# Patient Record
Sex: FEMALE | Race: WHITE | NOT HISPANIC OR LATINO | Employment: OTHER | URBAN - METROPOLITAN AREA
[De-identification: names, ages, dates, MRNs, and addresses within clinical notes are randomized per-mention and may not be internally consistent; named-entity substitution may affect disease eponyms.]

---

## 2019-09-27 ENCOUNTER — RECORDS - HEALTHEAST (OUTPATIENT)
Dept: LAB | Facility: CLINIC | Age: 39
End: 2019-09-27

## 2019-10-02 LAB
GAMMA INTERFERON BACKGROUND BLD IA-ACNC: 0.01 IU/ML
M TB IFN-G BLD-IMP: NEGATIVE
MITOGEN IGNF BCKGRD COR BLD-ACNC: 0 IU/ML
MITOGEN IGNF BCKGRD COR BLD-ACNC: 0 IU/ML
QTF INTERPRETATION: NORMAL
QTF MITOGEN - NIL: >10 IU/ML

## 2020-07-16 ENCOUNTER — TRANSFERRED RECORDS (OUTPATIENT)
Dept: HEALTH INFORMATION MANAGEMENT | Facility: CLINIC | Age: 40
End: 2020-07-16

## 2020-07-19 ENCOUNTER — TRANSFERRED RECORDS (OUTPATIENT)
Dept: HEALTH INFORMATION MANAGEMENT | Facility: CLINIC | Age: 40
End: 2020-07-19

## 2020-07-19 LAB
CREAT SERPL-MCNC: 2.32 MG/DL (ref 0.55–1.02)
GFR SERPL CREATININE-BSD FRML MDRD: 26 ML/MIN/1.73M2
GLUCOSE SERPL-MCNC: 98 MG/DL (ref 70–100)
POTASSIUM SERPL-SCNC: 3.6 MMOL/L (ref 3.5–5.1)

## 2020-09-03 ENCOUNTER — TRANSFERRED RECORDS (OUTPATIENT)
Dept: HEALTH INFORMATION MANAGEMENT | Facility: CLINIC | Age: 40
End: 2020-09-03

## 2020-09-03 ENCOUNTER — MEDICAL CORRESPONDENCE (OUTPATIENT)
Dept: HEALTH INFORMATION MANAGEMENT | Facility: CLINIC | Age: 40
End: 2020-09-03

## 2020-09-10 ENCOUNTER — REFERRAL (OUTPATIENT)
Dept: TRANSPLANT | Facility: CLINIC | Age: 40
End: 2020-09-10

## 2020-09-10 DIAGNOSIS — N18.9 CHRONIC RENAL FAILURE: ICD-10-CM

## 2020-09-10 DIAGNOSIS — N18.6 ESRD (END STAGE RENAL DISEASE) (H): Primary | ICD-10-CM

## 2020-09-10 DIAGNOSIS — Q60.5 CONGENITAL RENAL AGENESIS AND DYSGENESIS: ICD-10-CM

## 2020-09-10 DIAGNOSIS — Z76.82 ORGAN TRANSPLANT CANDIDATE: ICD-10-CM

## 2020-09-10 DIAGNOSIS — I10 ESSENTIAL HYPERTENSION: ICD-10-CM

## 2020-09-10 DIAGNOSIS — Z01.818 PRE-TRANSPLANT EVALUATION FOR KIDNEY TRANSPLANT: ICD-10-CM

## 2020-09-10 DIAGNOSIS — E78.5 HYPERLIPIDEMIA: ICD-10-CM

## 2020-09-10 DIAGNOSIS — Q60.2 CONGENITAL RENAL AGENESIS AND DYSGENESIS: ICD-10-CM

## 2020-09-10 NOTE — LETTER
September 25, 2020    Ariane Flores  Lot 11  1971 [address] [place] [address]    Dear Ariane,    Thank you for your interest in the Transplant Center at [facility], [facility]. We look forward to being a part of your care team and assisting you through the transplant process.    As we discussed, your transplant coordinator is Ann Solomon (Kidney).  You may call your coordinator at any time with questions or concerns call [phone].    Please complete the following.    1. Fill out and return the enclosed forms    Authorization for Electronic Communication    Authorization to Discuss Protected Health Information    Authorization for Release of Protected Health Information    Authorization for Care Everywhere Release of Information    2. Sign up for:    Properati, access to your electronic medical record (see enclosed pamphlet)    Mobile [email or web address], a transplant education website    You can use these tools to learn more about your transplant, communicate with your care team, and track your medical details    Sincerely,  Solid Organ Transplant  [facility], [facility], [facility]    cc: Dwayne Pandya MD(PCP); Rachael Tenorio

## 2020-09-17 VITALS — HEIGHT: 59 IN | BODY MASS INDEX: 34.27 KG/M2 | WEIGHT: 170 LBS

## 2020-09-17 PROBLEM — N17.9 AKI (ACUTE KIDNEY INJURY) (H): Status: ACTIVE | Noted: 2020-07-17

## 2020-09-17 PROBLEM — N25.81 HYPERPARATHYROIDISM, SECONDARY RENAL (H): Status: ACTIVE | Noted: 2019-10-13

## 2020-09-17 PROBLEM — J02.0 STREP PHARYNGITIS: Status: ACTIVE | Noted: 2020-07-17

## 2020-09-17 ASSESSMENT — MIFFLIN-ST. JEOR: SCORE: 1346.74

## 2020-09-17 NOTE — TELEPHONE ENCOUNTER
PCP: Dr. Dwayne Pandya   Referring Provider: Dr. Rachael Tenorio   Referring Diagnosis: One Kidney/CKD    Is patient under the age of 65? Yes  Is patient diabetic? No  Is patient on insulin? No  Was patient offered a pancreas transplant referral? No    Is patient in a group home/assisted living? No  Does patient have a guardian? No    Referral intake process completed.  Patient is aware that after financial approval is received, medical records will be requested.   Patient confirmed for a callback from transplant coordinator on 9/29/2020. (within 2 weeks)  Tentative evaluation date 11/5/2020. (within 4 weeks)    Confirmed coordinator will discuss evaluation process in more detail at the time of their call.   Patient is aware of the need to arrange age appropriate cancer screening, vaccinations, and dental care.  Reminded patient to complete questionnaire, complete medical records release, and review packet prior to evaluation visit .  Assessed patient for special needs (ie--wheelchair, assistance, guardian, and ):  No  Patient instructed to call [phone] with questions.     YAJAIRA Unger, LPN   Solid Organ Transplant

## 2020-09-29 NOTE — TELEPHONE ENCOUNTER
Contacted patient and introduced myself as their Transplant Coordinator, also introduced the role of the Transplant Coordinator in the transplant process.  Explained the purpose of this call including reviewing next steps and answering questions.    Confirmed Referring Provider, Dialysis Center, and Primary Care Physician. Notified patient of the importance of continued communication with referring providers and primary care physicians.    Reviewed components of transplant evaluation process including necessary appointments, tests, and procedures.    Answered questions for patient regarding evaluation, provided my name and contact information and requested they call with any additional questions.    Determined that patient would like additional information regarding transplant by:     Drop Down choices: Mail, Email, MyChart, Phone Call   Encourage MyChart   Notified patients that they will hear from a Transplant  to schedule evaluation.     Reviewed medical records and interviewed the patient. CKD with GFR range of 19 to 26. 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 No kidney biopsy. She has a history of HTN, hyperlipidemia, UTI's, migraines and genital warts. Other surgeries include achilles tendon repair, appendectomy, hernia repair and ovarian cystectomy. She does not remember anything about an ovarian cystectomy and has no idea when this could have been done. Never received blood, no smoking, no etoh, and no recreational drugs. She lives with her SO and daughter. BMI 34.5. She is independent in her ADL's. She has possible living donors. She is due for mammogram, PAP and dental. Records acceptable to proceed with pre-kidney evaluation.    We talked about the evaluation day and she has a save the date for 11/5/2020 and would prefer an in person evaluation. She will arrive at 0700 so she can watch the MTP videos in clinic. Reviewed the list of providers she will see and their  roles. Reviewed the goals of an evaluation and the approval process. She is aware her next contact will be from scheduling. Provided her with my contact information.     Smart set orders placed in Epic and routed to scheduling.

## 2020-10-12 NOTE — TELEPHONE ENCOUNTER
Called patient to schedule P[facility] clinic.  Reviewed equipment needed for virtual visits. Patient states she does not have equipment for virtual visit and would like to schedule in person. Patient declined MyChart and requested we send info via mail re: appts.  Informed patient we will mail info for eval day and coordinator will call week prior to review education.

## 2020-10-15 ENCOUNTER — TRANSFERRED RECORDS (OUTPATIENT)
Dept: HEALTH INFORMATION MANAGEMENT | Facility: CLINIC | Age: 40
End: 2020-10-15

## 2020-10-15 ENCOUNTER — RECORDS - HEALTHEAST (OUTPATIENT)
Dept: LAB | Facility: CLINIC | Age: 40
End: 2020-10-15

## 2020-10-15 LAB — HIV 1+2 AB+HIV1 P24 AG SERPL QL IA: NEGATIVE

## 2020-10-16 LAB — HCV AB SERPL QL IA: NEGATIVE

## 2020-10-20 ENCOUNTER — TRANSFERRED RECORDS (OUTPATIENT)
Dept: HEALTH INFORMATION MANAGEMENT | Facility: CLINIC | Age: 40
End: 2020-10-20

## 2020-10-20 LAB
HPV ABSTRACT: NORMAL
PAP-ABSTRACT: NORMAL

## 2020-10-22 ENCOUNTER — TRANSFERRED RECORDS (OUTPATIENT)
Dept: HEALTH INFORMATION MANAGEMENT | Facility: CLINIC | Age: 40
End: 2020-10-22

## 2020-11-05 ENCOUNTER — OFFICE VISIT (OUTPATIENT)
Dept: TRANSPLANT | Facility: CLINIC | Age: 40
End: 2020-11-05
Attending: INTERNAL MEDICINE
Payer: MEDICARE

## 2020-11-05 ENCOUNTER — ANCILLARY PROCEDURE (OUTPATIENT)
Dept: GENERAL RADIOLOGY | Facility: CLINIC | Age: 40
End: 2020-11-05
Payer: MEDICARE

## 2020-11-05 ENCOUNTER — DOCUMENTATION ONLY (OUTPATIENT)
Dept: TRANSPLANT | Facility: CLINIC | Age: 40
End: 2020-11-05

## 2020-11-05 VITALS
HEART RATE: 72 BPM | OXYGEN SATURATION: 99 % | WEIGHT: 172.1 LBS | HEIGHT: 59 IN | DIASTOLIC BLOOD PRESSURE: 86 MMHG | SYSTOLIC BLOOD PRESSURE: 138 MMHG | BODY MASS INDEX: 34.69 KG/M2

## 2020-11-05 DIAGNOSIS — Q60.2 CONGENITAL RENAL AGENESIS AND DYSGENESIS: ICD-10-CM

## 2020-11-05 DIAGNOSIS — N18.6 ESRD (END STAGE RENAL DISEASE) (H): ICD-10-CM

## 2020-11-05 DIAGNOSIS — Z01.818 PRE-TRANSPLANT EVALUATION FOR KIDNEY TRANSPLANT: ICD-10-CM

## 2020-11-05 DIAGNOSIS — I10 ESSENTIAL HYPERTENSION: ICD-10-CM

## 2020-11-05 DIAGNOSIS — Z11.59 ENCOUNTER FOR SCREENING FOR OTHER VIRAL DISEASES: ICD-10-CM

## 2020-11-05 DIAGNOSIS — N18.4 CKD (CHRONIC KIDNEY DISEASE) STAGE 4, GFR 15-29 ML/MIN (H): ICD-10-CM

## 2020-11-05 DIAGNOSIS — E78.5 HYPERLIPIDEMIA: ICD-10-CM

## 2020-11-05 DIAGNOSIS — Q60.5 CONGENITAL RENAL AGENESIS AND DYSGENESIS: ICD-10-CM

## 2020-11-05 DIAGNOSIS — N18.9 CHRONIC RENAL FAILURE: ICD-10-CM

## 2020-11-05 DIAGNOSIS — Z76.82 ORGAN TRANSPLANT CANDIDATE: ICD-10-CM

## 2020-11-05 DIAGNOSIS — Z01.818 PRE-TRANSPLANT EVALUATION FOR KIDNEY TRANSPLANT: Primary | ICD-10-CM

## 2020-11-05 DIAGNOSIS — N18.32 CHRONIC RENAL FAILURE, STAGE 3B (H): ICD-10-CM

## 2020-11-05 DIAGNOSIS — Z76.82 ORGAN TRANSPLANT CANDIDATE: Primary | ICD-10-CM

## 2020-11-05 DIAGNOSIS — I10 ESSENTIAL (PRIMARY) HYPERTENSION: ICD-10-CM

## 2020-11-05 DIAGNOSIS — E78.00 PURE HYPERCHOLESTEROLEMIA: ICD-10-CM

## 2020-11-05 LAB
ABO + RH BLD: NORMAL
ALBUMIN SERPL-MCNC: 3.4 G/DL (ref 3.4–5)
ALBUMIN UR-MCNC: 100 MG/DL
ALP SERPL-CCNC: 84 U/L (ref 40–150)
ALT SERPL W P-5'-P-CCNC: 24 U/L (ref 0–50)
ANION GAP SERPL CALCULATED.3IONS-SCNC: 5 MMOL/L (ref 3–14)
APPEARANCE UR: CLEAR
APTT PPP: 32 SEC (ref 22–37)
AST SERPL W P-5'-P-CCNC: 16 U/L (ref 0–45)
BACTERIA #/AREA URNS HPF: ABNORMAL /HPF
BASOPHILS # BLD AUTO: 0 10E9/L (ref 0–0.2)
BASOPHILS NFR BLD AUTO: 0.5 %
BILIRUB SERPL-MCNC: 0.5 MG/DL (ref 0.2–1.3)
BILIRUB UR QL STRIP: NEGATIVE
BLD GP AB SCN SERPL QL: NORMAL
BLOOD BANK CMNT PATIENT-IMP: NORMAL
BLOOD BANK CMNT PATIENT-IMP: NORMAL
BUN SERPL-MCNC: 26 MG/DL (ref 7–30)
CALCIUM SERPL-MCNC: 8.7 MG/DL (ref 8.5–10.1)
CHLORIDE SERPL-SCNC: 109 MMOL/L (ref 94–109)
CO2 SERPL-SCNC: 26 MMOL/L (ref 20–32)
COLOR UR AUTO: ABNORMAL
CREAT SERPL-MCNC: 2.5 MG/DL (ref 0.52–1.04)
DIFFERENTIAL METHOD BLD: NORMAL
EOSINOPHIL # BLD AUTO: 0.2 10E9/L (ref 0–0.7)
EOSINOPHIL NFR BLD AUTO: 2.7 %
ERYTHROCYTE [DISTWIDTH] IN BLOOD BY AUTOMATED COUNT: 12.4 % (ref 10–15)
GFR SERPL CREATININE-BSD FRML MDRD: 23 ML/MIN/{1.73_M2}
GLUCOSE SERPL-MCNC: 89 MG/DL (ref 70–99)
GLUCOSE UR STRIP-MCNC: NEGATIVE MG/DL
HCG SERPL QL: NEGATIVE
HCT VFR BLD AUTO: 40.2 % (ref 35–47)
HGB BLD-MCNC: 12.9 G/DL (ref 11.7–15.7)
HGB UR QL STRIP: NEGATIVE
IMM GRANULOCYTES # BLD: 0 10E9/L (ref 0–0.4)
IMM GRANULOCYTES NFR BLD: 0.2 %
INR PPP: 0.96 (ref 0.86–1.14)
KETONES UR STRIP-MCNC: NEGATIVE MG/DL
LEUKOCYTE ESTERASE UR QL STRIP: NEGATIVE
LYMPHOCYTES # BLD AUTO: 2.3 10E9/L (ref 0.8–5.3)
LYMPHOCYTES NFR BLD AUTO: 27 %
MCH RBC QN AUTO: 29.6 PG (ref 26.5–33)
MCHC RBC AUTO-ENTMCNC: 32.1 G/DL (ref 31.5–36.5)
MCV RBC AUTO: 92 FL (ref 78–100)
MONOCYTES # BLD AUTO: 0.8 10E9/L (ref 0–1.3)
MONOCYTES NFR BLD AUTO: 9.2 %
NEUTROPHILS # BLD AUTO: 5.1 10E9/L (ref 1.6–8.3)
NEUTROPHILS NFR BLD AUTO: 60.4 %
NITRATE UR QL: NEGATIVE
NRBC # BLD AUTO: 0 10*3/UL
NRBC BLD AUTO-RTO: 0 /100
PH UR STRIP: 6 PH (ref 5–7)
PLATELET # BLD AUTO: 335 10E9/L (ref 150–450)
POTASSIUM SERPL-SCNC: 3.6 MMOL/L (ref 3.4–5.3)
PROT SERPL-MCNC: 7.4 G/DL (ref 6.8–8.8)
RBC # BLD AUTO: 4.36 10E12/L (ref 3.8–5.2)
RBC #/AREA URNS AUTO: 2 /HPF (ref 0–2)
SODIUM SERPL-SCNC: 140 MMOL/L (ref 133–144)
SOURCE: ABNORMAL
SP GR UR STRIP: 1.01 (ref 1–1.03)
SPECIMEN EXP DATE BLD: NORMAL
SPECIMEN EXP DATE BLD: NORMAL
SQUAMOUS #/AREA URNS AUTO: 10 /HPF (ref 0–1)
UROBILINOGEN UR STRIP-MCNC: 0 MG/DL (ref 0–2)
WBC # BLD AUTO: 8.4 10E9/L (ref 4–11)
WBC #/AREA URNS AUTO: 5 /HPF (ref 0–5)

## 2020-11-05 PROCEDURE — 99204 OFFICE O/P NEW MOD 45 MIN: CPT | Mod: 95 | Performed by: TRANSPLANT SURGERY

## 2020-11-05 PROCEDURE — 99205 OFFICE O/P NEW HI 60 MIN: CPT

## 2020-11-05 PROCEDURE — 85610 PROTHROMBIN TIME: CPT | Performed by: PATHOLOGY

## 2020-11-05 PROCEDURE — 36415 COLL VENOUS BLD VENIPUNCTURE: CPT | Performed by: PATHOLOGY

## 2020-11-05 PROCEDURE — 81240 F2 GENE: CPT | Mod: GZ | Performed by: PATHOLOGY

## 2020-11-05 PROCEDURE — 86644 CMV ANTIBODY: CPT | Performed by: PATHOLOGY

## 2020-11-05 PROCEDURE — 86592 SYPHILIS TEST NON-TREP QUAL: CPT | Performed by: PATHOLOGY

## 2020-11-05 PROCEDURE — 85390 FIBRINOLYSINS SCREEN I&R: CPT | Performed by: PATHOLOGY

## 2020-11-05 PROCEDURE — 85025 COMPLETE CBC W/AUTO DIFF WBC: CPT | Performed by: PATHOLOGY

## 2020-11-05 PROCEDURE — 81241 F5 GENE: CPT | Performed by: PATHOLOGY

## 2020-11-05 PROCEDURE — 86481 TB AG RESPONSE T-CELL SUSP: CPT | Performed by: PATHOLOGY

## 2020-11-05 PROCEDURE — 84703 CHORIONIC GONADOTROPIN ASSAY: CPT | Performed by: PATHOLOGY

## 2020-11-05 PROCEDURE — 86803 HEPATITIS C AB TEST: CPT | Performed by: PATHOLOGY

## 2020-11-05 PROCEDURE — 99000 SPECIMEN HANDLING OFFICE-LAB: CPT | Performed by: PATHOLOGY

## 2020-11-05 PROCEDURE — 85670 THROMBIN TIME PLASMA: CPT | Performed by: PATHOLOGY

## 2020-11-05 PROCEDURE — 80053 COMPREHEN METABOLIC PANEL: CPT | Performed by: PATHOLOGY

## 2020-11-05 PROCEDURE — 86850 RBC ANTIBODY SCREEN: CPT | Performed by: PATHOLOGY

## 2020-11-05 PROCEDURE — 36415 COLL VENOUS BLD VENIPUNCTURE: CPT

## 2020-11-05 PROCEDURE — 86665 EPSTEIN-BARR CAPSID VCA: CPT | Performed by: PATHOLOGY

## 2020-11-05 PROCEDURE — 86706 HEP B SURFACE ANTIBODY: CPT | Performed by: PATHOLOGY

## 2020-11-05 PROCEDURE — 86147 CARDIOLIPIN ANTIBODY EA IG: CPT | Performed by: PATHOLOGY

## 2020-11-05 PROCEDURE — 86900 BLOOD TYPING SEROLOGIC ABO: CPT | Performed by: PATHOLOGY

## 2020-11-05 PROCEDURE — 86780 TREPONEMA PALLIDUM: CPT | Performed by: PATHOLOGY

## 2020-11-05 PROCEDURE — 999N001110 HC STATISTIC HIV 1/2 ANTIGEN/ANTIBODY PRETRANSPLANT ONLY: Performed by: PATHOLOGY

## 2020-11-05 PROCEDURE — 85730 THROMBOPLASTIN TIME PARTIAL: CPT | Performed by: PATHOLOGY

## 2020-11-05 PROCEDURE — 85613 RUSSELL VIPER VENOM DILUTED: CPT | Performed by: PATHOLOGY

## 2020-11-05 PROCEDURE — 86886 COOMBS TEST INDIRECT TITER: CPT | Performed by: PATHOLOGY

## 2020-11-05 PROCEDURE — G0452 MOLECULAR PATHOLOGY INTERPR: HCPCS | Performed by: PATHOLOGY

## 2020-11-05 PROCEDURE — 81001 URINALYSIS AUTO W/SCOPE: CPT | Performed by: PATHOLOGY

## 2020-11-05 PROCEDURE — 86901 BLOOD TYPING SEROLOGIC RH(D): CPT | Performed by: PATHOLOGY

## 2020-11-05 PROCEDURE — 71046 X-RAY EXAM CHEST 2 VIEWS: CPT | Performed by: RADIOLOGY

## 2020-11-05 PROCEDURE — 85730 THROMBOPLASTIN TIME PARTIAL: CPT | Mod: 91 | Performed by: PATHOLOGY

## 2020-11-05 PROCEDURE — 87340 HEPATITIS B SURFACE AG IA: CPT | Performed by: PATHOLOGY

## 2020-11-05 PROCEDURE — 86787 VARICELLA-ZOSTER ANTIBODY: CPT | Performed by: PATHOLOGY

## 2020-11-05 PROCEDURE — 86704 HEP B CORE ANTIBODY TOTAL: CPT | Performed by: PATHOLOGY

## 2020-11-05 PROCEDURE — 86905 BLOOD TYPING RBC ANTIGENS: CPT | Performed by: PATHOLOGY

## 2020-11-05 RX ORDER — FERROUS SULFATE 325(65) MG
325 TABLET ORAL
COMMUNITY

## 2020-11-05 RX ORDER — ATORVASTATIN CALCIUM 10 MG/1
10 TABLET, FILM COATED ORAL DAILY
COMMUNITY

## 2020-11-05 ASSESSMENT — MIFFLIN-ST. JEOR: SCORE: 1363.39

## 2020-11-05 NOTE — LETTER
11/5/2020         RE: Ariane Flores  Lot [address]  1971 [address] [place] [address]        Dear Colleague,    Thank you for referring your patient, Ariane Flores, to the [facility] TRANSPL[facility]. Please see a copy of my visit note below.    Transplant Surgery Consult Note     Medical record number: [id number]  YOB: 1980,   Consult requested  for evaluation of kidney transplant candidacy.    Assessment and Recommendations:Ms. Flores appears to be a good candidate for kidney transplantation and has a good understanding of the risks and benefits of this approach to the management of renal failure. The following issues should be addressed prior to finalizing her transplant candidacy:     Transplant order: Ms. 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  Recommend he be considered as a candidate for kidney trasnplant.  Cardiology consult for cardiac risk stratification to be ordered: needs echo, if echo is nl, does not need cards consult  Would recommend non-con MRI to confirm presence of normal vascular anatomy given etiology of her disease, and combination of anomalies that could contribute to syndrome; also doppler iliac US to make sure nl flow  Transplant listing labs ordered to include HLA, ABOx2, Cr, etc.  Dietician consult ordered: Yes  Social work consult ordered: Yes  Imaging reports reviewed:  Need to be done  Radiology images reviewed: CXR - clear, official report pending  PVR on Mark  Recommend [facility] given surgical history (which included ureteral re-implantation per family and right nephrectomy)  Need [facility]      The majority of our visit was spent in  counselling, discussing the medical and surgical risks of kidney transplantation. We discussed approximate wait time and how that is influenced by issues such as blood type and sensitization (PRA) and access to a living donor. I contrasted potential waiting time for living vs  donor kidneys from  normal (0-85%) or higher (%) kidney donor profile index (KDPI) donors and their associated outcomes. I would not recommend this individual to consider kidneys from high KDPI donors. The reason for this decision is best summarized as: improved long term graft survival. Potential surgical complications of kidney transplantation include bleeding, superficial or deep wound complications (infection, hernia, lymphocele), ureteral anastomotic failure (leak or stenosis), graft thrombosis, need for reoperation and other issues such as cardiac complications, pneumonia, deep venous thrombosis, pulmonary embolism, post transplant diabetes and death. The potential for recurrent disease or need for retransplantation was also addressed. We discussed the possible need for ureteral stent (and subsequent removal), and the utility of protocol biopsy and laboratory studies to evaluate for rejection or recurrent disease. We discussed the risk of graft rejection, our center's average graft and patient survival rates, immunosuppression protocols, as well as the potential opportunity to participate in clinical trials.  We also discussed the average length of stay, recovery process, and posttransplant lab and monitoring protocol.  I emphasized the need for strict immunosuppression medication adherence and the potential for complications of immunosuppression such as skin cancer or lymphoma, as well as a very low but not zero risk of donor-derived disease transmission risks (infection, cancer). Ms. Flores asked good questions and her candidacy will be reviewed at our Multidisciplinary Selection Committee. Thank you for the opportunity  to participate in Ms. Flores's care.      Total time: 45 minutes  Counselling time: 30 minutes    Attestation:  Patient has been seen and evaluated by me.   Vital signs, labs, medications and orders were reviewed.   When obtained, diagnostic images were reviewed by me and interpreted as above.    The care plan was discussed with the multidisciplinary team and I agree with the findings and plan in this note, with any differences recorded in blue.      George Marroquin MD    ---------------------------------------------------------------------------------------------------    HPI: Ms. Flores has Chronic renal failure due to congenital renal agenesis (may have had posterior ureteral valves - per discussion with patient and prior surgical history) and right nephrectomy. The patient is non-diabetic.       The patient is not on dialysis.    Has potential kidney donors:  Yes .  Interested in participation in paired exchange if a donor is willing: Yes     The patient has the following pertinent history:       No    Yes  Dialysis:    [x]      [] via:       Blood Transfusion                  [x]      []  Number of units:   Most recently:  Pregnancy:    []      [x] Number:       Previous Transplant:  [x]      [] Details:    Cancer    [x]      [] Comment:   Kidney stones   [x]      [] Comment:      Recurrent infections  [x]      []  Type:                  Bladder dysfunction  []      [x] Cause:    Claudication   [x]      [] Distance:    Previous Amputation  [x]      [] Cause:     Chronic anticoagulation  [x]      [] Indication:   Congregational  [x]      []      Past Medical History:   Diagnosis Date     CKD (chronic kidney disease)      Depressive disorder      Hyperlipidemia      Hypertension      Migraines      Renal agenesis      Tuberculosis      UTI (urinary tract infection)      Past Surgical History:   Procedure Laterality Date     ABDOMEN SURGERY      Nephrectomy      APPENDECTOMY       HC REPAIR ACHILLES  TENDON,PRIMARY       HERNIA REPAIR       No family history on file.  Social History     Socioeconomic History     Marital status: Single     Spouse name: Not on file     Number of children: Not on file     Years of education: Not on file     Highest education level: Not on file   Occupational History     Not on file   Social Needs     Financial resource strain: Not on file     Food insecurity     Worry: Not on file     Inability: Not on file     Transportation needs     Medical: Not on file     Non-medical: Not on file   Tobacco Use     Smoking status: Never Smoker     Smokeless tobacco: Never Used   Substance and Sexual Activity     Alcohol use: Not Currently     Drug use: Never     Sexual activity: Not on file   Lifestyle     Physical activity     Days per week: Not on file     Minutes per session: Not on file     Stress: Not on file   Relationships     Social connections     Talks on phone: Not on file     Gets together: Not on file     Attends Congregation service: Not on file     Active member of club or organization: Not on file     Attends meetings of clubs or organizations: Not on file     Relationship status: Not on file     Intimate partner violence     Fear of current or ex partner: Not on file     Emotionally abused: Not on file     Physically abused: Not on file     Forced sexual activity: Not on file   Other Topics Concern     Parent/sibling w/ CABG, MI or angioplasty before 65F 55M? Not Asked   Social History Narrative     Not on file       ROS:   CONSTITUTIONAL:  No fevers or chills  EYES: negative for icterus  ENT:  negative for hearing loss, tinnitus and sore throat  RESPIRATORY:  negative for cough, sputum, dyspnea  CARDIOVASCULAR:  negative for chest pain None  GASTROINTESTINAL:  negative for nausea, vomiting, diarrhea or constipation  GENITOURINARY:  negative for incontinence, dysuria, bladder emptying problems  HEME:  No easy bruising  INTEGUMENT:  negative for rash and pruritus  NEURO:  Negative  for headache, seizure disorder  Allergies:   Allergies   Allergen Reactions     Cephalosporins Other (See Comments)     Codeine Other (See Comments)     Diagnostic X-Ray Materials Other (See Comments)     Only has 1 kidney.  Only has 1 kidney.  Only has 1 kidney.       Nsaids Other (See Comments)     Kidney Damage. Have Only has one kidney      Medications:  Prescription Medications as of 11/5/2020       Rx Number Disp Refills Start End Last Dispensed Date Next Fill Date Owning Pharmacy    atorvastatin (LIPITOR) 20 MG tablet            Sig: Take 40 mg by mouth daily    Class: Historical    Route: Oral    cholecalciferol 25 MCG (1000 UT) TABS            Sig: Take 1,000 Units by mouth    Class: Historical    Route: Oral    ferrous sulfate (FEROSUL) 325 (65 Fe) MG tablet            Sig: Take 325 mg by mouth    Class: Historical    Route: Oral        Exam:     There were no vitals taken for this visit.  Appearance: in no apparent distress.   Skin: normal  Eyes:  no redness or discharge.  Sclera anicteric  Head and Neck: Normal, no rashes or jaundice  Respiratory: easy respirations, no audible wheezing.  Abdomen: rounded, No distention   Extremeties: femoral pulses - unable to palpate  Neuro: without deficit   Psychiatric: Normal mood and affect    Diagnostics:   No results found for this or any previous visit (from the past 672 hour(s)).  No results found for: CPRA        Again, thank you for allowing me to participate in the care of your patient.        Sincerely,        YENNY

## 2020-11-05 NOTE — PROGRESS NOTES
TRANSPLANT NEPHROLOGY RECIPIENT EVALUATION NOTE    Assessment and Plan:  # Kidney Transplant Evaluation: Patient is a good candidate overall. Benefits of a living donor transplant were discussed.    # CKD from unclear etiology: history of possible reflux/obstruction and recurrent UTI as a child requiring multiple urologic procedures, last of which was right native nephrectomy at age 13. Will need to obtain [facility] [place] [facility] she had other urologic procedures. No recent UTI or need for self-catheterization in years. She has had a slow progression of her kidney disease over the years. She is not yet on dialysis, most recent eGFR around 23-26 ml/min, but was 17-19 ml/min earlier this summer, and when ready, she may benefit from a kidney transplant. Per surgery, will likely need further abdominal imaging, PVR, and VCUG.     # Cardiac Risk: she has no known history of cardiac disease or events and is asymptomatic with exertion. Will get EKG and ECHO. If normal, no need for cardiac risk assessment at this time.    # Positive Treponema Ab: reflex studies pending.    # Health Maintenance: PAP: Up to date and Dental: Up to date    Discussed the risks and benefits of a transplant, including the risk of surgery and immunosuppression medications.  Patient's overall evaluation will be discussed in the Transplant Program's regular meeting with a final recommendation on the patients suitability for transplant to be made at that time.  Patient was seen in conjunction with Dr. Jeovanny Alvarez as part of a shared visit.    Evaluation:  Ariane Flores was seen in consultation at the request of Dr. George Marroquin for evaluation as a potential kidney transplant recipient.    Reason for Visit:  Ariane Flores is a 40-year-old female with CKD from unclear etiology, who presents for kidney transplant evaluation.    History of Present Illness:         Kidney Disease Hx: Patient was born in [place] and mother  reports the following history as records from that time are not available- had seizures and what sounds like reflux/obstruction (wasn't urinating, large volume urine out with beach placement) within the first few months of life. Required urologic procedure at age 5 months (details unknown). Then was intermittently hospitalized over a 3 year timeframe due to recurrent UTI. Was requiring intermittent catheterization. They moved to [place] when patient was approximately 6-7 years old. She had a few more [facility] at [facility] time [facility] [place]. She ultimately underwent right native nephrectomy at age 13 years. She did relatively well for many years, but has had elevated creatinine and proteinuria since at least 2014, which has steadily worsened. Most recent eGFR low 20's, but did reach a qualifying eGFR earlier this summer. She has not had a UTI in 5+ years. No seizure in 35+ years. Of note, patient's daughter was born with seizures and urinary reflux, which was surgically corrected. Patient has not had genetic testing completed.        Kidney Disease Dx: as above       Biopsy Proven: No         On Dialysis: No       Primary Nephrologist: Dr. Tenorio       H/o Kidney Stones: No       H/o Recurrent/Frequent UTI: Yes          Diabetic Hx: None           Cardiac/Vascular Disease Risk Factors:        - None         Viral Serology Status       CMV IgG Antibody: Positive       EBV IgG Antibody: Positive         Volume Status/Weight:        Volume status: Euvolemic       Weight:  Acceptable BMI       BMI: Body mass index is 34.24 kg/m .         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 She does not otherwise exercise. She denies chest pain, SOB, or claudication symptoms.       Fatigue/Decreased  Energy: [x] No [] Yes    Chest Pain or SOB with Exertion: [x] No [] Yes    Significant Weight Change: [x] No [] Yes    Nausea, Vomiting or Diarrhea: [x] No [] Yes    Fever, Sweats or Chills:  [x] No [] Yes    Leg Swelling [x] No [] Yes        History of Cancer: None    Other Significant Medical Issues: None    Review of Systems:  A comprehensive review of systems was obtained and negative, except as noted in the HPI or PMH.    Past Medical History:   Medical record was reviewed and PMH was discussed with patient and noted below.  Past Medical History:   Diagnosis Date     CKD (chronic kidney disease) stage 4, GFR 15-29 ml/min (H)      Hyperlipidemia      Hypertension      Migraines      Recurrent UTI     in childhood       Past Social History:   Past Surgical History:   Procedure Laterality Date     APPENDECTOMY       HC REPAIR ACHILLES TENDON,PRIMARY       HERNIA REPAIR       NEPHRECTOMY Right     age 13     OTHER SURGICAL HISTORY      complete detail unknown, but required multiple urologic procedures before the age of 10 years     Personal history of bleeding or anesthesia problems: No    Family History:  Family History   Problem Relation Age of Onset     Kidney Disease Daughter        Personal History:   Social History     Socioeconomic History     Marital status: Single     Spouse name: Not on file     Number of children: Not on file     Years of education: Not on file     Highest education level: Not on file   Occupational History     Not on file   Social Needs     Financial resource strain: Not on file     Food insecurity     Worry: Not on file     Inability: Not on file     Transportation needs     Medical: Not on file     Non-medical: Not on file   Tobacco Use     Smoking status: Never Smoker     Smokeless tobacco: Never Used   Substance and Sexual Activity     Alcohol use: Not Currently     Drug use: Never     Sexual activity: Not on file   Lifestyle     Physical activity     Days per week: Not on file      "Minutes per session: Not on file     Stress: Not on file   Relationships     Social connections     Talks on phone: Not on file     Gets together: Not on file     Attends Faith service: Not on file     Active member of club or organization: Not on file     Attends meetings of clubs or organizations: Not on file     Relationship status: Not on file     Intimate partner violence     Fear of current or ex partner: Not on file     Emotionally abused: Not on file     Physically abused: Not on file     Forced sexual activity: Not on file   Other Topics Concern     Parent/sibling w/ CABG, MI or angioplasty before 65F 55M? Not Asked   Social History Narrative     Not on file       Allergies:  Allergies   Allergen Reactions     Cephalosporins Other (See Comments)     Codeine Other (See Comments)     Diagnostic X-Ray Materials Other (See Comments)     Only has 1 kidney.  Only has 1 kidney.  Only has 1 kidney.       Nsaids Other (See Comments)     Kidney Damage. Have Only has one kidney        Medications:  Current Outpatient Medications   Medication Sig     atorvastatin (LIPITOR) 20 MG tablet Take 40 mg by mouth daily     cholecalciferol 25 MCG (1000 UT) TABS Take 1,000 Units by mouth     ferrous sulfate (FEROSUL) 325 (65 Fe) MG tablet Take 325 mg by mouth     No current facility-administered medications for this visit.        Vitals:  /86   Pulse 72   Ht 1.51 m (4' 11.45\")   Wt 78.1 kg (172 lb 1.6 oz)   SpO2 99%   Breastfeeding No   BMI 34.24 kg/m      Exam:  GENERAL APPEARANCE: alert and no distress  HENT: mouth without ulcers or lesions  LYMPHATICS: no cervical or supraclavicular nodes  RESP: lungs clear to auscultation - no rales, rhonchi or wheezes  CV: regular rhythm, normal rate, no rub, no murmur  EDEMA: no LE edema bilaterally  ABDOMEN: soft, nondistended, nontender, bowel sounds normal  MS: extremities normal - no gross deformities noted, no evidence of inflammation in joints, no muscle " tenderness  SKIN: no rash    Results:   Recent Results (from the past 336 hour(s))   HLA Typing Complete SOT Recipient    Collection Time: 11/05/20 11:13 AM   Result Value Ref Range    ABTest Method SSOP     A* locus A*02     A* A*24     B* locus B*07     B* B*35     C* locus C*04     C* C*07     Bw-1 Bw*6     Drsso Test Method SSOP     DRB1* locus DRB1*01(103)     DRB1* DRB1*04     DRB4* locus DRB4*01     DQB1* locus DQB1*04     DQB1* DQB1*05     DQA1*locus DQA1*01     DQA1* DQA1*03     DPB1* DPB1*02:01     DPB1* NMDP CVENJ     DPB1*locus DPB1*04:02     DPB1* locus NMDP       CVAVZ   04:02/105:01/665:01/701:01/724:01N/726:01/730:01/731:01/734:01/735:01/759:  01/776:01/777:01N/809:01/817:01/823:01/858:01/881:01/885:01/887:01/925:01N  /927:01/933:01/936:01Q/958:01/981:01/985:01N/995:01N/1005:01/1020:01/1025:  01/1031:01/1035:01/1075:01/1083:01/1084:01N/1085:01      DPA1* DPA1*01:03     DPA1* NMDP       CUUFX   01:03/01:14/01:16/01:17/01:18/01:19/01:20/01:21Q/01:22/01:23/01:24/01:25/0  1:26/01:27/01:28/01:29N/01:30/01:31/01:32N     ABO type [[id number]]    Collection Time: 11/05/20 11:14 AM   Result Value Ref Range    ABO A     RH(D) Pos     Specimen Expires 11/08/2020    Varicella Zoster Virus Antibody IgG [[id number]]    Collection Time: 11/05/20 11:28 AM   Result Value Ref Range    Varicella Zoster Virus Antibody IgG 4.8 (H) 0.0 - 0.8 AI   Treponema Abs w Reflex to RPR and Titer [[id number]]    Collection Time: 11/05/20 11:28 AM   Result Value Ref Range    Treponema Antibodies Reactive (A) NR^Nonreactive   HIV Antigen Antibody Combo Pretransplant    Collection Time: 11/05/20 11:28 AM   Result Value Ref Range    HIV Antigen Antibody Combo Pretransplant Nonreactive NR^Nonreactive   Hepatitis C antibody [[id number]]    Collection Time: 11/05/20 11:28 AM   Result Value Ref Range    Hepatitis C Antibody Nonreactive NR^Nonreactive   Hepatitis B surface antigen [LXZ119]    Collection Time: 11/05/20 11:28 AM   Result Value Ref Range     Hep B Surface Agn Nonreactive NR^Nonreactive   Hepatitis B Surface Antibody [[id number]]    Collection Time: 11/05/20 11:28 AM   Result Value Ref Range    Hepatitis B Surface Antibody 149.92 (H) <8.00 m[IU]/mL   Hepatitis B core antibody [[id number]]    Collection Time: 11/05/20 11:28 AM   Result Value Ref Range    Hepatitis B Core Nga Nonreactive NR^Nonreactive   EBV Capsid Antibody IgG [[id number]]    Collection Time: 11/05/20 11:28 AM   Result Value Ref Range    EBV Capsid Antibody IgG >8.0 (H) 0.0 - 0.8 AI   CMV Antibody IgG [[id number]]    Collection Time: 11/05/20 11:28 AM   Result Value Ref Range    CMV Antibody IgG 4.2 (H) 0.0 - 0.8 AI   Cardiolipin Nga IgG and IgM [LAB 6836]    Collection Time: 11/05/20 11:28 AM   Result Value Ref Range    Cardiolipin Antibody IgG <1.6 0.0 - 19.9 GPL-U/mL    Cardiolipin Antibody IgM 0.4 0.0 - 19.9 MPL-U/mL   CBC with platelets differential [[id number]]    Collection Time: 11/05/20 11:28 AM   Result Value Ref Range    WBC 8.4 4.0 - 11.0 10e9/L    RBC Count 4.36 3.8 - 5.2 10e12/L    Hemoglobin 12.9 11.7 - 15.7 g/dL    Hematocrit 40.2 35.0 - 47.0 %    MCV 92 78 - 100 fl    MCH 29.6 26.5 - 33.0 pg    MCHC 32.1 31.5 - 36.5 g/dL    RDW 12.4 10.0 - 15.0 %    Platelet Count 335 150 - 450 10e9/L    Diff Method Automated Method     % Neutrophils 60.4 %    % Lymphocytes 27.0 %    % Monocytes 9.2 %    % Eosinophils 2.7 %    % Basophils 0.5 %    % Immature Granulocytes 0.2 %    Nucleated RBCs 0 0 /100    Absolute Neutrophil 5.1 1.6 - 8.3 10e9/L    Absolute Lymphocytes 2.3 0.8 - 5.3 10e9/L    Absolute Monocytes 0.8 0.0 - 1.3 10e9/L    Absolute Eosinophils 0.2 0.0 - 0.7 10e9/L    Absolute Basophils 0.0 0.0 - 0.2 10e9/L    Abs Immature Granulocytes 0.0 0 - 0.4 10e9/L    Absolute Nucleated RBC 0.0    Lupus Anticoagulant Panel ([id number])    Collection Time: 11/05/20 11:28 AM   Result Value Ref Range    Lupus Result Negative NEG^Negative   Thrombin time [IKE847]    Collection Time: 11/05/20 11:28 AM   Result  Value Ref Range    Thrombin Time 16.5 13.0 - 19.0 sec   Partial thromboplastin time [LAB56]    Collection Time: 11/05/20 11:28 AM   Result Value Ref Range    PTT 32 22 - 37 sec   INR [NHG2312]    Collection Time: 11/05/20 11:28 AM   Result Value Ref Range    INR 0.96 0.86 - 1.14   Quantiferon TB Gold Plus    Collection Time: 11/05/20 11:28 AM    Specimen: Blood   Result Value Ref Range    MTB Quantiferon Result Negative NEG^Negative    TB1 Ag minus Nil 0.04 IU/mL    TB2 Ag minus Nil 0.04 IU/mL    Mitogen minus Nil 9.96 IU/mL    NIL Result 0.04 IU/mL   HCG qualitative [IDC377]    Collection Time: 11/05/20 11:28 AM   Result Value Ref Range    HCG Qualitative Serum Negative NEG^Negative   Comprehensive metabolic panel [LAB17]    Collection Time: 11/05/20 11:28 AM   Result Value Ref Range    Sodium 140 133 - 144 mmol/L    Potassium 3.6 3.4 - 5.3 mmol/L    Chloride 109 94 - 109 mmol/L    Carbon Dioxide 26 20 - 32 mmol/L    Anion Gap 5 3 - 14 mmol/L    Glucose 89 70 - 99 mg/dL    Urea Nitrogen 26 7 - 30 mg/dL    Creatinine 2.50 (H) 0.52 - 1.04 mg/dL    GFR Estimate 23 (L) >60 mL/min/[1.73_m2]    GFR Estimate If Black 27 (L) >60 mL/min/[1.73_m2]    Calcium 8.7 8.5 - 10.1 mg/dL    Bilirubin Total 0.5 0.2 - 1.3 mg/dL    Albumin 3.4 3.4 - 5.0 g/dL    Protein Total 7.4 6.8 - 8.8 g/dL    Alkaline Phosphatase 84 40 - 150 U/L    ALT 24 0 - 50 U/L    AST 16 0 - 45 U/L   ABO Subtyping [[id number]]    Collection Time: 11/05/20 11:28 AM   Result Value Ref Range    Antigen Type A1 Positive     ABO/Rh type and screen [MBB407]    Collection Time: 11/05/20 11:28 AM   Result Value Ref Range    ABO A     RH(D) Pos     Antibody Screen Neg     Test Valid Only At          [facility]    Specimen Expires 11/08/2020    Rapid Plasma Reagin w Rflx to TITER    Collection Time: 11/05/20 11:28 AM   Result Value Ref Range    Rapid Plasma Reagin Nonreactive NR^Nonreactive   Treponema pallidum antibody confirm     Collection Time: 11/05/20 11:28 AM   Result Value Ref Range    T Pallidum by TP-PA conf Non Reactive Non Reactive   Antibody titer red cell [KKF2530]    Collection Time: 11/05/20 11:30 AM   Result Value Ref Range    Antibody Titer Anti B IgM 32, IgG 16    Factor 2 and 5 mutation analysis    Collection Time: 11/05/20 11:37 AM   Result Value Ref Range    Copath Report       Patient Name: AMANDA CARABALLO  MR#: [id number]  Specimen #: [id number]  Collected: 11/5/2020 11:37  Received: 11/6/2020 08:53  Reported: 11/9/2020 16:39  Ordering Phy(s): ABDIRIZAK SELBY  Additional Phy(s): MELISA HERNANDEZ  Copy To:  Kim    For improved result formatting, select 'View Enhanced Report Format' under   Linked Documents section.  _________________________________________    TEST(S) REQUESTED:  Factor 5 Leiden and Factor 2 by PCR    SPECIMEN DESCRIPTION:  Blood    RESULTS:    Factor V 1691G>A (Leiden)  RESULTS:  Mutation analyzed:     1691G>A  Factor V 1691G>A (Leiden)  Interpretation:      ABSENT  Factor V 1691G>A (Leiden) mutation  genotype:      G/G    FACTOR 2/PROTHROMBIN RESULTS:  Mutation analyzed:     88433D>A  Factor 2 Mutation Interpretation:      PRESENT  Factor 2 Mutation genotype:      G/A    INTERPRETATION:  The patient is negative for the Factor V 1691G>A (Leiden) mutation.    The patient is a heterozygote (one copy of the gene is positive) for the   Factor 2  mutation.    The presence of the Factor 2 mutation is an indication of increased risk   of developing a thrombosis.  Consultation regarding these results is available upon request.    METHODOLOGY:   The regions of genomic DNA containing the G2104K Factor V   Leiden gene mutation (Factor V  Leiden) and the Factor 2(Prothrombin L32275Z) gene mutation were   simultaneously amplified using the polymerase  chain reaction.  The amplified products were digested with restriction   endonuclease TaqI and products were  analyzed by gel electrophoresis.    COMMENTS:  If a  patient is the recipient of an allogeneic bone marrow transplant,   this test must be done on a  pre-transplant sample or buccal swab.  A previous allogeneic bone marrow   transplant will interfere with test  results.  Call the [facility]([phone]) for   instructions on sample collection for these  patients.    This test was developed and its performance characteristics determined by   [facility]  [facility]. It has not been cleared or approved by the FDA.   The laboratory is regulated under CLIA  as qualified to perform high-complexity testing. This test is used for   clinical purposes. It should not be  regarded as investigational or for research.    A resident/fellow in an accredited training program was involved in the   selection of testing, review of  laboratory data, and/or interpretation of this case.  I, as the senior   physician, attest that I: (i) confirmed  appropriate testing, (ii) examined the relevant raw data for the   specimen(s); and (iii) rendered or confirmed  the interpretation(s).    Electronically Signed Out By:  Viktor Rogers MD    CPT Codes:  A: 94126-K5GGVR, 76032-D2OBBM, -SRVJVY(2)    TESTING LAB LOCATION:  [address] [address] [phone]    COLLECTION SITE:  [facility]  [facility]  [facility]     UA with Microscopic reflex to Culture    Collection Time: 11/05/20 11:50 AM    Specimen: Midstream Urine   Result Value Ref Range    Color Urine Straw     Appearance Urine Clear     Glucose Urine Negative NEG^Negative mg/dL    Bilirubin Urine Negative NEG^Negative    Ketones Urine Negative NEG^Negative mg/dL    Specific Gravity Urine 1.006 1.003 - 1.035    Blood Urine Negative NEG^Negative    pH Urine 6.0 5.0 - 7.0 pH    Protein Albumin Urine 100 (A) NEG^Negative mg/dL    Urobilinogen mg/dL 0.0 0.0 - 2.0  mg/dL    Nitrite Urine Negative NEG^Negative    Leukocyte Esterase Urine Negative NEG^Negative    Source Midstream Urine     WBC Urine 5 0 - 5 /HPF    RBC Urine 2 0 - 2 /HPF    Bacteria Urine Few (A) NEG^Negative /HPF    Squamous Epithelial /HPF Urine 10 (H) 0 - 1 /HPF   EKG 12-lead, tracing only [EKG1]    Collection Time: 11/05/20 11:53 AM   Result Value Ref Range    Interpretation ECG Click View Image link to view waveform and result        Patient was seen by myself, Dr. Jeovanny Alvarez, in conjunction with Adowa Whiteside PA-C as part of a shared visit.    I personally reviewed past medical and surgical history, vital signs, medications and labs.  Present and past medical history, along with significant physical exam findings were all reviewed with ALISHA.    My key findings:  Ariane Flores is 40 year old, who presents for Kidney transplant evaluation.    Key management decisions made by me and discussed with ALISHA:    1.  Advanced CKD not yet on dialysis.  2.  Transplant candidacy evaluation.  3.  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  4.  Concern for complex psychosocial situation due to possible learning disability.  5.  Need to update age-appropriate cancer screening.  Discussion:  Ms. Flores is a delightful 40-year-old lady was accompanied by her mother for kidney transplant evaluation.  She is not yet on dialysis.  Her original disease is related to her urinary tract malformation.  It is unclear what procedure she had over the years and whether she had bladder augmentation or not.  We discussed the need to obtain VCUG today assess bladder capacity and reflux.  From cardiovascular standpoint, she will need an echo and EKG.  Her age-appropriate cancer screening will include Pap smears and regular mammograms.  Await formal psychosocial assessment likely will benefit from neurobehavioral assessment.  Of note, she appears to be compliant with her medications but the  concern is if she will be able to keep up with frequent changes in medications and tight follow-up especially early on post engraftment with many moving pieces.  We will discuss Ms. Flores's case in our multidisciplinary meeting for final candidacy decision.

## 2020-11-05 NOTE — PROGRESS NOTES
Psychosocial Assessment  Patient Name/ Age: Ariane Flores 40 year old   Medical Record #: [id number]  Duration of Interview:     30min  Process:   Face-to-Face Interview                (counseling < 50%)   Present at Appointment: Ariane and her mother Hilda        :GERMAN Villalpando, LICSW Date:  November 5, 2020        Type of transplant: Kidney    Donor type:   Hilda indicated she is willing to be a donor for her daughter.   Cadaver and parent   Prior Transplants:    No Status of Transplant:       Current Living Situation    [facility] 1971 [facility] [address]  With Whom: Significant other Denis and Ariane's daughter Lisa (17)       Family/ Social Support:    Hilda lives in [place], [place].  Ariane has one brother ([place]).   Available, helpful   Committed relationship:  Ariane and Denis have been in a relationship for 10 years.   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      Income   Salary/wages and SSDI   Insurance  Medicare [employer], [employer], Part D through [employer]    At this time, patient can afford medication costs:  Yes  Medicare and MA       Medical Status    Current mode of treatment for ESRD None   Complications - Non diabetic        Behavioral    Tobacco Use No Chemical Dependency No       Psychiatric Impairment No    Reading ability Good  Education Level: Some College Recent Legal History No    Coping Style/Strategies: Ariane indicated she does not know have stress.     Ability to Adhere to Complex Medical Regime: Yes     Adherence History:  Ariane indicated she will usually follow her physician's recommendations.         Education  _X_ Medicare  _X_ Rehabilitation  _X_ Donor issues  _X_ Community resources  _X_ Post discharge housing  _X_ Financial resources  _X_ Medical insurance options  _X_ Psych adjustment  _X_ Family adjustment  _X_ Health Care Directive - Provided Education and is working on completed it. Psychosocial Risks of Transplant Reviewed and Discussed:  _X_ Increased stress related to emotional,            family, social, employment or financial           situation  _X_ Affect on work and/or disability benefits  _X_ Affect on future life insurance  _X_ Transplant outcome expectations may           not be met  _X_ Mental Health Risks: anxiety,           depression, PTSD, guilt, grief and           chronic fatigue     Notable Items:   None noted.       Final Evaluation/Assessment   Patient seemed to process information well. Appeared well informed, motivated and able to follow post transplant requirements. Behavior was appropriate during interview. Has adequate income and insurance coverage. Adequate social support. No major contraindications noted for transplant.  At this time patient appears to understand the risks and benefits of transplant.      Recommendation  Acceptable    Selection Criteria Met:  Plan for support Yes   Chemical Dependence Yes   Smoking Yes   Mental Health Yes   Adequate Finances Yes    Signature: GERMAN Villalpando, [facility]   Title: Clinical

## 2020-11-05 NOTE — PROGRESS NOTES
Transplant Surgery Consult Note     Medical record number: [id number]  YOB: 1980,   Consult requested  for evaluation of kidney transplant candidacy.    Assessment and Recommendations:Ms. Flores appears to be a good candidate for kidney transplantation and has a good understanding of the risks and benefits of this approach to the management of renal failure. The following issues should be addressed prior to finalizing her transplant candidacy:     Transplant order: Ms. 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  Recommend he be considered as a candidate for kidney trasnplant.  Cardiology consult for cardiac risk stratification to be ordered: needs echo, if echo is nl, does not need cards consult  Would recommend non-con MRI to confirm presence of normal vascular anatomy given etiology of her disease, and combination of anomalies that could contribute to syndrome; also doppler iliac US to make sure nl flow  Transplant listing labs ordered to include HLA, ABOx2, Cr, etc.  Dietician consult ordered: Yes  Social work consult ordered: Yes  Imaging reports reviewed:  Need to be done  Radiology images reviewed: CXR - clear, official report pending  PVR on Mark  Recommend [facility] given surgical history (which included ureteral re-implantation per family and right nephrectomy)  Need surgical records from [facility]      The majority of our visit was spent in counselling, discussing the medical and surgical risks of kidney transplantation. We discussed approximate wait time and how that is influenced by issues such as blood type and sensitization (PRA) and access to a living donor. I contrasted potential waiting time for  living vs  donor kidneys from  normal (0-85%) or higher (%) kidney donor profile index (KDPI) donors and their associated outcomes. I would not recommend this individual to consider kidneys from high KDPI donors. The reason for this decision is best summarized as: improved long term graft survival. Potential surgical complications of kidney transplantation include bleeding, superficial or deep wound complications (infection, hernia, lymphocele), ureteral anastomotic failure (leak or stenosis), graft thrombosis, need for reoperation and other issues such as cardiac complications, pneumonia, deep venous thrombosis, pulmonary embolism, post transplant diabetes and death. The potential for recurrent disease or need for retransplantation was also addressed. We discussed the possible need for ureteral stent (and subsequent removal), and the utility of protocol biopsy and laboratory studies to evaluate for rejection or recurrent disease. We discussed the risk of graft rejection, our center's average graft and patient survival rates, immunosuppression protocols, as well as the potential opportunity to participate in clinical trials.  We also discussed the average length of stay, recovery process, and posttransplant lab and monitoring protocol.  I emphasized the need for strict immunosuppression medication adherence and the potential for complications of immunosuppression such as skin cancer or lymphoma, as well as a very low but not zero risk of donor-derived disease transmission risks (infection, cancer). Ms. Flores asked good questions and her candidacy will be reviewed at our Multidisciplinary Selection Committee. Thank you for the opportunity to participate in Ms. Flores's care.      Total time: 45 minutes  Counselling time: 30 minutes    Attestation:  Patient has been seen and evaluated by me.   Vital signs, labs, medications and orders were reviewed.   When obtained, diagnostic images were reviewed by me  and interpreted as above.    The care plan was discussed with the multidisciplinary team and I agree with the findings and plan in this note, with any differences recorded in blue.      George Marroquin MD    ---------------------------------------------------------------------------------------------------    HPI: Ms. Flores has Chronic renal failure due to congenital renal agenesis (may have had posterior ureteral valves - per discussion with patient and prior surgical history) and right nephrectomy. The patient is non-diabetic.       The patient is not on dialysis.    Has potential kidney donors:  Yes .  Interested in participation in paired exchange if a donor is willing: Yes     The patient has the following pertinent history:       No    Yes  Dialysis:    [x]      [] via:       Blood Transfusion                  [x]      []  Number of units:   Most recently:  Pregnancy:    []      [x] Number:       Previous Transplant:  [x]      [] Details:    Cancer    [x]      [] Comment:   Kidney stones   [x]      [] Comment:      Recurrent infections  [x]      []  Type:                  Bladder dysfunction  []      [x] Cause:    Claudication   [x]      [] Distance:    Previous Amputation  [x]      [] Cause:     Chronic anticoagulation  [x]      [] Indication:   [facility]  [x]      []      Past Medical History:   Diagnosis Date     CKD (chronic kidney disease)      Depressive disorder      Hyperlipidemia      Hypertension      Migraines      Renal agenesis      Tuberculosis      UTI (urinary tract infection)      Past Surgical History:   Procedure Laterality Date     ABDOMEN SURGERY      Nephrectomy      APPENDECTOMY       HC REPAIR ACHILLES TENDON,PRIMARY       HERNIA REPAIR       No family history on file.  Social History     Socioeconomic History     Marital status: Single     Spouse name: Not on file     Number of children: Not on file     Years of education: Not on file     Highest education level: Not on  file   Occupational History     Not on file   Social Needs     Financial resource strain: Not on file     Food insecurity     Worry: Not on file     Inability: Not on file     Transportation needs     Medical: Not on file     Non-medical: Not on file   Tobacco Use     Smoking status: Never Smoker     Smokeless tobacco: Never Used   Substance and Sexual Activity     Alcohol use: Not Currently     Drug use: Never     Sexual activity: Not on file   Lifestyle     Physical activity     Days per week: Not on file     Minutes per session: Not on file     Stress: Not on file   Relationships     Social connections     Talks on phone: Not on file     Gets together: Not on file     Attends Gnosticism service: Not on file     Active member of club or organization: Not on file     Attends meetings of clubs or organizations: Not on file     Relationship status: Not on file     Intimate partner violence     Fear of current or ex partner: Not on file     Emotionally abused: Not on file     Physically abused: Not on file     Forced sexual activity: Not on file   Other Topics Concern     Parent/sibling w/ CABG, MI or angioplasty before 65F 55M? Not Asked   Social History Narrative     Not on file       ROS:   CONSTITUTIONAL:  No fevers or chills  EYES: negative for icterus  ENT:  negative for hearing loss, tinnitus and sore throat  RESPIRATORY:  negative for cough, sputum, dyspnea  CARDIOVASCULAR:  negative for chest pain None  GASTROINTESTINAL:  negative for nausea, vomiting, diarrhea or constipation  GENITOURINARY:  negative for incontinence, dysuria, bladder emptying problems  HEME:  No easy bruising  INTEGUMENT:  negative for rash and pruritus  NEURO:  Negative for headache, seizure disorder  Allergies:   Allergies   Allergen Reactions     Cephalosporins Other (See Comments)     Codeine Other (See Comments)     Diagnostic X-Ray Materials Other (See Comments)     Only has 1 kidney.  Only has 1 kidney.  Only has 1 kidney.        Nsaids Other (See Comments)     Kidney Damage. Have Only has one kidney      Medications:  Prescription Medications as of 11/5/2020       Rx Number Disp Refills Start End Last Dispensed Date Next Fill Date Owning Pharmacy    atorvastatin (LIPITOR) 20 MG tablet            Sig: Take 40 mg by mouth daily    Class: Historical    Route: Oral    cholecalciferol 25 MCG (1000 UT) TABS            Sig: Take 1,000 Units by mouth    Class: Historical    Route: Oral    ferrous sulfate (FEROSUL) 325 (65 Fe) MG tablet            Sig: Take 325 mg by mouth    Class: Historical    Route: Oral        Exam:     There were no vitals taken for this visit.  Appearance: in no apparent distress.   Skin: normal  Eyes:  no redness or discharge.  Sclera anicteric  Head and Neck: Normal, no rashes or jaundice  Respiratory: easy respirations, no audible wheezing.  Abdomen: rounded, No distention   Extremeties: femoral pulses - unable to palpate  Neuro: without deficit   Psychiatric: Normal mood and affect    Diagnostics:   No results found for this or any previous visit (from the past 672 hour(s)).  No results found for: CPRA

## 2020-11-05 NOTE — LETTER
11/5/2020         RE: Ariane Flores  Lot [birth year]  1971 16 [address] [place] [address]        Dear Colleague,    Thank you for referring your patient, Ariane Flores, to the [facility]PL[facility]. Please see a copy of my visit note below.    TRANSPLANT NEPHROLOGY RECIPIENT EVALUATION NOTE    Assessment and Plan:  # Kidney Transplant Evaluation: Patient is a good candidate overall. Benefits of a living donor transplant were discussed.    # CKD from unclear etiology: history of possible reflux/obstruction and recurrent UTI as a child requiring multiple urologic procedures, last of which was right native nephrectomy at age 13. Will need to obtain records [facility] [place] [facility] she had other urologic procedures. No recent UTI or need for self-catheterization in years. She has had a slow progression of her kidney disease over the years. She is not yet on dialysis, most recent eGFR around 23-26 ml/min, but was 17-19 ml/min earlier this summer, and when ready, she may benefit from a kidney transplant. Per surgery, will likely need further abdominal imaging, PVR, and VCUG.     # Cardiac Risk: she has no known history of cardiac disease or events and is asymptomatic with exertion. Will get EKG and ECHO. If normal, no need for cardiac risk assessment at this time.    # Positive Treponema Ab: reflex studies pending.    # Health Maintenance: PAP: Up to date and Dental: Up to date    Discussed the risks and benefits of a transplant, including the risk of surgery and immunosuppression medications.  Patient's overall evaluation will be discussed in the Transplant Program's regular meeting with a final recommendation on the patients suitability for transplant to be made at that time.  Patient was seen in conjunction with Dr. Jeovanny Alvarez as part of a shared visit.    Evaluation:  Ariane Flores was seen in consultation at the request of Dr. George Marroquin for evaluation as a potential kidney transplant  recipient.    Reason for Visit:  Ariane Flores is a 40-year-old female with CKD from unclear etiology, who presents for kidney transplant evaluation.    History of Present Illness:         Kidney Disease Hx: Patient was born in [place] and mother reports the following history as records from that time are not available- had seizures and what sounds like reflux/obstruction (wasn't urinating, large volume urine out with beach placement) within the first few months of life. Required urologic procedure at age 5 months (details unknown). Then was intermittently hospitalized over a 3 year timeframe due to recurrent UTI. Was requiring intermittent catheterization. They moved to [place] when patient was approximately 6-7 years old. She [facility] a few more [facility] at that time [facility] [place]. She ultimately underwent right native nephrectomy at age 13 years. She did relatively well for many years, but has had elevated creatinine and proteinuria since at least 2014, which has steadily worsened. Most recent eGFR low 20's, but did reach a qualifying eGFR earlier this summer. She has not had a UTI in 5+ years. No seizure in 35+ years. Of note, patient's daughter was born with seizures and urinary reflux, which was surgically corrected. Patient has not had genetic testing completed.        Kidney Disease Dx: as above       Biopsy Proven: No         On Dialysis: No       Primary Nephrologist: Dr. Tenorio       H/o Kidney Stones: No       H/o Recurrent/Frequent UTI: Yes          Diabetic Hx: None           Cardiac/Vascular Disease Risk Factors:        - None         Viral Serology Status       CMV IgG Antibody: Positive       EBV IgG Antibody: Positive         Volume Status/Weight:        Volume status: Euvolemic       Weight:  Acceptable BMI       BMI: Body mass index is 34.24 kg/m .         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 She does not otherwise exercise. She denies chest pain, SOB, or claudication symptoms.       Fatigue/Decreased Energy: [x] No [] Yes    Chest Pain or SOB with Exertion: [x] No [] Yes    Significant Weight Change: [x] No [] Yes    Nausea, Vomiting or Diarrhea: [x] No [] Yes    Fever, Sweats or Chills:  [x] No [] Yes    Leg Swelling [x] No [] Yes        History of Cancer: None    Other Significant Medical Issues: None    Review of Systems:  A comprehensive review of systems was obtained and negative, except as noted in the HPI or PMH.    Past Medical History:   Medical record was reviewed and PMH was discussed with patient and noted below.  Past Medical History:   Diagnosis Date     CKD (chronic kidney disease) stage 4, GFR 15-29 ml/min (H)      Hyperlipidemia      Hypertension      Migraines      Recurrent UTI     in childhood       Past Social History:   Past Surgical History:   Procedure Laterality Date     APPENDECTOMY       HC REPAIR ACHILLES TENDON,PRIMARY       HERNIA REPAIR       NEPHRECTOMY Right     age 13     OTHER SURGICAL HISTORY      complete detail unknown, but required multiple urologic procedures before the age of 10 years     Personal history of bleeding or anesthesia problems: No    Family History:  Family History   Problem Relation Age of Onset     Kidney Disease Daughter        Personal History:   Social History     Socioeconomic History     Marital status: Single     Spouse name: Not on file     Number of children: Not on file     Years of education: Not on file     Highest education level: Not on file   Occupational History     Not on file   Social Needs     Financial resource strain: Not on file     Food insecurity     Worry: Not on file     Inability: Not on file     Transportation needs     Medical: Not on file     Non-medical: Not on file   Tobacco Use      "Smoking status: Never Smoker     Smokeless tobacco: Never Used   Substance and Sexual Activity     Alcohol use: Not Currently     Drug use: Never     Sexual activity: Not on file   Lifestyle     Physical activity     Days per week: Not on file     Minutes per session: Not on file     Stress: Not on file   Relationships     Social connections     Talks on phone: Not on file     Gets together: Not on file     Attends Latter day service: Not on file     Active member of club or organization: Not on file     Attends meetings of clubs or organizations: Not on file     Relationship status: Not on file     Intimate partner violence     Fear of current or ex partner: Not on file     Emotionally abused: Not on file     Physically abused: Not on file     Forced sexual activity: Not on file   Other Topics Concern     Parent/sibling w/ CABG, MI or angioplasty before 65F 55M? Not Asked   Social History Narrative     Not on file       Allergies:  Allergies   Allergen Reactions     Cephalosporins Other (See Comments)     Codeine Other (See Comments)     Diagnostic X-Ray Materials Other (See Comments)     Only has 1 kidney.  Only has 1 kidney.  Only has 1 kidney.       Nsaids Other (See Comments)     Kidney Damage. Have Only has one kidney        Medications:  Current Outpatient Medications   Medication Sig     atorvastatin (LIPITOR) 20 MG tablet Take 40 mg by mouth daily     cholecalciferol 25 MCG (1000 UT) TABS Take 1,000 Units by mouth     ferrous sulfate (FEROSUL) 325 (65 Fe) MG tablet Take 325 mg by mouth     No current facility-administered medications for this visit.        Vitals:  /86   Pulse 72   Ht 1.51 m (4' 11.45\")   Wt 78.1 kg (172 lb 1.6 oz)   SpO2 99%   Breastfeeding No   BMI 34.24 kg/m      Exam:  GENERAL APPEARANCE: alert and no distress  HENT: mouth without ulcers or lesions  LYMPHATICS: no cervical or supraclavicular nodes  RESP: lungs clear to auscultation - no rales, rhonchi or wheezes  CV: regular " rhythm, normal rate, no rub, no murmur  EDEMA: no LE edema bilaterally  ABDOMEN: soft, nondistended, nontender, bowel sounds normal  MS: extremities normal - no gross deformities noted, no evidence of inflammation in joints, no muscle tenderness  SKIN: no rash    Results:   Recent Results (from the past 336 hour(s))   HLA Typing Complete SOT Recipient    Collection Time: 11/05/20 11:13 AM   Result Value Ref Range    ABTest Method SSOP     A* locus A*02     A* A*24     B* locus B*07     B* B*35     C* locus C*04     C* C*07     Bw-1 Bw*6     Drsso Test Method SSOP     DRB1* locus DRB1*01(103)     DRB1* DRB1*04     DRB4* locus DRB4*01     DQB1* locus DQB1*04     DQB1* DQB1*05     DQA1*locus DQA1*01     DQA1* DQA1*03     DPB1* DPB1*02:01     DPB1* NMDP CVENJ     DPB1*locus DPB1*04:02     DPB1* locus NMDP       CVAVZ   04:02/105:01/665:01/701:01/724:01N/726:01/730:01/731:01/734:01/735:01/759:  01/776:01/777:01N/809:01/817:01/823:01/858:01/881:01/885:01/887:01/925:01N  /927:01/933:01/936:01Q/958:01/981:01/985:01N/995:01N/1005:01/1020:01/1025:  01/1031:01/1035:01/1075:01/1083:01/1084:01N/1085:01      DPA1* DPA1*01:03     DPA1* NMDP       CUUFX   01:03/01:14/01:16/01:17/01:18/01:19/01:20/01:21Q/01:22/01:23/01:24/01:25/0  1:26/01:27/01:28/01:29N/01:30/01:31/01:32N     ABO type [HOT2618]    Collection Time: 11/05/20 11:14 AM   Result Value Ref Range    ABO A     RH(D) Pos     Specimen Expires 11/08/2020    Varicella Zoster Virus Antibody IgG [FAE3265]    Collection Time: 11/05/20 11:28 AM   Result Value Ref Range    Varicella Zoster Virus Antibody IgG 4.8 (H) 0.0 - 0.8 AI   Treponema Abs w Reflex to RPR and Titer [MTY6930]    Collection Time: 11/05/20 11:28 AM   Result Value Ref Range    Treponema Antibodies Reactive (A) NR^Nonreactive   HIV Antigen Antibody Combo Pretransplant    Collection Time: 11/05/20 11:28 AM   Result Value Ref Range    HIV Antigen Antibody Combo Pretransplant Nonreactive NR^Nonreactive   Hepatitis C  antibody [KNS167]    Collection Time: 11/05/20 11:28 AM   Result Value Ref Range    Hepatitis C Antibody Nonreactive NR^Nonreactive   Hepatitis B surface antigen [EHT867]    Collection Time: 11/05/20 11:28 AM   Result Value Ref Range    Hep B Surface Agn Nonreactive NR^Nonreactive   Hepatitis B Surface Antibody [EFL1757]    Collection Time: 11/05/20 11:28 AM   Result Value Ref Range    Hepatitis B Surface Antibody 149.92 (H) <8.00 m[IU]/mL   Hepatitis B core antibody [[id number]]    Collection Time: 11/05/20 11:28 AM   Result Value Ref Range    Hepatitis B Core Nga Nonreactive NR^Nonreactive   EBV Capsid Antibody IgG [LVU7155]    Collection Time: 11/05/20 11:28 AM   Result Value Ref Range    EBV Capsid Antibody IgG >8.0 (H) 0.0 - 0.8 AI   CMV Antibody IgG [[id number]]    Collection Time: 11/05/20 11:28 AM   Result Value Ref Range    CMV Antibody IgG 4.2 (H) 0.0 - 0.8 AI   Cardiolipin Nga IgG and IgM [LAB 6836]    Collection Time: 11/05/20 11:28 AM   Result Value Ref Range    Cardiolipin Antibody IgG <1.6 0.0 - 19.9 GPL-U/mL    Cardiolipin Antibody IgM 0.4 0.0 - 19.9 MPL-U/mL   CBC with platelets differential [PCR128]    Collection Time: 11/05/20 11:28 AM   Result Value Ref Range    WBC 8.4 4.0 - 11.0 10e9/L    RBC Count 4.36 3.8 - 5.2 10e12/L    Hemoglobin 12.9 11.7 - 15.7 g/dL    Hematocrit 40.2 35.0 - 47.0 %    MCV 92 78 - 100 fl    MCH 29.6 26.5 - 33.0 pg    MCHC 32.1 31.5 - 36.5 g/dL    RDW 12.4 10.0 - 15.0 %    Platelet Count 335 150 - 450 10e9/L    Diff Method Automated Method     % Neutrophils 60.4 %    % Lymphocytes 27.0 %    % Monocytes 9.2 %    % Eosinophils 2.7 %    % Basophils 0.5 %    % Immature Granulocytes 0.2 %    Nucleated RBCs 0 0 /100    Absolute Neutrophil 5.1 1.6 - 8.3 10e9/L    Absolute Lymphocytes 2.3 0.8 - 5.3 10e9/L    Absolute Monocytes 0.8 0.0 - 1.3 10e9/L    Absolute Eosinophils 0.2 0.0 - 0.7 10e9/L    Absolute Basophils 0.0 0.0 - 0.2 10e9/L    Abs Immature Granulocytes 0.0 0 - 0.4 10e9/L     Absolute Nucleated RBC 0.0    Lupus Anticoagulant Panel (PEY0536)    Collection Time: 11/05/20 11:28 AM   Result Value Ref Range    Lupus Result Negative NEG^Negative   Thrombin time [DYQ044]    Collection Time: 11/05/20 11:28 AM   Result Value Ref Range    Thrombin Time 16.5 13.0 - 19.0 sec   Partial thromboplastin time [LAB56]    Collection Time: 11/05/20 11:28 AM   Result Value Ref Range    PTT 32 22 - 37 sec   INR [UJQ4070]    Collection Time: 11/05/20 11:28 AM   Result Value Ref Range    INR 0.96 0.86 - 1.14   Quantiferon TB Gold Plus    Collection Time: 11/05/20 11:28 AM    Specimen: Blood   Result Value Ref Range    MTB Quantiferon Result Negative NEG^Negative    TB1 Ag minus Nil 0.04 IU/mL    TB2 Ag minus Nil 0.04 IU/mL    Mitogen minus Nil 9.96 IU/mL    NIL Result 0.04 IU/mL   HCG qualitative [GTB137]    Collection Time: 11/05/20 11:28 AM   Result Value Ref Range    HCG Qualitative Serum Negative NEG^Negative   Comprehensive metabolic panel [LAB17]    Collection Time: 11/05/20 11:28 AM   Result Value Ref Range    Sodium 140 133 - 144 mmol/L    Potassium 3.6 3.4 - 5.3 mmol/L    Chloride 109 94 - 109 mmol/L    Carbon Dioxide 26 20 - 32 mmol/L    Anion Gap 5 3 - 14 mmol/L    Glucose 89 70 - 99 mg/dL    Urea Nitrogen 26 7 - 30 mg/dL    Creatinine 2.50 (H) 0.52 - 1.04 mg/dL    GFR Estimate 23 (L) >60 mL/min/[1.73_m2]    GFR Estimate If Black 27 (L) >60 mL/min/[1.73_m2]    Calcium 8.7 8.5 - 10.1 mg/dL    Bilirubin Total 0.5 0.2 - 1.3 mg/dL    Albumin 3.4 3.4 - 5.0 g/dL    Protein Total 7.4 6.8 - 8.8 g/dL    Alkaline Phosphatase 84 40 - 150 U/L    ALT 24 0 - 50 U/L    AST 16 0 - 45 U/L   ABO Subtyping [QSH0279]    Collection Time: 11/05/20 11:28 AM   Result Value Ref Range    Antigen Type A1 Positive     ABO/Rh type and screen [QCE978]    Collection Time: 11/05/20 11:28 AM   Result Value Ref Range    ABO A     RH(D) Pos     Antibody Screen Neg     Test Valid Only At          [facility]  [facility]    Specimen Expires 11/08/2020    Rapid Plasma Reagin w Rflx to TITER    Collection Time: 11/05/20 11:28 AM   Result Value Ref Range    Rapid Plasma Reagin Nonreactive NR^Nonreactive   Treponema pallidum antibody confirm    Collection Time: 11/05/20 11:28 AM   Result Value Ref Range    T Pallidum by TP-PA conf Non Reactive Non Reactive   Antibody titer red cell [MPK6383]    Collection Time: 11/05/20 11:30 AM   Result Value Ref Range    Antibody Titer Anti B IgM 32, IgG 16    Factor 2 and 5 mutation analysis    Collection Time: 11/05/20 11:37 AM   Result Value Ref Range    Copath Report       Patient Name: AMANDA CARABALLO  MR#: [id number]  Specimen #: [id number]  Collected: 11/5/2020 11:37  Received: 11/6/2020 08:53  Reported: 11/9/2020 16:39  Ordering Phy(s): ABDIRIZAK SELBY  Additional Phy(s): MELISA HERNANDEZ  Copy To:  Kim    For improved result formatting, select 'View Enhanced Report Format' under   Linked Documents section.  _________________________________________    TEST(S) REQUESTED:  Factor 5 Leiden and Factor 2 by PCR    SPECIMEN DESCRIPTION:  Blood    RESULTS:    Factor V 1691G>A (Leiden)  RESULTS:  Mutation analyzed:     1691G>A  Factor V 1691G>A (Leiden)  Interpretation:      ABSENT  Factor V 1691G>A (Leiden) mutation  genotype:      G/G    FACTOR 2/PROTHROMBIN RESULTS:  Mutation analyzed:     76509Y>A  Factor 2 Mutation Interpretation:      PRESENT  Factor 2 Mutation genotype:      G/A    INTERPRETATION:  The patient is negative for the Factor V 1691G>A (Leiden) mutation.    The patient is a heterozygote (one copy of the gene is positive) for the   Factor 2  mutation.    The presence of the Factor 2 mutation is an indication of increased risk   of developing a thrombosis.  Consultation regarding these results is available upon request.    METHODOLOGY:   The regions of genomic DNA containing the [id number] Factor V   Leiden gene mutation (Factor V  Leiden) and the Factor  2(Prothrombin [id number]) gene mutation were   simultaneously amplified using the polymerase  chain reaction.  The amplified products were digested with restriction   endonuclease TaqI and products were  analyzed by gel electrophoresis.    COMMENTS:  If a patient is the recipient of an allogeneic bone marrow transplant,   this test must be done on a  pre-transplant sample or buccal swab.  A previous allogeneic bone marrow   transplant will interfere with test  results.  Call the [facility]([phone]) for   instructions on sample collection for these  patients.    This test was developed and its performance characteristics determined by   [facility]. It has not been cleared or approved by the FDA.   The laboratory is regulated under CLIA  as qualified to perform high-complexity testing. This test is used for   clinical purposes. It should not be  regarded as investigational or for research.    A resident/fellow in an accredited training program was involved in the   selection of testing, review of  laboratory data, and/or interpretation of this case.  I, as the senior   physician, attest that I: (i) confirmed  appropriate testing, (ii) examined the relevant raw data for the   specimen(s); and (iii) rendered or confirmed  the interpretation(s).    Electronically Signed Out By:  Viktor Rogers MD    CPT Codes:  A: 64742-S6PPJK, 64895-U7GBNI, -KJPHJD(2)    TESTING LAB LOCATION:  [address] [address]  [phone]    COLLECTION SITE:  Client:  [facility]  Location:  [facility] (B)     UA with Microscopic reflex to Culture    Collection Time: 11/05/20 11:50 AM    Specimen: Midstream Urine   Result Value Ref Range    Color Urine Straw     Appearance Urine Clear     Glucose Urine Negative NEG^Negative mg/dL    Bilirubin Urine Negative  NEG^Negative    Ketones Urine Negative NEG^Negative mg/dL    Specific Gravity Urine 1.006 1.003 - 1.035    Blood Urine Negative NEG^Negative    pH Urine 6.0 5.0 - 7.0 pH    Protein Albumin Urine 100 (A) NEG^Negative mg/dL    Urobilinogen mg/dL 0.0 0.0 - 2.0 mg/dL    Nitrite Urine Negative NEG^Negative    Leukocyte Esterase Urine Negative NEG^Negative    Source Midstream Urine     WBC Urine 5 0 - 5 /HPF    RBC Urine 2 0 - 2 /HPF    Bacteria Urine Few (A) NEG^Negative /HPF    Squamous Epithelial /HPF Urine 10 (H) 0 - 1 /HPF   EKG 12-lead, tracing only [EKG1]    Collection Time: 11/05/20 11:53 AM   Result Value Ref Range    Interpretation ECG Click View Image link to view waveform and result        Patient was seen by myself, Dr. Jeovanny Alvarez, in conjunction with Adwoa Whiteside PA-C as part of a shared visit.    I personally reviewed past medical and surgical history, vital signs, medications and labs.  Present and past medical history, along with significant physical exam findings were all reviewed with ALISHA.    My key findings:  Ariane Flores is 40 year old, who presents for Kidney transplant evaluation.    Key management decisions made by me and discussed with ALISHA:    1.  Advanced CKD not yet on dialysis.  2.  Transplant candidacy evaluation.  3.  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  4.  Concern for complex psychosocial situation due to possible learning disability.  5.  Need to update age-appropriate cancer screening.  Discussion:  Ms. Flores is a delightful 40-year-old lady was accompanied by her mother for kidney transplant evaluation.  She is not yet on dialysis.  Her original disease is related to her urinary tract malformation.  It is unclear what procedure she had over the years and whether she had bladder augmentation or not.  We discussed the need to obtain VCUG today assess bladder capacity and reflux.  From cardiovascular standpoint, she will need an  echo and EKG.  Her age-appropriate cancer screening will include Pap smears and regular mammograms.  Await formal psychosocial assessment likely will benefit from neurobehavioral assessment.  Of note, she appears to be compliant with her medications but the concern is if she will be able to keep up with frequent changes in medications and tight follow-up especially early on post engraftment with many moving pieces.  We will discuss Ms. Flores's case in our multidisciplinary meeting for final candidacy decision.      Again, thank you for allowing me to participate in the care of your patient.        Sincerely,        YENNY

## 2020-11-05 NOTE — PROGRESS NOTES
Kidney Transplant Referral - 9/10/2020  Ariane Flores was connected to [facility] website per clinic staff today and  pre-transplant modules were viewed. Patient was called after clinic and the following content was reviewed.   Content reviewed:    Living Donation and how to access that program    Paired exchange    Kidney Donor Profile Index (KDPI)    Waiting list issues (right to decline without penalty, high PHS risk donors, what to expect when called with an offer)    Hospital experience,  length of stay , need to stay locally post-discharge (2-4 weeks)    Surgical options (with pictures)                             Post-surgery lifting and driving restrictions    Post-transplant routines, frequency of lab work and clinic visits    Need to stay locally post-discharge (2-4 weeks)    Role of Transplant Coordinator    Participants were informed of the benefits of transplant as well as potential risks such as infection, cancer, and death.  The need for total adherence with immunosuppression medications and following transplant regimens was stressed.  The overall evaluation/approval/listing process was reviewed.        The patient stated she was provided with the following documents in clinic per clinic staff and will review at home:  What You Need to Know About a Kidney Transplant  Adult Kidney Transplant - A Guide for Patients  SRTR Data Sheet - Kidney  Brochure - Kidney Allocation  Brochure - Multiple Listing and Waiting Time Transfer  What Every Patient Needs to Know (UNOS)  UNOS Facts and Figures  Finding a Donor  [facility] - Quick Start Guide  KDPI Consent  Receipt of Information form    Patient stated she has Ann Solomon's contact information and was instructed to call with additional questions.      Summary    Team s concerns/comments:   1) Cardiac risk assessment  2) Hx congenital renal agenesis with multiple surgeries    Candidacy category: Yellow    Action/Plan:   1) Echo,  possible Cardiology consult  2) Request for records form [facility] on previous surgeries-Transplant Coordinator informed. Abd/Iliac US ordered.  May need non contrast MRI  Expected Selection Meeting Discussion: 11/11/20

## 2020-11-06 LAB
BLD GP AB SCN TITR SERPL: NORMAL {TITER}
CARDIOLIPIN ANTIBODY IGG: <1.6 GPL-U/ML (ref 0–19.9)
CARDIOLIPIN ANTIBODY IGM: 0.4 MPL-U/ML (ref 0–19.9)
CMV IGG SERPL QL IA: 4.2 AI (ref 0–0.8)
EBV VCA IGG SER QL IA: >8 AI (ref 0–0.8)
GAMMA INTERFERON BACKGROUND BLD IA-ACNC: 0.04 IU/ML
HBV CORE AB SERPL QL IA: NONREACTIVE
HBV SURFACE AB SERPL IA-ACNC: 149.92 M[IU]/ML
HBV SURFACE AG SERPL QL IA: NONREACTIVE
HCV AB SERPL QL IA: NONREACTIVE
HIV 1+2 AB+HIV1 P24 AG SERPL QL IA: NONREACTIVE
INTERPRETATION ECG - MUSE: NORMAL
M TB IFN-G CD4+ BCKGRND COR BLD-ACNC: 9.96 IU/ML
M TB TUBERC IFN-G BLD QL: NEGATIVE
MITOGEN IGNF BCKGRD COR BLD-ACNC: 0.04 IU/ML
MITOGEN IGNF BCKGRD COR BLD-ACNC: 0.04 IU/ML
RPR SER QL: NONREACTIVE
T PALLIDUM AB SER QL: REACTIVE
THROMBIN TIME: 16.5 SEC (ref 13–19)
VZV IGG SER QL IA: 4.8 AI (ref 0–0.8)

## 2020-11-08 LAB — T PALLIDUM AB SER QL AGGL: NON REACTIVE

## 2020-11-09 ENCOUNTER — TELEPHONE (OUTPATIENT)
Dept: TRANSPLANT | Facility: CLINIC | Age: 40
End: 2020-11-09

## 2020-11-09 LAB
COPATH REPORT: NORMAL
LA PPP-IMP: NEGATIVE

## 2020-11-09 NOTE — TELEPHONE ENCOUNTER
Ariane was calling to ask a few questions about her KDPI form and Receipt of Information. She has the paper copies at home and will sign and return to me.

## 2020-11-10 ENCOUNTER — ALLIED HEALTH/NURSE VISIT (OUTPATIENT)
Dept: TRANSPLANT | Facility: CLINIC | Age: 40
End: 2020-11-10
Attending: INTERNAL MEDICINE
Payer: MEDICARE

## 2020-11-10 DIAGNOSIS — Z76.82 ORGAN TRANSPLANT CANDIDATE: Primary | ICD-10-CM

## 2020-11-10 PROBLEM — J02.0 STREP PHARYNGITIS: Status: RESOLVED | Noted: 2020-07-17 | Resolved: 2020-11-10

## 2020-11-10 PROBLEM — N17.9 AKI (ACUTE KIDNEY INJURY) (H): Status: RESOLVED | Noted: 2020-07-17 | Resolved: 2020-11-10

## 2020-11-10 LAB
A* LOCUS: NORMAL
A*: NORMAL
ABTEST METHOD: NORMAL
B* LOCUS: NORMAL
B*: NORMAL
BW-1: NORMAL
C* LOCUS: NORMAL
C*: NORMAL
DPA1* NMDP: NORMAL
DPA1*: NORMAL
DPB1* LOCUS NMDP: NORMAL
DPB1* NMDP: NORMAL
DPB1*: NORMAL
DPB1*LOCUS: NORMAL
DQA1*: NORMAL
DQA1*LOCUS: NORMAL
DQB1* LOCUS: NORMAL
DQB1*: NORMAL
DRB1* LOCUS: NORMAL
DRB1*: NORMAL
DRB4* LOCUS: NORMAL
DRSSO TEST METHOD: NORMAL

## 2020-11-10 NOTE — PROGRESS NOTES
[facility] Solid Organ Transplant  Outpatient MNT: Kidney Transplant Evaluation    Current BMI: 34.2 (HT 49.75 in,  lbs/78 kg)  BMI is within recommendation of <35 for kidney transplant     Time Spent: 15 minutes  Visit Type: Initial   Referring Physician: Lopez   Pt accompanied by: self     History of previous txp: none   Dialysis: no     Nutrition Assessment  Pt cooks for self and uses a little salt   She reports her biggest vice is drinking soda and would really like to give this up     Appetite: normal/baseline     Vitamins, Supplements, Pertinent Meds: iron, vitamin D   Herbal Medicines/Supplements: none     Weight hx: has been told she's gaining slowly, but she feels stable     Edema: none     Diet Recall  Breakfast Sometimes will eat- occ shake with banana with strawberries; cereal   Lunch Ham s/w with tomatoes, carrots    Dinner Spaghetti, carrots; chicken soup     Snacks None    Beverages Pop 2x/week (generic diet Coke), tea, cranberry/pomegranate juice (8 oz), Pedialyte (8 oz), occasional milk, water     Alcohol None    Dining out 1-2x/month      Physical Activity  Active at work at  center  Walks occasionally for exercise (every other day, 3 rounds in trailer court)  Home jp workouts (2x/month, previously more)    Labs  11/5 K 3.6     Nutrition Diagnosis  No nutrition diagnosis identified at this time     Nutrition Intervention  Nutrition education provided:  Discussed sodium intake (low sodium foods and drinks, seasoning food without salt and tips for low sodium diet). Explained rationale for lower sodium diet.     Also reviewed wnl K level at this time, which does not warrant further dietary restriction, but reviewed K-rich foods for future reference.     Discussed borderline BMI and maintaining weight vs modest weight loss for transplant or per surgeon discretion.     Reviewed post txp diet guidelines in brief (will review in further detail post txp):  (1) Review of proper  "food safety measures d/t immunosuppressant therapy post-op and increased risk for food-borne illness    (2) Avoid the following post txp d/t risk for rejection, unknown effects on the organs, and/or potential interactions with immunosuppressants:  - Herbal, Chinese, holistic, chiropractic, natural, alternative medicines and supplements  - Detoxes and cleanses  - Weight loss pills  - Protein powders or other products with extracts or herbs (ie green tea extract)    (3) Med regimen and possible side effects    Patient Understanding: Pt verbalized understanding of education provided.  Expected Engagement: Good  Follow-Up Plans: PRN     Nutrition Goals  No nutrition goals identified at this time.     Grace Gillis, RD, LD, CCTD    Ariane Flores is a 40 year old female who is being evaluated via a billable telephone visit.      The patient has been notified of following:     \"This telephone visit will be conducted via a call between you and your physician/provider. We have found that certain health care needs can be provided without the need for a physical exam.  This service lets us provide the care you need with a short phone conversation.  If a prescription is necessary we can send it directly to your pharmacy.  If lab work is needed we can place an order for that and you can then stop by our lab to have the test done at a later time.    Telephone visits are billed at different rates depending on your insurance coverage. During this emergency period, for some insurers they may be billed the same as an in-person visit.  Please reach out to your insurance provider with any questions.    If during the course of the call the physician/provider feels a telephone visit is not appropriate, you will not be charged for this service.\"    Patient has given verbal consent for Telephone visit?  yes    Phone call duration: 15 minutes                                    "

## 2020-11-11 LAB
PROTOCOL CUTOFF: NORMAL
SA1 CELL: NORMAL
SA1 COMMENTS: NORMAL
SA1 HI RISK ABY: NORMAL
SA1 MOD RISK ABY: NORMAL
SA1 TEST METHOD: NORMAL
SA2 CELL: NORMAL
SA2 COMMENTS: NORMAL
SA2 HI RISK ABY UA: NORMAL
SA2 MOD RISK ABY: NORMAL
SA2 TEST METHOD: NORMAL
UNACCEPTABLE ANTIGEN: NORMAL
UNOS CPRA: 94

## 2020-11-12 ENCOUNTER — DOCUMENTATION ONLY (OUTPATIENT)
Dept: TRANSPLANT | Facility: CLINIC | Age: 40
End: 2020-11-12

## 2020-11-12 ENCOUNTER — TELEPHONE (OUTPATIENT)
Dept: TRANSPLANT | Facility: CLINIC | Age: 40
End: 2020-11-12

## 2020-11-12 DIAGNOSIS — Z76.82 AWAITING ORGAN TRANSPLANT: Primary | ICD-10-CM

## 2020-11-12 NOTE — LETTER
2020    Ariane Flores  [address]  [id number]  [place] [address]      Dear Ariane,    It was a pleasure to start working with you toward the goal of a kidney transplant. Your pre transplant evaluation began on 2020. Your evaluation results were discussed at our Multidisciplinary Selection Committee on 2020. You have been approved as a kidney transplant candidate pending the successful completion of your evaluation. We are asking for the following items:    1. We are asking you to see Neuropsychology to assess how best you learn and what is the best way to teach you so you can be successful with a transplant. Our  will call you with this appointment.  2. We are asking you to have iliac ultrasound to look at the blood flow to your legs. Our  will call you with this appointment.  3. We are asking you to have an abdominal/pelvic CT scan to look at your internal anatomy from the previous surgeries. Our  will call you with this appointment.  4. We are asking you to have a VCUG. A voiding cystourethrogram is a special type of xray that looks at the direction urine flows in your body. Our  will call you with this appointment.  5. We will need and echocardiogram of your heart to look at how well it is functioning. Our  will call you with this appointment.  6. Please schedule a dental appointment and have any recommended work done. Please call me when complete.  7. You will need a pneumococcal vaccination against pneumonia. Your primary care provider can do this for you. Call me when this is complete.    Once I have the signed forms for Receipt of Information and the KDPI I will be able to place you on the  donor kidney transplant wait list on INACTIVE status. This will start your waiting time while you finish your evaluation. Once your evaluation is complete we will change you to ACTIVE status and you will be eligible to receive  organ offers. I will call you when you are able to be changed to ACTIVE status.    Please have any potential live donors register now online with our program to initiate their evaluations at [email or web address] or call [phone]. You will be notified by our office in the event of an approved live donor.     If you have any questions please call me, e mail or My Chart message.      Sincerely,       Candace Solomon RN, BSN Transplant Coordinator  Solid Organ Transplant [address]  [address] [place] [address]  Phone [phone]  Fax [phone]  [email or web address]    CC:Rachael Contreras

## 2020-11-12 NOTE — LETTER
2020    Ariane Flores  Lot [address]  1971 16  [place] [address]      Dear Ariane,    This letter is sent to confirm that you are a candidate in the kidney transplant program at the [facility].  You were placed on the kidney inactive waitlist on ***.  This means you will accumulate waiting time but not receive  donor calls.       Items we will need from you:      We have received approval from you insurance company for the transplant procedure.  It is critical that you notify us if there is any change in your insurance.  It is also important that you familiarize yourself with the details of your specific insurance policy.  Our patient  is available to assist you if you should have any questions regarding your coverage.    An ALA or PRA blood sample will  need to be sent here every 3 months to match you with  donors or any potential living donors.  If you need this testing, special mailing boxes (called mailers) will be sent to you directly from the Outreach Department. You should take the physician order form and the  to your home laboratory when it is time to for this testing to be done.  Additional mailers can be obtained by calling the Transplant Office and asking to speak to a kidney . We will start having these labs drawn once you are on active status.      During this waiting period, we may request additional periodic laboratory tests with your primary physician.  It will be your responsibility to remind your physician to forward your results to the Transplant Office.      We need to be kept informed of any changes in your medical condition such as:    o changes in your medications,   o significant changes in your health  o significant infections (such as pneumonia or abscesses)  o blood transfusions  o any condition which requires hospitalization  o any surgery  o If you start  dialysis      Remember to complete any routine cancer screening tests required before your transplant.  This includes colonoscopy; prostrate screening for men, and mammogram and gynecologic testing for women, as well as dental work.  Your primary care clinic can assist you with arranging for these exams.  Remind your caregivers to forward copies of the records and final reports.    We want you to know that our program has physician and surgeon coverage 24 hours a day, 365 days a year. If this coverage changes or there are substantial program changes, you will be notified in writing by letter. You will now have a new coordinator while on the wait list. Her name is Tanya Cobian [phone] and her LPN is Wendy Leon [phone].    Attached is a letter from the United Network for Organ Sharing (UNOS). It describes the services and information offered to patients by UNOS and the Organ Procurement and Transplantation Network.    We appreciate having had the opportunity to participate in your care.  If you have questions, please feel free to call the Transplant Office at [phone] or [phone].      Sincerely,       Kidney Transplant Program    Enclosures: Telephone Contact List, Travel Resources, UNOS Letter, Waitlist Information Update and While You Are Waiting  CC:   Rachael Contreras                                The Organ Procurement and Transplantation Network  Toll-free patient services line:     Your resource for organ transplant information    If you have a question regarding your own medical care, you always should call your transplant hospital first. However, for general organ transplant-related information, you can call the Organ Procurement and Transplantation Network (OPTN) toll-free patient services line at [phone]- [phone]. Anyone, including potential transplant candidates, candidates, recipients, family members, friends, living donors, and donor family members, can call this  number to:          Talk about organ donation, living donation, the transplant process, the donation process, and transplant policies.    Get a free patient information kit with helpful booklets, waiting list and transplant information, and a list of all transplant hospitals.    Ask questions about the OPTN website (https://optn.transplant.hrsa.gov/), the [employer] Network for Organ Sharing s (UNOS) website (https://unos.org/), or the UNOS website for living donors and transplant recipients. (https://www.transplantliving.org/).    Learn how the OPTN can help you.    Talk about any concerns that you may have with a transplant hospital.    The [facility] s transplant system, the OPTN, is managed under federal contract by the [employer] Network for Organ Sharing (UNOS), which is a non-profit charitable organization. The OPTN helps create and define organ sharing policies that make the best use of donated organs. This process continuously evaluating new advances and discoveries so policies can be adapted to best serve patients waiting for transplants. To do so, the OPTN works closely with transplant professionals, transplant patients, transplant candidates, donor families, living donors, and the public. All transplant programs and organ procurement organizations throughout the country are OPTN members and are obligated to follow the policies the OPTN creates for allocating organs.    The OPTN also is responsible for:      Providing educational material for patients, the public, and professionals.    Raising awareness of the need for donated organs and tissue.    Coordinating organ procurement, matching, and placement.    Collecting information about every organ transplant and donation that occurs in the [place].    Remember, you should contact your transplant hospital directly if you have questions or concerns about your own medical care including medical records, work-up progress, and test results.    We are not your  transplant hospital, and our staff will not be able to answer questions about your case, so please keep your transplant hospital s phone number handy.    However, while you research your transplant needs and learn as much as you can about transplantation and donation, we welcome your call to our toll-free patient services line at [phone].          Updated 4/1/2019

## 2020-11-12 NOTE — PROGRESS NOTES
"Kidney Transplant Evaluation - 11/5/2020  Ariane Flores attended the pre-transplant patient education class on 11/5/2020 The [facility] website pre-transplant modules were viewed; class participants were educated on using the site.     Content reviewed:    Living Donation and how to access that program    Paired exchange    Kidney Donor Profile Index (KDPI)    Waiting list issues (right to decline without penalty, high PHS risk donors, what to expect when called with an offer)    Hospital experience,  length of stay , need to stay locally post-discharge (2-4 weeks)    Surgical options (with pictures)                             Post-surgery lifting and driving restrictions    Post-transplant routines, frequency of lab work and clinic visits    Need to stay locally post-discharge (2-4 weeks)    Role of Transplant Coordinator    Participants were informed of the benefits of transplant as well as potential risks such as infection, cancer, and death.  The need for total adherence with immunosuppression medications and following transplant regimens was stressed.  The overall evaluation/approval/listing process was reviewed.        The patient was provided with the following documents:  What You Need to Know About a Kidney Transplant  Adult Kidney Transplant - A Guide for Patients  SRTR Data Sheet - Kidney  Brochure - Kidney Allocation  Brochure - Multiple Listing and Waiting Time Transfer  What Every Patient Needs to Know (UNOS)  UNOS Facts and Figures  Finding a Donor  [facility] - Quick Start Guide  KDPI Consent  Receipt of Information form    Ariane Flores signed the  Receipt of Information for Organ Transplant Recipient.\" She was provided Ann Solomon's business card and instructed to call with additional questions          "

## 2020-11-12 NOTE — TELEPHONE ENCOUNTER
Called Ariane to discuss the outcome of the Selection Committee from yesterday 11/11/2020. She is approved as a kidney transplant candidate pending the completion of her evaluation. She is not on dialysis but has a qualifying GFR of 19 on 7/17/2020. She will be listed as inactive to start her wait time. She will need  her echo done. If the echo is normal she will not need to see cardiology. She will need iliacs, CT a/p and VCUG. She will need a pneumovax and dental. Committee would like neuropsych to see her to assess her learning. Transplant summary letter will be sent. Inactive listing letter will be sent.

## 2020-11-20 ENCOUNTER — TELEPHONE (OUTPATIENT)
Dept: TRANSPLANT | Facility: CLINIC | Age: 40
End: 2020-11-20

## 2020-11-20 NOTE — TELEPHONE ENCOUNTER
Pt called.  Lilian Willingham to mail the ANGEL and KDPI forms to Ariane to fill out /sign and mail to Candace Solomon.  cody Maria

## 2020-11-20 NOTE — TELEPHONE ENCOUNTER
I Abbie Davis, RN BSN called Ariane Flores  [phone]. I left Voice mail on her home/cell phone.   I LM asking if she mailed the ANGEL Receipt of Info and KDPI to Candace Solomon?   I LM asking her to call Abbie at [phone] to discuss status of the ANGEL and KDPI forms.   Candace Juanito cannot place her name on Wait list until Mandatory forms are obtained.      I called mother Hilda Brewer and spoke with her/Mom directly and informed her I am trying to reach Ariane Flores her daughter.  Reportedly Ms. Flores is at work today and cannot take phone calls.  I told Ms. Osman Thakkar's email does not work so we cannot send forms to her electronically.   I gave Hilda my name /directl number and asked Ariaen to call me today 11/20 about this situation how to get the forms to her for signature IF she has not received them or mailed them to transplant office.   Hilda understands the importance of getting this task done.      cody Maria

## 2020-11-24 ENCOUNTER — TELEPHONE (OUTPATIENT)
Dept: TRANSPLANT | Facility: CLINIC | Age: 40
End: 2020-11-24

## 2020-11-24 NOTE — TELEPHONE ENCOUNTER
Ariane calling to see if I have received her Receipt of Information and KDPI forms in the mail. I have not. Will ask administrative staff to resend and include a self addressed postage paid envelope. She understands I cannot list her without those documents. Her email does not work and there is no other option other than the [facility] service.

## 2020-12-10 ENCOUNTER — ANCILLARY PROCEDURE (OUTPATIENT)
Dept: CT IMAGING | Facility: CLINIC | Age: 40
End: 2020-12-10
Attending: PHYSICIAN ASSISTANT
Payer: MEDICARE

## 2020-12-10 ENCOUNTER — ANCILLARY PROCEDURE (OUTPATIENT)
Dept: ULTRASOUND IMAGING | Facility: CLINIC | Age: 40
End: 2020-12-10
Attending: PHYSICIAN ASSISTANT
Payer: MEDICARE

## 2020-12-10 ENCOUNTER — ANCILLARY PROCEDURE (OUTPATIENT)
Dept: GENERAL RADIOLOGY | Facility: CLINIC | Age: 40
End: 2020-12-10
Attending: PHYSICIAN ASSISTANT
Payer: MEDICARE

## 2020-12-10 ENCOUNTER — ANCILLARY PROCEDURE (OUTPATIENT)
Dept: CARDIOLOGY | Facility: CLINIC | Age: 40
End: 2020-12-10
Attending: PHYSICIAN ASSISTANT
Payer: MEDICARE

## 2020-12-10 DIAGNOSIS — N18.4 CKD (CHRONIC KIDNEY DISEASE) STAGE 4, GFR 15-29 ML/MIN (H): ICD-10-CM

## 2020-12-10 DIAGNOSIS — Z76.82 ORGAN TRANSPLANT CANDIDATE: ICD-10-CM

## 2020-12-10 DIAGNOSIS — Z01.818 PRE-TRANSPLANT EVALUATION FOR KIDNEY TRANSPLANT: ICD-10-CM

## 2020-12-10 DIAGNOSIS — I10 ESSENTIAL (PRIMARY) HYPERTENSION: ICD-10-CM

## 2020-12-10 PROCEDURE — 93306 TTE W/DOPPLER COMPLETE: CPT | Performed by: STUDENT IN AN ORGANIZED HEALTH CARE EDUCATION/TRAINING PROGRAM

## 2020-12-10 PROCEDURE — 74455 X-RAY URETHRA/BLADDER: CPT | Mod: GC | Performed by: RADIOLOGY

## 2020-12-10 PROCEDURE — 93978 VASCULAR STUDY: CPT | Mod: GC | Performed by: RADIOLOGY

## 2020-12-10 PROCEDURE — 74176 CT ABD & PELVIS W/O CONTRAST: CPT | Performed by: RADIOLOGY

## 2020-12-10 PROCEDURE — 51600 INJECTION FOR BLADDER X-RAY: CPT | Mod: GC | Performed by: RADIOLOGY

## 2020-12-11 ENCOUNTER — DOCUMENTATION ONLY (OUTPATIENT)
Dept: TRANSPLANT | Facility: CLINIC | Age: 40
End: 2020-12-11

## 2020-12-11 NOTE — PROGRESS NOTES
Patient signed ANGEL and KDPI / I/Abbie cosigned forms /   AGNEL and KDPI (Not wiling to accept >85%) were sent to [facility] to be scanned.      Note in checklist

## 2020-12-22 ENCOUNTER — TEAM CONFERENCE (OUTPATIENT)
Dept: TRANSPLANT | Facility: CLINIC | Age: 40
End: 2020-12-22

## 2020-12-22 NOTE — TELEPHONE ENCOUNTER
Dr. Hensley reviewed the pt's Abdominal/Pelvic CT from 12/10/20, he has no concerns w/ the finding of air in the bladder since the CT was done after the VCUG/PVR procedure.  Per Dr. Hensley nephrology should determine how to assess the cystic lesions in the kidney by either ordering a non-contrast MRI or referring the pt to urology.  Will send message to ALISHA/Dr. Alvarez for follow up.

## 2020-12-24 ENCOUNTER — TELEPHONE (OUTPATIENT)
Dept: TRANSPLANT | Facility: CLINIC | Age: 40
End: 2020-12-24

## 2020-12-24 NOTE — TELEPHONE ENCOUNTER
Patient Call: Is checking to see what she has left to do(test's)  Stated she has two more thing to do       Call back needed? Yes    Return Call Needed  Same as documented in contacts section  When to return call?: Greater than one day: Route standard priority

## 2020-12-28 ENCOUNTER — TELEPHONE (OUTPATIENT)
Dept: TRANSPLANT | Facility: CLINIC | Age: 40
End: 2020-12-28

## 2020-12-28 DIAGNOSIS — Z01.818 PRE-TRANSPLANT EVALUATION FOR KIDNEY TRANSPLANT: ICD-10-CM

## 2020-12-28 DIAGNOSIS — Z76.82 ORGAN TRANSPLANT CANDIDATE: ICD-10-CM

## 2020-12-28 DIAGNOSIS — I10 ESSENTIAL HYPERTENSION: ICD-10-CM

## 2020-12-28 NOTE — Clinical Note
Please see order for Urology consult, pt needs to see urology as follow up to her Ab/Pelv CT done on 12/10/20.  Transplant surgery would like urology to assess for any additional imaging needed d/t pt's anatomy. Pt is a pre-K, not on dialysis.  Please let me know if you have questions.  Thanks, Bee

## 2020-12-28 NOTE — TELEPHONE ENCOUNTER
Called pt and left VM stating our surgeons reviewed her Abdominal CT and would like her to see Urology.  I told her she would hear from scheduling to set up that appointment and to call the office if she had any questions. Order for urology order placed and routed to scheduling.

## 2021-01-11 ENCOUNTER — TELEPHONE (OUTPATIENT)
Dept: TRANSPLANT | Facility: CLINIC | Age: 41
End: 2021-01-11

## 2021-01-11 NOTE — TELEPHONE ENCOUNTER
Ariane called me right back. She is aware she is now listed but not eligible to receive organ offers until her evaluation is complete. She has completed all of her health maintenance and records will be requested.

## 2021-01-11 NOTE — TELEPHONE ENCOUNTER
Called Ariane to let her know I was able to place her on the inactive wait list meaning she is now listed but we have to finish up her evaluation to change to active. I asked her to call me back so we can compare notes. She was listed inactive on 1/9/2021.Inactive listing letter will be sent.

## 2021-01-11 NOTE — LETTER
2021    Ariane Flores  Lot 11  1971 16  [place] [address]      Dear Ms. Flores,    This letter is sent to confirm that you have completed your transplant work-up and you are a candidate in the {Organ } transplant program at the [facility].  You were placed on the {Organ } inactive waitlist on ***.  This means you will accumulate waiting time but not receive  donor calls.       Items we will need from you:      We have received approval from you insurance company for the transplant procedure.  It is critical that you notify us if there is any change in your insurance.  It is also important that you familiarize yourself with the details of your specific insurance policy.  Our patient  is available to assist you if you should have any questions regarding your coverage.      An ALA or PRA blood sample may need to be sent here every 3 to 6 months to match you with  donors or any potential living donors.  If you need this testing, special mailing boxes (called mailers) will be sent to you directly from the Outreach Department.  You should take the physician order form and the  to your home laboratory when it is time to for this testing to be done.  Additional mailers can be obtained by calling the Transplant Office and asking to speak to a {Organ } .      During this waiting period, we may request additional periodic laboratory tests with your primary physician.  It will be your responsibility to remind your physician to forward your results to the Transplant Office.      We need to be kept informed of any changes in your medical condition such as:    o changes in your medications,   o significant changes in your health  o significant infections (such as pneumonia or abscesses)  o blood transfusions  o any condition which requires hospitalization  o any surgery      Remember to  complete any routine cancer screening tests required before your transplant.  This includes colonoscopy; prostrate screening for men, and mammogram and gynecologic testing for women, as well as dental work.  Your primary care clinic can assist you with arranging for these exams.  Remind your caregivers to forward copies of the records and final reports.    We want you to know that our program has physician and surgeon coverage 24 hours a day, 365 days a year. If this coverage changes or there are substantial program changes, you will be notified in writing by letter.     Attached is a letter from the [employer] ([employer]). It describes the services and information offered to patients by UNOS and the Organ Procurement and Transplantation Network.    We appreciate having had the opportunity to participate in your care.  If you have questions, please feel free to call the Transplant Office at [phone] or [phone].      Sincerely,       {Organ UC:[id number]} Transplant Program    Enclosures: {SOT enclosures:118068}  CC:   ***                                The Organ Procurement and Transplantation Network  Toll-free patient services line:     Your resource for organ transplant information    If you have a question regarding your own medical care, you always should call your transplant hospital first. However, for general organ transplant-related information, you can call the Organ Procurement and Transplantation Network (OPTN) toll-free patient services line at [phone]- [phone]. Anyone, including potential transplant candidates, candidates, recipients, family members, friends, living donors, and donor family members, can call this number to:          Talk about organ donation, living donation, the transplant process, the donation process, and transplant policies.    Get a free patient information kit with helpful booklets, waiting list and transplant information, and a list of all transplant  hospitals.    Ask questions about the OPTN website (https://optn.transplant.hrsa.gov/), the [employer] Organ [employer]aring s (UNOS) website (https://unos.org/), or the UNOS website for living donors and transplant recipients. (https://www.transplantliving.org/).    Learn how the OPTN can help you.    Talk about any concerns that you may have with a transplant hospital.    The [facility] transplant system, the OPTN, is managed under federal contract by the [employer] Organ [employer]aring (UNOS), which is a non-profit charitable organization. The OPTN helps create and define organ sharing policies that make the best use of donated organs. This process continuously evaluating new advances and discoveries so policies can be adapted to best serve patients waiting for transplants. To do so, the OPTN works closely with transplant professionals, transplant patients, transplant candidates, donor families, living donors, and the public. All transplant programs and organ procurement organizations throughout the country are OPTN members and are obligated to follow the policies the OPTN creates for allocating organs.    The OPTN also is responsible for:      Providing educational material for patients, the public, and professionals.    Raising awareness of the need for donated organs and tissue.    Coordinating organ procurement, matching, and placement.    Collecting information about every organ transplant and donation that occurs in the [place].    Remember, you should contact your transplant hospital directly if you have questions or concerns about your own medical care including medical records, work-up progress, and test results.    We are not your transplant hospital, and our staff will not be able to answer questions about your case, so please keep your transplant hospital s phone number handy.    However, while you research your transplant needs and learn as much as you can about transplantation and donation, we  welcome your call to our toll-free patient services line at [phone].          Updated 4/1/2019

## 2021-01-11 NOTE — LETTER
2021    Ariane Flores  Lot 11  1971 16  [place] [address]      Dear Ariane,    This letter is sent to confirm that you have nearly  have completed your transplant work-up and you are a candidate in the kidney transplant program at the [facility].  You were placed on the kidney inactive waitlist on 2021.  This means you will accumulate waiting time but not receive  donor calls.  Your actual listing date will be modified by UNOS to be the date you were approved as a transplant candidate, 2020 so you will not be disadvantaged by the pandemic.     Items we will need from you:      We have received approval from you insurance company for the transplant procedure.  It is critical that you notify us if there is any change in your insurance.  It is also important that you familiarize yourself with the details of your specific insurance policy.  Our patient  is available to assist you if you should have any questions regarding your coverage.    An ALA or PRA blood sample will  need to be sent here every 3  months to match you with  donors or any potential living donors.  If you need this testing, special mailing boxes (called mailers) will be sent to you directly from the Outreach Department. You should take the physician order form and the  to your home laboratory when it is time to for this testing to be done.  Additional mailers can be obtained by calling the Transplant Office and asking to speak to a kidney . I will let you know when to start having these labs drawn.      During this waiting period, we may request additional periodic laboratory tests with your primary physician.  It will be your responsibility to remind your physician to forward your results to the Transplant Office.                We need to be kept informed of any changes in your medical condition such  as:    o changes in your medications,   o significant changes in your health  o significant infections (such as pneumonia or abscesses)  o blood transfusions  o any condition which requires hospitalization  o any surgery  o If you start dialysis      Remember to complete any routine cancer screening tests required before your transplant.  This includes colonoscopy; prostrate screening for men, and mammogram and gynecologic testing for women, as well as dental work.  Your primary care clinic can assist you with arranging for these exams.  Remind your caregivers to forward copies of the records and final reports.    We want you to know that our program has physician and surgeon coverage 24 hours a day, 365 days a year. If this coverage changes or there are substantial program changes, you will be notified in writing by letter.     Attached is a letter from the [employer] (UNOS). It describes the services and information offered to patients by UNOS and the Organ Procurement and Transplantation Network.    We appreciate having had the opportunity to participate in your care.  If you have questions, please feel free to call the Transplant Office at [phone] or [phone].      Sincerely,       Kidney Transplant Program    Enclosures: Telephone Contact List, Travel Resources, UNOS Letter, Waitlist Information Update and While You Are Waiting  CC:   Rachael Contreras                    The Organ Procurement and Transplantation Network  Toll-free patient services line:     Your resource for organ transplant information    If you have a question regarding your own medical care, you always should call your transplant hospital first. However, for general organ transplant-related information, you can call the Organ Procurement and Transplantation Network (OPTN) toll-free patient services line at [phone]. Anyone, including potential transplant candidates, candidates,  recipients, family members, friends, living donors, and donor family members, can call this number to:          Talk about organ donation, living donation, the transplant process, the donation process, and transplant policies.    Get a free patient information kit with helpful booklets, waiting list and transplant information, and a list of all transplant hospitals.    Ask questions about the OPTN website (https://optn.transplant.hrsa.gov/), the [employer] Organ [employer]aring s (UNOS) website (https://unos.org/), or the UNOS website for living donors and transplant recipients. (https://www.transplantliving.org/).    Learn how the OPTN can help you.    Talk about any concerns that you may have with a transplant hospital.    The [facility] transplant system, the OPTN, is managed under federal contract by the [employer] Organ [employer]aring (UNOS), which is a non-profit charitable organization. The OPTN helps create and define organ sharing policies that make the best use of donated organs. This process continuously evaluating new advances and discoveries so policies can be adapted to best serve patients waiting for transplants. To do so, the OPTN works closely with transplant professionals, transplant patients, transplant candidates, donor families, living donors, and the public. All transplant programs and organ procurement organizations throughout the country are OPTN members and are obligated to follow the policies the OPTN creates for allocating organs.    The OPTN also is responsible for:      Providing educational material for patients, the public, and professionals.    Raising awareness of the need for donated organs and tissue.    Coordinating organ procurement, matching, and placement.    Collecting information about every organ transplant and donation that occurs in the [facility].                    Remember, you should contact your transplant hospital directly if you have questions or concerns about  your own medical care including medical records, work-up progress, and test results.    We are not your transplant hospital, and our staff will not be able to answer questions about your case, so please keep your transplant hospital s phone number handy.    However, while you research your transplant needs and learn as much as you can about transplantation and donation, we welcome your call to our toll-free patient services line at [phone].          Updated 4/1/2019

## 2021-01-15 ENCOUNTER — DOCUMENTATION ONLY (OUTPATIENT)
Dept: TRANSPLANT | Facility: CLINIC | Age: 41
End: 2021-01-15

## 2021-01-15 NOTE — PROGRESS NOTES
Documentation requesting Wait Time Modification   I/Abbie Davis RN BSN working with Candace Pedromalinda gathered the Modification of Kidney Waiting Time form, Selection Committee, eGFR lab on 7/17/2020  with eGFR of 19 ml/min meeting criteria pre evaluation date 11/5/2020.     Forms were faxed to [employer] 1/15/2021. Successfully confirmed at 2:05 pm 1/15/2021     MODIFICATION OF KIDNEY WAITING TIME INITIATION FOR NON-DIALYSIS CANDIDATES      CANDIDATE NAME Ariane Flores  WAITING LIST ORGAN      Kidney _______  CANDIDATE HIC #/SSN   XXX  TRANSPLANT PROGRAM NAME/CODE  MNUM___  Requested listing/waiting time start date:  11/5/2020     Brief Explanation for request (include additional documentation as necessary): Patient completed a multi-virtual evaluation utilizing video visiting technology to assess candidacy for transplant and was approved by the selection committee to be listed as soon as it was safe for the candidate to complete on site testing including lab work, imaging, and appropriate consults. Due to the risk of exposure to COVID-19, the team planned to defer on-site testing until it is safe for ESRD patient to enter the health care facility.  At this time we are requesting the candidate s wait time be modified to the date indication above.      To apply for a kidney waiting time modification for candidates affected by the COVID-19 public health emergency, the candidate s transplant program must submit an application to the OPTN with all of the following information:      1. DONE Documentation confirming the transplant program s decision to register the candidate at the requested listing/waiting time start date above.   2. DONE Documentation explaining why the circumstances of the COVID-19 public health emergency prevented the candidate from beginning to accrue waiting time.   3. For candidates registered at age 18 years or older, documentation confirming candidate s measured or calculated creatinine clearance  or GFR was less than or equal to 20ml/min.   Date criteria was met (must be prior to the requested listing/waiting time start date): 2020 eGFR =19ml/min     TRANSPLANT PHYSICIAN/SURGEON SIGNATURE DATE     1/15/2021     TRANSPLANT PHYSICIAN/SURGEON NAME (Please print or type)   Corwin Baez MD     Transplant Program Contact Name Candace Solomon RN BSN   Transplant Program Contact Email [email or web address]  Transplant Program Contact Phone Number [phone] option 4   **The OPTN will notify the transplant program involved by secure email of the completed modification of waiting time**     PLEASE FAX TO ELIJAH AT [phone]    Date:  1/15/2021  Total Pages (Including Cover):   __pages  To:  ATTADAN NAVA   FAX:  [phone]  From:   Transplant Office /Coordinator   Name: AUGUSTIN VannN   Best way to contact:          [phone] option 4  Fax Number: [phone]  Message:  ealth Reading team requests Modification of Kidney Waiting Time initiation for Non-Dialysis Candidates Form on Ariane Flores  1980.  Enclosed selection committee notes, Labs pre evaluation meet criteria, and modification form.   Please call Candace Solomon RN BSN  at [phone]  Option 4  if questions.    Thank you so much,   Abbie Davis RN BSN submitting on behalf of Candace Solomon

## 2021-01-19 ENCOUNTER — PRE VISIT (OUTPATIENT)
Dept: UROLOGY | Facility: CLINIC | Age: 41
End: 2021-01-19

## 2021-01-19 NOTE — TELEPHONE ENCOUNTER
Reason for visit: evaluation for kidney transplant     Relevant information: New Patient    Records/imaging/labs/orders: available    Pt called: YULY    At Rooming: Video

## 2021-01-26 ENCOUNTER — VIRTUAL VISIT (OUTPATIENT)
Dept: UROLOGY | Facility: CLINIC | Age: 41
End: 2021-01-26
Attending: PHYSICIAN ASSISTANT
Payer: MEDICARE

## 2021-01-26 DIAGNOSIS — Z76.82 ORGAN TRANSPLANT CANDIDATE: ICD-10-CM

## 2021-01-26 DIAGNOSIS — Z01.818 PRE-TRANSPLANT EVALUATION FOR KIDNEY TRANSPLANT: ICD-10-CM

## 2021-01-26 DIAGNOSIS — I10 ESSENTIAL HYPERTENSION: ICD-10-CM

## 2021-01-26 PROCEDURE — 99203 OFFICE O/P NEW LOW 30 MIN: CPT | Mod: 95 | Performed by: UROLOGY

## 2021-01-26 ASSESSMENT — PAIN SCALES - GENERAL: PAINLEVEL: NO PAIN (0)

## 2021-01-26 NOTE — LETTER
1/26/2021       RE: Ariane Flores  Lot 11  1971 16 1/[address] [place] [address]     Dear Colleague,    Thank you for referring your patient, Ariane Flores, to the [facility] [place] [facility]. Please see a copy of my visit note below.    Ariane is a 40 year old who is being evaluated via a billable video visit.          UROLOGY VIDEO NEW PATIENT NOTE           Chief Complaint:   History of vesicoureteral reflux         Interval Update    Ariane Flores is a very pleasant 40-year-old female.  She was referred to see urology for a history of prior urologic surgeries in the setting of renal transplant work-up.  She is somewhat unclear on her urologic history as most of it happened when she was a child.  She remembers having recurrent urinary tract infections and multiple surgeries on her right kidney that ultimately ended in a nephrectomy.  She has since had a solitary kidney on the left side which has progressively lost function.  She is being evaluated for transplant.    In her adult life, she does not recall having a history of any particular urologic problems.  She sees a nephrologist locally every 6 months but does not have any recent urologic care.  She denies a recent history of kidney stones, flank pain, voiding dysfunction, urinary tract infection.    She notes that she voids without problems and is somewhat puzzled herself as to why she would need a urologic work-up at this time.    3 months ago she underwent a voiding cystourethrogram which showed no evidence of vesicoureteral reflux and she was noted to empty her bladder to completion.  These images and report are personally reviewed.    Additionally, a CT scan was performed later that day which was essentially unremarkable aside from her having a left solitary kidney.  There was a focus of air in the bladder which I suspect was related to the voiding cystourethrogram performed just  prior to this.  This also was personally reviewed including images and report.        Physical Exam:   General Appearance: Well groomed, hygenic  Eyes: No redness, discharge  Respiratory: No cough, no respiratory distress or labored breathing  Musculoskeletal:  grossly normal, full range of motion in upper extremities, no gross deficits  Skin: No discoloration or apparent rashes  Neurologic - No tremors  Psychiatric - Alert and oriented  The rest of a comprehensive physical examination is deferred due to public health emergency video visit restrictions      Labs and Pathology:    I personally reviewed all applicable laboratory data and went over findings with patient  Significant for:    CBC RESULTS:  Recent Labs   Lab Test 11/05/20  1128   WBC 8.4   HGB 12.9           BMP RESULTS:  Recent Labs   Lab Test 11/05/20  1128 07/19/20     --    POTASSIUM 3.6 3.6   CHLORIDE 109  --    CO2 26  --    ANIONGAP 5  --    GLC 89 98   BUN 26  --    CR 2.50* 2.32*   GFRESTIMATED 23* 26*   GFRESTBLACK 27*  --    RON 8.7  --        UA RESULTS:   Recent Labs   Lab Test 11/05/20  1150   SG 1.006   URINEPH 6.0   NITRITE Negative   RBCU 2   WBCU 5              Assessment/Plan   40 year old female with history of solitary left kidney.    -At this time I see no reason that she should not be able to proceed with her potential renal transplant.  I am uncertain as to the etiology of her CKD however do not suspect a urologic origin given the unremarkable VCUG, lack of urinary tract infection, lack of any evidence of obstruction.    -I suspect the focus of air in her bladder is most likely related to her VCUG performed prior that same day.               Past Medical History:     Past Medical History:   Diagnosis Date     CKD (chronic kidney disease) stage 4, GFR 15-29 ml/min (H)      Hyperlipidemia      Hypertension      Migraines      Recurrent UTI     in childhood            Past Surgical History:     Past Surgical History:    Procedure Laterality Date     APPENDECTOMY       HC REPAIR ACHILLES TENDON,PRIMARY       HERNIA REPAIR       NEPHRECTOMY Right     age 13     OTHER SURGICAL HISTORY      complete detail unknown, but required multiple urologic procedures before the age of 10 years            Medications     Current Outpatient Medications   Medication     atorvastatin (LIPITOR) 20 MG tablet     cholecalciferol 25 MCG (1000 UT) TABS     ferrous sulfate (FEROSUL) 325 (65 Fe) MG tablet     No current facility-administered medications for this visit.             Family History:     Family History   Problem Relation Age of Onset     Kidney Disease Daughter             Social History:     Social History     Socioeconomic History     Marital status: Single     Spouse name: Not on file     Number of children: Not on file     Years of education: Not on file     Highest education level: Not on file   Occupational History     Not on file   Social Needs     Financial resource strain: Not on file     Food insecurity     Worry: Not on file     Inability: Not on file     Transportation needs     Medical: Not on file     Non-medical: Not on file   Tobacco Use     Smoking status: Never Smoker     Smokeless tobacco: Never Used   Substance and Sexual Activity     Alcohol use: Not Currently     Drug use: Never     Sexual activity: Not on file   Lifestyle     Physical activity     Days per week: Not on file     Minutes per session: Not on file     Stress: Not on file   Relationships     Social connections     Talks on phone: Not on file     Gets together: Not on file     Attends [employer] service: Not on file     Active member of club or organization: Not on file     Attends meetings of clubs or organizations: Not on file     Relationship status: Not on file     Intimate partner violence     Fear of current or ex partner: Not on file     Emotionally abused: Not on file     Physically abused: Not on file     Forced sexual activity: Not on file   Other  Topics Concern     Parent/sibling w/ CABG, MI or angioplasty before 65F 55M? Not Asked   Social History Narrative     Not on file            Allergies:   Cephalosporins, Codeine, Diagnostic x-ray materials, and Nsaids         Review of Systems:  From intake questionnaire   Negative 14 system review except as noted on HPI, nurse's note.        CC:  Dwayne Pandya    How would you like to obtain your AVS? Mail a copy  If the video visit is dropped, the invitation should be resent by: Text to cell phone: [phone]  Will anyone else be joining your video visit? No      Video Start Time: 8:04  Video-Visit Details    Type of service:  Video Visit    Video End Time:8:11    Originating Location (pt. Location): Home    Distant Location (provider location):  [facility] [place]     Platform used for Video Visit: Yammer     Greater than 30 minutes of time including the time spent during the video visit were spent cumulatively on this patient encounter.  This included review of transplant records, review of imaging and imaging reports, review of labs, and documentation.    Again, thank you for allowing me to participate in the care of your patient.      Sincerely,    Lasha Boo MD

## 2021-01-26 NOTE — NURSING NOTE
Chief Complaint   Patient presents with     Consult     Review renal anatomy prior to transplant        not currently breastfeeding. There is no height or weight on file to calculate BMI.    Patient Active Problem List   Diagnosis     CKD (chronic kidney disease) stage 4, GFR 15-29 ml/min (H)     Migraine     Hyperparathyroidism, secondary renal (H)     Hyperlipidemia     HTN (hypertension)       Allergies   Allergen Reactions     Cephalosporins Other (See Comments)     Codeine Other (See Comments)     Diagnostic X-Ray Materials Other (See Comments)     Only has 1 kidney.  Only has 1 kidney.  Only has 1 kidney.       Nsaids Other (See Comments)     Kidney Damage. Have Only has one kidney        Current Outpatient Medications   Medication Sig Dispense Refill     atorvastatin (LIPITOR) 20 MG tablet Take 40 mg by mouth daily       cholecalciferol 25 MCG (1000 UT) TABS Take 1,000 Units by mouth       ferrous sulfate (FEROSUL) 325 (65 Fe) MG tablet Take 325 mg by mouth         Social History     Tobacco Use     Smoking status: Never Smoker     Smokeless tobacco: Never Used   Substance Use Topics     Alcohol use: Not Currently     Drug use: Never       Luisa Hill CMA  1/26/2021  7:36 AM

## 2021-01-26 NOTE — PROGRESS NOTES
Ariane is a 40 year old who is being evaluated via a billable video visit.          UROLOGY VIDEO NEW PATIENT NOTE           Chief Complaint:   History of vesicoureteral reflux         Interval Update    Ariane Flores is a very pleasant 40-year-old female.  She was referred to see urology for a history of prior urologic surgeries in the setting of renal transplant work-up.  She is somewhat unclear on her urologic history as most of it happened when she was a child.  She remembers having recurrent urinary tract infections and multiple surgeries on her right kidney that ultimately ended in a nephrectomy.  She has since had a solitary kidney on the left side which has progressively lost function.  She is being evaluated for transplant.    In her adult life, she does not recall having a history of any particular urologic problems.  She sees a nephrologist locally every 6 months but does not have any recent urologic care.  She denies a recent history of kidney stones, flank pain, voiding dysfunction, urinary tract infection.    She notes that she voids without problems and is somewhat puzzled herself as to why she would need a urologic work-up at this time.    3 months ago she underwent a voiding cystourethrogram which showed no evidence of vesicoureteral reflux and she was noted to empty her bladder to completion.  These images and report are personally reviewed.    Additionally, a CT scan was performed later that day which was essentially unremarkable aside from her having a left solitary kidney.  There was a focus of air in the bladder which I suspect was related to the voiding cystourethrogram performed just prior to this.  This also was personally reviewed including images and report.        Physical Exam:   General Appearance: Well groomed, hygenic  Eyes: No redness, discharge  Respiratory: No cough, no respiratory distress or labored breathing  Musculoskeletal:  grossly normal, full range of motion in upper  extremities, no gross deficits  Skin: No discoloration or apparent rashes  Neurologic - No tremors  Psychiatric - Alert and oriented  The rest of a comprehensive physical examination is deferred due to public health emergency video visit restrictions      Labs and Pathology:    I personally reviewed all applicable laboratory data and went over findings with patient  Significant for:    CBC RESULTS:  Recent Labs   Lab Test 11/05/20  1128   WBC 8.4   HGB 12.9           BMP RESULTS:  Recent Labs   Lab Test 11/05/20  1128 07/19/20     --    POTASSIUM 3.6 3.6   CHLORIDE 109  --    CO2 26  --    ANIONGAP 5  --    GLC 89 98   BUN 26  --    CR 2.50* 2.32*   GFRESTIMATED 23* 26*   GFRESTBLACK 27*  --    RON 8.7  --        UA RESULTS:   Recent Labs   Lab Test 11/05/20  1150   SG 1.006   URINEPH 6.0   NITRITE Negative   RBCU 2   WBCU 5              Assessment/Plan   40 year old female with history of solitary left kidney.    -At this time I see no reason that she should not be able to proceed with her potential renal transplant.  I am uncertain as to the etiology of her CKD however do not suspect a urologic origin given the unremarkable VCUG, lack of urinary tract infection, lack of any evidence of obstruction.    -I suspect the focus of air in her bladder is most likely related to her VCUG performed prior that same day.               Past Medical History:     Past Medical History:   Diagnosis Date     CKD (chronic kidney disease) stage 4, GFR 15-29 ml/min (H)      Hyperlipidemia      Hypertension      Migraines      Recurrent UTI     in childhood            Past Surgical History:     Past Surgical History:   Procedure Laterality Date     APPENDECTOMY       HC REPAIR ACHILLES TENDON,PRIMARY       HERNIA REPAIR       NEPHRECTOMY Right     age 13     OTHER SURGICAL HISTORY      complete detail unknown, but required multiple urologic procedures before the age of 10 years            Medications     Current Outpatient  Medications   Medication     atorvastatin (LIPITOR) 20 MG tablet     cholecalciferol 25 MCG (1000 UT) TABS     ferrous sulfate (FEROSUL) 325 (65 Fe) MG tablet     No current facility-administered medications for this visit.             Family History:     Family History   Problem Relation Age of Onset     Kidney Disease Daughter             Social History:     Social History     Socioeconomic History     Marital status: Single     Spouse name: Not on file     Number of children: Not on file     Years of education: Not on file     Highest education level: Not on file   Occupational History     Not on file   Social Needs     Financial resource strain: Not on file     Food insecurity     Worry: Not on file     Inability: Not on file     Transportation needs     Medical: Not on file     Non-medical: Not on file   Tobacco Use     Smoking status: Never Smoker     Smokeless tobacco: Never Used   Substance and Sexual Activity     Alcohol use: Not Currently     Drug use: Never     Sexual activity: Not on file   Lifestyle     Physical activity     Days per week: Not on file     Minutes per session: Not on file     Stress: Not on file   Relationships     Social connections     Talks on phone: Not on file     Gets together: Not on file     Attends Religion service: Not on file     Active member of club or organization: Not on file     Attends meetings of clubs or organizations: Not on file     Relationship status: Not on file     Intimate partner violence     Fear of current or ex partner: Not on file     Emotionally abused: Not on file     Physically abused: Not on file     Forced sexual activity: Not on file   Other Topics Concern     Parent/sibling w/ CABG, MI or angioplasty before 65F 55M? Not Asked   Social History Narrative     Not on file            Allergies:   Cephalosporins, Codeine, Diagnostic x-ray materials, and Nsaids         Review of Systems:  From intake questionnaire   Negative 14 system review except as  noted on HPI, nurse's note.        CC:  Dwayne Pandya    How would you like to obtain your AVS? Mail a copy  If the video visit is dropped, the invitation should be resent by: Text to cell phone: [phone]  Will anyone else be joining your video visit? No      Video Start Time: 8:04  Video-Visit Details    Type of service:  Video Visit    Video End Time:8:11    Originating Location (pt. Location): Home    Distant Location (provider location):  [facility] [place]     Platform used for Video Visit: [employer]     Greater than 30 minutes of time including the time spent during the video visit were spent cumulatively on this patient encounter.  This included review of transplant records, review of imaging and imaging reports, review of labs, and documentation.

## 2021-02-01 ENCOUNTER — DOCUMENTATION ONLY (OUTPATIENT)
Dept: TRANSPLANT | Facility: CLINIC | Age: 41
End: 2021-02-01

## 2021-02-01 NOTE — PROGRESS NOTES
Handoff information     Type of transplant: kidney    Listing status: Inactive    Transplant history: N/A congenital renal agenesis    Outstanding items for patient: neurospych scheduled 2/16    Pertinent history: see snapshot    Barriers to transplant care: none    Imaging reviewed? (if applicable): yes. Dr Hensley though may need MRI depending on urology and they cleared patient     Does pt have potential living donors?  no

## 2021-02-02 ENCOUNTER — DOCUMENTATION ONLY (OUTPATIENT)
Dept: TRANSPLANT | Facility: CLINIC | Age: 41
End: 2021-02-02

## 2021-02-16 ENCOUNTER — VIRTUAL VISIT (OUTPATIENT)
Dept: NEUROPSYCHOLOGY | Facility: CLINIC | Age: 41
End: 2021-02-16
Payer: MEDICARE

## 2021-02-16 DIAGNOSIS — R41.83: ICD-10-CM

## 2021-02-16 DIAGNOSIS — N18.6 END STAGE RENAL DISEASE (H): Primary | ICD-10-CM

## 2021-02-16 DIAGNOSIS — F54 PSYCHOLOGICAL AND BEHAVIORAL FACTORS ASSOCIATED WITH DISORDERS OR DISEASES CLASSIFIED ELSEWHERE: ICD-10-CM

## 2021-02-16 DIAGNOSIS — Z01.818 PREOP EXAMINATION: ICD-10-CM

## 2021-02-16 PROCEDURE — 96133 NRPSYC TST EVAL PHYS/QHP EA: CPT | Mod: 95 | Performed by: CLINICAL NEUROPSYCHOLOGIST

## 2021-02-16 PROCEDURE — 96116 NUBHVL XM PHYS/QHP 1ST HR: CPT | Mod: 95 | Performed by: CLINICAL NEUROPSYCHOLOGIST

## 2021-02-16 PROCEDURE — 96138 PSYCL/NRPSYC TECH 1ST: CPT | Mod: 95 | Performed by: CLINICAL NEUROPSYCHOLOGIST

## 2021-02-16 PROCEDURE — 96132 NRPSYC TST EVAL PHYS/QHP 1ST: CPT | Mod: 95 | Performed by: CLINICAL NEUROPSYCHOLOGIST

## 2021-02-16 PROCEDURE — 96139 PSYCL/NRPSYC TST TECH EA: CPT | Mod: 95 | Performed by: CLINICAL NEUROPSYCHOLOGIST

## 2021-02-16 NOTE — PROGRESS NOTES
Pt was seen for psychological evaluation at the request of Lasha Boo MD for the purposes of diagnostic clarification and treatment planning. 161 minutes of test administration and scoring were provided by this writer. Please see Dr. Karthikeyan Garcias's report for a full interpretation of the findings.    Video Start- 8:31  Video Stop- 10:12

## 2021-03-01 NOTE — PROGRESS NOTES
Name  Ariane Flores  HICKS [birth year]       MRN  [id number]  Provider NURIA         80   [facility]       Age  40   Station OP       Sex  Female   Visit Type Video       Education  12   [facility]       Handedness right                         ORIENTATION     CLOCK DRAWING      Time  0    Command  Impaired      Personal Information           Place  1 /2   ORAL TRAILS      Presidents  3 /6     Raw z Errors         Trails A  10 -2.42 0   WRAT-4 READING     Trails B  35 -0.33 0   Raw  41           SS  71    TSAT       %ile  3      Raw z    Grade Equivalence 4.2    Total Time  180 -2.41          Total Errors  6 -1.48    WAIS-IV               Raw SS RDS  RBANS       Digit Span  23 7 9    Raw z SS/%ile   Vocabulary  11 3   Line Orientation 13 -1.22 3-9   Matrix Reasoning 8 5   Story Immediate 12 -1.13 6   Similarities  11 3   Story Delay  7 -1.06 7   EST VIQ   58                HVLT       BOSTON NAMING TEST       Raw T    Raw 35     Trial 1  5     SS 4 %ile 0   Trial 2  6     T 23 MAS 0   Trial 3  6     Total Stim. Correct 0    Learning  1     Total Phon. Correct 12    Total Recall  17 22          Delayed Recall 3 <20    COWAT      Percent Retention 0.5 22    Form FAS     True Positives 7     Raw 18     False Positives 0     SS 5     Discrimination Index 7 <20    T 29 MAS 0          %ile 0 z 0   ILS HEALTH & SAFETY QUESTIONNAIRE    ANIMAL FLUENCY     Total  25     Raw 12     Classification Low     SS 6 z 0          T 28     BDI-II             Total  0     COMPLEX IDEATIONAL MATERIAL    Interpretation Minimal     Raw  8           SS  3    NILA       T  15    Total  0           Interpretation Minimal

## 2021-03-01 NOTE — PROGRESS NOTES
[facility] [place] [address]      NEUROPSYCHOLOGICAL EVALUATION    RELEVANT HISTORY AND REASON FOR REFERRAL    This is a report of neuropsychological consultation regarding Ariane Flores, a 40-year-old, right-handed,  woman with 12 years of formal education. Her medical history includes stage IV chronic kidney disease, hypertension, hyperlipidemia, secondary renal hyperparathyroidism, and migraine. She is under consideration for a kidney transplant. Adwoa Whiteside PA-C, ordered this neuropsychological evaluation of brain functioning as part of the standard pre-procedure protocol, to better understand cognitive and psychosocial factors that affect transplant candidacy.    Per chart notes, the transplant committee is concerned because Ms. Flores seemed unable to provide much history at recent appointments, and her mother instead tended to speak on her behalf. I communicated with the team's , Glory Pierce, who told me she did not have as many concerns about Ms. Flores's cognitive functioning as other members of the team did, as her mother apparently did less of the talking during the visit with Ms. Pierce. Ms. Flores's kidney issues are lifelong (congenital agenesis), and records indicate a history of developmental learning disabilities. It seems that her mother has been acting as her caretaker and managing many medical issues for her entire life, and that dynamic perhaps played out at recent visits.    In today's interview, Ms. Flores has nobody else with her and provides an independent history. She tells me she was born with kidney disease, with one of her kidneys never working properly. She says her mother told her that she   3 times  in her early childhood. She was born in [place], and she came to [place] at age 4. She says they lived in [place] and that is where her primary treatment had been. Her malformed kidney was not  removed until she was 13 years old, and she says that her good kidney was damaged by that time. She says that the functioning of her remaining kidney is now estimated to be around 20%. She believes that she has already done all of the tests and evaluations that the transplant committee has asked for, and now she is just waiting to be put onto the transplant waitlist.    Treatment goals include being able to live longer, being able to see her daughter grow up (daughter turns 18 in April), and hopefully to live long enough to be able to take care of grandchildren. She says she knows she will need to take  2 pills  for the rest of her life because of rejection issues. She is aware of the risk of infections, and she says she has been told that she will no longer be able to eat meat.    As noted, she was born in [place] and moved here before school age. Records say she was diagnosed with a developmental reading disorder. She tells me she began learning English when she entered school in [place]. She did not speak much Bengali until later on, after she graduated from high school. She relearned Bengali because her fiancé only speaks Bengali. She had an IEP throughout her school years, and she got special support with homework. She was never held back any grades. 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    She reports no changes in her cognitive functioning compared to her lifelong baseline. She and her fiancé share financial management, and she denies any trouble with her portion. She says she manages all of her medications independently and without difficulty. She has not felt the need to limit or change her driving habits.    There are no neuroimaging studies in the medical records available to me. She says she thinks she may have had  seizures when she was little, but she does not have the details. She is not sure if she has never been on antiepileptic medications. She thinks her mother might know more about it. She has never had a stroke. She reports no history of TBI. She does not have troubles with balance or coordination, tremor, or any other motoric dyscontrol. She has never experienced unilateral weakness or numbness. She does have migraines. The last one was about 2 days prior to this appointment. She says migraines simply come and go and she cannot identify specific triggers. She manages them by putting on a cold compress and taking Tylenol. She has not experienced any abnormal changes to her senses of smell, taste, hearing, or vision. She has not had a COVID-19 infection. She reports a single ER visits in the last year, being hospitalized for 3 days with significant kidney problems last June or July.    She lives at home with her daughter and laurence, whom she has been with for the last 8-9 years. She counts her daughter, fiancé, and mother as her core support system. Her mother lives about an hour away from her. She says she has spoken with all of them about her transplant options. Her mother apparently wanted to donate a kidney herself, but they were told that it would be better if she could receive an organ from a younger donor. She tells me that her mother will be able to provide postsurgical care and oversight, for as long as is needed while she recovers.    In the medical records, there is a listing for a history of depression. Today, Ms. Flores tells me she has no history of mental health concerns. She denies significant symptoms related to mood or anxiety. The only thing she can think of along these lines is that she just does not want to die. She reports good motivation and interest in enjoyable activities. She says she has never taken psychiatric medications, been involved in psychotherapy, had a psychiatric hospitalization,  experienced hallucinations, or had suicidal ideation.    She reports that she sometimes tends to stay up too late, but otherwise her sleep is good. She wakes up feeling rested in the morning. She only has to take a nap if she has a headache.    She reports no history of alcohol use at all, no history of recreational or illicit drugs, and no use of tobacco or vaping products. She says she has never had any legal troubles. She reports no problematic gambling behaviors. She feels like drinking soda has seemed like somewhat of an addiction for her, but these days she is usually able to limit herself to drinking 1 can per week.    There is no history of kidney disease in other family members. She reports no history of psychiatric or behavioral health problems in other family members.    BEHAVIORAL OBSERVATIONS    Ms. Flores was polite and cooperative with the evaluation. She demonstrated language sophistication and vocabulary consistent with her history of delayed English language learning, having a developmental verbal learning disability, and generally having low normal baseline functioning. Her speech was minimally slowed at times, but there were no real issues with speech rate, fluency, or prosody. She seemed to be a reasonable historian and demonstrated appropriate insight into the situation with her kidney health. Language comprehension was normal. Thought processes were linear and goal-directed. She was polite, friendly, and open throughout the interview. During the testing session, she was alert and attentive. He seemed a little slow to respond to tasks at times, but not severely so. There were some issues with the quality of the video connection, so some testing was completed only over the telephone, and then video was used when visual stimuli needed to be presented. She was open to testing and seemed to give good effort throughout. The test results are seen as reasonable estimations of her cognitive  functioning. However, the conditions of the assessment are not standard as the visit was conducted by telemedicine instead of in person, which may render inaccurate any comparisons to standard normative data.    MEASURES ADMINISTERED    The following measures were administered by a trained psychometrist, under my direct supervision:    Orientation: Time, Place, Basic Personal Information, Recent [place] Presidents; Wide Range Achievement Test 4: Word Reading; Wechsler Adult Intelligence Scales-IV: Vocabulary, Similarities, Matrix Reasoning, Digit Span; Controlled Oral Word Association Test; Animal Naming Test; [place] Naming Test; Complex Ideational Material; Oral Trail-Making Test; Test of Sustained Attention & Tracking; Clock Drawing; Michelle Verbal Learning Test-Revised; Repeatable Battery for the Assessment of Neuropsychological Status: Immediate and Delayed Stories, Judgment of Line Orientation; ILS Health and Safety Questionnaire; Styles Depression Inventory-II; Styles Anxiety Inventory.     RESULTS AND INTERPRETATION    Orientation was normal for time and basic personal information. Orientation to place/purpose was generally appropriate. She was able to name 3 of the last 6 [place] presidents. Performance on a reading and pronunciation test that is validated for estimating premorbid intelligence was below average. Abstract verbal analogical reasoning was below average. Demonstrating vocabulary knowledge was below average. Practical reasoning for a variety of health and safety scenarios was below normal expectations. Visual reasoning through pattern identification was below average. Clock drawing was abnormal in visuospatial aspects and in the representation of a specified time. Visual perceptual matching and discrimination of variably oriented lines was low average. Immediate auditory attention and working memory were below average for repeating and rearranging digit strings. Performances were variable but overall below  average across a variety of brief verbal tasks requiring executive management of attention and concentration. Confrontation naming was below average. Letter-based verbal fluency was below average. Category-based verbal fluency was below average. Oral comprehension and making inferences from sentences and brief paragraphs was below average. Immediate verbal memory for short stories was low average, and delayed story recall was low average. Learning a word list over repeated readings was below average. Delayed free recall of the list was below average, and delayed recognition of the list was below average.     On brief self-report inventories, she endorsed no symptoms related to depression (BDI-II = 0) or anxiety (NILA = 0). Given the demonstration of limited reading skills (~4th grade equivalent), more in-depth psychometric assessment via MMPI was deferred.     IMPRESSIONS AND RECOMMENDATIONS    In the context of nonstandard and limited assessment via video conference instead of in person (as necessitated by the current COVID-19 situation), the cognitive test results are abnormal.    Ms. Flores demonstrates mild cognitive impairments across most measures obtained today. A few performances are in the lower end of the average range, but most tests today yield below average results. 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 These findings appear to be consistent with her history of developmental learning disabilities requiring special education services throughout her academic career. There could be some additional contribution from systemic effects of organ failure, but I think the primary explanatory factors are neurodevelopmental. The cognitive deficits are not so severe as to make Ms. Flores reliant on others for medical decision-making, but they do indicate a need for regular oversight and  assistance in day-to-day medical management. She is likely able to learn and carry out a regular daily routine with practice and training, but somebody should be habitually checking the accuracy of her medication management--especially when there are any changes to the routine. Her treatment team should understand that providing copious amounts of written materials is probably less helpful than in-depth and repeated conversations about critical medical information, but such materials could be given to caregivers who could assist in going through it and understanding the material sufficiently. Treatment providers should regularly assess her comprehension of critical medical information in the midst of conversations, and then check and re-check her retention of information across visits.     At this time, I do not see indications of emotional health concerns requiring clinical intervention. There appear to be no issues with substance use or behavioral troubles. She appears to have good psychosocial support. I have not met with her mother, daughter, or fiancé, and I cannot speak to their capacity for acting as caregivers. I defer to the transplant team s social workers and care coordinators on those matters.     A neuropsychological baseline has been obtained. I would be happy to see here again, whenever clinically indicated.     Karthikeyan Garcias, PhD, LP, ABPP-CN  Board Certified in Clinical Neuropsychology  Licensed Psychologist [id number]      Time spent: 42 minutes neurobehavioral status exam including interview, clinical assessment by licensed and board-certified neuropsychologist (CPT 13983). 91 minutes neuropsychological testing evaluation by licensed and board-certified neuropsychologist, including integration of patient data, interpretation of standardized test results and clinical data, clinical decision-making, treatment planning, report, and interactive feedback to the patient (CPT 53434, 00550). 161 minutes of  psychological and neuropsychological test administration and scoring by technician (CPT 23155, 71113). Diagnoses: N18.6, F54, R41.83, Z01.818    ---------  Ariane Flores is a 40-year-old woman who is being evaluated via a billable video visit. Telemedicine services are necessary because of the COVID-19 pandemic.     Patient would like the video invitation sent by: Send to e-mail at: [email or web address]  Will anyone else be joining your video visit? No     Visit Details  Type of service: Video Conference  Interview:    Start Time: 8:01 AM    End Time: 8:29 AM  Formal Testing:    Start Time: 8:31 AM    End Time: 10:12 AM  Originating Location (pt. Location): Home   Distant Location (provider location): [facility]   Platform used for Video Visit: [employer]

## 2021-03-03 ENCOUNTER — COMMITTEE REVIEW (OUTPATIENT)
Dept: TRANSPLANT | Facility: CLINIC | Age: 41
End: 2021-03-03

## 2021-03-03 ENCOUNTER — TELEPHONE (OUTPATIENT)
Dept: TRANSPLANT | Facility: CLINIC | Age: 41
End: 2021-03-03

## 2021-03-03 NOTE — TELEPHONE ENCOUNTER
Coordinator called pt to review she was approved for active status at selection today. Described that our team would recommend active status with a only calling pt with very good offers, since her GFR is steady around 23. Pt states she is feeling good but would like to be on active list and contacted if a very good offer comes through. Pt is aware a banner will be placed on her chart once activated. Pt to notify coordinator in the future if her kidney function declines/pt starts to exhibit symptoms so banner can be taken off of chart. Coordinator will activate pt once insurance verification is approved.    Pt phone numbers reviewed in EPIC. Reviewed what to do when pt gets a call with a kidney offer.  Reviewed general components of inpt stay and post-transplant routines including frequent appointments here as an outpt x1-2 weeks post-transplant.   Requested pt contact me with any changes in her health, insurance, or contact information.  Instructed pt to call with any questions. Pt verbalized good understanding and had no further questions. Contact information provided to pt.

## 2021-03-04 ENCOUNTER — DOCUMENTATION ONLY (OUTPATIENT)
Dept: TRANSPLANT | Facility: CLINIC | Age: 41
End: 2021-03-04

## 2021-03-04 ENCOUNTER — TELEPHONE (OUTPATIENT)
Dept: TRANSPLANT | Facility: CLINIC | Age: 41
End: 2021-03-04

## 2021-03-04 DIAGNOSIS — Z76.82 ORGAN TRANSPLANT CANDIDATE: ICD-10-CM

## 2021-03-04 DIAGNOSIS — N18.6 ESRD (END STAGE RENAL DISEASE) (H): ICD-10-CM

## 2021-03-04 NOTE — TELEPHONE ENCOUNTER
Spoke with patient and let her know that she is now Active on the kidney wait list. Patient has kits/mailers; writer will send orders and letter to pt via mail today so she can get drawn asap.

## 2021-03-04 NOTE — COMMITTEE REVIEW
Abdominal Committee Review Note     Evaluation Date: 11/5/2020  Committee Review Date: 3/3/2021    Organ being evaluated for: Kidney    Transplant Phase: Waitlist  Transplant Status: Inactive    Transplant Coordinator: Tanya Cobian  Transplant Surgeon:       Referring Physician: Rachael Tenorio    Primary Diagnosis: Hypoplasia/Dysplasia/Dysgenesis/Agenesis  Secondary Diagnosis:     Committee Review Members:  Nephrology Issac Kennedy MD, Bryant Rizvi MD, Familia Swift MD   Nurse Tonja Paz, RN, Golden Dorsey, RN   Pharmacist Leigh Hart, [facility]    - Clinical Valorie Evans, [facility], Glory Pierce, [facility]   Transplant Adwoa Whiteside PA-C, Laurie Manzo, RN, Bee Spence, RN, Tanya Cobian, RN, Wendy Quintero LPN, Golden Dorsey, AUGUSTIN, Roseanna Wilkins, RN, Mayte Moore, RN, Ann Solomon RN   Transplant Surgery Daniel Fitch MD, Kendal Browning MD, MD       Transplant Eligibility:     Committee Review Decision: Make Active    Relative Contraindications:     Absolute Contraindications:     Committee Chair eKndal Browning MD verbally attested to the committee's decision.    Committee Discussion Details: Committee reviewed pt has completed her workup, including seeing Dr. Garcias on 2/16/2021.     Pt is approved for active status and committee recommends pt is ONLY offered zero mismatch/very good offers as pt's GFR is steady at 23 and pt is feeling well.    Ok to list pt actively pending insurance verification.

## 2021-03-18 DIAGNOSIS — N18.6 ESRD (END STAGE RENAL DISEASE) (H): ICD-10-CM

## 2021-03-18 DIAGNOSIS — Z76.82 ORGAN TRANSPLANT CANDIDATE: ICD-10-CM

## 2021-06-24 DIAGNOSIS — N18.6 ESRD (END STAGE RENAL DISEASE) (H): ICD-10-CM

## 2021-06-24 DIAGNOSIS — Z76.82 ORGAN TRANSPLANT CANDIDATE: ICD-10-CM

## 2021-08-24 ENCOUNTER — DOCUMENTATION ONLY (OUTPATIENT)
Dept: TRANSPLANT | Facility: CLINIC | Age: 41
End: 2021-08-24

## 2021-08-28 ENCOUNTER — DOCUMENTATION ONLY (OUTPATIENT)
Dept: TRANSPLANT | Facility: CLINIC | Age: 41
End: 2021-08-28

## 2021-08-28 DIAGNOSIS — Z76.82 AWAITING ORGAN TRANSPLANT: Primary | ICD-10-CM

## 2021-08-30 NOTE — PROGRESS NOTES
PATHOLOGY HLA CROSSMATCH CONSULTATION: DONOR/RECIPIENT  VIRTUAL CROSSMATCH - Kidney  Consultation Date: 2021  Consultation Requested by: Dr. Issac Simeon    Regarding: Compatibility of  donor organ UNOS #[id number] from OPO: MNOP with Ariane Flores    Findings: Regarding a virtual crossmatch between Ariane Flores and  donor listed above (match ID [id number]):  The most recent (21) and two (2) additional patient serum/sera  were analyzed.  The patient has no antibodies listed with HLA specificity against the donor organ.      Record Review Indicates: I personally reviewed the most recent serum, the historic peak sera, and all other sera with solid-phase HLA Single Antigen test results:  The patient has no HLA antibodies against the donor organ.     The results of this virtual XM are:   -most recent serum: compatible   -peak #1: compatible    Disclaimer: Clinical judgement must take into account other factors, such as non-HLA antibodies not detected in the assay. The VXM gives probabilities only.  The probability does not account for the potential for auto-antibodies that may be present in the patient's serum.  These autoantibodies may render the physical crossmatch falsely positive, and would be detected by an autologous crossmatch.  When possible, confirm findings with prospective allogeneic and autologous flow crossmatches before going to transplant as clinically indicated.     Lucas Arechiga, PhD    Lucas Arechiga, PhD,[facility]  Medical Director, Immunology/Histocompatibility Laboratory  Pager [phone]

## 2021-09-30 PROCEDURE — 36415 COLL VENOUS BLD VENIPUNCTURE: CPT

## 2021-09-30 PROCEDURE — 86833 HLA CLASS II HIGH DEFIN QUAL: CPT | Performed by: SURGERY

## 2021-09-30 PROCEDURE — 86832 HLA CLASS I HIGH DEFIN QUAL: CPT | Performed by: SURGERY

## 2021-10-04 ENCOUNTER — LAB (OUTPATIENT)
Dept: LAB | Facility: CLINIC | Age: 41
End: 2021-10-04
Payer: COMMERCIAL

## 2021-10-04 DIAGNOSIS — Z76.82 ORGAN TRANSPLANT CANDIDATE: ICD-10-CM

## 2021-10-04 DIAGNOSIS — N18.6 ESRD (END STAGE RENAL DISEASE) (H): ICD-10-CM

## 2021-10-04 PROCEDURE — 36415 COLL VENOUS BLD VENIPUNCTURE: CPT

## 2021-10-05 DIAGNOSIS — Z76.82 ORGAN TRANSPLANT CANDIDATE: ICD-10-CM

## 2021-10-05 DIAGNOSIS — N18.6 ESRD (END STAGE RENAL DISEASE) (H): ICD-10-CM

## 2021-10-05 NOTE — PROGRESS NOTES
Pt is due for yearly return waitlist visits with transplant nephrology, social work. Our schedulers will call you to get these appts set up. Pt verbalized understanding of information and has no further questions. Encouraged to reach out if questions arise.

## 2021-10-07 LAB
PROTOCOL CUTOFF: NORMAL
SA 1 CELL: NORMAL
SA 1 TEST METHOD: NORMAL
SA 2 CELL: NORMAL
SA 2 TEST METHOD: NORMAL
SA1 HI RISK ABY: NORMAL
SA1 MOD RISK ABY: NORMAL
SA2 HI RISK ABY: NORMAL
SA2 MOD RISK ABY: NORMAL
UNACCEPTABLE ANTIGENS: NORMAL
UNOS CPRA: 94
ZZZSA 1  COMMENTS: NORMAL
ZZZSA 2 COMMENTS: NORMAL

## 2021-10-13 ENCOUNTER — ORGAN (OUTPATIENT)
Dept: TRANSPLANT | Facility: CLINIC | Age: 41
End: 2021-10-13

## 2021-10-13 ENCOUNTER — DOCUMENTATION ONLY (OUTPATIENT)
Dept: TRANSPLANT | Facility: CLINIC | Age: 41
End: 2021-10-13

## 2021-10-13 DIAGNOSIS — Z76.82 AWAITING ORGAN TRANSPLANT: Primary | ICD-10-CM

## 2021-10-13 PROCEDURE — 86825 HLA X-MATH NON-CYTOTOXIC: CPT | Performed by: SURGERY

## 2021-10-13 PROCEDURE — 86832 HLA CLASS I HIGH DEFIN QUAL: CPT | Performed by: SURGERY

## 2021-10-13 PROCEDURE — 36415 COLL VENOUS BLD VENIPUNCTURE: CPT

## 2021-10-13 PROCEDURE — 86833 HLA CLASS II HIGH DEFIN QUAL: CPT | Performed by: SURGERY

## 2021-10-13 NOTE — PROGRESS NOTES
PATHOLOGY HLA CROSSMATCH CONSULTATION: DONOR/RECIPIENT  VIRTUAL CROSSMATCH - Kidney  Consultation Date: 10/13/2021  Consultation Requested by: Dr. Simeon    Regarding: Compatibility of  donor organ UNOS #[id number] from OPO: PETER with Ariane Flores    Findings: Regarding a virtual crossmatch between Ariane Flores and  donor listed above (match ID [id number]):  The most recent (9..) and 4 additional patient serum/sera  were analyzed.  The patient has no antibodies listed with HLA specificity against the donor organ.      Record Review Indicates: I personally reviewed the most recent serum, the historic peak sera, and all other sera with solid-phase HLA Single Antigen test results:  The patient has no HLA antibodies against the donor organ.     The results of this virtual XM are:   -most recent serum: compatible   -peak #1: compatible  -peak #2: compatible    Disclaimer: Clinical judgement must take into account other factors, such as non-HLA antibodies not detected in the assay. The VXM gives probabilities only.  The probability does not account for the potential for auto-antibodies that may be present in the patient's serum.  These autoantibodies may render the physical crossmatch falsely positive, and would be detected by an autologous crossmatch.  When possible, confirm findings with prospective allogeneic and autologous flow crossmatches before going to transplant as clinically indicated.     Sandy Haile MD  Medical Director, Immunology/Histocompatibility Laboratory

## 2021-10-14 NOTE — TELEPHONE ENCOUNTER
Organ Offer Encounter Information    Organ Offer Information  Organ offer date & time: 10/13/2021  8:56 AM  Coordinator/Fellow/Attending name: Yin Velasco RN   Organ(s):  Organ UNOS ID Match Run ID Comment Organ Laterality   Kidney [id number]       Recent infections?: No    New medications?: No Recent pregnancy?: No   Angicoagulation medications?: No Recent vaccinations?: No   Recent blood transfusions?: No Recent hospitalizations?: No   Has your insurance changed in the last 6-12 months?: Neg    Discussed organ offer with: Patient  Discussed risk category with Patient/Other: N/A  Understood donor criteria, verbalized understanding  Patient/Other asked to speak to a surgeon?: No  Discussed program-specific outcomes: Did not have questions regarding SRTR  Right to decline organ offer without penalty, Patient/Other: Aware of option to decline without penalty  Organ offer decision status Patient/Other: Declined Offer  UNOS decline reason: 898 - Other Specify  Detail: 841- donor positive covid result.    Organ disposition: Case Cancelled - Other (specify) (Comment: donor positive covid result. )  Additional Comments: 10/13/2021 8:58 AM  Kidney: Import, DBD  MD: Charli/Blanco  OPO Contact: Alicia [phone]  VXM Results: Compatible, no DSA  XM Plan (FXM must be done with serum no older than 10 days from transplant):    Plan (Admission, NPO, Donor OR): Called patient to discuss back up kidney offer.  Health assessment as above, not yet on dialysis.  Patient is currently at work & requests I call back after 1430 to make plans for XM.  Donor OR time is still TBD, so will call patient back after 1430 to finalize plans for XM.  Spoke to Jair potter Patient Communicator to request donor blood.    - - -   COVID Screening  In the past month, have you had:  Any close contact with a suspect or laboratory-confirmed COVID-19 patient: No  Travel anywhere: No  COVID Symptoms (Fever, Cough, Short of Breath, Loss of Taste/Smell,  Rash): No  Mahi Velasco RN   Transplant Coordinator    October 13, 2021 4:40 PM  Donor blood ETA 1830, being driven.  Spoke to patient who will call daughter to bring her to the lab this evening, orders placed for final XM.  Uzair in acute care lab notified, ETA 1930.  Saranya in immunology aware.  Mahi Velasco RN   Transplant Coordinator    October 13, 2021 8:22 PM  Connected with patient.  She had her blood drawn, will f/u with results when available.  Instructed that she does NOT need to be NPO yet at this point & she does remain back up.  Donor OR time TBD.  Mahi Velasco RN   Transplant Coordinator    October 14, 2021 4:42 AM  XM negative, reported to Dr. Simeon, updated patient.  Mahi Velasco RN   Transplant Coordinator    10/14/2021 12:45 PM;  Incoming call from Dr Simeon advising Dr Davenport wants Ki turned down due to donor positive covid result. Pt updated she will not be getting a Ki.     Shaq Patel RN  On-Call Transplant Coordinator          Attestation I have discussed all of the above with the Patient/Legal Guardian/Caregiver regarding this organ offer.: Yes  Coordinator/Fellow/Attending name: Yin Velasco RN

## 2021-10-18 LAB
CELL TYPE ALLO: NORMAL
CELL TYPE AUTO: NORMAL
CHANNELSHIFTALLOB1: -62
CHANNELSHIFTALLOB2: -70
CHANNELSHIFTALLOT1: -33
CHANNELSHIFTALLOT2: -34
CHANNELSHIFTAUTOB1: -42
CHANNELSHIFTAUTOB2: -42
CHANNELSHIFTAUTOT1: -30
CHANNELSHIFTAUTOT2: -29
CROSSMATCHDATEALLO: NORMAL
CROSSMATCHDATEAUTO: NORMAL
DONOR ALLO: NORMAL
DONOR AUTO: NORMAL
DONORCELLDATE ALLO: NORMAL
DONORCELLDATE AUTO: NORMAL
POS CUT OFF ALLO B: >93
POS CUT OFF ALLO T: >79
POS CUT OFF AUTO B: >93
POS CUT OFF AUTO T: >79
PROTOCOL CUTOFF: NORMAL
RESULT ALLO B1: NORMAL
RESULT ALLO B2: NORMAL
RESULT ALLO T1: NORMAL
RESULT ALLO T2: NORMAL
RESULT AUTO B1: NORMAL
RESULT AUTO B2: NORMAL
RESULT AUTO T1: NORMAL
RESULT AUTO T2: NORMAL
SA 1 CELL: NORMAL
SA 1 TEST METHOD: NORMAL
SA 2 CELL: NORMAL
SA 2 TEST METHOD: NORMAL
SA1 HI RISK ABY: NORMAL
SA1 MOD RISK ABY: NORMAL
SA2 HI RISK ABY: NORMAL
SA2 MOD RISK ABY: NORMAL
SERUM DATE ALLO B1: NORMAL
SERUM DATE ALLO B2: NORMAL
SERUM DATE ALLO T1: NORMAL
SERUM DATE ALLO T2: NORMAL
SERUM DATE AUTO B1: NORMAL
SERUM DATE AUTO B2: NORMAL
SERUM DATE AUTO T1: NORMAL
SERUM DATE AUTO T2: NORMAL
TESTMETHODALLO: NORMAL
TESTMETHODAUTO: NORMAL
TREATMENT ALLO B1: NORMAL
TREATMENT ALLO B2: NORMAL
TREATMENT ALLO T1: NORMAL
TREATMENT ALLO T2: NORMAL
TREATMENT AUTO B1: NORMAL
TREATMENT AUTO B2: NORMAL
TREATMENT AUTO T1: NORMAL
TREATMENT AUTO T2: NORMAL
UNACCEPTABLE ANTIGENS: NORMAL
UNOS CPRA: 94
ZZZCOMMENT ALLOB2: NORMAL
ZZZSA 1  COMMENTS: NORMAL
ZZZSA 2 COMMENTS: NORMAL

## 2021-11-23 ENCOUNTER — TRANSFERRED RECORDS (OUTPATIENT)
Dept: HEALTH INFORMATION MANAGEMENT | Facility: CLINIC | Age: 41
End: 2021-11-23
Payer: COMMERCIAL

## 2021-12-16 ENCOUNTER — VIRTUAL VISIT (OUTPATIENT)
Dept: TRANSPLANT | Facility: CLINIC | Age: 41
End: 2021-12-16
Attending: INTERNAL MEDICINE
Payer: COMMERCIAL

## 2021-12-16 DIAGNOSIS — N18.6 ESRD (END STAGE RENAL DISEASE) (H): ICD-10-CM

## 2021-12-16 DIAGNOSIS — Z76.82 ORGAN TRANSPLANT CANDIDATE: ICD-10-CM

## 2021-12-16 PROCEDURE — 99207 PR NO CHARGE COORDINATED CARE PS: CPT | Performed by: SOCIAL WORKER

## 2021-12-16 NOTE — LETTER
12/16/2021         RE: Ariane Flores  Lot [address]  1971 [address] [place] [address]        Dear Colleague,    Thank you for referring your patient, Ariane Flores, to the [facility]PL[facility]. Please see a copy of my visit note below.    BRIEF SOCIAL WORK NOTE     Ariane to have OP Kidney workup in [place] where she resides.       Maggie Vizcaino   [facility]  Outpatient Kidney/Pancreas/Auto Islet Transplant Program   [address] [place] [address] [place] [id number]  [email or web address]  Office: [phone] I Fax: [phone]      Again, thank you for allowing me to participate in the care of your patient.        Sincerely,        JUDITH MelaraSW

## 2021-12-16 NOTE — LETTER
Date:December 20, 2021      Patient was self referred, no letter generated. Do not send.        [facility] Health Information

## 2021-12-16 NOTE — PROGRESS NOTES
BRIEF SOCIAL WORK NOTE     Ariane to have OP Kidney workup in [place] where she resides.       Maggie MONTOYA [facility]  Outpatient Kidney/Pancreas/Auto Islet Transplant Program   [address] [facility] [place] [address]  [email or web address]  Office: [phone] I Fax: [phone]

## 2021-12-17 ENCOUNTER — TELEPHONE (OUTPATIENT)
Dept: TRANSPLANT | Facility: CLINIC | Age: 41
End: 2021-12-17
Payer: COMMERCIAL

## 2021-12-17 DIAGNOSIS — Z76.82 ORGAN TRANSPLANT CANDIDATE: ICD-10-CM

## 2021-12-17 DIAGNOSIS — N18.9 CHRONIC KIDNEY DISEASE (CKD): ICD-10-CM

## 2021-12-17 NOTE — TELEPHONE ENCOUNTER
Called pt to introduce self as new transplant coordinator. Informed pt orders were placed for an inperson nephrology and SW visit. Pt should hear from our schedulers sometime next week. Pt also informed RNCC that she is vaccinated for COVID, she received the moderna vaccine but has yet to get her booster. Gave pt direct line in case further questions arise.

## 2021-12-27 ENCOUNTER — TELEPHONE (OUTPATIENT)
Dept: TRANSPLANT | Facility: CLINIC | Age: 41
End: 2021-12-27
Payer: COMMERCIAL

## 2021-12-31 ENCOUNTER — DOCUMENTATION ONLY (OUTPATIENT)
Dept: TRANSPLANT | Facility: CLINIC | Age: 41
End: 2021-12-31

## 2021-12-31 ENCOUNTER — LAB (OUTPATIENT)
Dept: LAB | Facility: CLINIC | Age: 41
End: 2021-12-31
Payer: COMMERCIAL

## 2021-12-31 DIAGNOSIS — Z76.82 ORGAN TRANSPLANT CANDIDATE: ICD-10-CM

## 2021-12-31 DIAGNOSIS — N18.6 ESRD (END STAGE RENAL DISEASE) (H): ICD-10-CM

## 2021-12-31 PROCEDURE — 86833 HLA CLASS II HIGH DEFIN QUAL: CPT

## 2021-12-31 PROCEDURE — 86832 HLA CLASS I HIGH DEFIN QUAL: CPT

## 2022-01-04 ENCOUNTER — TELEPHONE (OUTPATIENT)
Dept: TRANSPLANT | Facility: CLINIC | Age: 42
End: 2022-01-04
Payer: COMMERCIAL

## 2022-01-04 NOTE — TELEPHONE ENCOUNTER
Called pt to discuss updated appts. Pt confirmed she is aware of appt on 2/8/22 with Anisa. RNCC gave clinic number to pt to follow up with scheduling to make sure all appts are made. Pt asked for more stickers for her PRA tubes, RNCC will reach out to LPN to send more. Pt verbalized understanding of information and has no further questions. Encouraged to reach out if questions arise.

## 2022-01-05 ENCOUNTER — TELEPHONE (OUTPATIENT)
Dept: TRANSPLANT | Facility: CLINIC | Age: 42
End: 2022-01-05
Payer: COMMERCIAL

## 2022-01-05 NOTE — TELEPHONE ENCOUNTER
Called pt back to inform her she can have lab label the blood tubes as long as it has 2 identifiers on it. Pt verbalized understanding of information and has no further questions. Encouraged to reach out if questions arise.

## 2022-02-08 ENCOUNTER — OFFICE VISIT (OUTPATIENT)
Dept: NEPHROLOGY | Facility: CLINIC | Age: 42
End: 2022-02-08
Attending: NURSE PRACTITIONER
Payer: COMMERCIAL

## 2022-02-08 ENCOUNTER — LAB (OUTPATIENT)
Dept: LAB | Facility: CLINIC | Age: 42
End: 2022-02-08
Payer: COMMERCIAL

## 2022-02-08 VITALS
BODY MASS INDEX: 33.2 KG/M2 | WEIGHT: 166.9 LBS | DIASTOLIC BLOOD PRESSURE: 87 MMHG | OXYGEN SATURATION: 97 % | SYSTOLIC BLOOD PRESSURE: 129 MMHG | HEART RATE: 103 BPM

## 2022-02-08 DIAGNOSIS — Z76.82 ORGAN TRANSPLANT CANDIDATE: ICD-10-CM

## 2022-02-08 DIAGNOSIS — N18.9 CHRONIC KIDNEY DISEASE (CKD): ICD-10-CM

## 2022-02-08 DIAGNOSIS — N18.4 CKD (CHRONIC KIDNEY DISEASE) STAGE 4, GFR 15-29 ML/MIN (H): Primary | ICD-10-CM

## 2022-02-08 PROBLEM — E87.20 METABOLIC ACIDOSIS: Status: ACTIVE | Noted: 2022-02-08

## 2022-02-08 PROCEDURE — 99204 OFFICE O/P NEW MOD 45 MIN: CPT | Performed by: NURSE PRACTITIONER

## 2022-02-08 PROCEDURE — 86833 HLA CLASS II HIGH DEFIN QUAL: CPT | Performed by: PATHOLOGY

## 2022-02-08 PROCEDURE — 86832 HLA CLASS I HIGH DEFIN QUAL: CPT | Performed by: PATHOLOGY

## 2022-02-08 PROCEDURE — 36415 COLL VENOUS BLD VENIPUNCTURE: CPT | Performed by: PATHOLOGY

## 2022-02-08 RX ORDER — SODIUM BICARBONATE 650 MG/1
650 TABLET ORAL 2 TIMES DAILY
Status: ON HOLD | COMMUNITY
Start: 2021-07-29 | End: 2023-09-30

## 2022-02-08 RX ORDER — AMLODIPINE BESYLATE 10 MG/1
10 TABLET ORAL DAILY
Status: ON HOLD | COMMUNITY
Start: 2021-11-11 | End: 2023-09-30

## 2022-02-08 NOTE — PROGRESS NOTES
TRANSPLANT NEPHROLOGY WAITLIST VISIT    Assessment and Plan:  #Kidney Transplant Wait List Evaluation: Patient is a good candidate overall. Patient should be active on the wait list.    # CKD from unclear etiology: with history of possible reflux/obstruction and recurrent UTI as a child requiring multiple urologic procedures, last of which was right native nephrectomy at age 13. CKD stable, with GFRs ranging 16-18, complaining of leg swelling and some new fatigue. When ready, she would likely benefit from a kidney transplant.     # Complex Urologic history:  cleared by urology last year with a voiding cystourethrogram which showed no evidence of vesicoureteral reflux and she was noted to empty her bladder to completion. CT imaging was unremarkable outside of left solitary kidney.      # Cardiac Risk: had normal EKG and ECHO in 2020, no further cardiac work up recommended.     # Health Maintenance: 10/2020 PAP: Up to date and Dental: Up to date.      Discussed the risks and benefits of a transplant, including the risk of surgery and immunosuppression medications.    KDPI: We discussed approximate remaining wait time and how that is influenced by issues such as blood type and sensitization (PRA) and access to a living donor. I contrasted potential waiting time for living vs  donor kidneys from  normal (0-85%) or higher (%) kidney donor profile index (KDPI) donors and their associated outcomes. I would not recommend Ms. Flores to consider kidneys from high KDPI donors. The reason for this decision is best summarized as: improved long term graft survival.    Patient presently appears to be enough of an acceptable kidney transplant recipient candidate to have any potential kidney donors start the evaluation process.  Patient s overall re-evaluation may require further discussion in the Transplant Program s multidisciplinary selection committee for a final recommendation on the patient s suitability for  transplant.     Reason for Visit:  Ariane Flores is a 41 year old female with ESKD from unknown etiology, who presents for kidney transplant wait list evaluation.     Date of Initial Transplant Evaluation:  11/2020        Current Transplant Phase: Waitlist: Active  Official UNOS Listing Date: 11/5/2020  Blood Type: A        cPRA: 94%       Date of cPRA: 12/2021  Transplant Coordinator: Maggie Maynard Transplant Office phone number [phone]     Previous Medical Issues:  # Positive Treponema Nga +: RPR neg.   # Urologic work up: cleared by urology last year with a voiding cystourethrogram which showed no evidence of vesicoureteral reflux and she was noted to empty her bladder to completion. CT imaging was unremarkable outside of left solitary kidney.      History of Present Illness:   Ariane Flores is a 41-year-old female,  born in [place], who presents for wait list evaluation with history of CKD secondary to unclear etiology, possible reflux/obstruction and recurrent UTI as a child requiring multiple urologic procedures, last of which was right native nephrectomy at age 13.           Interim Events: None          Kidney Disease: CKD stable, GFR 16 as of 11/2021 (down from 17-18 in prior months). Complains of worsening LE swelling and fatigue, but otherwise is feeling OK.        Kidney Disease Dx: Unknown etiology (no kidney biopsy).         On Dialysis: No       Primary Nephrologist: Dr. Tenorio          Cardiac/Vascular Disease History:       Known CAD: No       Known PAD/Claudication Symptoms: No       Known Heart Failure: No       Arrhythmia:  No       Pulmonary Hypertension: No        Valvular Disease: No       Other: None       New Cardiac/Vascular Events: No         Functional Capacity/Frailty:        Working full time at a  center. Complains of some SOB and fatigue, but no chest pains with exertion.     # COVID Vaccination Up To Date: Yes     Other Pertinent Transplant Surgical  Issues:  Recent Blood Transfusion: No  Previous Abdominal Transplant: No  Bladder Dysfunction: No  Chronic/Recurrent Infections: No  Chronic Anticoagulation: No  Jehovah s Witness: No       Active Problem List:   Patient Active Problem List   Diagnosis     CKD (chronic kidney disease) stage 4, GFR 15-29 ml/min (H)     Migraine     Hyperparathyroidism, secondary renal (H)     Hyperlipidemia     HTN (hypertension)       Personal History:  Recently . Lives with  at teenage daughter.       Allergies:  Allergies   Allergen Reactions     Cephalosporins Other (See Comments)     Codeine Other (See Comments)     Diagnostic X-Ray Materials Other (See Comments)     Only has 1 kidney.  Only has 1 kidney.  Only has 1 kidney.       Nsaids Other (See Comments)     Kidney Damage. Have Only has one kidney         Medications:  Current Outpatient Medications   Medication Sig     atorvastatin (LIPITOR) 20 MG tablet Take 40 mg by mouth daily     cholecalciferol 25 MCG (1000 UT) TABS Take 1,000 Units by mouth     ferrous sulfate (FEROSUL) 325 (65 Fe) MG tablet Take 325 mg by mouth     No current facility-administered medications for this visit.        Vitals:  There were no vitals taken for this visit.     Exam:  GENERAL APPEARANCE: alert and no distress  HENT: mouth without ulcers or lesions  LYMPHATICS: no cervical or supraclavicular nodes  RESP: lungs clear to auscultation - no rales, rhonchi or wheezes  CV: regular rhythm, normal rate, no rub, no murmur  FEMORAL PULSES: normal  EDEMA: no LE edema bilaterally  ABDOMEN: soft, nondistended, nontender, bowel sounds normal  MS: extremities normal - no gross deformities noted, no evidence of inflammation in joints, no muscle tenderness  SKIN: no rash

## 2022-02-08 NOTE — LETTER
2022     RE: Ariane Flores  [address]  1971 16  [place] [address]     Dear Colleague,    Thank you for referring your patient, Ariane Flores, to the [facility]OLOGY [facility] [place] [facility]. Please see a copy of my visit note below.    TRANSPLANT NEPHROLOGY WAITLIST VISIT    Assessment and Plan:  #Kidney Transplant Wait List Evaluation: Patient is a good candidate overall. Patient should be active on the wait list.    # CKD from unclear etiology: with history of possible reflux/obstruction and recurrent UTI as a child requiring multiple urologic procedures, last of which was right native nephrectomy at age 13. CKD stable, with GFRs ranging 16-18, complaining of leg swelling and some new fatigue. When ready, she would likely benefit from a kidney transplant.     # Complex Urologic history:  cleared by urology last year with a voiding cystourethrogram which showed no evidence of vesicoureteral reflux and she was noted to empty her bladder to completion. CT imaging was unremarkable outside of left solitary kidney.      # Cardiac Risk: had normal EKG and ECHO in , no further cardiac work up recommended.     # Health Maintenance: 10/2020 PAP: Up to date and Dental: Up to date.      Discussed the risks and benefits of a transplant, including the risk of surgery and immunosuppression medications.    KDPI: We discussed approximate remaining wait time and how that is influenced by issues such as blood type and sensitization (PRA) and access to a living donor. I contrasted potential waiting time for living vs  donor kidneys from  normal (0-85%) or higher (%) kidney donor profile index (KDPI) donors and their associated outcomes. I would not recommend Ms. Flores to consider kidneys from high KDPI donors. The reason for this decision is best summarized as: improved long term graft survival.    Patient presently appears to be  enough of an acceptable kidney transplant recipient candidate to have any potential kidney donors start the evaluation process.  Patient s overall re-evaluation may require further discussion in the Transplant Program s multidisciplinary selection committee for a final recommendation on the patient s suitability for transplant.     Reason for Visit:  Ariane Flores is a 41 year old female with ESKD from unknown etiology, who presents for kidney transplant wait list evaluation.     Date of Initial Transplant Evaluation:  11/2020        Current Transplant Phase: Waitlist: Active  Official UNOS Listing Date: 11/5/2020  Blood Type: A        cPRA: 94%       Date of cPRA: 12/2021  Transplant Coordinator: Maggie Maynard Transplant Office phone number [phone]     Previous Medical Issues:  # Positive Treponema Nga +: RPR neg.   # Urologic work up: cleared by urology last year with a voiding cystourethrogram which showed no evidence of vesicoureteral reflux and she was noted to empty her bladder to completion. CT imaging was unremarkable outside of left solitary kidney.      History of Present Illness:   Ariane Flores is a 41-year-old female,  born in [place], who presents for wait list evaluation with history of CKD secondary to unclear etiology, possible reflux/obstruction and recurrent UTI as a child requiring multiple urologic procedures, last of which was right native nephrectomy at age 13.           Interim Events: None          Kidney Disease: CKD stable, GFR 16 as of 11/2021 (down from 17-18 in prior months). Complains of worsening LE swelling and fatigue, but otherwise is feeling OK.        Kidney Disease Dx: Unknown etiology (no kidney biopsy).         On Dialysis: No       Primary Nephrologist: Dr. Tenorio          Cardiac/Vascular Disease History:       Known CAD: No       Known PAD/Claudication Symptoms: No       Known Heart Failure: No       Arrhythmia:  No       Pulmonary Hypertension: No         Valvular Disease: No       Other: None       New Cardiac/Vascular Events: No         Functional Capacity/Frailty:        Working full time at a  center. Complains of some SOB and fatigue, but no chest pains with exertion.     # COVID Vaccination Up To Date: Yes     Other Pertinent Transplant Surgical Issues:  Recent Blood Transfusion: No  Previous Abdominal Transplant: No  Bladder Dysfunction: No  Chronic/Recurrent Infections: No  Chronic Anticoagulation: No  Jehovah s Witness: No       Active Problem List:   Patient Active Problem List   Diagnosis     CKD (chronic kidney disease) stage 4, GFR 15-29 ml/min (H)     Migraine     Hyperparathyroidism, secondary renal (H)     Hyperlipidemia     HTN (hypertension)       Personal History:  Recently . Lives with  at teenage daughter.       Allergies:  Allergies   Allergen Reactions     Cephalosporins Other (See Comments)     Codeine Other (See Comments)     Diagnostic X-Ray Materials Other (See Comments)     Only has 1 kidney.  Only has 1 kidney.  Only has 1 kidney.       Nsaids Other (See Comments)     Kidney Damage. Have Only has one kidney         Medications:  Current Outpatient Medications   Medication Sig     atorvastatin (LIPITOR) 20 MG tablet Take 40 mg by mouth daily     cholecalciferol 25 MCG (1000 UT) TABS Take 1,000 Units by mouth     ferrous sulfate (FEROSUL) 325 (65 Fe) MG tablet Take 325 mg by mouth     No current facility-administered medications for this visit.        Vitals:  There were no vitals taken for this visit.     Exam:  GENERAL APPEARANCE: alert and no distress  HENT: mouth without ulcers or lesions  LYMPHATICS: no cervical or supraclavicular nodes  RESP: lungs clear to auscultation - no rales, rhonchi or wheezes  CV: regular rhythm, normal rate, no rub, no murmur  FEMORAL PULSES: normal  EDEMA: no LE edema bilaterally  ABDOMEN: soft, nondistended, nontender, bowel sounds normal  MS: extremities normal - no gross deformities  noted, no evidence of inflammation in joints, no muscle tenderness  SKIN: no rash    Again, thank you for allowing me to participate in the care of your patient.      Sincerely,    VIRGILIO Ford CNP

## 2022-02-09 DIAGNOSIS — N18.6 ESRD (END STAGE RENAL DISEASE) (H): ICD-10-CM

## 2022-02-09 DIAGNOSIS — Z76.82 ORGAN TRANSPLANT CANDIDATE: ICD-10-CM

## 2022-02-21 ENCOUNTER — TELEPHONE (OUTPATIENT)
Dept: TRANSPLANT | Facility: CLINIC | Age: 42
End: 2022-02-21
Payer: COMMERCIAL

## 2022-02-21 NOTE — TELEPHONE ENCOUNTER
Called pt to see if she has compeleted a mammogram in the past year. Pt is unsure and will check in with her PCP. Pt will let RNCC know when she has completed this. RNCC will email pt direct line and fax number for clinic.

## 2022-03-29 ENCOUNTER — TELEPHONE (OUTPATIENT)
Dept: TRANSPLANT | Facility: CLINIC | Age: 42
End: 2022-03-29
Payer: COMMERCIAL

## 2022-03-29 NOTE — TELEPHONE ENCOUNTER
Pt called and left message that she can set up appt for a mammogram on Friday. Returned pt call. Informed her that if she is able she should set up appt and call [facility] back where this will be completed. Left VM with direct line for return call.

## 2022-03-29 NOTE — TELEPHONE ENCOUNTER
Called [facility] in [place] to get fax number for mammo records. Fax number is [phone].

## 2022-03-29 NOTE — TELEPHONE ENCOUNTER
Pt called to update that she did complete her mammogram through [facility] in [place], clinic number [phone] on 11/23/21. [facility] will request records. Pt had some questions about labels for her PRA sample, informed pt that the lab she has them drawn at should be able to print labels. Pt verbalized understanding of information and has no further questions. Encouraged to reach out if questions arise.

## 2022-04-12 ENCOUNTER — TELEPHONE (OUTPATIENT)
Dept: TRANSPLANT | Facility: CLINIC | Age: 42
End: 2022-04-12
Payer: COMMERCIAL

## 2022-04-12 NOTE — TELEPHONE ENCOUNTER
Pharmacist spoke with Ariane via telephone to provide a general transplant medication teaching while active on the waitlist. The purpose of this teaching was to introduce Diamond to the different types of medications used post kidney transplant to ease the educational burden some patients experience prior to discharge. We discussed the usual post-transplant immunosuppressant, antimicrobial, and supportive medication regimens following kidney transplants performed at [facility]. General dosing frequencies, durations of medications, and importance of labs draws was also discussed.      Emphasis was placed on the importance of adherence to medications and lab draws. We discussed various facilitators of adherence including alarms, smart phone applications, and med boxes. Patient was encouraged to develop an adherence action plan prior to transplant to aid in their transition home post-transplant.     We discussed the importance of obtaining timely refills and how crucial it is to never go without medications. We discussed the usual process of patients discharging with their first month supply from the discharge pharmacy (pending insurance restriction) and various options for obtaining future fills including [facility]. A pharmacy benefits investigation will be performed at time of transplant to further assess any insurance/pharmacy restrictions.    Ariane currently manges her own medications and did not describe any issues managing her medications. She did recognize her current regimen only requires her to take medication once daily. Stated she just takes all her medications when she wakes up. We re-discussed that post-transplant her regimen will require every 12 hour dosing for immunosupressants and other medications may create a schedule that require taking meds up to 4 times day. Diamond was not concerned or worried about this.      Chris Parker, Pharm.D.  [facility]  Pharmacy  [phone]

## 2022-06-23 ENCOUNTER — LAB (OUTPATIENT)
Dept: LAB | Facility: CLINIC | Age: 42
End: 2022-06-23

## 2022-06-23 DIAGNOSIS — N18.6 ESRD (END STAGE RENAL DISEASE) (H): ICD-10-CM

## 2022-06-23 DIAGNOSIS — Z76.82 ORGAN TRANSPLANT CANDIDATE: ICD-10-CM

## 2022-06-23 PROCEDURE — 86832 HLA CLASS I HIGH DEFIN QUAL: CPT | Performed by: SURGERY

## 2022-06-23 PROCEDURE — 86833 HLA CLASS II HIGH DEFIN QUAL: CPT | Performed by: SURGERY

## 2022-06-23 PROCEDURE — 36415 COLL VENOUS BLD VENIPUNCTURE: CPT

## 2022-09-30 ENCOUNTER — LAB (OUTPATIENT)
Dept: LAB | Facility: CLINIC | Age: 42
End: 2022-09-30
Payer: COMMERCIAL

## 2022-09-30 ENCOUNTER — TELEPHONE (OUTPATIENT)
Dept: TRANSPLANT | Facility: CLINIC | Age: 42
End: 2022-09-30

## 2022-09-30 DIAGNOSIS — Z76.82 ORGAN TRANSPLANT CANDIDATE: ICD-10-CM

## 2022-09-30 DIAGNOSIS — N18.6 ESRD (END STAGE RENAL DISEASE) (H): ICD-10-CM

## 2022-09-30 PROCEDURE — 86832 HLA CLASS I HIGH DEFIN QUAL: CPT

## 2022-09-30 PROCEDURE — 86833 HLA CLASS II HIGH DEFIN QUAL: CPT

## 2022-10-03 ENCOUNTER — TELEPHONE (OUTPATIENT)
Dept: TRANSPLANT | Facility: CLINIC | Age: 42
End: 2022-10-03

## 2022-10-03 ENCOUNTER — ORGAN (OUTPATIENT)
Dept: TRANSPLANT | Facility: CLINIC | Age: 42
End: 2022-10-03

## 2022-10-03 DIAGNOSIS — Z76.82 AWAITING ORGAN TRANSPLANT: Primary | ICD-10-CM

## 2022-10-03 NOTE — TELEPHONE ENCOUNTER
Received a call from Geeta with Dr. Jacob's office, wondering if pt is active on our list. Pt is active but is highly sensitized so may not receive an offer for some time. Left  with direct line for return call.

## 2022-10-04 NOTE — TELEPHONE ENCOUNTER
Organ Offer Encounter Information    Organ Offer Information  Organ offer date & time: 10/3/2022  3:53 PM  Coordinator/Fellow/Attending name: Belén Montenegro RN   Organ(s):  Organ UNOS ID Match Run ID Comment Organ Laterality   Kidney [id number] Back up       Recent infections?: No    New medications?: No Recent pregnancy?: No   Angicoagulation medications?: No Recent vaccinations?: No   Recent blood transfusions?: No Recent hospitalizations?: No   Has your insurance changed in the last 6-12 months?: Neg    Discussed organ offer with: Patient  Patient/Caregiver name: Ariane  Discussed risk category with Patient/Other: N/A  Understood donor criteria, verbalized understanding  Patient/Other asked to speak to a surgeon?: No  Discussed program-specific outcomes: Did not have questions regarding SRTR  Right to decline organ offer without penalty, Patient/Other: Aware of option to decline without penalty  Organ offer decision status Patient/Other: Accepted Offer  Organ disposition: Case Cancelled - Other (specify) (Comment: did not become primary)  Additional Comments: 10/3/2022 3:54 PM  Reviewed case with Dr. Sawyer. Request donor blood. Donor OR 10/4 @ 0700 CST.   Belén Montenegro RN  Donor     10/3/2022 3:58 PM  Kidney: back up  MD: Finger/Silverio  [facility]O Contact: Belen [phone]  VXM Results: Negative  XM Plan (FXM must be done with serum no older than 10 days from transplant):  TBD  Plan (Admission, NPO, Donor OR): Called pt with offer. Pt is aware that this is a back up offer. Pt is interested in the offer. Pt provided with contact information. Will check in with patient later tonight about the plan. Pt had COVID 2 months ago, but she feels normal now- Dr. Sawyer aware.   - - -   COVID Screening  In the past month, have you:  Or anyone close to you had a positive COVID test or suspected to have COVID: no   Had any COVID symptoms (Fever, Cough, Short of Breath, Loss of Taste/Smell, Rash):  no    10/3/2022 6:12 PM  [facility] will not send donor blood. Dr. Sawyer aware. Pt called & updated on status. Pt has to work in the morning at 0700. Plan to have maureen text pt when she takes over in the morning. When we know if we will get a KI or not- call pt's cell. If she does not answer cell, call work numbers [phone] or [phone]. If a KI is offered, plan to do FXM once KI is delivered.   Belén Montenegro RN  Donor     Donor OR Time: 0700  Procuring MD: Dwayne IZQUIERDO [facility]  Contact in the OR: MAURIZIO Harley  Organs Being Procured: HR, KACIE, SANTIAGO, ANDREAS, COLT  Flush Solution: UW  Biopsy: no  Pump: no  Special Requests (Special blood tubes, nodes, waivers): n/a  MD for Visualization: n/a  Transportation Details: n/a    9:20 AM  Donor in OR now, not yet XC. KP has not been officially accepted. There is now 1 py ahead of recipient. Dr. Sawyer updated on first back up position.   Admissions: Discussed potential admission with [facility] on 7a. They do not currently have any beds, but do have medicine patients that could be transferred. Will plan to continue discussion with patient placement to potentially plan for an admission if kidney becomes primary.   Maureen Rodriguez RN    10:37  Patient updated per 's request that offer was not a 0 mismatch, as they had been hoping to find her. Offer has a normal average mismatch. No questions or concerns.     11:37  Both kidneys tentatively placed MV. Dr. Sawyer and patient both updated that case will likely not move forward as kidney not appearing to become primary. Plan to pump kidneys, will update MD/patient if acceptance changes.   Maureen Rodriguez RN    3:35 PM  Case cancelled as both kidneys were placed MV and we did not become primary. MD and patient aware.   Maureen Rodriguez RN                    Attestation I have discussed all of the above with the Patient/Legal Guardian/Caregiver regarding this organ offer.:  Yes  Coordinator/Fellow/Attending name: Belén Montenegro RN

## 2022-10-10 DIAGNOSIS — Z76.82 AWAITING ORGAN TRANSPLANT: Primary | ICD-10-CM

## 2022-12-10 DIAGNOSIS — Z76.82 AWAITING ORGAN TRANSPLANT: Primary | ICD-10-CM

## 2022-12-23 ENCOUNTER — LAB (OUTPATIENT)
Dept: LAB | Facility: CLINIC | Age: 42
End: 2022-12-23
Payer: COMMERCIAL

## 2022-12-23 DIAGNOSIS — Z76.82 ORGAN TRANSPLANT CANDIDATE: ICD-10-CM

## 2022-12-23 DIAGNOSIS — N18.6 ESRD (END STAGE RENAL DISEASE) (H): ICD-10-CM

## 2022-12-23 PROCEDURE — 86833 HLA CLASS II HIGH DEFIN QUAL: CPT

## 2022-12-23 PROCEDURE — 86832 HLA CLASS I HIGH DEFIN QUAL: CPT

## 2022-12-30 LAB
PROTOCOL CUTOFF: NORMAL
SA 1 CELL: NORMAL
SA 1 TEST METHOD: NORMAL
SA 2 CELL: NORMAL
SA 2 TEST METHOD: NORMAL
SA1 HI RISK ABY: NORMAL
SA1 MOD RISK ABY: NORMAL
SA2 HI RISK ABY: NORMAL
SA2 MOD RISK ABY: NORMAL
UNACCEPTABLE ANTIGENS: NORMAL
UNOS CPRA: 96
ZZZSA 1  COMMENTS: NORMAL
ZZZSA 2 COMMENTS: NORMAL

## 2023-01-17 DIAGNOSIS — Z76.82 ORGAN TRANSPLANT CANDIDATE: ICD-10-CM

## 2023-01-17 DIAGNOSIS — N18.6 ESRD (END STAGE RENAL DISEASE) (H): ICD-10-CM

## 2023-02-08 NOTE — PROGRESS NOTES
TRANSPLANT NEPHROLOGY WAITLIST VISIT    Assessment and Plan:  #Kidney Transplant Wait List Evaluation: Patient is a good candidate overall. Patient should be active on the wait list.    # CKD from unclear etiology: with history of possible reflux/obstruction and recurrent UTI as a child requiring multiple urologic procedures, last of which was right native nephrectomy at age 13. GFR trending down to 10-11 from the 16-18 range after a recent viral illness. Has vein mapping appt soon. When ready, she would likely benefit from a kidney transplant.     # Complex Urologic history:  cleared by urology in 2021 with a voiding cystourethrogram which showed no evidence of vesicoureteral reflux and also noted to empty her bladder to completion. CT imaging was unremarkable outside of left solitary kidney.      # Cardiac Risk: had normal EKG and ECHO in . Denies exertional sxs, no further cardiac work up recommended.     # BMI 33: encouraged healthy diet and exercise.      # Health Maintenance: 10/2020 PAP (due 10/2025): Up to date and Dental: Up to date.      Discussed the risks and benefits of a transplant, including the risk of surgery and immunosuppression medications.    KDPI: We discussed approximate remaining wait time and how that is influenced by issues such as blood type and sensitization (PRA) and access to a living donor. I contrasted potential waiting time for living vs  donor kidneys from  normal (0-85%) or higher (%) kidney donor profile index (KDPI) donors and their associated outcomes. I would not recommend Ms. Flores to consider kidneys from high KDPI donors. The reason for this decision is best summarized as: improved long term graft survival.    Patient presently appears to be enough of an acceptable kidney transplant recipient candidate to have any potential kidney donors start the evaluation process.  Patient s overall re-evaluation may require further discussion in the Transplant  Program s multidisciplinary selection committee for a final recommendation on the patient s suitability for transplant.     Reason for Visit:  Ariane Flores is a 41 year old female with ESKD from unknown etiology, who presents for kidney transplant wait list evaluation.     Date of Initial Transplant Evaluation:  11/2020        Current Transplant Phase: Waitlist: Active  Official UNOS Listing Date: 11/5/2020  Blood Type: A        cPRA: 94%       Date of cPRA: 12/2021  Transplant Coordinator: Maggie Maynard Transplant Office phone number [phone]        History of Present Illness:   Ariane Flores is a 42-year-old female, born in [place], who presents for wait list evaluation with history of CKD secondary to unclear etiology, possible reflux/obstruction and recurrent UTI as a child requiring multiple urologic procedures, last of which was right native nephrectomy at age 13.           Interim Events: One recent ED visit for muscle aches and fever, thought likely to have a viral illness.                   Kidney Disease: GFR down to the 10-11 from the 16-18 range after a recent viral illness as above. Feeling OK, outside of fatigue. Vein mapping appt scheduled.        Kidney Disease Dx: Unknown etiology (no kidney biopsy).         On Dialysis: No       Primary Nephrologist: Dr. 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 Complains of some SOB and fatigue, but no chest pains with exertion.     # COVID Vaccination Up To Date: Yes     Other Pertinent Transplant Surgical Issues:  Recent Blood Transfusion: No  Previous Abdominal Transplant: No  Bladder Dysfunction: No  Chronic/Recurrent  Infections: No  Chronic Anticoagulation: No  Jehovah s Witness: No       Active Problem List:   Patient Active Problem List   Diagnosis     CKD (chronic kidney disease) stage 4, GFR 15-29 ml/min (H)     Migraine     Hyperparathyroidism, secondary renal (H)     Hyperlipidemia     HTN (hypertension)     Vitamin D deficiency     Metabolic acidosis       Personal History:  Recently . Lives with  at teenage daughter.       Allergies:  Allergies   Allergen Reactions     Cephalosporins Other (See Comments)     Codeine Other (See Comments)     Diagnostic X-Ray Materials Other (See Comments)     Only has 1 kidney.  Only has 1 kidney.  Only has 1 kidney.       Nsaids Other (See Comments)     Kidney Damage. Have Only has one kidney         Medications:  Current Outpatient Medications   Medication Sig     amLODIPine (NORVASC) 10 MG tablet amlodipine 10 mg tablet   TAKE 1 TABLET BY MOUTH ONCE DAILY     atorvastatin (LIPITOR) 20 MG tablet Take 40 mg by mouth daily     cholecalciferol 25 MCG (1000 UT) TABS Take 1,000 Units by mouth     ferrous sulfate (FEROSUL) 325 (65 Fe) MG tablet Take 325 mg by mouth     sodium bicarbonate 650 MG tablet sodium bicarbonate 650 mg tablet     No current facility-administered medications for this visit.        Vitals:  There were no vitals taken for this visit.     Exam:  GENERAL APPEARANCE: alert and no distress  HENT: mouth without ulcers or lesions  LYMPHATICS: no cervical or supraclavicular nodes  RESP: lungs clear to auscultation - no rales, rhonchi or wheezes  CV: regular rhythm, normal rate, no rub, no murmur  FEMORAL PULSES: normal  EDEMA: no LE edema bilaterally  ABDOMEN: soft, nondistended, nontender, bowel sounds normal  MS: extremities normal - no gross deformities noted, no evidence of inflammation in joints, no muscle tenderness  SKIN: no rash  TRANSPLANT NEPHROLOGY WAITLIST VISIT    Assessment and Plan:  #Kidney Transplant Wait List Evaluation: Patient is a good  candidate overall. Patient should be active on the wait list.    # CKD from unclear etiology: with history of possible reflux/obstruction and recurrent UTI as a child requiring multiple urologic procedures, last of which was right native nephrectomy at age 13. CKD stable, with GFRs ranging 16-18, complaining of leg swelling and some new fatigue. When ready, she would likely benefit from a kidney transplant.     # Complex Urologic history:  cleared by urology last year with a voiding cystourethrogram which showed no evidence of vesicoureteral reflux and she was noted to empty her bladder to completion. CT imaging was unremarkable outside of left solitary kidney.      # Cardiac Risk: had normal EKG and ECHO in 2020, no further cardiac work up recommended.     # Health Maintenance: 10/2020 PAP: Up to date and Dental: Up to date.      Discussed the risks and benefits of a transplant, including the risk of surgery and immunosuppression medications.    KDPI: We discussed approximate remaining wait time and how that is influenced by issues such as blood type and sensitization (PRA) and access to a living donor. I contrasted potential waiting time for living vs  donor kidneys from  normal (0-85%) or higher (%) kidney donor profile index (KDPI) donors and their associated outcomes. I would not recommend Ms. Flores to consider kidneys from high KDPI donors. The reason for this decision is best summarized as: improved long term graft survival.    Patient presently appears to be enough of an acceptable kidney transplant recipient candidate to have any potential kidney donors start the evaluation process.  Patient s overall re-evaluation may require further discussion in the Transplant Program s multidisciplinary selection committee for a final recommendation on the patient s suitability for transplant.     Reason for Visit:  Ariane Flores is a 41 year old female with ESKD from unknown etiology, who presents for  kidney transplant wait list evaluation.     Date of Initial Transplant Evaluation:  11/2020        Current Transplant Phase: Waitlist: Active  Official UNOS Listing Date: 11/5/2020  Blood Type: A        cPRA: 94%       Date of cPRA: 12/2021  Transplant Coordinator: Maggie Maynard Transplant Office phone number [phone]     Previous Medical Issues:  # Positive Treponema Nga +: RPR neg.   # Urologic work up: cleared by urology last year with a voiding cystourethrogram which showed no evidence of vesicoureteral reflux and she was noted to empty her bladder to completion. CT imaging was unremarkable outside of left solitary kidney.      History of Present Illness:   Ariane Flores is a 41-year-old female,  born in [place], who presents for wait list evaluation with history of CKD secondary to unclear etiology, possible reflux/obstruction and recurrent UTI as a child requiring multiple urologic procedures, last of which was right native nephrectomy at age 13.           Interim Events: None          Kidney Disease: CKD stable, GFR 16 as of 11/2021 (down from 17-18 in prior months). Complains of worsening LE swelling and fatigue, but otherwise is feeling OK.        Kidney Disease Dx: Unknown etiology (no kidney biopsy).         On Dialysis: No       Primary Nephrologist: Dr. Tenorio          Cardiac/Vascular Disease History:       Known CAD: No       Known PAD/Claudication Symptoms: No       Known Heart Failure: No       Arrhythmia:  No       Pulmonary Hypertension: No        Valvular Disease: No       Other: None       New Cardiac/Vascular Events: No         Functional Capacity/Frailty:        Working full time at a  center. Complains of some SOB and fatigue, but no chest pains with exertion.     # COVID Vaccination Up To Date: Yes     Other Pertinent Transplant Surgical Issues:  Recent Blood Transfusion: No  Previous Abdominal Transplant: No  Bladder Dysfunction: No  Chronic/Recurrent Infections:  No  Chronic Anticoagulation: No  Jehovah s Witness: No       Active Problem List:   Patient Active Problem List   Diagnosis     CKD (chronic kidney disease) stage 4, GFR 15-29 ml/min (H)     Migraine     Hyperparathyroidism, secondary renal (H)     Hyperlipidemia     HTN (hypertension)     Vitamin D deficiency     Metabolic acidosis       Personal History:  Recently . Lives with  at teenage daughter.       Allergies:  Allergies   Allergen Reactions     Cephalosporins Other (See Comments)     Codeine Other (See Comments)     Diagnostic X-Ray Materials Other (See Comments)     Only has 1 kidney.  Only has 1 kidney.  Only has 1 kidney.       Nsaids Other (See Comments)     Kidney Damage. Have Only has one kidney         Medications:  Current Outpatient Medications   Medication Sig     amLODIPine (NORVASC) 10 MG tablet amlodipine 10 mg tablet   TAKE 1 TABLET BY MOUTH ONCE DAILY     atorvastatin (LIPITOR) 20 MG tablet Take 40 mg by mouth daily     cholecalciferol 25 MCG (1000 UT) TABS Take 1,000 Units by mouth     ferrous sulfate (FEROSUL) 325 (65 Fe) MG tablet Take 325 mg by mouth     sodium bicarbonate 650 MG tablet sodium bicarbonate 650 mg tablet     No current facility-administered medications for this visit.        Vitals:  There were no vitals taken for this visit.     Exam:  GENERAL APPEARANCE: alert and no distress  HENT: mouth without ulcers or lesions  LYMPHATICS: no cervical or supraclavicular nodes  RESP: lungs clear to auscultation - no rales, rhonchi or wheezes  CV: regular rhythm, normal rate, no rub, no murmur  FEMORAL PULSES: normal  EDEMA: no LE edema bilaterally  ABDOMEN: soft, nondistended, nontender, bowel sounds normal  MS: extremities normal - no gross deformities noted, no evidence of inflammation in joints, no muscle tenderness  SKIN: no rash

## 2023-02-09 ENCOUNTER — OFFICE VISIT (OUTPATIENT)
Dept: NEPHROLOGY | Facility: CLINIC | Age: 43
End: 2023-02-09
Attending: NURSE PRACTITIONER
Payer: COMMERCIAL

## 2023-02-09 VITALS
OXYGEN SATURATION: 98 % | DIASTOLIC BLOOD PRESSURE: 86 MMHG | TEMPERATURE: 98.5 F | BODY MASS INDEX: 33.22 KG/M2 | HEART RATE: 81 BPM | SYSTOLIC BLOOD PRESSURE: 135 MMHG | WEIGHT: 167 LBS

## 2023-02-09 DIAGNOSIS — N18.6 ESRD (END STAGE RENAL DISEASE) (H): ICD-10-CM

## 2023-02-09 DIAGNOSIS — Z76.82 ORGAN TRANSPLANT CANDIDATE: ICD-10-CM

## 2023-02-09 PROCEDURE — G0463 HOSPITAL OUTPT CLINIC VISIT: HCPCS | Performed by: NURSE PRACTITIONER

## 2023-02-09 PROCEDURE — 99214 OFFICE O/P EST MOD 30 MIN: CPT | Performed by: NURSE PRACTITIONER

## 2023-02-09 ASSESSMENT — PAIN SCALES - GENERAL: PAINLEVEL: NO PAIN (0)

## 2023-02-09 NOTE — NURSING NOTE
Chief Complaint   Patient presents with     RECHECK       /86   Pulse 81   Temp 98.5  F (36.9  C) (Oral)   Wt 75.8 kg (167 lb)   SpO2 98%   BMI 33.22 kg/m      Tommy Mota on 2/9/2023 at 2:05 PM

## 2023-02-09 NOTE — LETTER
2023       RE: Ariane Flores  [address]  1971 [id number]  [place] [address]     Dear Colleague,    Thank you for referring your patient, Ariane Flores, to the [facility] [place] [facility]. Please see a copy of my visit note below.    TRANSPLANT NEPHROLOGY WAITLIST VISIT    Assessment and Plan:  #Kidney Transplant Wait List Evaluation: Patient is a good candidate overall. Patient should be active on the wait list.    # CKD from unclear etiology: with history of possible reflux/obstruction and recurrent UTI as a child requiring multiple urologic procedures, last of which was right native nephrectomy at age 13. GFR trending down to 10-11 from the 16-18 range after a recent viral illness. Has vein mapping appt soon. When ready, she would likely benefit from a kidney transplant.     # Complex Urologic history:  cleared by urology in 2021 with a voiding cystourethrogram which showed no evidence of vesicoureteral reflux and also noted to empty her bladder to completion. CT imaging was unremarkable outside of left solitary kidney.      # Cardiac Risk: had normal EKG and ECHO in . Denies exertional sxs, no further cardiac work up recommended.     # BMI 33: encouraged healthy diet and exercise.      # Health Maintenance: 10/2020 PAP (due 10/2025): Up to date and Dental: Up to date.      Discussed the risks and benefits of a transplant, including the risk of surgery and immunosuppression medications.    KDPI: We discussed approximate remaining wait time and how that is influenced by issues such as blood type and sensitization (PRA) and access to a living donor. I contrasted potential waiting time for living vs  donor kidneys from  normal (0-85%) or higher (%) kidney donor profile index (KDPI) donors and their associated outcomes. I would not recommend Ms. Flores to consider kidneys from high KDPI donors. The reason  for this decision is best summarized as: improved long term graft survival.    Patient presently appears to be enough of an acceptable kidney transplant recipient candidate to have any potential kidney donors start the evaluation process.  Patient s overall re-evaluation may require further discussion in the Transplant Program s multidisciplinary selection committee for a final recommendation on the patient s suitability for transplant.     Reason for Visit:  Ariane Flores is a 41 year old female with ESKD from unknown etiology, who presents for kidney transplant wait list evaluation.     Date of Initial Transplant Evaluation:  11/2020        Current Transplant Phase: Waitlist: Active  Official UNOS Listing Date: 11/5/2020  Blood Type: A        cPRA: 94%       Date of cPRA: 12/2021  Transplant Coordinator: Maggie Maynard Transplant Office phone number [phone]        History of Present Illness:   Ariane Flores is a 42-year-old female, born in [place], who presents for wait list evaluation with history of CKD secondary to unclear etiology, possible reflux/obstruction and recurrent UTI as a child requiring multiple urologic procedures, last of which was right native nephrectomy at age 13.           Interim Events: One recent ED visit for muscle aches and fever, thought likely to have a viral illness.                   Kidney Disease: GFR down to the 10-11 from the 16-18 range after a recent viral illness as above. Feeling OK, outside of fatigue. Vein mapping appt scheduled.        Kidney Disease Dx: Unknown etiology (no kidney biopsy).         On Dialysis: No       Primary Nephrologist: Dr. 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 Complains of some SOB and fatigue, but no chest pains with exertion.     # COVID Vaccination Up To Date: Yes     Other Pertinent Transplant Surgical Issues:  Recent Blood Transfusion: No  Previous Abdominal Transplant: No  Bladder Dysfunction: No  Chronic/Recurrent Infections: No  Chronic Anticoagulation: No  Jehovah s Witness: No       Active Problem List:   Patient Active Problem List   Diagnosis     CKD (chronic kidney disease) stage 4, GFR 15-29 ml/min (H)     Migraine     Hyperparathyroidism, secondary renal (H)     Hyperlipidemia     HTN (hypertension)     Vitamin D deficiency     Metabolic acidosis       Personal History:  Recently . Lives with  at teenage daughter.       Allergies:  Allergies   Allergen Reactions     Cephalosporins Other (See Comments)     Codeine Other (See Comments)     Diagnostic X-Ray Materials Other (See Comments)     Only has 1 kidney.  Only has 1 kidney.  Only has 1 kidney.       Nsaids Other (See Comments)     Kidney Damage. Have Only has one kidney         Medications:  Current Outpatient Medications   Medication Sig     amLODIPine (NORVASC) 10 MG tablet amlodipine 10 mg tablet   TAKE 1 TABLET BY MOUTH ONCE DAILY     atorvastatin (LIPITOR) 20 MG tablet Take 40 mg by mouth daily     cholecalciferol 25 MCG (1000 UT) TABS Take 1,000 Units by mouth     ferrous sulfate (FEROSUL) 325 (65 Fe) MG tablet Take 325 mg by mouth     sodium bicarbonate 650 MG tablet sodium bicarbonate 650 mg tablet     No current facility-administered medications for this visit.        Vitals:  There were no vitals taken for this visit.     Exam:  GENERAL APPEARANCE: alert and no distress  HENT: mouth without ulcers or lesions  LYMPHATICS: no cervical or supraclavicular nodes  RESP: lungs clear to auscultation - no rales, rhonchi or wheezes  CV: regular rhythm, normal rate, no rub, no murmur  FEMORAL PULSES: normal  EDEMA: no LE edema  bilaterally  ABDOMEN: soft, nondistended, nontender, bowel sounds normal  MS: extremities normal - no gross deformities noted, no evidence of inflammation in joints, no muscle tenderness  SKIN: no rash  TRANSPLANT NEPHROLOGY WAITLIST VISIT    Assessment and Plan:  #Kidney Transplant Wait List Evaluation: Patient is a good candidate overall. Patient should be active on the wait list.    # CKD from unclear etiology: with history of possible reflux/obstruction and recurrent UTI as a child requiring multiple urologic procedures, last of which was right native nephrectomy at age 13. CKD stable, with GFRs ranging 16-18, complaining of leg swelling and some new fatigue. When ready, she would likely benefit from a kidney transplant.     # Complex Urologic history:  cleared by urology last year with a voiding cystourethrogram which showed no evidence of vesicoureteral reflux and she was noted to empty her bladder to completion. CT imaging was unremarkable outside of left solitary kidney.      # Cardiac Risk: had normal EKG and ECHO in , no further cardiac work up recommended.     # Health Maintenance: 10/2020 PAP: Up to date and Dental: Up to date.      Discussed the risks and benefits of a transplant, including the risk of surgery and immunosuppression medications.    KDPI: We discussed approximate remaining wait time and how that is influenced by issues such as blood type and sensitization (PRA) and access to a living donor. I contrasted potential waiting time for living vs  donor kidneys from  normal (0-85%) or higher (%) kidney donor profile index (KDPI) donors and their associated outcomes. I would not recommend Ms. Flores to consider kidneys from high KDPI donors. The reason for this decision is best summarized as: improved long term graft survival.    Patient presently appears to be enough of an acceptable kidney transplant recipient candidate to have any potential kidney donors start the evaluation  process.  Patient s overall re-evaluation may require further discussion in the Transplant Program s multidisciplinary selection committee for a final recommendation on the patient s suitability for transplant.     Reason for Visit:  Ariane Flores is a 41 year old female with ESKD from unknown etiology, who presents for kidney transplant wait list evaluation.     Date of Initial Transplant Evaluation:  11/2020        Current Transplant Phase: Waitlist: Active  Official UNOS Listing Date: 11/5/2020  Blood Type: A        cPRA: 94%       Date of cPRA: 12/2021  Transplant Coordinator: Maggie Maynard Transplant Office phone number [phone]     Previous Medical Issues:  # Positive Treponema Nga +: RPR neg.   # Urologic work up: cleared by urology last year with a voiding cystourethrogram which showed no evidence of vesicoureteral reflux and she was noted to empty her bladder to completion. CT imaging was unremarkable outside of left solitary kidney.      History of Present Illness:   Ariane Flores is a 41-year-old female,  born in [place], who presents for wait list evaluation with history of CKD secondary to unclear etiology, possible reflux/obstruction and recurrent UTI as a child requiring multiple urologic procedures, last of which was right native nephrectomy at age 13.           Interim Events: None          Kidney Disease: CKD stable, GFR 16 as of 11/2021 (down from 17-18 in prior months). Complains of worsening LE swelling and fatigue, but otherwise is feeling OK.        Kidney Disease Dx: Unknown etiology (no kidney biopsy).         On Dialysis: No       Primary Nephrologist: Dr. Tenorio          Cardiac/Vascular Disease History:       Known CAD: No       Known PAD/Claudication Symptoms: No       Known Heart Failure: No       Arrhythmia:  No       Pulmonary Hypertension: No        Valvular Disease: No       Other: None       New Cardiac/Vascular Events: No         Functional Capacity/Frailty:         Working full time at a  center. Complains of some SOB and fatigue, but no chest pains with exertion.     # COVID Vaccination Up To Date: Yes     Other Pertinent Transplant Surgical Issues:  Recent Blood Transfusion: No  Previous Abdominal Transplant: No  Bladder Dysfunction: No  Chronic/Recurrent Infections: No  Chronic Anticoagulation: No  Jehovah s Witness: No       Active Problem List:   Patient Active Problem List   Diagnosis     CKD (chronic kidney disease) stage 4, GFR 15-29 ml/min (H)     Migraine     Hyperparathyroidism, secondary renal (H)     Hyperlipidemia     HTN (hypertension)     Vitamin D deficiency     Metabolic acidosis       Personal History:  Recently . Lives with  at teenage daughter.       Allergies:  Allergies   Allergen Reactions     Cephalosporins Other (See Comments)     Codeine Other (See Comments)     Diagnostic X-Ray Materials Other (See Comments)     Only has 1 kidney.  Only has 1 kidney.  Only has 1 kidney.       Nsaids Other (See Comments)     Kidney Damage. Have Only has one kidney         Medications:  Current Outpatient Medications   Medication Sig     amLODIPine (NORVASC) 10 MG tablet amlodipine 10 mg tablet   TAKE 1 TABLET BY MOUTH ONCE DAILY     atorvastatin (LIPITOR) 20 MG tablet Take 40 mg by mouth daily     cholecalciferol 25 MCG (1000 UT) TABS Take 1,000 Units by mouth     ferrous sulfate (FEROSUL) 325 (65 Fe) MG tablet Take 325 mg by mouth     sodium bicarbonate 650 MG tablet sodium bicarbonate 650 mg tablet     No current facility-administered medications for this visit.        Vitals:  There were no vitals taken for this visit.     Exam:  GENERAL APPEARANCE: alert and no distress  HENT: mouth without ulcers or lesions  LYMPHATICS: no cervical or supraclavicular nodes  RESP: lungs clear to auscultation - no rales, rhonchi or wheezes  CV: regular rhythm, normal rate, no rub, no murmur  FEMORAL PULSES: normal  EDEMA: no LE edema bilaterally  ABDOMEN:  soft, nondistended, nontender, bowel sounds normal  MS: extremities normal - no gross deformities noted, no evidence of inflammation in joints, no muscle tenderness  SKIN: no rash      Again, thank you for allowing me to participate in the care of your patient.      Sincerely,    VIRGILIO Ford CNP

## 2023-02-10 ENCOUNTER — DOCUMENTATION ONLY (OUTPATIENT)
Dept: TRANSPLANT | Facility: CLINIC | Age: 43
End: 2023-02-10

## 2023-02-14 DIAGNOSIS — Z76.82 AWAITING ORGAN TRANSPLANT: Primary | ICD-10-CM

## 2023-03-03 ENCOUNTER — DOCUMENTATION ONLY (OUTPATIENT)
Dept: TRANSPLANT | Facility: CLINIC | Age: 43
End: 2023-03-03

## 2023-04-07 ENCOUNTER — LAB (OUTPATIENT)
Dept: LAB | Facility: CLINIC | Age: 43
End: 2023-04-07
Payer: COMMERCIAL

## 2023-04-07 DIAGNOSIS — Z76.82 ORGAN TRANSPLANT CANDIDATE: ICD-10-CM

## 2023-04-07 DIAGNOSIS — N18.6 ESRD (END STAGE RENAL DISEASE) (H): ICD-10-CM

## 2023-04-07 PROCEDURE — 86832 HLA CLASS I HIGH DEFIN QUAL: CPT

## 2023-04-07 PROCEDURE — 86833 HLA CLASS II HIGH DEFIN QUAL: CPT

## 2023-05-03 ENCOUNTER — DOCUMENTATION ONLY (OUTPATIENT)
Dept: TRANSPLANT | Facility: CLINIC | Age: 43
End: 2023-05-03

## 2023-05-05 ENCOUNTER — LAB REQUISITION (OUTPATIENT)
Dept: LAB | Facility: CLINIC | Age: 43
End: 2023-05-05

## 2023-05-05 PROCEDURE — 83970 ASSAY OF PARATHORMONE: CPT | Performed by: INTERNAL MEDICINE

## 2023-05-05 PROCEDURE — 82306 VITAMIN D 25 HYDROXY: CPT | Performed by: INTERNAL MEDICINE

## 2023-05-06 LAB
DEPRECATED CALCIDIOL+CALCIFEROL SERPL-MC: 45 UG/L (ref 20–75)
PTH-INTACT SERPL-MCNC: 90 PG/ML (ref 15–65)

## 2023-05-12 ENCOUNTER — TRANSFERRED RECORDS (OUTPATIENT)
Dept: HEALTH INFORMATION MANAGEMENT | Facility: CLINIC | Age: 43
End: 2023-05-12
Payer: COMMERCIAL

## 2023-05-26 ENCOUNTER — TELEPHONE (OUTPATIENT)
Dept: TRANSPLANT | Facility: CLINIC | Age: 43
End: 2023-05-26
Payer: COMMERCIAL

## 2023-05-26 NOTE — TELEPHONE ENCOUNTER
Returned call to pt. Pt reports she has had someone express interest in donation. Pt has website for sign up, she was wondering what her blood type was. Pt is ABO A. Pt verbalized understanding of information and has no further questions. Encouraged to reach out if questions arise.

## 2023-06-01 ENCOUNTER — TELEPHONE (OUTPATIENT)
Dept: TRANSPLANT | Facility: CLINIC | Age: 43
End: 2023-06-01
Payer: COMMERCIAL

## 2023-06-01 NOTE — TELEPHONE ENCOUNTER
Called pt after receiving voicemail asking what her blood type is and if she is pos or negative. Reviewed pt is A positive. She can receive a kidney from an A blood type donor. Pt verbalized understanding of information and has no further questions. Encouraged to reach out if questions arise.

## 2023-06-09 ENCOUNTER — TELEPHONE (OUTPATIENT)
Dept: TRANSPLANT | Facility: CLINIC | Age: 43
End: 2023-06-09
Payer: COMMERCIAL

## 2023-06-09 NOTE — TELEPHONE ENCOUNTER
Returned call to pt after getting vm requesting call back. Unable to leave message as VM box is full. Will try again at a later time.

## 2023-06-23 ENCOUNTER — TELEPHONE (OUTPATIENT)
Dept: TRANSPLANT | Facility: CLINIC | Age: 43
End: 2023-06-23
Payer: COMMERCIAL

## 2023-06-23 NOTE — TELEPHONE ENCOUNTER
Pt called writer with donor questions. Reports possible donor is an O blood type. Reviewed pt can receive O blood, only way to know if they are a match is to go through donor process. Pt verbalized understanding of information and has no further questions. Encouraged to reach out if questions arise.

## 2023-07-06 ENCOUNTER — LAB REQUISITION (OUTPATIENT)
Dept: LAB | Facility: CLINIC | Age: 43
End: 2023-07-06

## 2023-07-06 PROCEDURE — 83970 ASSAY OF PARATHORMONE: CPT | Performed by: INTERNAL MEDICINE

## 2023-07-07 LAB — PTH-INTACT SERPL-MCNC: 64 PG/ML (ref 15–65)

## 2023-08-31 DIAGNOSIS — Z76.82 ORGAN TRANSPLANT CANDIDATE: ICD-10-CM

## 2023-08-31 DIAGNOSIS — N18.6 ESRD (END STAGE RENAL DISEASE) (H): Primary | ICD-10-CM

## 2023-09-07 ENCOUNTER — LAB (OUTPATIENT)
Dept: LAB | Facility: CLINIC | Age: 43
End: 2023-09-07
Payer: COMMERCIAL

## 2023-09-07 DIAGNOSIS — Z76.82 ORGAN TRANSPLANT CANDIDATE: ICD-10-CM

## 2023-09-07 DIAGNOSIS — N18.6 ESRD (END STAGE RENAL DISEASE) (H): ICD-10-CM

## 2023-09-07 PROCEDURE — 86832 HLA CLASS I HIGH DEFIN QUAL: CPT

## 2023-09-07 PROCEDURE — 86833 HLA CLASS II HIGH DEFIN QUAL: CPT

## 2023-09-15 DIAGNOSIS — Z76.82 AWAITING ORGAN TRANSPLANT: Primary | ICD-10-CM

## 2023-09-15 LAB
PROTOCOL CUTOFF: NORMAL
SA 1 CELL: NORMAL
SA 1 TEST METHOD: NORMAL
SA 2 CELL: NORMAL
SA 2 TEST METHOD: NORMAL
SA1 HI RISK ABY: NORMAL
SA1 MOD RISK ABY: NORMAL
SA2 HI RISK ABY: NORMAL
SA2 MOD RISK ABY: NORMAL
UNACCEPTABLE ANTIGENS: NORMAL
UNOS CPRA: 97
ZZZSA 1  COMMENTS: NORMAL
ZZZSA 2 COMMENTS: NORMAL

## 2023-09-26 ENCOUNTER — HOSPITAL ENCOUNTER (INPATIENT)
Facility: CLINIC | Age: 43
Setting detail: SURGERY ADMIT
End: 2023-09-26
Attending: SURGERY | Admitting: SURGERY
Payer: COMMERCIAL

## 2023-09-26 ENCOUNTER — ORGAN (OUTPATIENT)
Dept: TRANSPLANT | Facility: CLINIC | Age: 43
End: 2023-09-26
Payer: COMMERCIAL

## 2023-09-26 ENCOUNTER — LAB (OUTPATIENT)
Dept: LAB | Facility: CLINIC | Age: 43
DRG: 652 | End: 2023-09-26
Attending: SURGERY
Payer: COMMERCIAL

## 2023-09-26 DIAGNOSIS — Z76.82 AWAITING ORGAN TRANSPLANT: ICD-10-CM

## 2023-09-26 DIAGNOSIS — Z76.82 AWAITING ORGAN TRANSPLANT: Primary | ICD-10-CM

## 2023-09-26 PROCEDURE — 86825 HLA X-MATH NON-CYTOTOXIC: CPT | Performed by: SURGERY

## 2023-09-27 ENCOUNTER — HOSPITAL ENCOUNTER (INPATIENT)
Facility: CLINIC | Age: 43
LOS: 3 days | Discharge: HOME | DRG: 652 | End: 2023-09-30
Attending: SURGERY | Admitting: SURGERY
Payer: COMMERCIAL

## 2023-09-27 ENCOUNTER — ANESTHESIA EVENT (OUTPATIENT)
Dept: SURGERY | Facility: CLINIC | Age: 43
DRG: 652 | End: 2023-09-27
Payer: COMMERCIAL

## 2023-09-27 ENCOUNTER — APPOINTMENT (OUTPATIENT)
Dept: GENERAL RADIOLOGY | Facility: CLINIC | Age: 43
DRG: 652 | End: 2023-09-27
Attending: SURGERY
Payer: COMMERCIAL

## 2023-09-27 ENCOUNTER — ANESTHESIA (OUTPATIENT)
Dept: SURGERY | Facility: CLINIC | Age: 43
DRG: 652 | End: 2023-09-27
Payer: COMMERCIAL

## 2023-09-27 ENCOUNTER — DOCUMENTATION ONLY (OUTPATIENT)
Dept: TRANSPLANT | Facility: CLINIC | Age: 43
End: 2023-09-27
Payer: COMMERCIAL

## 2023-09-27 ENCOUNTER — APPOINTMENT (OUTPATIENT)
Dept: ULTRASOUND IMAGING | Facility: CLINIC | Age: 43
DRG: 652 | End: 2023-09-27
Attending: SURGERY
Payer: COMMERCIAL

## 2023-09-27 DIAGNOSIS — Z94.0 S/P KIDNEY TRANSPLANT: ICD-10-CM

## 2023-09-27 DIAGNOSIS — N18.4 CKD (CHRONIC KIDNEY DISEASE) STAGE 4, GFR 15-29 ML/MIN (H): Primary | ICD-10-CM

## 2023-09-27 LAB
ABO/RH(D): NORMAL
ALBUMIN MFR UR ELPH: 91.9 MG/DL
ALBUMIN SERPL BCG-MCNC: 4.5 G/DL (ref 3.5–5.2)
ALBUMIN UR-MCNC: 70 MG/DL
ALP SERPL-CCNC: 81 U/L (ref 35–104)
ALT SERPL W P-5'-P-CCNC: 16 U/L (ref 0–50)
ANION GAP SERPL CALCULATED.3IONS-SCNC: 16 MMOL/L (ref 7–15)
ANION GAP SERPL CALCULATED.3IONS-SCNC: 16 MMOL/L (ref 7–15)
ANION GAP SERPL CALCULATED.3IONS-SCNC: 17 MMOL/L (ref 7–15)
ANTIBODY SCREEN: NEGATIVE
APPEARANCE UR: CLEAR
APTT PPP: 30 SECONDS (ref 22–38)
AST SERPL W P-5'-P-CCNC: 15 U/L (ref 0–45)
ATRIAL RATE - MUSE: 66 BPM
BASOPHILS # BLD AUTO: 0.1 10E3/UL (ref 0–0.2)
BASOPHILS NFR BLD AUTO: 0 %
BILIRUB SERPL-MCNC: 0.3 MG/DL
BILIRUB UR QL STRIP: NEGATIVE
BUN SERPL-MCNC: 49 MG/DL (ref 6–20)
BUN SERPL-MCNC: 51.6 MG/DL (ref 6–20)
BUN SERPL-MCNC: 53.3 MG/DL (ref 6–20)
CALCIUM SERPL-MCNC: 8.1 MG/DL (ref 8.6–10)
CALCIUM SERPL-MCNC: 8.7 MG/DL (ref 8.6–10)
CALCIUM SERPL-MCNC: 9.2 MG/DL (ref 8.6–10)
CHLORIDE SERPL-SCNC: 106 MMOL/L (ref 98–107)
CHLORIDE SERPL-SCNC: 107 MMOL/L (ref 98–107)
CHLORIDE SERPL-SCNC: 108 MMOL/L (ref 98–107)
CHOLEST SERPL-MCNC: 160 MG/DL
CMV DNA SPEC NAA+PROBE-ACNC: NOT DETECTED IU/ML
CMV IGG SERPL IA-ACNC: 2.3 U/ML
CMV IGG SERPL IA-ACNC: ABNORMAL
COLOR UR AUTO: ABNORMAL
CREAT SERPL-MCNC: 5.36 MG/DL (ref 0.51–0.95)
CREAT SERPL-MCNC: 5.58 MG/DL (ref 0.51–0.95)
CREAT SERPL-MCNC: 5.6 MG/DL (ref 0.51–0.95)
CREAT UR-MCNC: 23 MG/DL
DEPRECATED HCO3 PLAS-SCNC: 18 MMOL/L (ref 22–29)
DEPRECATED HCO3 PLAS-SCNC: 19 MMOL/L (ref 22–29)
DEPRECATED HCO3 PLAS-SCNC: 19 MMOL/L (ref 22–29)
DIASTOLIC BLOOD PRESSURE - MUSE: NORMAL MMHG
EBV VCA IGG SER IA-ACNC: 169 U/ML
EBV VCA IGG SER IA-ACNC: POSITIVE
EBV VCA IGM SER IA-ACNC: 20 U/ML
EBV VCA IGM SER IA-ACNC: NORMAL
EGFRCR SERPLBLD CKD-EPI 2021: 10 ML/MIN/1.73M2
EGFRCR SERPLBLD CKD-EPI 2021: 9 ML/MIN/1.73M2
EGFRCR SERPLBLD CKD-EPI 2021: 9 ML/MIN/1.73M2
EOSINOPHIL # BLD AUTO: 0.5 10E3/UL (ref 0–0.7)
EOSINOPHIL NFR BLD AUTO: 4 %
ERYTHROCYTE [DISTWIDTH] IN BLOOD BY AUTOMATED COUNT: 13.1 % (ref 10–15)
ERYTHROCYTE [DISTWIDTH] IN BLOOD BY AUTOMATED COUNT: 13.1 % (ref 10–15)
ERYTHROCYTE [DISTWIDTH] IN BLOOD BY AUTOMATED COUNT: 13.2 % (ref 10–15)
GLUCOSE SERPL-MCNC: 147 MG/DL (ref 70–99)
GLUCOSE SERPL-MCNC: 97 MG/DL (ref 70–99)
GLUCOSE SERPL-MCNC: 98 MG/DL (ref 70–99)
GLUCOSE UR STRIP-MCNC: 30 MG/DL
HBA1C MFR BLD: 5.5 %
HBV CORE AB SERPL QL IA: NONREACTIVE
HBV SURFACE AB SERPL IA-ACNC: 82.83 M[IU]/ML
HBV SURFACE AB SERPL IA-ACNC: REACTIVE M[IU]/ML
HBV SURFACE AG SERPL QL IA: NONREACTIVE
HCG INTACT+B SERPL-ACNC: <1 MIU/ML
HCG SERPL QL: NEGATIVE
HCT VFR BLD AUTO: 31.5 % (ref 35–47)
HCT VFR BLD AUTO: 32.9 % (ref 35–47)
HCT VFR BLD AUTO: 35.7 % (ref 35–47)
HCV AB SERPL QL IA: NONREACTIVE
HDLC SERPL-MCNC: 36 MG/DL
HGB BLD-MCNC: 10.2 G/DL (ref 11.7–15.7)
HGB BLD-MCNC: 10.2 G/DL (ref 11.7–15.7)
HGB BLD-MCNC: 10.9 G/DL (ref 11.7–15.7)
HGB BLD-MCNC: 11.4 G/DL (ref 11.7–15.7)
HGB BLD-MCNC: 9.6 G/DL (ref 11.7–15.7)
HGB UR QL STRIP: ABNORMAL
HIV 1+2 AB+HIV1 P24 AG SERPL QL IA: NONREACTIVE
IMM GRANULOCYTES # BLD: 0 10E3/UL
IMM GRANULOCYTES NFR BLD: 0 %
INR PPP: 1.02 (ref 0.85–1.15)
INTERPRETATION ECG - MUSE: NORMAL
KETONES UR STRIP-MCNC: NEGATIVE MG/DL
LDLC SERPL CALC-MCNC: 94 MG/DL
LEUKOCYTE ESTERASE UR QL STRIP: NEGATIVE
LYMPHOCYTES # BLD AUTO: 2.9 10E3/UL (ref 0.8–5.3)
LYMPHOCYTES NFR BLD AUTO: 23 %
MAGNESIUM SERPL-MCNC: 1.9 MG/DL (ref 1.7–2.3)
MAGNESIUM SERPL-MCNC: 2.2 MG/DL (ref 1.7–2.3)
MCH RBC QN AUTO: 28.8 PG (ref 26.5–33)
MCH RBC QN AUTO: 29.2 PG (ref 26.5–33)
MCH RBC QN AUTO: 29.6 PG (ref 26.5–33)
MCHC RBC AUTO-ENTMCNC: 31.9 G/DL (ref 31.5–36.5)
MCHC RBC AUTO-ENTMCNC: 32.4 G/DL (ref 31.5–36.5)
MCHC RBC AUTO-ENTMCNC: 33.1 G/DL (ref 31.5–36.5)
MCV RBC AUTO: 88 FL (ref 78–100)
MCV RBC AUTO: 90 FL (ref 78–100)
MCV RBC AUTO: 91 FL (ref 78–100)
MONOCYTES # BLD AUTO: 1.1 10E3/UL (ref 0–1.3)
MONOCYTES NFR BLD AUTO: 9 %
NEUTROPHILS # BLD AUTO: 8.1 10E3/UL (ref 1.6–8.3)
NEUTROPHILS NFR BLD AUTO: 64 %
NITRATE UR QL: NEGATIVE
NONHDLC SERPL-MCNC: 124 MG/DL
NRBC # BLD AUTO: 0 10E3/UL
NRBC BLD AUTO-RTO: 0 /100
P AXIS - MUSE: 4 DEGREES
PH UR STRIP: 7 [PH] (ref 5–7)
PHOSPHATE SERPL-MCNC: 4.8 MG/DL (ref 2.5–4.5)
PHOSPHATE SERPL-MCNC: 5.7 MG/DL (ref 2.5–4.5)
PLATELET # BLD AUTO: 239 10E3/UL (ref 150–450)
PLATELET # BLD AUTO: 332 10E3/UL (ref 150–450)
PLATELET # BLD AUTO: 341 10E3/UL (ref 150–450)
POTASSIUM SERPL-SCNC: 3.8 MMOL/L (ref 3.4–5.3)
POTASSIUM SERPL-SCNC: 3.8 MMOL/L (ref 3.4–5.3)
POTASSIUM SERPL-SCNC: 4 MMOL/L (ref 3.4–5.3)
POTASSIUM SERPL-SCNC: 4.1 MMOL/L (ref 3.4–5.3)
POTASSIUM SERPL-SCNC: 4.2 MMOL/L (ref 3.4–5.3)
PR INTERVAL - MUSE: 172 MS
PROT SERPL-MCNC: 7.7 G/DL (ref 6.4–8.3)
PROT/CREAT 24H UR: 4 MG/MG CR (ref 0–0.2)
QRS DURATION - MUSE: 80 MS
QT - MUSE: 412 MS
QTC - MUSE: 431 MS
R AXIS - MUSE: 11 DEGREES
RBC # BLD AUTO: 3.45 10E6/UL (ref 3.8–5.2)
RBC # BLD AUTO: 3.73 10E6/UL (ref 3.8–5.2)
RBC # BLD AUTO: 3.96 10E6/UL (ref 3.8–5.2)
RBC URINE: 1 /HPF
SARS-COV-2 RNA RESP QL NAA+PROBE: NEGATIVE
SODIUM SERPL-SCNC: 141 MMOL/L (ref 135–145)
SODIUM SERPL-SCNC: 142 MMOL/L (ref 135–145)
SODIUM SERPL-SCNC: 143 MMOL/L (ref 135–145)
SP GR UR STRIP: 1 (ref 1–1.03)
SPECIMEN EXPIRATION DATE: NORMAL
SQUAMOUS EPITHELIAL: 5 /HPF
SYSTOLIC BLOOD PRESSURE - MUSE: NORMAL MMHG
T AXIS - MUSE: 15 DEGREES
TRIGL SERPL-MCNC: 148 MG/DL
UROBILINOGEN UR STRIP-MCNC: NORMAL MG/DL
VENTRICULAR RATE- MUSE: 66 BPM
WBC # BLD AUTO: 10.9 10E3/UL (ref 4–11)
WBC # BLD AUTO: 12.6 10E3/UL (ref 4–11)
WBC # BLD AUTO: 13.7 10E3/UL (ref 4–11)
WBC URINE: 2 /HPF

## 2023-09-27 PROCEDURE — 250N000009 HC RX 250

## 2023-09-27 PROCEDURE — 87535 HIV-1 PROBE&REVERSE TRNSCRPJ: CPT

## 2023-09-27 PROCEDURE — 250N000012 HC RX MED GY IP 250 OP 636 PS 637: Performed by: SURGERY

## 2023-09-27 PROCEDURE — 710N000010 HC RECOVERY PHASE 1, LEVEL 2, PER MIN: Performed by: SURGERY

## 2023-09-27 PROCEDURE — 250N000009 HC RX 250: Performed by: NURSE ANESTHETIST, CERTIFIED REGISTERED

## 2023-09-27 PROCEDURE — 86704 HEP B CORE ANTIBODY TOTAL: CPT

## 2023-09-27 PROCEDURE — 76776 US EXAM K TRANSPL W/DOPPLER: CPT | Mod: 26 | Performed by: RADIOLOGY

## 2023-09-27 PROCEDURE — 84132 ASSAY OF SERUM POTASSIUM: CPT | Performed by: SURGERY

## 2023-09-27 PROCEDURE — 76776 US EXAM K TRANSPL W/DOPPLER: CPT

## 2023-09-27 PROCEDURE — 120N000011 HC R&B TRANSPLANT UMMC

## 2023-09-27 PROCEDURE — C2617 STENT, NON-COR, TEM W/O DEL: HCPCS | Performed by: SURGERY

## 2023-09-27 PROCEDURE — 36592 COLLECT BLOOD FROM PICC: CPT | Performed by: SURGERY

## 2023-09-27 PROCEDURE — 36415 COLL VENOUS BLD VENIPUNCTURE: CPT

## 2023-09-27 PROCEDURE — 84703 CHORIONIC GONADOTROPIN ASSAY: CPT | Performed by: SURGERY

## 2023-09-27 PROCEDURE — 50360 RNL ALTRNSPLJ W/O RCP NFRCT: CPT | Mod: RT | Performed by: SURGERY

## 2023-09-27 PROCEDURE — 87521 HEPATITIS C PROBE&RVRS TRNSC: CPT

## 2023-09-27 PROCEDURE — 84100 ASSAY OF PHOSPHORUS: CPT | Performed by: SURGERY

## 2023-09-27 PROCEDURE — 86832 HLA CLASS I HIGH DEFIN QUAL: CPT

## 2023-09-27 PROCEDURE — 86644 CMV ANTIBODY: CPT

## 2023-09-27 PROCEDURE — 83735 ASSAY OF MAGNESIUM: CPT | Performed by: SURGERY

## 2023-09-27 PROCEDURE — 258N000003 HC RX IP 258 OP 636: Performed by: SURGERY

## 2023-09-27 PROCEDURE — 86900 BLOOD TYPING SEROLOGIC ABO: CPT

## 2023-09-27 PROCEDURE — 360N000077 HC SURGERY LEVEL 4, PER MIN: Performed by: SURGERY

## 2023-09-27 PROCEDURE — 0T760DZ DILATION OF RIGHT URETER WITH INTRALUMINAL DEVICE, OPEN APPROACH: ICD-10-PCS | Performed by: SURGERY

## 2023-09-27 PROCEDURE — 85025 COMPLETE CBC W/AUTO DIFF WBC: CPT

## 2023-09-27 PROCEDURE — 258N000003 HC RX IP 258 OP 636: Performed by: NURSE ANESTHETIST, CERTIFIED REGISTERED

## 2023-09-27 PROCEDURE — 85610 PROTHROMBIN TIME: CPT

## 2023-09-27 PROCEDURE — 86665 EPSTEIN-BARR CAPSID VCA: CPT

## 2023-09-27 PROCEDURE — 258N000003 HC RX IP 258 OP 636

## 2023-09-27 PROCEDURE — 86790 VIRUS ANTIBODY NOS: CPT

## 2023-09-27 PROCEDURE — 80053 COMPREHEN METABOLIC PANEL: CPT

## 2023-09-27 PROCEDURE — 80061 LIPID PANEL: CPT

## 2023-09-27 PROCEDURE — 250N000011 HC RX IP 250 OP 636: Performed by: SURGERY

## 2023-09-27 PROCEDURE — 36415 COLL VENOUS BLD VENIPUNCTURE: CPT | Performed by: SURGERY

## 2023-09-27 PROCEDURE — 80048 BASIC METABOLIC PNL TOTAL CA: CPT | Performed by: SURGERY

## 2023-09-27 PROCEDURE — 86825 HLA X-MATH NON-CYTOTOXIC: CPT

## 2023-09-27 PROCEDURE — 71046 X-RAY EXAM CHEST 2 VIEWS: CPT | Mod: 26 | Performed by: RADIOLOGY

## 2023-09-27 PROCEDURE — 999N000141 HC STATISTIC PRE-PROCEDURE NURSING ASSESSMENT: Performed by: SURGERY

## 2023-09-27 PROCEDURE — 86706 HEP B SURFACE ANTIBODY: CPT

## 2023-09-27 PROCEDURE — 999N000248 HC STATISTIC IV INSERT WITH US BY RN

## 2023-09-27 PROCEDURE — 83735 ASSAY OF MAGNESIUM: CPT

## 2023-09-27 PROCEDURE — 93005 ELECTROCARDIOGRAM TRACING: CPT

## 2023-09-27 PROCEDURE — 999N000065 XR CHEST PORT 1 VIEW

## 2023-09-27 PROCEDURE — 250N000025 HC SEVOFLURANE, PER MIN: Performed by: SURGERY

## 2023-09-27 PROCEDURE — 250N000011 HC RX IP 250 OP 636

## 2023-09-27 PROCEDURE — 71046 X-RAY EXAM CHEST 2 VIEWS: CPT

## 2023-09-27 PROCEDURE — 85041 AUTOMATED RBC COUNT: CPT | Performed by: SURGERY

## 2023-09-27 PROCEDURE — 250N000011 HC RX IP 250 OP 636: Performed by: NURSE ANESTHETIST, CERTIFIED REGISTERED

## 2023-09-27 PROCEDURE — 84156 ASSAY OF PROTEIN URINE: CPT

## 2023-09-27 PROCEDURE — 83036 HEMOGLOBIN GLYCOSYLATED A1C: CPT

## 2023-09-27 PROCEDURE — 99255 IP/OBS CONSLTJ NEW/EST HI 80: CPT | Mod: FS

## 2023-09-27 PROCEDURE — 85730 THROMBOPLASTIN TIME PARTIAL: CPT

## 2023-09-27 PROCEDURE — 93010 ELECTROCARDIOGRAM REPORT: CPT | Performed by: INTERNAL MEDICINE

## 2023-09-27 PROCEDURE — 0TY00Z0 TRANSPLANTATION OF RIGHT KIDNEY, ALLOGENEIC, OPEN APPROACH: ICD-10-PCS | Performed by: SURGERY

## 2023-09-27 PROCEDURE — 258N000002 HC RX IP 258 OP 250: Performed by: SURGERY

## 2023-09-27 PROCEDURE — 85027 COMPLETE CBC AUTOMATED: CPT

## 2023-09-27 PROCEDURE — 84100 ASSAY OF PHOSPHORUS: CPT

## 2023-09-27 PROCEDURE — 87389 HIV-1 AG W/HIV-1&-2 AB AG IA: CPT

## 2023-09-27 PROCEDURE — 250N000012 HC RX MED GY IP 250 OP 636 PS 637: Mod: JZ | Performed by: SURGERY

## 2023-09-27 PROCEDURE — 250N000011 HC RX IP 250 OP 636: Performed by: ANESTHESIOLOGY

## 2023-09-27 PROCEDURE — 86803 HEPATITIS C AB TEST: CPT

## 2023-09-27 PROCEDURE — 250N000013 HC RX MED GY IP 250 OP 250 PS 637: Performed by: SURGERY

## 2023-09-27 PROCEDURE — 87635 SARS-COV-2 COVID-19 AMP PRB: CPT

## 2023-09-27 PROCEDURE — 250N000011 HC RX IP 250 OP 636: Mod: JZ | Performed by: NURSE ANESTHETIST, CERTIFIED REGISTERED

## 2023-09-27 PROCEDURE — 71045 X-RAY EXAM CHEST 1 VIEW: CPT | Mod: 26 | Performed by: RADIOLOGY

## 2023-09-27 PROCEDURE — 87340 HEPATITIS B SURFACE AG IA: CPT

## 2023-09-27 PROCEDURE — 86833 HLA CLASS II HIGH DEFIN QUAL: CPT

## 2023-09-27 PROCEDURE — 81001 URINALYSIS AUTO W/SCOPE: CPT

## 2023-09-27 PROCEDURE — 370N000017 HC ANESTHESIA TECHNICAL FEE, PER MIN: Performed by: SURGERY

## 2023-09-27 PROCEDURE — 84702 CHORIONIC GONADOTROPIN TEST: CPT

## 2023-09-27 PROCEDURE — 272N000001 HC OR GENERAL SUPPLY STERILE: Performed by: SURGERY

## 2023-09-27 PROCEDURE — 812N000003 HC ACQUISITION KIDNEY CADAVER

## 2023-09-27 PROCEDURE — 85018 HEMOGLOBIN: CPT | Performed by: SURGERY

## 2023-09-27 DEVICE — SOF-FLEX DOUBLE PIGTAIL URETERAL STENT SET
Type: IMPLANTABLE DEVICE | Site: URETER | Status: NON-FUNCTIONAL
Brand: SOF-FLEX
Removed: 2023-11-07

## 2023-09-27 RX ORDER — ONDANSETRON 4 MG/1
4 TABLET, ORALLY DISINTEGRATING ORAL EVERY 6 HOURS PRN
Status: DISCONTINUED | OUTPATIENT
Start: 2023-09-27 | End: 2023-09-30 | Stop reason: HOSPADM

## 2023-09-27 RX ORDER — FENTANYL CITRATE 50 UG/ML
25 INJECTION, SOLUTION INTRAMUSCULAR; INTRAVENOUS EVERY 5 MIN PRN
Status: DISCONTINUED | OUTPATIENT
Start: 2023-09-27 | End: 2023-09-27 | Stop reason: HOSPADM

## 2023-09-27 RX ORDER — HYDROMORPHONE HCL IN WATER/PF 6 MG/30 ML
0.4 PATIENT CONTROLLED ANALGESIA SYRINGE INTRAVENOUS
Status: DISCONTINUED | OUTPATIENT
Start: 2023-09-27 | End: 2023-09-28

## 2023-09-27 RX ORDER — SODIUM CHLORIDE 9 MG/ML
INJECTION, SOLUTION INTRAVENOUS CONTINUOUS PRN
Status: DISCONTINUED | OUTPATIENT
Start: 2023-09-27 | End: 2023-09-27

## 2023-09-27 RX ORDER — ONDANSETRON 2 MG/ML
INJECTION INTRAMUSCULAR; INTRAVENOUS PRN
Status: DISCONTINUED | OUTPATIENT
Start: 2023-09-27 | End: 2023-09-27

## 2023-09-27 RX ORDER — BUMETANIDE 0.25 MG/ML
INJECTION INTRAMUSCULAR; INTRAVENOUS PRN
Status: DISCONTINUED | OUTPATIENT
Start: 2023-09-27 | End: 2023-09-27

## 2023-09-27 RX ORDER — ACETAMINOPHEN 325 MG/1
975 TABLET ORAL
Status: DISCONTINUED | OUTPATIENT
Start: 2023-09-27 | End: 2023-09-27 | Stop reason: HOSPADM

## 2023-09-27 RX ORDER — HYDROMORPHONE HCL IN WATER/PF 6 MG/30 ML
0.2 PATIENT CONTROLLED ANALGESIA SYRINGE INTRAVENOUS
Status: DISCONTINUED | OUTPATIENT
Start: 2023-09-27 | End: 2023-09-28

## 2023-09-27 RX ORDER — KETOROLAC TROMETHAMINE 30 MG/ML
15 INJECTION, SOLUTION INTRAMUSCULAR; INTRAVENOUS
Status: DISCONTINUED | OUTPATIENT
Start: 2023-09-27 | End: 2023-09-27 | Stop reason: HOSPADM

## 2023-09-27 RX ORDER — HYDROMORPHONE HCL IN WATER/PF 6 MG/30 ML
0.4 PATIENT CONTROLLED ANALGESIA SYRINGE INTRAVENOUS EVERY 5 MIN PRN
Status: DISCONTINUED | OUTPATIENT
Start: 2023-09-27 | End: 2023-09-27 | Stop reason: HOSPADM

## 2023-09-27 RX ORDER — ATORVASTATIN CALCIUM 10 MG/1
10 TABLET, FILM COATED ORAL DAILY
Status: DISCONTINUED | OUTPATIENT
Start: 2023-09-28 | End: 2023-09-28

## 2023-09-27 RX ORDER — FUROSEMIDE 10 MG/ML
INJECTION INTRAMUSCULAR; INTRAVENOUS PRN
Status: DISCONTINUED | OUTPATIENT
Start: 2023-09-27 | End: 2023-09-27

## 2023-09-27 RX ORDER — VALGANCICLOVIR 450 MG/1
450 TABLET, FILM COATED ORAL
Status: DISCONTINUED | OUTPATIENT
Start: 2023-09-28 | End: 2023-09-30 | Stop reason: HOSPADM

## 2023-09-27 RX ORDER — OXYCODONE HYDROCHLORIDE 10 MG/1
10 TABLET ORAL EVERY 4 HOURS PRN
Status: DISCONTINUED | OUTPATIENT
Start: 2023-09-27 | End: 2023-09-28

## 2023-09-27 RX ORDER — ACETAMINOPHEN 325 MG/1
975 TABLET ORAL EVERY 8 HOURS
Status: DISPENSED | OUTPATIENT
Start: 2023-09-27 | End: 2023-09-30

## 2023-09-27 RX ORDER — ONDANSETRON 2 MG/ML
4 INJECTION INTRAMUSCULAR; INTRAVENOUS EVERY 6 HOURS PRN
Status: DISCONTINUED | OUTPATIENT
Start: 2023-09-27 | End: 2023-09-27

## 2023-09-27 RX ORDER — FAMOTIDINE 20 MG/1
20 TABLET, FILM COATED ORAL 2 TIMES DAILY
Status: DISCONTINUED | OUTPATIENT
Start: 2023-09-27 | End: 2023-09-28

## 2023-09-27 RX ORDER — GLYCOPYRROLATE 0.2 MG/ML
INJECTION, SOLUTION INTRAMUSCULAR; INTRAVENOUS PRN
Status: DISCONTINUED | OUTPATIENT
Start: 2023-09-27 | End: 2023-09-27

## 2023-09-27 RX ORDER — SODIUM CHLORIDE, SODIUM LACTATE, POTASSIUM CHLORIDE, CALCIUM CHLORIDE 600; 310; 30; 20 MG/100ML; MG/100ML; MG/100ML; MG/100ML
INJECTION, SOLUTION INTRAVENOUS CONTINUOUS
Status: DISCONTINUED | OUTPATIENT
Start: 2023-09-27 | End: 2023-09-27

## 2023-09-27 RX ORDER — POLYETHYLENE GLYCOL 3350 17 G/17G
17 POWDER, FOR SOLUTION ORAL DAILY
Status: DISCONTINUED | OUTPATIENT
Start: 2023-09-28 | End: 2023-09-30 | Stop reason: HOSPADM

## 2023-09-27 RX ORDER — SODIUM CHLORIDE 9 MG/ML
1000 INJECTION, SOLUTION INTRAVENOUS CONTINUOUS PRN
Status: DISCONTINUED | OUTPATIENT
Start: 2023-09-27 | End: 2023-09-28

## 2023-09-27 RX ORDER — NALOXONE HYDROCHLORIDE 0.4 MG/ML
0.4 INJECTION, SOLUTION INTRAMUSCULAR; INTRAVENOUS; SUBCUTANEOUS
Status: DISCONTINUED | OUTPATIENT
Start: 2023-09-27 | End: 2023-09-30 | Stop reason: HOSPADM

## 2023-09-27 RX ORDER — DOPAMINE HYDROCHLORIDE 160 MG/100ML
2-20 INJECTION, SOLUTION INTRAVENOUS CONTINUOUS
Status: DISCONTINUED | OUTPATIENT
Start: 2023-09-27 | End: 2023-09-27 | Stop reason: HOSPADM

## 2023-09-27 RX ORDER — VERAPAMIL HYDROCHLORIDE 2.5 MG/ML
INJECTION, SOLUTION INTRAVENOUS PRN
Status: DISCONTINUED | OUTPATIENT
Start: 2023-09-27 | End: 2023-09-27 | Stop reason: HOSPADM

## 2023-09-27 RX ORDER — OXYCODONE HYDROCHLORIDE 5 MG/1
5 TABLET ORAL EVERY 4 HOURS PRN
Status: DISCONTINUED | OUTPATIENT
Start: 2023-09-27 | End: 2023-09-28

## 2023-09-27 RX ORDER — SODIUM CHLORIDE, SODIUM LACTATE, POTASSIUM CHLORIDE, CALCIUM CHLORIDE 600; 310; 30; 20 MG/100ML; MG/100ML; MG/100ML; MG/100ML
INJECTION, SOLUTION INTRAVENOUS CONTINUOUS
Status: DISCONTINUED | OUTPATIENT
Start: 2023-09-27 | End: 2023-09-27 | Stop reason: HOSPADM

## 2023-09-27 RX ORDER — ACETAMINOPHEN 325 MG/1
975 TABLET ORAL ONCE
Status: DISCONTINUED | OUTPATIENT
Start: 2023-09-27 | End: 2023-09-27 | Stop reason: HOSPADM

## 2023-09-27 RX ORDER — DOPAMINE HYDROCHLORIDE 160 MG/100ML
INJECTION, SOLUTION INTRAVENOUS CONTINUOUS PRN
Status: DISCONTINUED | OUTPATIENT
Start: 2023-09-27 | End: 2023-09-27

## 2023-09-27 RX ORDER — PAPAVERINE HYDROCHLORIDE 30 MG/ML
INJECTION INTRAMUSCULAR; INTRAVENOUS PRN
Status: DISCONTINUED | OUTPATIENT
Start: 2023-09-27 | End: 2023-09-27 | Stop reason: HOSPADM

## 2023-09-27 RX ORDER — ACETAMINOPHEN 325 MG/1
650 TABLET ORAL EVERY 4 HOURS PRN
Status: DISCONTINUED | OUTPATIENT
Start: 2023-09-30 | End: 2023-09-30 | Stop reason: HOSPADM

## 2023-09-27 RX ORDER — NALOXONE HYDROCHLORIDE 0.4 MG/ML
0.2 INJECTION, SOLUTION INTRAMUSCULAR; INTRAVENOUS; SUBCUTANEOUS
Status: DISCONTINUED | OUTPATIENT
Start: 2023-09-27 | End: 2023-09-30 | Stop reason: HOSPADM

## 2023-09-27 RX ORDER — PROCHLORPERAZINE MALEATE 10 MG
10 TABLET ORAL EVERY 6 HOURS PRN
Status: DISCONTINUED | OUTPATIENT
Start: 2023-09-27 | End: 2023-09-30 | Stop reason: HOSPADM

## 2023-09-27 RX ORDER — BISACODYL 10 MG
10 SUPPOSITORY, RECTAL RECTAL DAILY PRN
Status: DISCONTINUED | OUTPATIENT
Start: 2023-09-27 | End: 2023-09-30 | Stop reason: HOSPADM

## 2023-09-27 RX ORDER — HEPARIN SODIUM 1000 [USP'U]/ML
INJECTION, SOLUTION INTRAVENOUS; SUBCUTANEOUS PRN
Status: DISCONTINUED | OUTPATIENT
Start: 2023-09-27 | End: 2023-09-27

## 2023-09-27 RX ORDER — SODIUM CHLORIDE, SODIUM GLUCONATE, SODIUM ACETATE, POTASSIUM CHLORIDE AND MAGNESIUM CHLORIDE 526; 502; 368; 37; 30 MG/100ML; MG/100ML; MG/100ML; MG/100ML; MG/100ML
INJECTION, SOLUTION INTRAVENOUS CONTINUOUS PRN
Status: DISCONTINUED | OUTPATIENT
Start: 2023-09-27 | End: 2023-09-27

## 2023-09-27 RX ORDER — MANNITOL 20 G/100ML
INJECTION, SOLUTION INTRAVENOUS PRN
Status: DISCONTINUED | OUTPATIENT
Start: 2023-09-27 | End: 2023-09-27

## 2023-09-27 RX ORDER — ONDANSETRON 4 MG/1
4 TABLET, ORALLY DISINTEGRATING ORAL EVERY 30 MIN PRN
Status: DISCONTINUED | OUTPATIENT
Start: 2023-09-27 | End: 2023-09-27 | Stop reason: HOSPADM

## 2023-09-27 RX ORDER — LEVOFLOXACIN 5 MG/ML
500 INJECTION, SOLUTION INTRAVENOUS ONCE
Status: DISCONTINUED | OUTPATIENT
Start: 2023-09-27 | End: 2023-09-27

## 2023-09-27 RX ORDER — CEFUROXIME SODIUM 1.5 G/16ML
1.5 INJECTION, POWDER, FOR SOLUTION INTRAVENOUS SEE ADMIN INSTRUCTIONS
Status: DISCONTINUED | OUTPATIENT
Start: 2023-09-27 | End: 2023-09-27 | Stop reason: HOSPADM

## 2023-09-27 RX ORDER — HYDROMORPHONE HCL IN WATER/PF 6 MG/30 ML
0.2 PATIENT CONTROLLED ANALGESIA SYRINGE INTRAVENOUS EVERY 5 MIN PRN
Status: DISCONTINUED | OUTPATIENT
Start: 2023-09-27 | End: 2023-09-27 | Stop reason: HOSPADM

## 2023-09-27 RX ORDER — LANOLIN ALCOHOL/MO/W.PET/CERES
3 CREAM (GRAM) TOPICAL
Status: DISCONTINUED | OUTPATIENT
Start: 2023-09-27 | End: 2023-09-27

## 2023-09-27 RX ORDER — PROPOFOL 10 MG/ML
INJECTION, EMULSION INTRAVENOUS PRN
Status: DISCONTINUED | OUTPATIENT
Start: 2023-09-27 | End: 2023-09-27

## 2023-09-27 RX ORDER — FENTANYL CITRATE 50 UG/ML
50 INJECTION, SOLUTION INTRAMUSCULAR; INTRAVENOUS EVERY 5 MIN PRN
Status: DISCONTINUED | OUTPATIENT
Start: 2023-09-27 | End: 2023-09-27 | Stop reason: HOSPADM

## 2023-09-27 RX ORDER — PROCHLORPERAZINE MALEATE 10 MG
10 TABLET ORAL EVERY 6 HOURS PRN
Status: DISCONTINUED | OUTPATIENT
Start: 2023-09-27 | End: 2023-09-27

## 2023-09-27 RX ORDER — CEFUROXIME SODIUM 1.5 G/16ML
1.5 INJECTION, POWDER, FOR SOLUTION INTRAVENOUS
Status: COMPLETED | OUTPATIENT
Start: 2023-09-27 | End: 2023-09-27

## 2023-09-27 RX ORDER — LIDOCAINE 40 MG/G
CREAM TOPICAL
Status: CANCELLED | OUTPATIENT
Start: 2023-09-27

## 2023-09-27 RX ORDER — LANOLIN ALCOHOL/MO/W.PET/CERES
3 CREAM (GRAM) TOPICAL
Status: DISCONTINUED | OUTPATIENT
Start: 2023-09-27 | End: 2023-09-30 | Stop reason: HOSPADM

## 2023-09-27 RX ORDER — CALCIUM CHLORIDE 100 MG/ML
INJECTION INTRAVENOUS; INTRAVENTRICULAR PRN
Status: DISCONTINUED | OUTPATIENT
Start: 2023-09-27 | End: 2023-09-27

## 2023-09-27 RX ORDER — SULFAMETHOXAZOLE AND TRIMETHOPRIM 400; 80 MG/1; MG/1
1 TABLET ORAL DAILY
Status: DISCONTINUED | OUTPATIENT
Start: 2023-09-27 | End: 2023-09-28

## 2023-09-27 RX ORDER — CALCITRIOL 0.25 UG/1
CAPSULE, LIQUID FILLED ORAL
Status: ON HOLD | COMMUNITY
End: 2023-09-30

## 2023-09-27 RX ORDER — LEVOTHYROXINE SODIUM 50 UG/1
50 TABLET ORAL DAILY
COMMUNITY

## 2023-09-27 RX ORDER — ONDANSETRON 4 MG/1
4 TABLET, ORALLY DISINTEGRATING ORAL EVERY 6 HOURS PRN
Status: DISCONTINUED | OUTPATIENT
Start: 2023-09-27 | End: 2023-09-27

## 2023-09-27 RX ORDER — LEVOTHYROXINE SODIUM 50 UG/1
50 TABLET ORAL DAILY
Status: DISCONTINUED | OUTPATIENT
Start: 2023-09-27 | End: 2023-09-30 | Stop reason: HOSPADM

## 2023-09-27 RX ORDER — FENTANYL CITRATE 50 UG/ML
INJECTION, SOLUTION INTRAMUSCULAR; INTRAVENOUS PRN
Status: DISCONTINUED | OUTPATIENT
Start: 2023-09-27 | End: 2023-09-27

## 2023-09-27 RX ORDER — SODIUM CHLORIDE 450 MG/100ML
INJECTION, SOLUTION INTRAVENOUS CONTINUOUS PRN
Status: DISCONTINUED | OUTPATIENT
Start: 2023-09-27 | End: 2023-09-28

## 2023-09-27 RX ORDER — SODIUM CHLORIDE, SODIUM LACTATE, POTASSIUM CHLORIDE, CALCIUM CHLORIDE 600; 310; 30; 20 MG/100ML; MG/100ML; MG/100ML; MG/100ML
INJECTION, SOLUTION INTRAVENOUS CONTINUOUS
Status: CANCELLED | OUTPATIENT
Start: 2023-09-27

## 2023-09-27 RX ORDER — DEXTROSE, SODIUM CHLORIDE, SODIUM LACTATE, POTASSIUM CHLORIDE, AND CALCIUM CHLORIDE 5; .6; .31; .03; .02 G/100ML; G/100ML; G/100ML; G/100ML; G/100ML
INJECTION, SOLUTION INTRAVENOUS CONTINUOUS
Status: DISCONTINUED | OUTPATIENT
Start: 2023-09-27 | End: 2023-09-29

## 2023-09-27 RX ORDER — PROCHLORPERAZINE 25 MG
25 SUPPOSITORY, RECTAL RECTAL EVERY 12 HOURS PRN
Status: DISCONTINUED | OUTPATIENT
Start: 2023-09-27 | End: 2023-09-27

## 2023-09-27 RX ORDER — ONDANSETRON 2 MG/ML
4 INJECTION INTRAMUSCULAR; INTRAVENOUS EVERY 6 HOURS PRN
Status: DISCONTINUED | OUTPATIENT
Start: 2023-09-27 | End: 2023-09-30 | Stop reason: HOSPADM

## 2023-09-27 RX ORDER — LIDOCAINE 40 MG/G
CREAM TOPICAL
Status: DISCONTINUED | OUTPATIENT
Start: 2023-09-27 | End: 2023-09-27 | Stop reason: HOSPADM

## 2023-09-27 RX ORDER — AMOXICILLIN 250 MG
1 CAPSULE ORAL 2 TIMES DAILY
Status: DISCONTINUED | OUTPATIENT
Start: 2023-09-27 | End: 2023-09-30 | Stop reason: HOSPADM

## 2023-09-27 RX ORDER — MYCOPHENOLATE MOFETIL 250 MG/1
750 CAPSULE ORAL
Status: DISCONTINUED | OUTPATIENT
Start: 2023-09-27 | End: 2023-09-30 | Stop reason: HOSPADM

## 2023-09-27 RX ORDER — ONDANSETRON 2 MG/ML
4 INJECTION INTRAMUSCULAR; INTRAVENOUS EVERY 30 MIN PRN
Status: DISCONTINUED | OUTPATIENT
Start: 2023-09-27 | End: 2023-09-27 | Stop reason: HOSPADM

## 2023-09-27 RX ORDER — MAGNESIUM OXIDE 400 MG/1
400 TABLET ORAL
Status: DISCONTINUED | OUTPATIENT
Start: 2023-09-29 | End: 2023-09-30 | Stop reason: HOSPADM

## 2023-09-27 RX ORDER — LIDOCAINE HYDROCHLORIDE 20 MG/ML
INJECTION, SOLUTION INFILTRATION; PERINEURAL PRN
Status: DISCONTINUED | OUTPATIENT
Start: 2023-09-27 | End: 2023-09-27

## 2023-09-27 RX ADMIN — CEFUROXIME 1.5 G: 1.5 INJECTION, POWDER, FOR SOLUTION INTRAVENOUS at 07:06

## 2023-09-27 RX ADMIN — ONDANSETRON 4 MG: 2 INJECTION INTRAMUSCULAR; INTRAVENOUS at 11:30

## 2023-09-27 RX ADMIN — ANTI-THYMOCYTE GLOBULIN (RABBIT) 150 MG: 5 INJECTION, POWDER, LYOPHILIZED, FOR SOLUTION INTRAVENOUS at 07:08

## 2023-09-27 RX ADMIN — LEVOTHYROXINE SODIUM 50 MCG: 0.05 TABLET ORAL at 16:17

## 2023-09-27 RX ADMIN — BUMETANIDE 5 MG: 0.25 INJECTION INTRAMUSCULAR; INTRAVENOUS at 10:25

## 2023-09-27 RX ADMIN — SENNOSIDES AND DOCUSATE SODIUM 1 TABLET: 50; 8.6 TABLET ORAL at 20:55

## 2023-09-27 RX ADMIN — FENTANYL CITRATE 100 MCG: 50 INJECTION, SOLUTION INTRAMUSCULAR; INTRAVENOUS at 06:32

## 2023-09-27 RX ADMIN — SUGAMMADEX 200 MG: 100 INJECTION, SOLUTION INTRAVENOUS at 11:36

## 2023-09-27 RX ADMIN — SODIUM CHLORIDE 1000 ML: 4.5 INJECTION, SOLUTION INTRAVENOUS at 13:00

## 2023-09-27 RX ADMIN — FAMOTIDINE 20 MG: 20 TABLET ORAL at 20:55

## 2023-09-27 RX ADMIN — SODIUM CHLORIDE, SODIUM GLUCONATE, SODIUM ACETATE, POTASSIUM CHLORIDE AND MAGNESIUM CHLORIDE: 526; 502; 368; 37; 30 INJECTION, SOLUTION INTRAVENOUS at 06:58

## 2023-09-27 RX ADMIN — MYCOPHENOLATE MOFETIL 750 MG: 250 CAPSULE ORAL at 18:04

## 2023-09-27 RX ADMIN — SODIUM CHLORIDE, SODIUM GLUCONATE, SODIUM ACETATE, POTASSIUM CHLORIDE AND MAGNESIUM CHLORIDE: 526; 502; 368; 37; 30 INJECTION, SOLUTION INTRAVENOUS at 06:33

## 2023-09-27 RX ADMIN — LIDOCAINE HYDROCHLORIDE 100 MG: 20 INJECTION, SOLUTION INFILTRATION; PERINEURAL at 06:32

## 2023-09-27 RX ADMIN — GLYCOPYRROLATE 0.2 MG: 0.2 INJECTION, SOLUTION INTRAMUSCULAR; INTRAVENOUS at 10:15

## 2023-09-27 RX ADMIN — SODIUM CHLORIDE, SODIUM LACTATE, POTASSIUM CHLORIDE, CALCIUM CHLORIDE AND DEXTROSE MONOHYDRATE: 5; 600; 310; 30; 20 INJECTION, SOLUTION INTRAVENOUS at 22:17

## 2023-09-27 RX ADMIN — SODIUM CHLORIDE 1000 ML: 9 INJECTION, SOLUTION INTRAVENOUS at 18:06

## 2023-09-27 RX ADMIN — Medication 20 MG: at 10:58

## 2023-09-27 RX ADMIN — FENTANYL CITRATE 25 MCG: 50 INJECTION, SOLUTION INTRAMUSCULAR; INTRAVENOUS at 12:37

## 2023-09-27 RX ADMIN — CALCIUM CHLORIDE INJECTION 250 MG: 100 INJECTION, SOLUTION INTRAVENOUS at 10:14

## 2023-09-27 RX ADMIN — PROPOFOL 120 MG: 10 INJECTION, EMULSION INTRAVENOUS at 06:32

## 2023-09-27 RX ADMIN — MANNITOL 25 G: 20 INJECTION, SOLUTION INTRAVENOUS at 10:19

## 2023-09-27 RX ADMIN — SODIUM CHLORIDE, POTASSIUM CHLORIDE, SODIUM LACTATE AND CALCIUM CHLORIDE: 600; 310; 30; 20 INJECTION, SOLUTION INTRAVENOUS at 03:29

## 2023-09-27 RX ADMIN — SULFAMETHOXAZOLE AND TRIMETHOPRIM 1 TABLET: 400; 80 TABLET ORAL at 16:17

## 2023-09-27 RX ADMIN — FENTANYL CITRATE 50 MCG: 50 INJECTION, SOLUTION INTRAMUSCULAR; INTRAVENOUS at 08:01

## 2023-09-27 RX ADMIN — MYCOPHENOLATE MOFETIL 1000 MG: 500 INJECTION, POWDER, LYOPHILIZED, FOR SOLUTION INTRAVENOUS at 07:22

## 2023-09-27 RX ADMIN — FENTANYL CITRATE 50 MCG: 50 INJECTION, SOLUTION INTRAMUSCULAR; INTRAVENOUS at 11:36

## 2023-09-27 RX ADMIN — HYDROMORPHONE HYDROCHLORIDE 0.5 MG: 1 INJECTION, SOLUTION INTRAMUSCULAR; INTRAVENOUS; SUBCUTANEOUS at 11:26

## 2023-09-27 RX ADMIN — HEPARIN SODIUM 1000 UNITS: 1000 INJECTION INTRAVENOUS; SUBCUTANEOUS at 09:03

## 2023-09-27 RX ADMIN — SODIUM CHLORIDE 500 MG: 9 INJECTION, SOLUTION INTRAVENOUS at 06:42

## 2023-09-27 RX ADMIN — ACETAMINOPHEN 975 MG: 325 TABLET, FILM COATED ORAL at 16:16

## 2023-09-27 RX ADMIN — CALCIUM CHLORIDE INJECTION 250 MG: 100 INJECTION, SOLUTION INTRAVENOUS at 10:10

## 2023-09-27 RX ADMIN — DOPAMINE HYDROCHLORIDE 2 MCG/KG/MIN: 160 INJECTION, SOLUTION INTRAVENOUS at 10:34

## 2023-09-27 RX ADMIN — SODIUM CHLORIDE, SODIUM LACTATE, POTASSIUM CHLORIDE, CALCIUM CHLORIDE AND DEXTROSE MONOHYDRATE: 5; 600; 310; 30; 20 INJECTION, SOLUTION INTRAVENOUS at 13:00

## 2023-09-27 RX ADMIN — HYDROMORPHONE HYDROCHLORIDE 0.5 MG: 1 INJECTION, SOLUTION INTRAMUSCULAR; INTRAVENOUS; SUBCUTANEOUS at 11:53

## 2023-09-27 RX ADMIN — ACETAMINOPHEN 975 MG: 325 TABLET, FILM COATED ORAL at 22:02

## 2023-09-27 RX ADMIN — FUROSEMIDE 100 MG: 10 INJECTION, SOLUTION INTRAVENOUS at 10:19

## 2023-09-27 RX ADMIN — MIDAZOLAM 2 MG: 1 INJECTION INTRAMUSCULAR; INTRAVENOUS at 06:27

## 2023-09-27 RX ADMIN — FUROSEMIDE 100 MG: 10 INJECTION, SOLUTION INTRAVENOUS at 10:50

## 2023-09-27 RX ADMIN — SODIUM CHLORIDE 1000 ML: 9 INJECTION, SOLUTION INTRAVENOUS at 22:32

## 2023-09-27 RX ADMIN — SODIUM CHLORIDE, SODIUM GLUCONATE, SODIUM ACETATE, POTASSIUM CHLORIDE AND MAGNESIUM CHLORIDE: 526; 502; 368; 37; 30 INJECTION, SOLUTION INTRAVENOUS at 10:24

## 2023-09-27 RX ADMIN — SODIUM CHLORIDE 1000 ML: 9 INJECTION, SOLUTION INTRAVENOUS at 13:00

## 2023-09-27 RX ADMIN — Medication 20 MG: at 09:46

## 2023-09-27 RX ADMIN — Medication 20 MG: at 08:00

## 2023-09-27 RX ADMIN — Medication 20 MG: at 08:40

## 2023-09-27 RX ADMIN — SODIUM CHLORIDE: 9 INJECTION, SOLUTION INTRAVENOUS at 11:25

## 2023-09-27 RX ADMIN — Medication 50 MG: at 06:32

## 2023-09-27 ASSESSMENT — ACTIVITIES OF DAILY LIVING (ADL)
ADLS_ACUITY_SCORE: 20
CONCENTRATING,_REMEMBERING_OR_MAKING_DECISIONS_DIFFICULTY: NO
ADLS_ACUITY_SCORE: 35
ADLS_ACUITY_SCORE: 18
ADLS_ACUITY_SCORE: 35
ADLS_ACUITY_SCORE: 20
CHANGE_IN_FUNCTIONAL_STATUS_SINCE_ONSET_OF_CURRENT_ILLNESS/INJURY: NO
ADLS_ACUITY_SCORE: 18
FALL_HISTORY_WITHIN_LAST_SIX_MONTHS: NO
TOILETING_ISSUES: NO
DOING_ERRANDS_INDEPENDENTLY_DIFFICULTY: NO
WALKING_OR_CLIMBING_STAIRS_DIFFICULTY: NO
DIFFICULTY_EATING/SWALLOWING: NO
ADLS_ACUITY_SCORE: 18
DRESSING/BATHING_DIFFICULTY: NO
ADLS_ACUITY_SCORE: 18
ADLS_ACUITY_SCORE: 20
ADLS_ACUITY_SCORE: 18
WEAR_GLASSES_OR_BLIND: NO

## 2023-09-27 ASSESSMENT — COPD QUESTIONNAIRES: COPD: 0

## 2023-09-27 ASSESSMENT — LIFESTYLE VARIABLES: TOBACCO_USE: 0

## 2023-09-27 NOTE — PLAN OF CARE
"Goal Outcome Evaluation:      Plan of Care Reviewed With: patient      /80 (BP Location: Right arm)   Pulse 81   Temp 98.8  F (37.1  C) (Oral)   Resp 16   Ht 1.499 m (4' 11\")   Wt 73.8 kg (162 lb 11.2 oz)   SpO2 98%   BMI 32.86 kg/m      Shift: 2165-6829  Isolation Status: standard isolation  VS: stable on RA, afebrile  Neuro: Aox  Behaviors: Calm and cooperative  B  Labs: Covid negative  Respiratory: WDL  Cardiac: WDL  Pain/Nausea: none present  PRN: none given  Diet: NPO pre-op  IV Access: R PIV, L AV fistula  Infusion(s): LR at 75mL/hr  Lines/Drains: None  GI/: WDL  Skin: scab on right ankle from bug bite  Mobility: UAL  Events/Education: pre-op education complete, pre-op orders completed, shower completed, pt has no questions.  Plan: pt left at 5am,  in room with pt.  will be here with pt until Thursday, as he has no lodging or transportation home.       "

## 2023-09-27 NOTE — PROGRESS NOTES
Admitted/transferred from:   2 RN full   skin assessment completed by Pam Aguilera RN and Jair Dsouza RN.    Skin assessment finding: issues found R shin scab, Bruise on L thigh, RLQ abdominal incision with op dressing.      Interventions/actions: skin interventions LDA documentation      Will continue to monitor and notify team of any changes.

## 2023-09-27 NOTE — ANESTHESIA PROCEDURE NOTES
Central Line/PA Catheter Placement    Pre-Procedure   Staff -        Anesthesiologist:  Deysi Santos MD       Resident/Fellow: Priti Cuenca MD       Performed By: resident       Location: OR       Pre-Anesthestic Checklist: patient identified, IV checked, site marked, risks and benefits discussed, informed consent, monitors and equipment checked and pre-op evaluation  Timeout:       Correct Patient: Yes        Correct Procedure: Yes        Correct Site: Yes        Correct Position: Yes        Correct Laterality: Yes   Line Placement:   This line was placed Post Induction    Procedure   Procedure: central line       Laterality: right       Insertion Site: internal jugular.       Patient Position: Trendelenburg  Sterile Prep        All elements of maximal sterile barrier technique followed       Patient Prep/Sterile Barriers: draped, hand hygiene, gloves , hat , mask , draped, gown, sterile gel and probe cover  Insertion/Injection        Technique: ultrasound guided        1. Ultrasound was used to evaluate the access site.       2. Vein evaluated via ultrasound for patency/adequacy.       3. Using real-time ultrasound the needle/catheter was observed entering the artery/vein.       Type: CVC       Number of Lumens: triple lumen  Narrative         Secured by: suture       Tegaderm and Biopatch dressing used.       Complications: None apparent,        blood aspirated from all lumens,        All lumens flushed: Yes       Verification method: Placement to be verified post-op

## 2023-09-27 NOTE — TELEPHONE ENCOUNTER
Some donors have risk factors or behaviors that increase their chance of having an infection such as human immunodeficiency virus (HIV), hepatitis B virus (HBV), and/or hepatitis C virus (HCV). This donor has met one or more of the risk criteria in the last 30 days for these infections.  This donor was tested for HIV, HBV, and HCV, which resulted as negative, but as with every test, there is a small chance that the test result was negative even if the virus is present.     A negative test might happen if the donor had a very recent infection.  For this reason, we identify and discuss donors who have potential exposures for HIV, HBV, and/or HCV in the last month that might be missed by testing. The risk for undetected infections is very low but not zero.      Studies show that transplant candidates may have a higher chance of survival by accepting organs from donors with risk factors for these infections compared to waiting for an organ from a donor without these risk factors.      All transplant recipients are tested for HIV, HCV, and HBV after transplant.  In the very rare event that transmission would occur, there are effective therapies available, your medical team would collaborate with a team of infectious disease experts to treat you accordingly.    List of Risk Criteria:    Person who has had sex (including vaginal, anal, and oral) with a person known or suspected to have HIV, HBV, or HCV infections in the preceding 30 days.    Men ho have had sex with men in the preceding 30 days    Per who has had sex in exchange for money or drugs in the preceding 30 days    Person who has had sex with a person who has sex in exchange for money or drugs in the preceding 30 days    Person who has injected drugs for non-medical reasons in the preceding 30 days    Person who has had sex with a person that has injected drugs for non-medical reasons in the preceding 30 days    Person who has been incarcerated (confined in [facility],  [facility], or juvenile correctional facility) for greater than/equal to 72 consecutive hours in the preceding 30 days    Child who has been  within the preceding 30 days by a mother with HIV infection    Child born in the preceding 30 days to a mother known to be infected with HIV, HBV, or HCV    Person with an unknown medical or social history for the preceding 30 days (a Donor Risk Assessment Interview - DRAI - cannot be obtained or risk factors cannot be determined)

## 2023-09-27 NOTE — PROVIDER NOTIFICATION
"Paged Dr. Cummings at [phone]:    \"NIKIA Flores ([id number])  ROBINI, got a CVP of 14. Other vitals WDL, and good UOP. It is possible that we are simply reading it differently than previous RNs. We'll continue to monitor. Patient looks good.  Jair Dsouza, RN  [phone]\"    Page acknowledged.   To come assess.  "

## 2023-09-27 NOTE — PROGRESS NOTES
Admitted/transferred from:   Time of arrival on unit 0000  2 RN full  skin assessment completed by Lisbet Saucedo and Kavitha Kelley  Skin assessment finding: skin intact, no problems   Interventions/actions: continue to monitor  Small mosquito bite scab on left ankle.  Will continue to monitor.

## 2023-09-27 NOTE — PROGRESS NOTES
Patient removed from OS waitlist after  donor kidney transplant. OS ID [id number].    Donor Has Risk Criteria for Transmission of HIV/HCV/HBV: Yes  Recipient Notified of Risk Criteria: Yes   General

## 2023-09-27 NOTE — CONSULTS
[facility]  Transplant Nephrology Consult  Date of Admission:  9/27/2023  Today's Date: 09/27/2023  Requesting physician: Linette Mills MD    Recommendations:  - Agree with high intensity induction.  - No indication for dialysis.    Assessment & Plan   # DDKT: Trend down in serum creatinine.   - Baseline Creatinine: ~ TBD   - Proteinuria: Nephrotic range (>3 grams) (prior to transplant)   - Date DSA Last Checked: Sep/2023      Latest DSA:  (in process)   - BK Viremia: Not checked post transplant   - Kidney Tx Biopsy: No   - Transplant Ureteral Stent: Yes    # Immunosuppression: Tacrolimus immediate release (goal 8-10), Mycophenolate mofetil (dose 750 mg every 12 hours), and Methylprednisolone (dose taper)   - Patient is in an immunosuppressed state and will continue to monitor for efficacy and toxicity of immunosuppression medications.   - Induction with Recent Transplant:  High Intensity Protocol   - Changes: No    # Infection Prophylaxis:   - PJP: Sulfa/TMP (Bactrim)  - CMV: Valganciclovir (Valcyte), CMV IgG Ab positive     # Hypertension: Controlled;  Goal BP: < 150/90   - Volume status: Euvolemic     - Changes: No    Prior to admission antihypertensives: amlodipine 10 mg PO daily,      # Anemia in Chronic Renal Disease: Hgb: Decreased      KAREN: No   - Iron studies: Replete 3/2023    # Mineral Bone Disorder:    - Secondary renal hyperparathyroidism; PTH level: Normal (15-65 pg/ml)   (prior to transplant)     On treatment: None  - Vitamin D; level: Normal  5/2023       On supplement: No  - Calcium; level: slightly Low    (will monitor)    On supplement: No  - Phosphorus; level: High    (will monitor)     On supplement: No    # Electrolytes:   - Potassium; level: Normal          On supplement: No  - Magnesium; level: Normal          On supplement: No  - Bicarbonate; level: Low  (will monitor)       On supplement: No  - Sodium; level: Normal    # Complex Urologic  history: possible reflux/obstruction and recurrent UTI as a child requiring multiple urologic procedures, last of which was right native nephrectomy at age 13.  Cleared by urology for transplant. Voiding cystourethrogram which showed no evidence of vesicoureteral reflux and she was noted to empty her bladder to completion. CT imaging was unremarkable outside of left solitary kidney.     # Transplant History:  Etiology of Kidney Failure: Unknown etiology  Tx: DDKT  Transplant: N/A  Crossmatch at time of Tx: (in process)  Significant changes in immunosuppression: None  Significant transplant-related complications: None    Recommendations were communicated to the primary team via this note.    Seen and discussed with Dr. Nino.    VIRGILIO Covarrubias CNP  Pager: [phone]    REASON FOR CONSULT   Kidney Transplant    History of Present Illness   Ariane Flores is a 43 year old female with history of ESRD from unclear etiology (possible reflux/obstruction and recurrent UTI as a child requiring multiple urologic procedures, last of which was right native nephrectomy at age 13) who is now s/p preemptive DDKT with stent.  She also has a history of HTN, HLD, and migraines.  Final crossmatch in process and most recent PRA 97%.  Induction was with rATG.  Her creatinine this afternoon of 5.36 is down from 5.60 this morning and urine output has been ~ 1.5 L.    SBP 100s-130s.  Afebrile. No SOB on 2L O2 via NC.  No nausea or vomiting. Pt reports abdominal pain tolerable.  No leg edema.    Review of Systems    The 10 point Review of Systems is negative other than noted in the HPI or here.     Past Medical History    I have reviewed this patient's medical history and updated it with pertinent information if needed.   Past Medical History:   Diagnosis Date    CKD (chronic kidney disease) stage 4, GFR 15-29 ml/min (H)     Hyperlipidemia     Hypertension     Metabolic acidosis     Migraines     Recurrent UTI     in childhood     "Vitamin D deficiency        Past Surgical History   I have reviewed this patient's surgical history and updated it with pertinent information if needed.  Past Surgical History:   Procedure Laterality Date    APPENDECTOMY      HC REPAIR ACHILLES TENDON,PRIMARY      HERNIA REPAIR      NEPHRECTOMY Right     age 13    OTHER SURGICAL HISTORY      complete detail unknown, but required multiple urologic procedures before the age of 10 years       Family History   I have reviewed this patient's family history and updated it with pertinent information if needed.   Family History   Problem Relation Age of Onset    Kidney Disease Daughter        Social History   I have reviewed this patient's social history and updated it with pertinent information if needed. Ariane Flores  reports that she has never smoked. She has never used smokeless tobacco. She reports that she does not currently use alcohol. She reports that she does not use drugs.    Allergies   Allergies   Allergen Reactions    Cephalosporins Other (See Comments)     Pt does not know rx    Codeine Other (See Comments)    Iodinated Contrast Media Other (See Comments)     Only has 1 kidney.  Only has 1 kidney.  Only has 1 kidney.      Nsaids Other (See Comments)     Kidney Damage. Have Only has one kidney      Prior to Admission Medications    acetaminophen  975 mg Oral Pre-Op/Pre-procedure x 1 dose    cefuroxime  1.5 g Intravenous See Admin Instructions    sodium chloride (PF)  3 mL Intracatheter Q8H      DOPamine      lactated ringers 75 mL/hr at 23 0329       Physical Exam   Temp  Av.8  F (37.1  C)  Min: 98.8  F (37.1  C)  Max: 98.8  F (37.1  C)      Pulse  Av  Min: 81  Max: 81 Resp  Av  Min: 16  Max: 16  SpO2  Av %  Min: 98 %  Max: 98 %     /80 (BP Location: Right arm)   Pulse 81   Temp 98.8  F (37.1  C) (Oral)   Resp 16   Ht 1.499 m (4' 11\")   Wt 73.8 kg (162 lb 11.2 oz)   SpO2 98%   BMI 32.86 kg/m     Date 23 0700 - " 09/28/23 0659   Shift 5906-5019 7528-5529 0242-6909 24 Hour Total   INTAKE   I.V. 400   400   Shift Total(mL/kg) 400(5.42)   400(5.42)   OUTPUT   Urine 220   220   Shift Total(mL/kg) 220(2.98)   220(2.98)   Weight (kg) 73.8 73.8 73.8 73.8      Admit Weight: 73.8 kg (162 lb 11.2 oz)     GENERAL APPEARANCE: alert and no distress  HENT: mouth without ulcers or lesions  RESP: lungs clear to auscultation - no rales, rhonchi or wheezes  CV: regular rhythm, normal rate, no rub, no murmur  EDEMA: no LE edema bilaterally  ABDOMEN: soft, nondistended, appropriately tender  MS: extremities normal - no gross deformities noted, no evidence of inflammation in joints, no muscle tenderness  SKIN: no rash  PSYCH: mentation appears normal and affect normal/bright    Data   CMP  Recent Labs   Lab 09/27/23  0509 09/27/23  0114    142   POTASSIUM 4.2 3.8   CHLORIDE 108* 106   CO2 19* 19*   ANIONGAP 16* 17*   GLC 97 98   BUN 51.6* 53.3*   CR 5.60* 5.58*   GFRESTIMATED 9* 9*   RON 9.2 8.7   MAG 2.2  --    PHOS 5.7*  --    PROTTOTAL  --  7.7   ALBUMIN  --  4.5   BILITOTAL  --  0.3   ALKPHOS  --  81   AST  --  15   ALT  --  16     CBC  Recent Labs   Lab 09/27/23  0509 09/27/23  0114   HGB 11.4* 10.9*   WBC 10.9 12.6*   RBC 3.96 3.73*   HCT 35.7 32.9*   MCV 90 88   MCH 28.8 29.2   MCHC 31.9 33.1   RDW 13.1 13.1    341     INR  Recent Labs   Lab 09/27/23  0114   INR 1.02   PTT 30     ABGNo lab results found in last 7 days.   Urine Studies  Recent Labs   Lab Test 09/27/23  0107 11/05/20  1150   COLOR Straw Straw   APPEARANCE Clear Clear   URINEGLC 30* Negative   URINEBILI Negative Negative   URINEKETONE Negative Negative   SG 1.005 1.006   UBLD Trace* Negative   URINEPH 7.0 6.0   PROTEIN 70* 100*   NITRITE Negative Negative   LEUKEST Negative Negative   RBCU 1 2   WBCU 2 5     No lab results found.  PTH  Recent Labs   Lab Test 07/06/23  1028 05/05/23  0930   PTHI 64 90*     Iron Studies  No lab results found.    IMAGING:  All  imaging studies reviewed by me.

## 2023-09-27 NOTE — TELEPHONE ENCOUNTER
TRANSPLANT OR REPORT    Organ: Right Kidney  Laterality (if known): Right  Organ Location: [facility] blood bank, on pump    UNOS ID:   Donor OR Time: 9/26/2023, 1600 CST  Expected/Actual Cross Clamp Time: 1700CST  Expected Organ Arrival Time: 9/27/23 0100CST, on pump    Surgeon: Gene  Time in OR: 0600  Time in 3C (N/A for LI): 0500    Recipient Details  Admission ETA: 9/27/2023 0000  Unit: 7a  Isolation: none  Latex Allergy: no  : no  Diagnosis: ESRD      Kidney/Panc Transplants  XM Status (Need to wait for XM?): yes, additional FXM to be drawn upon admission.     Liver or KP/PA Recipients - Vessel Banking:  Donor has positive serologies for HIV/HCV/HBV: no  Donor has risk criteria for HIV/HCV/HBV: yes      Transplant Coordinator Contact Info: Alcira Dickinson RN- [phone]      Vessel Bank Information  Transplant hospitals must not store a donor s extra vessels if the donor has tested positive for any of the following:   - HIV by antibody, antigen, or nucleic acid test (YANET)   - Hepatitis B surface antigen (HBsAg)   - Hepatitis B (HBV) by YANET   - Hepatitis C (HCV) by antibody or YANET     Extra vessels from donors that do not test positive for HIV, HBV, or HCV as above may be stored

## 2023-09-27 NOTE — ANESTHESIA CARE TRANSFER NOTE
Patient: Ariane Flores    Procedure: Procedure(s):  Transplant kidney recipient  donor       Diagnosis: ESRD (end stage renal disease) (H) [N18.6]  Diagnosis Additional Information: No value filed.    Anesthesia Type:   General     Note:    Oropharynx: oropharynx clear of all foreign objects and spontaneously breathing  Level of Consciousness: drowsy  Oxygen Supplementation: nasal cannula  Level of Supplemental Oxygen (L/min / FiO2): 3  Independent Airway: airway patency satisfactory and stable  Dentition: dentition unchanged  Vital Signs Stable: post-procedure vital signs reviewed and stable  Report to RN Given: handoff report given  Patient transferred to: PACU    Handoff Report: Identifed the Patient, Identified the Reponsible Provider, Reviewed the pertinent medical history, Discussed the surgical course, Reviewed Intra-OP anesthesia mangement and issues during anesthesia, Set expectations for post-procedure period and Allowed opportunity for questions and acknowledgement of understanding      Vitals:  Vitals Value Taken Time   /73 23 1158   Temp     Pulse 89 23 1202   Resp 24 23 1202   SpO2 100 % 23 1202   Vitals shown include unvalidated device data.    Electronically Signed By: VIRGILIO Parker CRNA  2023  12:03 PM

## 2023-09-27 NOTE — ANESTHESIA POSTPROCEDURE EVALUATION
Patient: Ariane Flores    Procedure: Procedure(s):  Transplant kidney recipient  donor       Anesthesia Type:  General    Note:  Disposition: Inpatient   Postop Pain Control: Uneventful            Sign Out: Well controlled pain   PONV: No   Neuro/Psych: Uneventful            Sign Out: Acceptable/Baseline neuro status   Airway/Respiratory: Uneventful            Sign Out: Acceptable/Baseline resp. status   CV/Hemodynamics: Uneventful            Sign Out: Acceptable CV status; No obvious hypovolemia; No obvious fluid overload   Other NRE: NONE   DID A NON-ROUTINE EVENT OCCUR? No           Last vitals:  Vitals Value Taken Time   /70 23 1300   Temp 37.4  C (99.3  F) 23 1200   Pulse 91 23 1307   Resp 21 23 1307   SpO2 98 % 23 1307   Vitals shown include unvalidated device data.    Electronically Signed By: Lucas Huffman MD  2023  1:08 PM

## 2023-09-27 NOTE — ANESTHESIA PROCEDURE NOTES
Airway       Patient location during procedure: OR       Procedure Start/Stop Times: 9/27/2023 6:36 AM  Staff -        CRNA: Belen Collins APRN CRNA       Performed By: CRNA  Consent for Airway        Urgency: elective  Indications and Patient Condition       Indications for airway management: abi-procedural       Induction type:intravenous       Mask difficulty assessment: 1 - vent by mask    Final Airway Details       Final airway type: endotracheal airway       Successful airway: ETT - single  Endotracheal Airway Details        ETT size (mm): 6.5       Cuffed: yes       Successful intubation technique: direct laryngoscopy       DL Blade Type: MAC 4       Grade View of Cords: 1       Adjucts: stylet       Position: Right       Measured from: lips       Bite block used: None    Post intubation assessment        Placement verified by: capnometry and chest rise        Number of attempts at approach: 1       Secured with: pink tape       Ease of procedure: easy       Dentition: Intact and Unchanged    Medication(s) Administered   Medication Administration Time: 9/27/2023 6:36 AM

## 2023-09-27 NOTE — ANESTHESIA PREPROCEDURE EVALUATION
Anesthesia Pre-Procedure Evaluation    Patient: Ariane Flores   MRN: [id number] : 1980        Procedure : Procedure(s):  Transplant kidney recipient  donor          Past Medical History:   Diagnosis Date    CKD (chronic kidney disease) stage 4, GFR 15-29 ml/min (H)     Hyperlipidemia     Hypertension     Metabolic acidosis     Migraines     Recurrent UTI     in childhood    Vitamin D deficiency       Past Surgical History:   Procedure Laterality Date    APPENDECTOMY      HC REPAIR ACHILLES TENDON,PRIMARY      HERNIA REPAIR      NEPHRECTOMY Right     age 13    OTHER SURGICAL HISTORY      complete detail unknown, but required multiple urologic procedures before the age of 10 years      Allergies   Allergen Reactions    Cephalosporins Other (See Comments)     Pt does not know rx    Codeine Other (See Comments)    Iodinated Contrast Media Other (See Comments)     Only has 1 kidney.  Only has 1 kidney.  Only has 1 kidney.      Nsaids Other (See Comments)     Kidney Damage. Have Only has one kidney       Social History     Tobacco Use    Smoking status: Never    Smokeless tobacco: Never   Substance Use Topics    Alcohol use: Not Currently      Wt Readings from Last 1 Encounters:   23 73.8 kg (162 lb 11.2 oz)        Anesthesia Evaluation   Pt has had prior anesthetic. Type: General and MAC.    No history of anesthetic complications       ROS/MED HX  ENT/Pulmonary:  - neg pulmonary ROS   (+)     KEI risk factors,  hypertension,                             (-) tobacco use, asthma and COPD   Neurologic:    (-) no CVA and no TIA   Cardiovascular:     (+) Dyslipidemia hypertension- -   -  - -                                   (-) taking anticoagulants/antiplatelets, pacemaker and pacemaker   METS/Exercise Tolerance: >4 METS    Hematologic:    (-) history of blood transfusion   Musculoskeletal: Comment: Left AV fistula   (-) muscular dystrophy and arthritis   GI/Hepatic:     (+) GERD,                    Renal/Genitourinary:     (+) renal disease (history of possible reflux/obstruction and recurrent UTI as a child requiring multiple urologic procedures, last of which was right native nephrectomy at age 13), type: ESRD, Pt does not require dialysis,  Pt has history of transplant,         Endo:    (-) Type I DM and Type II DM   Psychiatric/Substance Use:  - neg psychiatric ROS  (-) psychiatric history   Infectious Disease:  - neg infectious disease ROS     Malignancy:  - neg malignancy ROS     Other:      (+)  , no H/O Chronic Pain,         Physical Exam    Airway        Mallampati: III       Respiratory Devices and Support         Dental       (+) Minor Abnormalities - some fillings, tiny chips      Cardiovascular   cardiovascular exam normal          Pulmonary   pulmonary exam normal                OUTSIDE LABS:  CBC:   Lab Results   Component Value Date    WBC 12.6 (H) 09/27/2023    WBC 8.4 11/05/2020    HGB 10.9 (L) 09/27/2023    HGB 12.9 11/05/2020    HCT 32.9 (L) 09/27/2023    HCT 40.2 11/05/2020     09/27/2023     11/05/2020     BMP:   Lab Results   Component Value Date     09/27/2023     11/05/2020    POTASSIUM 3.8 09/27/2023    POTASSIUM 3.6 11/05/2020    CHLORIDE 106 09/27/2023    CHLORIDE 109 11/05/2020    CO2 19 (L) 09/27/2023    CO2 26 11/05/2020    BUN 53.3 (H) 09/27/2023    BUN 26 11/05/2020    CR 5.58 (H) 09/27/2023    CR 2.50 (H) 11/05/2020    GLC 98 09/27/2023    GLC 89 11/05/2020     COAGS:   Lab Results   Component Value Date    PTT 30 09/27/2023    INR 1.02 09/27/2023     POC:   Lab Results   Component Value Date    HCGS Negative 11/05/2020     HEPATIC:   Lab Results   Component Value Date    ALBUMIN 4.5 09/27/2023    PROTTOTAL 7.7 09/27/2023    ALT 16 09/27/2023    AST 15 09/27/2023    ALKPHOS 81 09/27/2023    BILITOTAL 0.3 09/27/2023     OTHER:   Lab Results   Component Value Date    A1C 5.5 09/27/2023    RON 8.7 09/27/2023       Anesthesia Plan    ASA Status:  3     NPO Status:  ELEVATED Aspiration Risk/Unknown    Anesthesia Type: General.     - Airway: ETT   Induction: Intravenous.   Maintenance: Balanced.   Techniques and Equipment:     - Airway: Video-Laryngoscope     - Lines/Monitors: 2nd IV, Central Line     Consents    Anesthesia Plan(s) and associated risks, benefits, and realistic alternatives discussed. Questions answered and patient/representative(s) expressed understanding.     - Discussed: Risks, Benefits and Alternatives for BOTH SEDATION and the PROCEDURE were discussed     - Discussed with:  Patient, Spouse      - Extended Intubation/Ventilatory Support Discussed: No.      - Patient is DNR/DNI Status: No     Use of blood products discussed: No .     Postoperative Care    Pain management: IV analgesics, Oral pain medications, Multi-modal analgesia.   PONV prophylaxis: Ondansetron (or other 5HT-3), Background Propofol Infusion     Comments:                Liliana Morales MD      43-year-old male with past medical history of chronic kidney disease of unclear etiology, history of right nephrectomy at age 13, high blood pressure, high cholesterol, presents for kidney transplant..

## 2023-09-27 NOTE — OP NOTE
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 Right kidney transplant,  Donation after Brain Death, Right  iliac fossa, with venous reconstruction. A J-J ureteral stent was placed.  2. Kidney allograft preparation on Back Table      Surgeon:  ERICA QUEEN    Fellow/Assistant:  Pavan Tse MD- Fellow. Dr. Tse was the primary assistant for the entire procedure and participated in all of the reconstruction and anastomoses. There was no qualified resident to assist with this procedure.    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 After discussing the risks and benefits of proceeding, the patient agreed to proceed with surgery and provided informed consent.  Findings: Integrity of recipient artery: Normal. Graft right kidney with single artery and vein. Vein reconstructed with caval conduit. Placed end to side to external iliac artery and vein in right iliac fossa. Initial significant spasm on reperfusion that improved with distal clamping and dopamine. Minimal urine production from kidney intraoperatively. URETERAL STENT PLACED.   Intraoperative Events: None    Final ABO/Crossmatch verification: After the donor organ arrived to the operating room and prior to anastomosis, I participated in the transplant pre-verification upon organ receipt timeout by visually verifying the donor ID, organ and laterality, donor blood type, recipient unique identifier, recipient blood type, and that the donor and recipient are blood type compatible. The crossmatch was done prospectively; the T cell flow  crossmatch result was negative and B cell flow crossmatch result was negative prior to anastomosis.  The patient received Thymoglobulin, Cellcept, and Solumedrol on induction.    Donor Organ Information:   Donor UNOS ID:  [id number]    Donor arterial clamp on:  9/26/2023  5:32 PM    Total ischemic time:  1012 min    Cold ischemic time:  981 min    Warm ischemic time:  31 min    Preservation fluid:  UW      Back Table Details:   Procedure:  Bench preparation of the kidney allograft for transplantation with venous reconstruction    Surgeon:  ERICA QUEEN    Faculty Co-Surgeon:  ERICA QUEEN    Fellow/Assistant:  As above    Donor arrival to recipient room:  9/27/2023  6:45 AM    Graft injury:  No    Graft biopsy:  Yes, normal    Organ received on:  Pump    Pump resistance:  0.19    Pump flow:  75    Arterial anatomy:  Single    Donor arterial quality:  Normal    Venous anatomy:  Single    Ureteral anatomy:  Single    Any reconstruction:  Yes    Artery:  None    Vein:  Caval conduit     Complications: None.    Findings: Normal. As above      None.    Back Table Preparation:  The donor kidney was received and inspected. It had been flushed with UW. The graft was prepared on the back table by removing perinephric fat and ligating venous tributaries and lymphatics. The ureter was also cleaned of excess tissue. If required, reconstruction was performed as detailed above. The kidney was stored in iced cold preservation solution until ready for transplantation. Faculty was present for the critical portions of the procedure.    Operative Procedure:   Arterial anastomosis start:  9/27/2023  9:53 AM    Arterial unclamp:  9/27/2023 10:24 AM    Extra vessels used:   N/a      The patient was brought to the operating room, placed in a supine position, and a time out was performed. Sequential compression devices were placed on both lower extremities and general endotracheal anesthesia was  induced.  The patient was given IV antibiotics and a Cook catheter. A central line was placed by Anesthesia service. The abdomen was then shaved, prepped, and draped in the usual sterile fashion.  An incision was made in the right lower quadrant and carried down through the subcutaneous tissue and the abdominal wall fascia. If encountered, the epigastric vessels were ligated in continuity, divided and secured with surgical clips. The right iliac artery and vein were exposed. The retractor system was placed and the lymphatics overlying the vessels were serially ligated and divided.     The patient was heparinized. We applied atraumatic vascular clamps and the donor kidney was brought to the operative field. We made a venotomy and the caval conduit was anastomosed to the recipient right External Iliac vein in an end-to-side fashion. An arteriotomy was made and the donor renal artery was anastomosed to the recipient right external iliac artery  in an end to side fashion. The patient was simultaneously loaded with IV mannitol, Lasix and volume. The renal artery was protected and the clamps were removed. After several cardiac cycles, we opened the renal artery and the kidney had Good reperfusion and was firm and pink .    The transplant ureter was managed by creating a Liche (anterior multistitch) anastomosis with absorbable suture. A stent was placed across the anastomosis. The kidney made Yes urine prior to implantation.    Hemostasis was obtained, the anastomoses inspected, and the kidney placed in the iliac fossa. After placement, the vessel lay was inspected and found to be acceptable. The kidney position was Retroperitoneal. The field was irrigated with antibiotic solution. No drain was placed. The retractor was removed and the abdominal wall fascia reapproximated. Subcutaneous tissues were irrigated and hemostasis obtained.  The skin was reapproximated with staples and a dry dressing was applied.   All needle,  sponge and instrument counts were correct x 2. The patient was awakened, extubated, and transferred to PACU for post-op monitoring. Faculty was present for key portions of the procedure.

## 2023-09-27 NOTE — H&P
"    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 Patient now admitted for pre-op DDKT today.   - OR today for DDKT  - NPO, mIVF  - Pre-op labs (ordered)  - Immunosuppression (ordered)  - Surgical & blood consent completed         Diet: NPO per Anesthesia Guidelines for Procedure/Surgery Except for: Meds    DVT Prophylaxis: Pneumatic Compression Devices  Cook Catheter: Not present  Lines: None     Drains: None     Cardiac Monitoring: None  Code Status: Full Code      Clinically Significant Risk Factors Present on Admission                  # Hypertension: Noted on problem list      # Obesity: Estimated body mass index is 32.86 kg/m  as calculated from the following:    Height as of this encounter: 1.499 m (4' 11\").    Weight as of this encounter: 73.8 kg (162 lb 11.2 oz).              Disposition Plan      Expected Discharge Date: 10/01/2023      Destination: home with family           The patient's care was discussed with the fellow Dr. Tse.     Willy Cummings MD  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 Patient is difficult historian of PMHx and reports multiple prior urologic procedures as a child. She does not know what these were all for however she underwent a right nephrectomy at the age of 13 in [place] after repeated episodes of UTI's and pyelonephritis. 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 After right nephrectomy at age 13, no additional urologic surgeries reported. 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     Patient follows with nephrologist Dr. Ilir Jacob for which additional history obtain from chart review. Baseline creatinine of 4.5-4.7 mg/dL secondary to unilateral kidney and FSGS. Patient has met with transplant team previously and underwent evaluation for transplant with Dr. Marroquin 11/5/2020 who recommended further workup including but not limited to imaging to confirm normal vasculature and voiding cystourethrogram given surgical history. Patient completed U/S Aorta/IVC/Iliac showing no evidence of occlusion, stenosis, aneurysms of bilateral iliac arteries or venous thromboses. Additionally patient underwent voiding cystourethrogram showing no vesicoureteral reflux and normal bladder emptying. CT Abdomen pelvis completed showing \"air in the bladder and small bowel anastomosis in the right lower quadrant. Patient with history of multiple urologic procedures, status post right nephrectomy, cystic changes in the left kidney, and abdominal wall hernia repair mesh\". All three aforementioned tests were completed 12/10/2020. Patient subsequently followed up with Urologist Dr. Lasha Boo 1/26/2021 who reviewed imaging stating that focus of air in bladder likely related to voiding cystourethrogram " performed prior to CT.     Currently on admission patient states undergoing AVF creation with Dr. Joe Nelson 3/2/203 however denies having to start dialysis. Continues to make urine, denies any previous blood transfusions, and denies any use of anticoagulation medications. She states she is in her normal state of health and denies any changes to her medication history. She denies any recent headaches, blurry vision, cough, sore throat, rhinorrhea, chest pain, shortness of breath, nausea, vomiting, diarrhea, changes in bowel or dietary habits, muscle spasms, abdominal pain, burning on urination, or recent hospitalizations. Discussed risk and benefits with patient including but not limited to bleeding, superficial/deep wound complications such as hernia, lymphocele, anastomotic leak, graft thrombosis, infection, need for re-operations, immunosuppression, and increased risk of certain cancers (squamous cell skin cancer & lymphoma) after transplant.     Past Medical History    Past Medical History:   Diagnosis Date    CKD (chronic kidney disease) stage 4, GFR 15-29 ml/min (H)     Hyperlipidemia     Hypertension     Metabolic acidosis     Migraines     Recurrent UTI     in childhood    Vitamin D deficiency        Past Surgical History   Past Surgical History:   Procedure Laterality Date    APPENDECTOMY      HC REPAIR ACHILLES TENDON,PRIMARY      HERNIA REPAIR      NEPHRECTOMY Right     age 13    OTHER SURGICAL HISTORY      complete detail unknown, but required multiple urologic procedures before the age of 10 years       Prior to Admission Medications   Prior to Admission Medications   Prescriptions Last Dose Informant Patient Reported? Taking?   amLODIPine (NORVASC) 10 MG tablet 9/26/2023 at 0600  Yes Yes   Sig: amlodipine 10 mg tablet   TAKE 1 TABLET BY MOUTH ONCE DAILY   atorvastatin (LIPITOR) 20 MG tablet 9/26/2023  Yes Yes   Sig: Take 40 mg by mouth daily   calcitRIOL (ROCALTROL) 0.25 MCG capsule 9/26/2023  Yes  Yes   Sig: Take 0.25 mcg by mouth daily   cholecalciferol 25 MCG (1000 UT) TABS 9/26/2023  Yes Yes   Sig: Take 1,000 Units by mouth   ferrous sulfate (FEROSUL) 325 (65 Fe) MG tablet 9/26/2023  Yes Yes   Sig: Take 325 mg by mouth   levothyroxine (SYNTHROID/LEVOTHROID) 50 MCG tablet 9/26/2023  Yes Yes   Sig: Take 50 mcg by mouth daily   sodium bicarbonate 650 MG tablet 9/26/2023  Yes Yes   Sig: sodium bicarbonate 650 mg tablet      Facility-Administered Medications: None        Review of Systems    The 10 point Review of Systems is negative other than noted in the HPI or here.     Social History   I have reviewed this patient's social history and updated it with pertinent information if needed.  Social History     Tobacco Use    Smoking status: Never    Smokeless tobacco: Never   Substance Use Topics    Alcohol use: Not Currently    Drug use: Never         Family History   I have reviewed this patient's family history and updated it with pertinent information if needed.  Family History   Problem Relation Age of Onset    Kidney Disease Daughter          Allergies   Allergies   Allergen Reactions    Cephalosporins Other (See Comments)     Pt does not know rx    Codeine Other (See Comments)    Iodinated Contrast Media Other (See Comments)     Only has 1 kidney.  Only has 1 kidney.  Only has 1 kidney.      Nsaids Other (See Comments)     Kidney Damage. Have Only has one kidney         Physical Exam   Vital Signs: Temp: 98.8  F (37.1  C) Temp src: Oral BP: 136/80 Pulse: 81   Resp: 16 SpO2: 98 % O2 Device: None (Room air)    Weight: 162 lbs 11.2 ozNo intake or output data in the 24 hours ending 09/27/23 0842    Constitutional: awake, alert, cooperative, no apparent distress, and appears stated age  Respiratory: No increased work of breathing, good air exchange, clear to auscultation bilaterally, no crackles or wheezing, saturating well on room air  Cardiovascular: Normal apical impulse, regular rate and rhythm, normal S1  and S2, no S3 or S4, and no murmur noted  GI: Prior right nephrectomy scar, prior midline laparotomy scar, soft, non-distended, non-tender, no masses palpated  Skin: no bruising or bleeding and normal skin color, texture, turgor  Musculoskeletal: There is no redness, warmth, or swelling of the joints  Vascular: Femoral pulses present bilaterally, L > R  Neurologic: Awake, alert, oriented to name, place and time. Cranial nerves II-XII are grossly intact        Data     I have personally reviewed the following data over the past 24 hrs:    10.9  \   11.4 (L)   / 332     142 106 53.3 (H) /  98   3.8 19 (L) 5.58 (H) \     ALT: 16 AST: 15 AP: 81 TBILI: 0.3   ALB: 4.5 TOT PROTEIN: 7.7 LIPASE: N/A     TSH: N/A T4: N/A A1C: 5.5     INR:  1.02 PTT:  30   D-dimer:  N/A Fibrinogen:  N/A     Imaging results reviewed over the past 24 hrs:   Recent Results (from the past 24 hour(s))   XR Chest 2 Views    Impression    RESIDENT PRELIMINARY INTERPRETATION  IMPRESSION: Clear chest.    I have reviewed history, examined patient and discussed plan with the fellow/resident/ALISHA.    I concur with the findings in this note.    Time spent on admission activities: 45 minutes.

## 2023-09-27 NOTE — TELEPHONE ENCOUNTER
Organ Offer Encounter Information    Organ Offer Information  Organ offer date & time: 9/26/2023  7:29 AM  Coordinator/Fellow/Attending name: Alcira Dickinson RN   Organ(s):  Organ UNOS ID Match Run ID Comment Organ Laterality   Kidney [id number] MNOP         Recent infections?: Yes (Comment: see notes below, cough 1 week ago.) Recent IV antibiotics?: Neg     New medications?: No Recent pregnancy?: No     Angicoagulation medications?: No Recent vaccinations?: No     Recent blood transfusions?: No Recent hospitalizations?: No   Has your insurance changed in the last 6-12 months?: Neg    Discussed organ offer with: Patient  Patient/Caregiver name: Patient- Ariane.  Discussed risk category with Patient/Other: PHS Risk Criteria Met  Understood donor criteria, verbalized understanding  Patient/Other asked to speak to a surgeon?: No  Discussed program-specific outcomes: Did not have questions regarding SRTR  Right to decline organ offer without penalty, Patient/Other: Aware of option to decline without penalty  Organ offer decision status Patient/Other: Accepted Offer  Organ disposition: Transplanted  Additional Comments: 9/26/2023 2:20 PM  Kidney: Local, MNOP  MD: ITA Mills MD  OPO Contact: Cruzito Gallardo  VXM Results: compatible, no recent nor remote DSA.  XM Plan (FXM must be done with serum no older than 10 days from transplant): FXM to be run with serum date 9/7/2023, ok per Dr. Mills. Chitra in immunology aware.    Is this an ABO A2 donor to ABO B Recipient: No.    Plan (Admission, NPO, Donor OR): discussed details of offer with recipient. Re who accepts offer. Pt on standby for admission. Will call pt this evening after kidney biopsy results are final. Pt can eat dinner tonight. FXM to be redone if pt admits. Pt agreeable to plan.  - - -   COVID Screening  In the past month, have you:  Or anyone close to you had a positive COVID test or suspected to have COVID: NO   Had any COVID symptoms (Fever,  Cough, Short of Breath, Loss of Taste/Smell, Rash): Pt reports cough/fever last week. Symptoms have subsided.  Took tylenol. No respiratory symptoms.   -------  Donor OR Time: 9/26/2023, 1600 CST  Procuring MD: Tang Sawyer and Richy Castellon  Contact in the OR: Vicky  Organs Being Procured: Kidney/Liver  Flush Solution: UW  Biopsy: yes  Pump: yes  Special Requests (Special blood tubes, nodes, waivers): no  MD for Visualization: Pavan Tse  Transportation Details: procurement will take place at [facility], will be placed on pump, and transported to blood bank to optimize overnight.   ----------------------  1441: Discussed potential admission with charge RNAdwoa. 1 bed available to be held.    2200: Called recip to advise to head to hospital for admit.  2211: Accepted R kidney.  -----------------------  Admissions: 2302- spoke with Altaf. Confirmed bed on 7a.   Unit: 2225, Discussed with 7a Charge RN, gave eta of recip: 9/27/23 @0000.   Update Provider Entering Orders (XM Plan & COVID Testing): 2300- paged on call resident- Willy Cummings. Call back at 2308. Confirmed need for admission orders.  Immunology: 2305- spoke with Olive. Confirmed need for repeat FXM upon arrival.  Inpatient Lab (COVID Testing [phone], Option 2): Covid swab upon admission.  Vessel Storage Confirmation (PA/KP/LI): N/A  Blood Bank: 2310- spoke with Lasha. Confirmed Right kidney will be on pump overnight in blood bank prior to transplant at 0600.   Research: ON HOLD  Book OR: 2333, spoke with Carol, who confirmed receipt of printed labels  TransNet/ABO Verification: 2320, printed. 2333- Confirmed receipt of printed labels with Carol  Add Organ: 2315- R kidney    -Alcira Dickinson RN      Attestation I have discussed all of the above with the Patient/Legal Guardian/Caregiver regarding this organ offer.: Yes  Coordinator/Fellow/Attending name: Alcira Dickinson, RN

## 2023-09-27 NOTE — PROGRESS NOTES
Transplant Surgery Post-Op Check    S: Pain controlled, no nausea    O: Vital signs:  Temp: 99.1  F (37.3  C) Temp src: Temporal BP: 118/54 Pulse: 83       Gen: NAD  Skin: Warm, pink  CV: Well perfused  Resp: Unlabored  GI: Incision with small amount of shadow on incision  : Cook, clear yellow  Ext: No edema  Neuro: A&Ox4    A/P: POD #0  donor kidney transplant    -UO hourly with IVF replacement  -Q4h potassium and hgb  -OOB tonight  -ADAT  -Pain control w/ oxycodone  -Incentive spirometer    Herminia Jang NP

## 2023-09-28 ENCOUNTER — DOCUMENTATION ONLY (OUTPATIENT)
Dept: TRANSPLANT | Facility: CLINIC | Age: 43
End: 2023-09-28
Payer: COMMERCIAL

## 2023-09-28 PROBLEM — E87.8 LOW BICARBONATE LEVEL: Status: ACTIVE | Noted: 2023-09-28

## 2023-09-28 PROBLEM — D63.8 ANEMIA OF CHRONIC DISEASE: Status: ACTIVE | Noted: 2023-09-28

## 2023-09-28 PROBLEM — E83.51 HYPOCALCEMIA: Status: ACTIVE | Noted: 2023-09-28

## 2023-09-28 PROBLEM — D84.9 IMMUNOSUPPRESSED STATUS (H): Status: ACTIVE | Noted: 2023-09-28

## 2023-09-28 LAB
ALBUMIN SERPL BCG-MCNC: 3.3 G/DL (ref 3.5–5.2)
ANION GAP SERPL CALCULATED.3IONS-SCNC: 14 MMOL/L (ref 7–15)
ATRIAL RATE - MUSE: 89 BPM
BASOPHILS # BLD AUTO: 0 10E3/UL (ref 0–0.2)
BASOPHILS NFR BLD AUTO: 0 %
BUN SERPL-MCNC: 46.8 MG/DL (ref 6–20)
CALCIUM SERPL-MCNC: 7.8 MG/DL (ref 8.6–10)
CHLORIDE SERPL-SCNC: 105 MMOL/L (ref 98–107)
CREAT SERPL-MCNC: 5.09 MG/DL (ref 0.51–0.95)
DIASTOLIC BLOOD PRESSURE - MUSE: NORMAL MMHG
DONOR IDENTIFICATION: NORMAL
DSA COMMENTS: NORMAL
DSA PRESENT: NO
DSA TEST METHOD: NORMAL
EGFRCR SERPLBLD CKD-EPI 2021: 10 ML/MIN/1.73M2
EOSINOPHIL # BLD AUTO: 0 10E3/UL (ref 0–0.7)
EOSINOPHIL NFR BLD AUTO: 0 %
ERYTHROCYTE [DISTWIDTH] IN BLOOD BY AUTOMATED COUNT: 13.4 % (ref 10–15)
GLUCOSE BLDC GLUCOMTR-MCNC: 159 MG/DL (ref 70–99)
GLUCOSE SERPL-MCNC: 144 MG/DL (ref 70–99)
HCO3 SERPL-SCNC: 18 MMOL/L (ref 22–29)
HCT VFR BLD AUTO: 28.1 % (ref 35–47)
HGB BLD-MCNC: 8.9 G/DL (ref 11.7–15.7)
HGB BLD-MCNC: 9.1 G/DL (ref 11.7–15.7)
HGB BLD-MCNC: 9.3 G/DL (ref 11.7–15.7)
HGB BLD-MCNC: 9.5 G/DL (ref 11.7–15.7)
IMM GRANULOCYTES # BLD: 0.1 10E3/UL
IMM GRANULOCYTES NFR BLD: 1 %
INTERPRETATION ECG - MUSE: NORMAL
LYMPHOCYTES # BLD AUTO: 0.1 10E3/UL (ref 0.8–5.3)
LYMPHOCYTES NFR BLD AUTO: 0 %
MAGNESIUM SERPL-MCNC: 1.7 MG/DL (ref 1.7–2.3)
MCH RBC QN AUTO: 29 PG (ref 26.5–33)
MCHC RBC AUTO-ENTMCNC: 32.4 G/DL (ref 31.5–36.5)
MCV RBC AUTO: 90 FL (ref 78–100)
MONOCYTES # BLD AUTO: 1.3 10E3/UL (ref 0–1.3)
MONOCYTES NFR BLD AUTO: 6 %
NEUTROPHILS # BLD AUTO: 18.8 10E3/UL (ref 1.6–8.3)
NEUTROPHILS NFR BLD AUTO: 93 %
NRBC # BLD AUTO: 0 10E3/UL
NRBC BLD AUTO-RTO: 0 /100
ORGAN: NORMAL
P AXIS - MUSE: 53 DEGREES
PHOSPHATE SERPL-MCNC: 4.4 MG/DL (ref 2.5–4.5)
PLATELET # BLD AUTO: 214 10E3/UL (ref 150–450)
POTASSIUM SERPL-SCNC: 4 MMOL/L (ref 3.4–5.3)
POTASSIUM SERPL-SCNC: 4 MMOL/L (ref 3.4–5.3)
POTASSIUM SERPL-SCNC: 4.2 MMOL/L (ref 3.4–5.3)
PR INTERVAL - MUSE: 130 MS
QRS DURATION - MUSE: 76 MS
QT - MUSE: 362 MS
QTC - MUSE: 440 MS
R AXIS - MUSE: 25 DEGREES
RBC # BLD AUTO: 3.14 10E6/UL (ref 3.8–5.2)
SA 1 CELL: NORMAL
SA 1 TEST METHOD: NORMAL
SA 2 CELL: NORMAL
SA 2 TEST METHOD: NORMAL
SA1 HI RISK ABY: NORMAL
SA1 MOD RISK ABY: NORMAL
SA2 HI RISK ABY: NORMAL
SA2 MOD RISK ABY: NORMAL
SODIUM SERPL-SCNC: 137 MMOL/L (ref 135–145)
SYSTOLIC BLOOD PRESSURE - MUSE: NORMAL MMHG
T AXIS - MUSE: 17 DEGREES
UNACCEPTABLE ANTIGENS: NORMAL
UNOS CPRA: 97
VENTRICULAR RATE- MUSE: 89 BPM
WBC # BLD AUTO: 20.3 10E3/UL (ref 4–11)
ZZZSA 1  COMMENTS: NORMAL
ZZZSA 2 COMMENTS: NORMAL

## 2023-09-28 PROCEDURE — 84100 ASSAY OF PHOSPHORUS: CPT | Performed by: SURGERY

## 2023-09-28 PROCEDURE — 36592 COLLECT BLOOD FROM PICC: CPT | Performed by: SURGERY

## 2023-09-28 PROCEDURE — 30243S1 TRANSFUSION OF NONAUTOLOGOUS GLOBULIN INTO CENTRAL VEIN, PERCUTANEOUS APPROACH: ICD-10-PCS | Performed by: SURGERY

## 2023-09-28 PROCEDURE — 120N000011 HC R&B TRANSPLANT UMMC

## 2023-09-28 PROCEDURE — 250N000011 HC RX IP 250 OP 636: Mod: JZ | Performed by: SURGERY

## 2023-09-28 PROCEDURE — 258N000003 HC RX IP 258 OP 636: Performed by: NURSE PRACTITIONER

## 2023-09-28 PROCEDURE — 85018 HEMOGLOBIN: CPT | Performed by: SURGERY

## 2023-09-28 PROCEDURE — 84132 ASSAY OF SERUM POTASSIUM: CPT | Performed by: SURGERY

## 2023-09-28 PROCEDURE — 83735 ASSAY OF MAGNESIUM: CPT | Performed by: SURGERY

## 2023-09-28 PROCEDURE — 99233 SBSQ HOSP IP/OBS HIGH 50: CPT | Mod: FS

## 2023-09-28 PROCEDURE — 250N000013 HC RX MED GY IP 250 OP 250 PS 637

## 2023-09-28 PROCEDURE — 250N000011 HC RX IP 250 OP 636: Mod: JZ | Performed by: NURSE PRACTITIONER

## 2023-09-28 PROCEDURE — 250N000012 HC RX MED GY IP 250 OP 636 PS 637: Performed by: SURGERY

## 2023-09-28 PROCEDURE — 250N000013 HC RX MED GY IP 250 OP 250 PS 637: Performed by: NURSE PRACTITIONER

## 2023-09-28 PROCEDURE — 250N000013 HC RX MED GY IP 250 OP 250 PS 637: Performed by: SURGERY

## 2023-09-28 PROCEDURE — 36415 COLL VENOUS BLD VENIPUNCTURE: CPT | Performed by: SURGERY

## 2023-09-28 RX ORDER — SULFAMETHOXAZOLE AND TRIMETHOPRIM 400; 80 MG/1; MG/1
1 TABLET ORAL
Status: DISCONTINUED | OUTPATIENT
Start: 2023-09-29 | End: 2023-09-30 | Stop reason: HOSPADM

## 2023-09-28 RX ORDER — OXYCODONE HYDROCHLORIDE 5 MG/1
5 TABLET ORAL EVERY 4 HOURS PRN
Status: DISCONTINUED | OUTPATIENT
Start: 2023-09-28 | End: 2023-09-30 | Stop reason: HOSPADM

## 2023-09-28 RX ORDER — CALCIUM GLUCONATE 20 MG/ML
1 INJECTION, SOLUTION INTRAVENOUS ONCE
Status: COMPLETED | OUTPATIENT
Start: 2023-09-28 | End: 2023-09-28

## 2023-09-28 RX ORDER — FAMOTIDINE 10 MG
10 TABLET ORAL DAILY
Status: DISCONTINUED | OUTPATIENT
Start: 2023-09-29 | End: 2023-09-30 | Stop reason: HOSPADM

## 2023-09-28 RX ORDER — DIPHENHYDRAMINE HCL 25 MG
25-50 CAPSULE ORAL ONCE
Status: COMPLETED | OUTPATIENT
Start: 2023-09-28 | End: 2023-09-28

## 2023-09-28 RX ORDER — ACETAMINOPHEN 325 MG/1
650 TABLET ORAL ONCE
Status: COMPLETED | OUTPATIENT
Start: 2023-09-28 | End: 2023-09-28

## 2023-09-28 RX ORDER — DIPHENHYDRAMINE HCL 12.5MG/5ML
25-50 LIQUID (ML) ORAL ONCE
Status: COMPLETED | OUTPATIENT
Start: 2023-09-28 | End: 2023-09-28

## 2023-09-28 RX ORDER — ATORVASTATIN CALCIUM 40 MG/1
40 TABLET, FILM COATED ORAL DAILY
Status: DISCONTINUED | OUTPATIENT
Start: 2023-09-28 | End: 2023-09-30 | Stop reason: HOSPADM

## 2023-09-28 RX ORDER — SODIUM BICARBONATE 650 MG/1
650 TABLET ORAL 2 TIMES DAILY
Status: DISCONTINUED | OUTPATIENT
Start: 2023-09-28 | End: 2023-09-29

## 2023-09-28 RX ADMIN — ACETAMINOPHEN 650 MG: 325 TABLET ORAL at 10:56

## 2023-09-28 RX ADMIN — METHYLPREDNISOLONE SODIUM SUCCINATE 250 MG: 125 INJECTION, POWDER, LYOPHILIZED, FOR SOLUTION INTRAMUSCULAR; INTRAVENOUS at 10:55

## 2023-09-28 RX ADMIN — SENNOSIDES AND DOCUSATE SODIUM 1 TABLET: 50; 8.6 TABLET ORAL at 19:42

## 2023-09-28 RX ADMIN — SODIUM CHLORIDE, SODIUM LACTATE, POTASSIUM CHLORIDE, CALCIUM CHLORIDE AND DEXTROSE MONOHYDRATE: 5; 600; 310; 30; 20 INJECTION, SOLUTION INTRAVENOUS at 16:48

## 2023-09-28 RX ADMIN — MYCOPHENOLATE MOFETIL 750 MG: 250 CAPSULE ORAL at 08:58

## 2023-09-28 RX ADMIN — TACROLIMUS 2.5 MG: 1 CAPSULE ORAL at 18:07

## 2023-09-28 RX ADMIN — TACROLIMUS 2.5 MG: 1 CAPSULE ORAL at 08:58

## 2023-09-28 RX ADMIN — ATORVASTATIN CALCIUM 40 MG: 40 TABLET, FILM COATED ORAL at 19:42

## 2023-09-28 RX ADMIN — Medication 2.5 MG: at 06:15

## 2023-09-28 RX ADMIN — SODIUM BICARBONATE 650 MG TABLET 650 MG: at 10:56

## 2023-09-28 RX ADMIN — CALCIUM GLUCONATE 1 G: 20 INJECTION, SOLUTION INTRAVENOUS at 14:35

## 2023-09-28 RX ADMIN — POLYETHYLENE GLYCOL 3350 17 G: 17 POWDER, FOR SOLUTION ORAL at 08:58

## 2023-09-28 RX ADMIN — SULFAMETHOXAZOLE AND TRIMETHOPRIM 1 TABLET: 400; 80 TABLET ORAL at 08:58

## 2023-09-28 RX ADMIN — SENNOSIDES AND DOCUSATE SODIUM 1 TABLET: 50; 8.6 TABLET ORAL at 08:58

## 2023-09-28 RX ADMIN — MYCOPHENOLATE MOFETIL 750 MG: 250 CAPSULE ORAL at 18:08

## 2023-09-28 RX ADMIN — ANTI-THYMOCYTE GLOBULIN (RABBIT) 150 MG: 5 INJECTION, POWDER, LYOPHILIZED, FOR SOLUTION INTRAVENOUS at 12:01

## 2023-09-28 RX ADMIN — ACETAMINOPHEN 975 MG: 325 TABLET, FILM COATED ORAL at 16:04

## 2023-09-28 RX ADMIN — VALGANCICLOVIR 450 MG: 450 TABLET, FILM COATED ORAL at 08:58

## 2023-09-28 RX ADMIN — ACETAMINOPHEN 975 MG: 325 TABLET, FILM COATED ORAL at 22:08

## 2023-09-28 RX ADMIN — SODIUM CHLORIDE, SODIUM LACTATE, POTASSIUM CHLORIDE, CALCIUM CHLORIDE AND DEXTROSE MONOHYDRATE: 5; 600; 310; 30; 20 INJECTION, SOLUTION INTRAVENOUS at 07:05

## 2023-09-28 RX ADMIN — FAMOTIDINE 20 MG: 20 TABLET ORAL at 08:58

## 2023-09-28 RX ADMIN — SODIUM BICARBONATE 650 MG TABLET 650 MG: at 19:42

## 2023-09-28 RX ADMIN — DIPHENHYDRAMINE HYDROCHLORIDE 50 MG: 25 CAPSULE ORAL at 10:56

## 2023-09-28 RX ADMIN — Medication 2.5 MG: at 16:53

## 2023-09-28 RX ADMIN — LEVOTHYROXINE SODIUM 50 MCG: 0.05 TABLET ORAL at 08:58

## 2023-09-28 RX ADMIN — HYDROMORPHONE HYDROCHLORIDE 0.2 MG: 0.2 INJECTION, SOLUTION INTRAMUSCULAR; INTRAVENOUS; SUBCUTANEOUS at 00:15

## 2023-09-28 ASSESSMENT — ACTIVITIES OF DAILY LIVING (ADL)
ADLS_ACUITY_SCORE: 20
ADLS_ACUITY_SCORE: 20
ADLS_ACUITY_SCORE: 26
ADLS_ACUITY_SCORE: 20
ADLS_ACUITY_SCORE: 20
ADLS_ACUITY_SCORE: 26
ADLS_ACUITY_SCORE: 26
ADLS_ACUITY_SCORE: 20

## 2023-09-28 NOTE — CONSULTS
Transplant Admission Psychosocial Assessment    Patient Name: Ariane Flores  : 1980  Age: 43 year old  MRN: [id number]  Date of Initial Social Work Evaluation: 2020    Patient underwent Kidney transplant.  Met with patient to update psychosocial assessment and provide brief education about SW role while inpatient, as well as expectations/requirements and follow up needs post-transplant. SW also provided education about need for compliance with transplant medications, and explained ESRD Medicare benefits and medication coverage under Medicare part B.  Medicare 2728 forms completed and signed by patient.    Presenting Information   Living Situation:   [facility]  [place] [address]   Ariane resides about 1.50 hour from [facility]     If not local, plans for short term stay:  Plan to stay at hot with family post transplant. SW provided psychoeducation on the need to stay post transplant for [facility] appointments. Family plans to attempt [place] seek [place].     SW provided the number for [place] to obtain [place]   Visit [employer] or call [phone]   (voice) or [phone] (TTY).    Previous Functional Status: Independent ADL's     Cultural/Language/Spiritual Considerations: Macanese speaking patient, bi-lingual. Her spouse does not speak english. However, the other family members at bedside do. SW noted that [facility] does provide interpreters, if needed at any time for spouse.     Support System  Primary Support Person Spouse Denis    Other support:  Daughter Lisa, mother, friend     Plan for support in immediate post-transplant period:     Ariane had her spouse, mother, daughter, best friend and grandfather at bedside. She has a large support system. Her spouse and daughter will be primary caregivers.     Health Care Directive  Decision Maker: Self     Alternate Decision Maker: NOK-spouse     Health Care Directive: Provided education    Mental Health/Coping:   History  of Mental Health: Ariane was unable to converse much today. Per chart review, she does not have any history of mental health concerns.     History of Chemical Health: Per review, there is no history of concerns     Current status: Ariane is experiencing some post surgical discomfort. She was asleep during most of the conversation that was had with her family.     Coping: Appropriate     Services Needed/Recommended: NA     Financial   Income: works at a  part time and SSDI    Impact of transplant on income: 6-8 weeks off of work for transplant recovery and potential implications of losing SSDI one year post transplant.    Insurance and medication coverage:     SOT*LIZBETH JC) IS A (KIDNEY) TRANSPLANT PATIENT  (DATE OF TRANSPLANT: (DATE: 9/27/2023) )    HEALTH BENEFIT: ([facility]) MEDICARE PART B & PART D ADVANTAGE PLAN    ID# [id number] GRP# [id number] (EFFECTIVE (DATE: 7/1/2021) )    PROCESSING INFO: ID# [id number] GRP# [id number] PCN# [id number] BIN# [id number] (EFFECTIVE (DATE: 7/1/2021) )    (DO NOT BILL TO ORIGINAL MEDICARE ID# (PART A EFFECTIVE (DATE) , PART B EFFECTIVE (DATE) )    HEALTH [place]EN[place]IT: ([place]    ID# [id number] (EFFECTIVE (DATE: 1/1/2023) )    PROCESSOR NAME; [place] MANUAL B[place] ID#: [id number] BIN: [id number]    PT WILL PAY $0 COST OF MEDICATION AT TIME OF SERVICE    PHARMACY BENEFIT: ([facility]) PART D    PROCESSING INFO: ID# [id number] GRP# [id number] PCN# [id number] BIN# [id number] (EFFECTIVE (DATE: 7/1/2021) )    NO DEDUCTIBLE OR MAX OUT-OF-POCKET DUE TO LOW-INCOME SUBSIDY    COPAY STRUCTURE:    $ (0) FOR GENERIC    $ (0) FOR BRAND    BILL OTC S TO RX PROCESSOR ([place] MED[place]) BIN#[id number] ID#[id number].    TEST CLAIM SPECIALTY #28    MYCOPHENOLATE 250mg (#240/30DS)=$0    PROGRAF 1mg (#180/30DS)=PRODUCT SEVICE NOT COVERED/ PLAN EXCLUSION    TACROLIMUS 1mg (#60/30DS)=$0    CYCLOSPORINE 100mg (#60/30DS)=$0    VALGANCICLOVIR 450mg (#60/30DS)=$0    VALACYCLOVIR 1gm (#90/30DS)=$0    TEST CLAIM DISCHARGE #27 (18 YEARS AND  OLDER):    MYCOPHENOLATE 250mg (#240/30DS)=$0    PROGRAF 1mg (#180/30DS)=PRODUCT SEVICE NOT COVERED/ PLAN EXCLUSION    TACROLIMUS 1mg (#60/30DS)=$0    CYCLOSPORINE 100mg (#60/30DS)=$0    VALGANCICLOVIR 450mg (#60/30DS)=$0    VALACYCLOVIR 1gm (#90/30DS)=$0    **CAN PATIENT FILL AT [facility] FOR MEDICATIONS LISTED? PATIENT CAN FILL WITH [facility] HOWEVER PT HAS AN INSURANCE PLAN THAT PROCESSES THROUGH [employer], AND PER OUR CONTRACT WITH THEM WE CANNOT SHIP OR MAIL OUT ORDERS. PT CAN EITHER  ORDER AT [facility] SITE 28 OR HAVE SITE 28 DELIVER TO LOCAL [facility] TO  THERE (PICKUP AT RETAIL) OR A LOCAL PHARMACY. IF THEY PREFER DELIVERY OR MAIL THEY WILL NEED TO UTILIZE [facility] ([phone]).     **BILL 1ST FILL OF IMMUNOS TO [facility] AT DISCHARGE    Financial concerns: Ariane's spouse expressed some financial difficulties with lodging. SW advised to first seek assistance with [place].     Resources needed: [place] information       Education provided by SW: Social Work role inpatient setting, availability of support groups, parking information and psychosocial support.     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    CHITO met with Ariane at the bedside to complete assessment and for psychosocial post surgical support. Her spouse, mother, daughter, best friend and grandfather were all present. She was not on dialysis pre transplant, Kidney from  donor. Ariane was working part time at a  and obtained SSDI pre transplant. She lives with her spouse and daughter about 1.5 hour away. CHITO provided education on post transplant stay for [facility] visit. She appears to have a  strong support system and caregiver plan. There are no noted MH or CD concerns. Patient was asleep most of visit. SW will plan to see patient on 9/29 for review as this conversation was with family. SW will continue to follow for psychosocial support, resources and advocate on behalf of the patient.     GERMAN Zepeda, [facility]  Outpatient Kidney/Pancreas/Auto Islet Transplant Program   [address] [facility] [place] [id number]  [email or web address]  Office: [phone] I Fax: [phone]

## 2023-09-28 NOTE — PHARMACY-TRANSPLANT NOTE
Adult Kidney Transplant Post Operative Note    43 year old female s/p  donor kidney transplant on 23 d/t renal agenesis/dysgenesis s/p multiple urologic procedures resulting in right nephrectomy .      Planned immunosuppression regimen per kidney transplant protocol:  INDUCTION: High intensity, PRA 97%  Thymoglobulin 6 mg/kg total as divided below:   (POD 0): thymoglobulin 2 mg/kg (150), methylprednisolone 500 mg IV   (POD 1): thymoglobulin 2 mg/kg (150), methylprednisolone 250 mg IV   (POD 2): thymoglobulin 2 mg/kg (150), methylprednisolone 100 mg IV     MAINTENANCE:   - Mycophenolate 750 mg BID  - Tacrolimus with goal trough levels of 8-10 mcg/L for first 6 months post-transplant     Opportunistic pathogen prophylaxis includes:  - PJP: trimethoprim/sulfamethoxazole  - CMV D+/-/R+: Valganciclovir for 3/6 months duration tentatively     Opportunistic pathogen prophylaxis includes: trimethoprim/sulfamethoxazole and valganciclovir.    Patient is not enrolled in medication study.    Pharmacy will monitor for medication interactions and immunosuppression levels in conjunction with the team. Medication therapy needs for discharge planning will continue to be addressed throughout the current admission via multidisciplinary rounds and order review.  Pharmacy will make recommendations as appropriate.

## 2023-09-28 NOTE — PROGRESS NOTES
Transplant Surgery  Inpatient Daily Progress Note  2023    Assessment & Plan: 44 yo with history of CKD presumed secondary to unilateral kidney (s/p right nephrectomy as a child for recurrent pyelonephritis) and secondary FSGS, numerous unspecified urologic procedures as a child, HTN, GERD, and hernia repair w/ mesh. She had a normal voiding cystourethrogram in . S/p pre-emptive  donor kidney transplant with ureteral stent on 23.    Graft function: POD #1   Kidney: Cr 5.4->5.1. Expect slow graft function. Made urine prior to transplant.    Immunosuppressed status secondary to medications:   -PRA 97  Thymo: 150/150. Goal total is 450mg (6.1mg/kg)  Steroids:  500/250  MMF: 750mg BID  Tacro: Goal level 8-10  Complexity of management: High, induction    Hematology:   Anemia of chronic disease: Hgb 9.1, stable.    Cardiorespiratory:   HTN: BP controlled, monitor.    GI/Nutrition:   Diet: Regular  Bowel regimen: Senna, PEG    Endocrine: No acute issues.     Fluid/Electrolytes: MIVF: Straight rate  Hypocalcemia: Asymptomatic. Replace today per Neph.  Low bicarb: Start NaBicarb 650mg BID.    : Cook to remain due to new surgical anastomosis x3 days.    Infectious disease: Afebrile    Prophylaxis: DVT, fall, GI, viral (Valcyte), pneumocystis (TMP/sulfa)    Disposition: 7A     ALISHA/Fellow/Resident Provider: Herminia Jang NP [id number]    Faculty: Linette Mills MD   _________________________________________________________________      Interval History: History is obtained from the patient  Overnight events: Pain controlled. Sitting at edge of bed.    ROS:   A 10-point review of systems was negative except as noted above.    Meds:   acetaminophen  650 mg Oral Once    acetaminophen  975 mg Oral Q8H    anti-thymocyte globulin  2 mg/kg Intravenous Central line Once    atorvastatin  10 mg Oral Daily    diphenhydrAMINE  25-50 mg Oral Once    Or    diphenhydrAMINE  25-50 mg Per NG tube Once    famotidine  20 mg  "Oral BID    Or    famotidine  20 mg Intravenous BID    levothyroxine  50 mcg Oral Daily    [START ON 9/29/2023] magnesium oxide  400 mg Oral Daily with lunch    methylPREDNISolone  250 mg Intravenous Once    mycophenolate  750 mg Oral BID IS    polyethylene glycol  17 g Oral Daily    senna-docusate  1 tablet Oral BID    sodium chloride (PF)  10 mL Intracatheter Q8H    sulfamethoxazole-trimethoprim  1 tablet Oral Daily    tacrolimus  2.5 mg Oral BID IS    valGANciclovir  450 mg Oral Once per day on Mon Thu       Physical Exam:     Admit Weight: 73.8 kg (162 lb 11.2 oz)    Current vitals:   /72   Pulse 68   Temp 98.8  F (37.1  C) (Oral)   Resp 20   Ht 1.499 m (4' 11\")   Wt 73.8 kg (162 lb 11.2 oz)   SpO2 93%   BMI 32.86 kg/m      Vital sign ranges:    Temp:  [98.1  F (36.7  C)-99.6  F (37.6  C)] 98.8  F (37.1  C)  Pulse:  [68-97] 68  Resp:  [10-30] 20  BP: ()/(54-89) 119/72  SpO2:  [92 %-100 %] 93 %    General Appearance: in no apparent distress.   Skin: Warm, perfused  Heart: NSR  Lungs: Unlabored, 3L O2  Abdomen: The abdomen is Soft, incision covered with shadow on dressing  : beach is present.  Urine is yellow.  Extremities: edema: none, strength 5/5  Neurologic: A&Ox4    Data:   CMP  Recent Labs   Lab 09/28/23  0619 09/28/23  0440 09/28/23  0129 09/27/23  1735 09/27/23  1222 09/27/23  0509 09/27/23  0114   NA  --  137  --   --  141   < > 142   POTASSIUM  --  4.2  4.2 4.2   < > 4.0   < > 3.8   CHLORIDE  --  105  --   --  107   < > 106   CO2  --  18*  --   --  18*   < > 19*   * 144*  --   --  147*   < > 98   BUN  --  46.8*  --   --  49.0*   < > 53.3*   CR  --  5.09*  --   --  5.36*   < > 5.58*   GFRESTIMATED  --  10*  --   --  10*   < > 9*   RON  --  7.8*  --   --  8.1*   < > 8.7   MAG  --  1.7  --   --  1.9   < >  --    PHOS  --  4.4  --   --  4.8*   < >  --    ALBUMIN  --   --   --   --   --   --  4.5   BILITOTAL  --   --   --   --   --   --  0.3   ALKPHOS  --   --   --   --   --   --  " 81   AST  --   --   --   --   --   --  15   ALT  --   --   --   --   --   --  16    < > = values in this interval not displayed.     CBC  Recent Labs   Lab 09/28/23  0440 09/28/23  0129 09/27/23  1735 09/27/23  1222 09/27/23  0509 09/27/23  0114   HGB 9.1* 9.3*   < > 10.2*   < > 10.9*   WBC 20.3*  --   --  13.7*   < > 12.6*     --   --  239   < > 341   A1C  --   --   --   --   --  5.5    < > = values in this interval not displayed.

## 2023-09-28 NOTE — PROGRESS NOTES
"/68 (BP Location: Right arm)   Pulse 81   Temp 99  F (37.2  C)   Resp 28   Ht 1.499 m (4' 11\")   Wt 73.8 kg (162 lb 11.2 oz)   SpO2 93%   BMI 32.86 kg/m      Shift: 3870-8950  Isolation status: N/A  VS: VSS on 3L O2 nasal cannula, tmax 99.6  Neuro: AO x4  Behaviors: calm, cooperative. Able to make needs known.  BG: N/A  Labs: K+ latest 4.1, Hgb latest 9.6 checks q4hr  Respiratory: on 3L nasal cannula, continuous pulse ox. Occasional tachypnea  Cardiac: WDL on tele reads NSR, CVP 10-15 on shift team aware  Pain/nausea: incisional pain 3/10 managed with scheduled tylenol  PRN: none given   Diet: Clear liquid, tolerating well  IV access: R internal jugular, L AV fistula  Infusion(s): D5LR 100 ml/hr, NS and 0.45% NS replaced per protocol   Lines/drains: beach in place  GI/: Adequate -550 ml  Skin: R scab on right shin (mosquito bite), RLQ abd incision covered with op dressing, serous drainage marked   Mobility: SBA, up once at bedside  Events/Education: med card started and at bedside  Plan: Continue with POC, will report any change to team       "

## 2023-09-28 NOTE — PROGRESS NOTES
[facility]   Transplant Nephrology Progress Note  Date of Admission:  9/27/2023  Today's Date: 09/28/2023    Recommendations:  - Start sodium bicarbonate 650 mg PO BID (ordered).  - Give calcium gluconate 1 g IV once.  - No indication for dialysis.      Assessment & Plan   # DDKT: Trend down in serum creatinine.   - Baseline Creatinine: ~ TBD   - Proteinuria: Nephrotic range (>3 grams) (prior to transplant)   - Date DSA Last Checked: Sep/2023      Latest DSA:  (in process)   - BK Viremia: Not checked post transplant   - Kidney Tx Biopsy: No   - Transplant Ureteral Stent: Yes    # Immunosuppression: Tacrolimus immediate release (goal 8-10), Mycophenolate mofetil (dose 750 mg every 12 hours), and Methylprednisolone (dose taper)   - Patient is in an immunosuppressed state and will continue to monitor for efficacy and toxicity of immunosuppression medications.   - Induction with Recent Transplant:  High Intensity Protocol   - Changes: No    # Infection Prophylaxis:   - PJP: Sulfa/TMP (Bactrim)  - CMV: Valganciclovir (Valcyte), CMV IgG Ab positive     # Hypertension: Controlled;  Goal BP: < 150/90   - Volume status: Euvolemic     - Changes: No    Prior to admission antihypertensives: amlodipine 10 mg PO daily,    # Anemia in Chronic Renal Disease: Hgb: Trend down      KAREN: No   - Iron studies: Replete 3/2023    # Mineral Bone Disorder:    - Secondary renal hyperparathyroidism; PTH level: Normal (15-65 pg/ml)   (prior to transplant)     On treatment: None  - Vitamin D; level: Normal  5/2023       On supplement: No  - Calcium; level: slightly Low        On supplement: No, give calcium gluconate 1 g IV once  - Phosphorus; level: Normal         On supplement: No    # Electrolytes:   - Potassium; level: Normal          On supplement: No  - Magnesium; level: Normal          On supplement: No, magnesium oxide 400 mg PO daily starts tomorrow  - Bicarbonate; level: Low          On  supplement: No, start sodium bicarbonate 650 mg PO BID  - Sodium; level: Normal    # Complex Urologic history: possible reflux/obstruction and recurrent UTI as a child requiring multiple urologic procedures, last of which was right native nephrectomy at age 13.  Cleared by urology for transplant. Voiding cystourethrogram which showed no evidence of vesicoureteral reflux and she was noted to empty her bladder to completion. CT imaging was unremarkable outside of left solitary kidney.     # Transplant History:  Etiology of Kidney Failure: Unknown etiology  Tx: DDKT  Transplant: 9/27/2023 (Kidney)  Crossmatch at time of Tx: (in process)  Significant changes in immunosuppression: None  Significant transplant-related complications: None    Recommendations were communicated to the primary team verbally.    Seen and discussed with Dr. Nino.    VIRGILIO Covarrubias CNP   Pager: [phone]    Interval History   Pt to receive a dose of rATG today.  Ms. Flores's creatinine is 5.09 (09/28 0440); Trend down from 5.36 yesterday.   I/O last 3 completed shifts:  In: 8715.85 [P.O.:1573.7; I.V.:7142.15]  Out: 6325 [Urine:6175; Blood:150]   Other significant labs/tests/vitals: SBP 110s - 120s overnight. Tmax 99.6.  No acute events overnight.  No chest pain or shortness of breath.  No leg swelling.  No nausea and vomiting.  No BM yet since surgery.      Review of Systems   4 point ROS was obtained and negative except as noted in the Interval History.    MEDICATIONS:   acetaminophen  650 mg Oral Once    acetaminophen  975 mg Oral Q8H    anti-thymocyte globulin  2 mg/kg Intravenous Central line Once    atorvastatin  10 mg Oral Daily    diphenhydrAMINE  25-50 mg Oral Once    Or    diphenhydrAMINE  25-50 mg Per NG tube Once    famotidine  20 mg Oral BID    Or    famotidine  20 mg Intravenous BID    levothyroxine  50 mcg Oral Daily    [START ON 9/29/2023] magnesium oxide  400 mg Oral Daily with lunch    methylPREDNISolone  250 mg  "Intravenous Once    mycophenolate  750 mg Oral BID IS    polyethylene glycol  17 g Oral Daily    senna-docusate  1 tablet Oral BID    sodium chloride (PF)  10 mL Intracatheter Q8H    sulfamethoxazole-trimethoprim  1 tablet Oral Daily    tacrolimus  2.5 mg Oral BID IS    valGANciclovir  450 mg Oral Once per day on       dextrose 5% lactated ringers 100 mL/hr at 23 0705    sodium chloride Stopped (23 2308)    sodium chloride 100 mL/hr at 23 0701       Physical Exam   Temp  Av.9  F (37.2  C)  Min: 98.1  F (36.7  C)  Max: 99.6  F (37.6  C)      Pulse  Av.2  Min: 68  Max: 97 Resp  Av.3  Min: 10  Max: 30  SpO2  Av.1 %  Min: 92 %  Max: 100 %    CVP (mmHg): 12 mmHgBP 119/72   Pulse 68   Temp 98.8  F (37.1  C) (Oral)   Resp 20   Ht 1.499 m (4' 11\")   Wt 73.8 kg (162 lb 11.2 oz)   SpO2 93%   BMI 32.86 kg/m     Date 23 07 - 23 0659   Shift 2955-5409 9303-2897 4571-0097 24 Hour Total   INTAKE   Shift Total(mL/kg)       OUTPUT   Urine 200   200   Shift Total(mL/kg) 200(2.71)   200(2.71)   Weight (kg) 73.8 73.8 73.8 73.8      Admit Weight: 73.8 kg (162 lb 11.2 oz)     GENERAL APPEARANCE: alert and no distress  RESP: lungs clear to auscultation - no rales, rhonchi or wheezes  CV: regular rhythm, normal rate, no rub, no murmur  EDEMA: no LE edema bilaterally  ABDOMEN: soft, nondistended, appropriately tender, bowel sounds normal  MS: extremities normal - no gross deformities noted, no evidence of inflammation in joints, no muscle tenderness  SKIN: no rash  PSYCH: mentation appears normal and affect normal/bright  DIALYSIS ACCESS:  LUE AV fistula +bruit/+thrill    Data   All labs reviewed by me.  CMP  Recent Labs   Lab 23  0619 23  0440 23  0129 23  2141 23  1735 23  1222 23  0509 23  0114   NA  --  137  --   --   --  141 143 142   POTASSIUM  --  4.2  4.2 4.2 4.1 3.8 4.0 4.2 3.8   CHLORIDE  --  105  --   --   --  107 " 108* 106   CO2  --  18*  --   --   --  18* 19* 19*   ANIONGAP  --  14  --   --   --  16* 16* 17*   * 144*  --   --   --  147* 97 98   BUN  --  46.8*  --   --   --  49.0* 51.6* 53.3*   CR  --  5.09*  --   --   --  5.36* 5.60* 5.58*   GFRESTIMATED  --  10*  --   --   --  10* 9* 9*   RON  --  7.8*  --   --   --  8.1* 9.2 8.7   MAG  --  1.7  --   --   --  1.9 2.2  --    PHOS  --  4.4  --   --   --  4.8* 5.7*  --    PROTTOTAL  --   --   --   --   --   --   --  7.7   ALBUMIN  --   --   --   --   --   --   --  4.5   BILITOTAL  --   --   --   --   --   --   --  0.3   ALKPHOS  --   --   --   --   --   --   --  81   AST  --   --   --   --   --   --   --  15   ALT  --   --   --   --   --   --   --  16     CBC  Recent Labs   Lab 09/28/23  0440 09/28/23  0129 09/27/23  2141 09/27/23  1735 09/27/23  1222 09/27/23  0509 09/27/23  0114   HGB 9.1* 9.3* 9.6* 10.2* 10.2* 11.4* 10.9*   WBC 20.3*  --   --   --  13.7* 10.9 12.6*   RBC 3.14*  --   --   --  3.45* 3.96 3.73*   HCT 28.1*  --   --   --  31.5* 35.7 32.9*   MCV 90  --   --   --  91 90 88   MCH 29.0  --   --   --  29.6 28.8 29.2   MCHC 32.4  --   --   --  32.4 31.9 33.1   RDW 13.4  --   --   --  13.2 13.1 13.1     --   --   --  239 332 341     INR  Recent Labs   Lab 09/27/23  0114   INR 1.02   PTT 30     ABGNo lab results found in last 7 days.   Urine Studies  Recent Labs   Lab Test 09/27/23  0107 11/05/20  1150   COLOR Straw Straw   APPEARANCE Clear Clear   URINEGLC 30* Negative   URINEBILI Negative Negative   URINEKETONE Negative Negative   SG 1.005 1.006   UBLD Trace* Negative   URINEPH 7.0 6.0   PROTEIN 70* 100*   NITRITE Negative Negative   LEUKEST Negative Negative   RBCU 1 2   WBCU 2 5     No lab results found.  PTH  Recent Labs   Lab Test 07/06/23  1028 05/05/23  0930   PTHI 64 90*     Iron Studies  No lab results found.    IMAGING:  All imaging studies reviewed by me.

## 2023-09-28 NOTE — PLAN OF CARE
"/84 (BP Location: Right arm, Cuff Size: Adult Regular)   Pulse 74   Temp 99  F (37.2  C) (Oral)   Resp 20   Ht 1.499 m (4' 11\")   Wt 76.1 kg (167 lb 12.3 oz)   SpO2 96%   BMI 33.89 kg/m      Shift: 7472-9910  Isolation Status: none  VS: stable on 3L O2 nasal canula, afebrile  Neuro: Aox4  Behaviors: calm, cooperative  BG: daily  Labs: Hgb=9.5 and K=4.0 labs q4 hours.  Respiratory: requires 3L O2 while asleep, potential to wean off when awake  Cardiac: WDL  Pain/Nausea: moderate pain rating 4 out 10. Denies nausea  PRN: none administered   Diet: regular with low appetite   IV Access: right triple lumen internal jugular, left PIV  Infusion(s): D5LR@100, Thymo running for 6 hours  Lines/Drains: beach catheter  GI/: beach catheter, no bm.no flatus  Skin: RLQ abd incision with op dressing in place  Mobility: Ax1  Events/Education: med card and lab book updated and education provided, handbook introduced and provided to patient and family members, QR code for videos provided to family and patient. Pharmacy education to be done tomorrow at 11am    Plan: Continue POC      "

## 2023-09-28 NOTE — PLAN OF CARE
[id number]  Status: Kidney tx done 9/27   Vitals: VSS, on 3 LPM when sleeping  Neuros: A&Ox4.   IV: PIV running LR and D5 at 75mL/hr  Labs/Electrolytes: WNL  Resp/trach: LS clear using o2 when sleeping.   Diet: Regular, ate about 50% of her dinner.   Bowel status: Passing flatus, but no BM this shift.   : Cook in place, with adequate output.   Skin: RLQ abdominal incision with dressing in place, CDI.   Pain: Moderate pain gave, PRN Oxy and scheduled tylenol.   Activity: SBA w/ GB   Plan: Continue to monitor labs and monitor patient. Pharmacy education to be done tomorrow at 11am.    Updates: Thymoglbulin finished. Patient up and walking and sitting up in chair. Patient had more pain than during the day gave Oxy w/ relief.

## 2023-09-28 NOTE — PROGRESS NOTES
"CLINICAL NUTRITION SERVICES - ASSESSMENT NOTE     Nutrition Prescription    RECOMMENDATIONS FOR MDs/PROVIDERS TO ORDER:  None at this time.    Malnutrition Status:    Patient does not meet two of the established criteria necessary for diagnosing malnutrition but is at risk for malnutrition    Recommendations already ordered by Registered Dietitian (RD):  Ensure Enlive BID    Future/Additional Recommendations:  Monitor appetite/intake, acceptance of supplements, wt trends, pertinent labs, bowel movements.      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 She is now POD1 s/p DDKT.    NUTRITION HISTORY  Pt reports a usual intake of meals BID plus a snack in between. Typically would have a breakfast of cereal with milk, snack of fruit or veggies, and dinner of sandwich and chips. Right now, pt reports decreased appetite but had small amount of oatmeal and jello for breakfast today. Reports she has not had any recent weight changes.     GI: No BM since admission. Pt denied N/V/D/C.    CURRENT NUTRITION ORDERS  Diet: Regular  Intake/Tolerance: No intakes documented since admission    LABS  Na 137 (WNL)  K 4.2, Mg 1.7, Phos 4.4 (WNL)  BUN 46.8 (H), Cr 5.09 (H) -> trending down  Glucose 144 (H)    MEDICATIONS  Thymoglobulin  Pepcid  Levothyroxine  Solu-medrol  Mycophenolate  Miralax  Senokot  Sodium bicarbonate BID  Tacrolimus  D5 @ 100 mL/hr -> provides additional 408 kcals/day    ANTHROPOMETRICS  Height: 149.9 cm (4' 11\")  Most Recent Weight: 73.8 kg (162 lb 11.2 oz)    IBW: 43.2 kg  BMI: Obesity Grade I BMI 30-34.9  Weight History:   Wt Readings from Last 15 Encounters: "   09/27/23 73.8 kg (162 lb 11.2 oz)   02/09/23 75.8 kg (167 lb)   02/08/22 75.7 kg (166 lb 14.4 oz)   11/05/20 78.1 kg (172 lb 1.6 oz)   09/17/20 77.1 kg (170 lb)   2.6% wt loss in 7 months- does not meet criteria for malnutrition, although no recent wt hx within the past 7 months.    Dosing Weight: 50.9 kg (adjusted based on most recent weight of 73.8 kg on 9/27 and IBW of 43.2 kg)    ASSESSED NUTRITION NEEDS  Estimated Energy Needs: 1525 - 1780 kcals/day (30 - 35 kcals/kg )  Justification: Increased needs  Estimated Protein Needs: 66 - 102 grams protein/day (1.3 - 2 grams of pro/kg)  Justification: Increased needs  Estimated Fluid Needs: UOP + 500 mL/day  Justification: Maintenance    PHYSICAL FINDINGS  See malnutrition section below.     MALNUTRITION  % Intake: Decreased intake does not meet criteria  % Weight Loss: Weight loss does not meet criteria  Subcutaneous Fat Loss: None observed  Muscle Loss: Temporal:  mild  Fluid Accumulation/Edema: None noted  Malnutrition Diagnosis: Patient does not meet two of the established criteria necessary for diagnosing malnutrition but is at risk for malnutrition    NUTRITION DIAGNOSIS  Increased nutrient needs (kcals/protein) related to s/p kidney txp as evidenced by assessed needs of 30-35 kcals/kg and 1.3-2.0 g/kg protein.      INTERVENTIONS  Implementation  Nutrition Education: Post-txp nutrition education. Provided handouts: Guide to your diet after transplant and Food safety booklet. Discussed high protein needs and supplement use, heart healthy nutrition, food safety, etc.   Medical food supplement therapy   Encouraged small/frequent meals d/t decreased appetite to optimize intake  Encouraged protein intake at each meal/snack time    Goals  Patient to consume % of nutritionally adequate meal trays TID, or the equivalent with supplements/snacks.     Monitoring/Evaluation  Progress toward goals will be monitored and evaluated per protocol.    KARLENE Rossi,  RD, LD  7A/7B (beds 219-229) RD pager: [phone]  Weekend/Holiday RD pager: [phone]

## 2023-09-28 NOTE — PLAN OF CARE
Goal Outcome Evaluation:      Plan of Care Reviewed With: patient, family    Overall Patient Progress: no changeOverall Patient Progress: no change    Outcome Evaluation: See RD note 9/28    KARLENE Rossi, RD, LD  7A/7B (beds 219-229) RD pager: [phone]  Weekend/Holiday RD pager: [phone]

## 2023-09-28 NOTE — PROGRESS NOTES
Handoff information     Type of transplant: DDKT   Date of transplant: 9/27/23  Direct/non-direct/PEP- n/a  Transplant history: Naive  (Why they lost previous tx graft)  Outstanding items for patient: none   Pertinent history: CKD from unclear etiology not yet on HD. Reflux/obstruction and recurrent UTI s/p R native nephrectomy   Barriers to post transplant care: None

## 2023-09-28 NOTE — DISCHARGE SUMMARY
[facility]    Discharge Summary  Transplant Surgery    Date of Admission:  2023  Date of Discharge:  2023  Discharging Provider: Shannon Mota PA-C/Linette Mills MD  Date of Service (when I saw the patient): 23    Discharge Diagnoses   Principal Problem:    Status post kidney transplant  Active Problems:    CKD (chronic kidney disease) stage 4, GFR 15-29 ml/min (H)    Immunosuppressed status (H24)    Anemia of chronic disease    Hypocalcemia    Low bicarbonate level      Procedure/Surgery Information   Procedure: Procedure(s):  Transplant kidney recipient  donor, with ureteral stenting  Bench kidney   Surgeon(s): Surgeon(s) and Role:     * Daniel Fitch MD - Primary     * Altaf Tse MD - Fellow - Assisting     History of Present Illness   Ariane Flores is a 42 yo with history of CKD presumed secondary to unilateral kidney (s/p right nephrectomy as a child for recurrent pyelonephritis) and secondary FSGS, numerous unspecified urologic procedures as a child, HTN, GERD, and hernia repair w/ mesh. She had a normal voiding cystourethrogram in . S/p pre-emptive  donor kidney transplant with ureteral stent on 23.    Hospital Course   Ariane Flores was admitted on 2023.  The following problems were addressed during her hospitalization:     Graft function:  Kidney: Cr 3.7 on discharge. Expect slower graft function. Made urine prior to transplant.  -US  with patent vasculature.     DBDKTx    Ureteral stent: YES  CMV D?/ R+; EBV D?/R+  DSA high risk: No  Induction 450mg (6.1 mg/kg)     Immunosuppressed status secondary to medications:   -PRA 97  Thymo: 150/150/150. Total 450mg (6.1mg/kg)  Steroids:  500/250/100  MMF: 750mg BID  Tacro: 2.5 mg BID. Goal level 8-10. 9/30 level still pending at the time of discharge      Hematology:   Anemia of chronic disease: Hgb ~9 post operatively,  stable.      Cardiorespiratory:   HTN: BP controlled, monitor. Will continue to hold norvasc on discharge.      GI/Nutrition:   Diet: Regular  Bowel regimen: Senna, PEG. Positive bowel function prior to discharge.      Fluid/Electrolytes: MIVF: Straight rate initially then discontinued when tolerating adequate oral intake  Hypocalcemia: Asymptomatic. Replaced POD 1  Low bicarb: Start NaBicarb 1300mg BID.     : Beach remained due to new surgical anastomosis x3 days, removed without complication or retention.    Discharge Disposition   Discharged to [facility]  Condition at discharge: Stable    Pending Results   These results will be followed up by transplant coordinator  Unresulted Labs Ordered in the Past 30 Days of this Admission       Date and Time Order Name Status Description    9/29/2023 11:30 PM Tacrolimus by Tandem Mass Spectrometry In process     9/27/2023 12:45 AM HBV HCV HIV by YANET In process     9/27/2023 12:45 AM BK Virus IgG Antibody In process           Primary Care Physician   Dwayne Pandya    Physical Exam   Temp: 98  F (36.7  C) Temp src: Oral BP: 131/81 Pulse: 70   Resp: 16 SpO2: 94 % O2 Device: None (Room air) Oxygen Delivery: 2 LPM  Vitals:    09/28/23 1006 09/29/23 0508 09/30/23 0559   Weight: 76.1 kg (167 lb 12.3 oz) 76.8 kg (169 lb 4.8 oz) 75 kg (165 lb 4.8 oz)     Vital Signs with Ranges  Temp:  [97.8  F (36.6  C)-98.9  F (37.2  C)] 98  F (36.7  C)  Pulse:  [66-79] 70  Resp:  [16-18] 16  BP: (128-144)/(76-91) 131/81  SpO2:  [92 %-98 %] 94 %  I/O last 3 completed shifts:  In: 1905 [P.O.:1600; I.V.:305]  Out: 4600 [Urine:4600]    General Appearance: in no apparent distress.   Heart: Warm, perfused  Lungs: Unlabored on room air  Abdomen: The abdomen is Soft, incision c/d/i  : beach is not present.  Urine is yellow.  Extremities: edema: none  Neurologic: A&Ox4    Consultations This Hospital Stay   NURSING TO CONSULT FOR VASCULAR ACCESS CARE IP CONSULT  SOT MEDICATION HISTORY IP PHARMACY  CONSULT  SOCIAL WORK IP CONSULT  PHARMACY IP CONSULT  NUTRITION SERVICES ADULT IP CONSULT  NEPHROLOGY KIDNEY/PANCREAS TRANSPLANT ADULT IP CONSULT  PSYCHIATRY IP CONSULT    Time Spent on this Encounter   I have spent greater than 30 minutes on this discharge.    Discharge Orders      PAC Visit Referral (For [facility] Only)      Reason for your hospital stay    Patient underwent a pre-emptive  donor kidney transplant     Activity    Your activity upon discharge: Walk at least four times a day, lift no greater than 10 pounds for 6-8 weeks from the time of surgery.  No driving while taking narcotics or 3 weeks after surgery.     Adult [facility] Follow-up and recommended labs and tests    [facility] FOLLOW UP:     1. [facility]: Over the next 2 days you will be seen in the [facility] (ph. [phone], option 3). Your labs will be drawn at the beginning of your appointment. DO NOT take your medications prior to having labs drawn. Please bring all your medications with you from home to take after labs are drawn.     2. Follow up with Dr. Fitch in Transplant Clinic in 1-2 weeks.     3. Follow up with Transplant Nephrologist as scheduled.     4.  Ureteral stent removal in 4-6 weeks, to be scheduled by Transplant Coordinator. If a  does not contact you for this, please contact your transplant coordinator.     5. Follow up with your primary care provider in ~8 weeks. Patient to schedule.     Remember to always bring an updated medication list to all appointments.        Call your Transplant Coordinator ([phone]) with questions about Transplant Center appointment scheduling.     LABS:     CBC, BMP, magnesium, phosphorus, tacrolimus level to be drawn daily while in ATC, then every Monday and Thursday by home health care nurse if arranged, or at an outpatient lab.     When to contact your care team    WHEN TO CONTACT YOUR  COORDINATOR:     Transplant Coordinator  [phone]     Notify your coordinator if you have pain over your kidney, increased redness or drainage from your incision, fever greater than 100F, decreased urine output or new or increased amount of blood in urine.     Notify your coordinator immediately if you are ever unable to take your immunosuppressive medications for any reason.     If you have URGENT concerns after office hours, please call the hospital switchboard at [phone] and ask to have the organ transplant nurse on-call paged. If you have a life-threatening emergency, go to the nearest emergency room.     Wound care and dressings    Instructions to care for your wound at home: If you have staples in place, they will be removed in 3 weeks after operation. Wash incision daily with soap and water. Do not soak or scrub.     Diet    Follow this diet upon discharge: Diet recommendations post-transplant: Heart healthy dietary habits long term (low saturated/trans fat, low sodium). High protein diet x 8 weeks. Practice food safety precautions.       Discharge Medications   Current Discharge Medication List        START taking these medications    Details   acetaminophen (TYLENOL) 325 MG tablet Take 1-2 tablets (325-650 mg) by mouth every 4 hours as needed for other (For optimal non-opioid multimodal pain management to improve pain control.)  Qty: 100 tablet, Refills: 0    Associated Diagnoses: S/P kidney transplant      magnesium oxide (MAG-OX) 400 MG tablet Take 1 tablet (400 mg) by mouth daily (with lunch)  Qty: 30 tablet, Refills: 11    Associated Diagnoses: S/P kidney transplant      mycophenolate (GENERIC EQUIVALENT) 250 MG capsule Take 3 capsules (750 mg) by mouth 2 times daily  Qty: 180 capsule, Refills: 11    Associated Diagnoses: S/P kidney transplant      oxyCODONE (ROXICODONE) 5 MG tablet Take 0.5 tablets (2.5 mg) by mouth every 4 hours as needed for moderate pain (level 4-6)  Qty: 5 tablet, Refills: 0    Associated Diagnoses: S/P kidney  transplant      polyethylene glycol (MIRALAX) 17 GM/Dose powder Take 17 g by mouth daily Until having regular bowel movements, HOLD for loose stools  Qty: 510 g, Refills: 0    Associated Diagnoses: S/P kidney transplant      senna-docusate (SENOKOT-S/PERICOLACE) 8.6-50 MG tablet Take 1 tablet by mouth 2 times daily Hold for loose stools  Qty: 100 tablet, Refills: 0    Associated Diagnoses: S/P kidney transplant      sulfamethoxazole-trimethoprim (BACTRIM) 400-80 MG tablet Take 1 tablet by mouth three times a week Increase to 1 tab by mouth daily when directed by transplant team (based on improving kidney function)  Qty: 30 tablet, Refills: 11    Associated Diagnoses: S/P kidney transplant      tacrolimus (GENERIC EQUIVALENT) 0.5 MG capsule Take 2.5 mg by mouth twice daily  Qty: 60 capsule, Refills: 11    Associated Diagnoses: S/P kidney transplant      tacrolimus (GENERIC EQUIVALENT) 1 MG capsule Take 2.5 mg by mouth twice daily  Qty: 120 capsule, Refills: 11    Associated Diagnoses: S/P kidney transplant      valGANciclovir (VALCYTE) 450 MG tablet Take 1 tablet (450 mg) by mouth twice a week Increase to 2 tabs by mouth daily as directed by transplant team (based on improving kidney function)  Qty: 60 tablet, Refills: 2    Associated Diagnoses: S/P kidney transplant           CONTINUE these medications which have CHANGED    Details   sodium bicarbonate 650 MG tablet Take 2 tablets (1,300 mg) by mouth 2 times daily  Qty: 120 tablet, Refills: 11    Associated Diagnoses: S/P kidney transplant           CONTINUE these medications which have NOT CHANGED    Details   atorvastatin (LIPITOR) 40 MG tablet Take 40 mg by mouth daily      cholecalciferol 25 MCG (1000 UT) TABS Take 1,000 Units by mouth daily      ferrous sulfate (FEROSUL) 325 (65 Fe) MG tablet Take 325 mg by mouth daily (with breakfast)      levothyroxine (SYNTHROID/LEVOTHROID) 50 MCG tablet Take 50 mcg by mouth daily           STOP taking these medications        amLODIPine (NORVASC) 10 MG tablet Comments:   Reason for Stopping:         calcitRIOL (ROCALTROL) 0.25 MCG capsule Comments:   Reason for Stopping:               Data   Most Recent 3 Creatinines:  Recent Labs   Lab Test 09/30/23  0543 09/29/23  0538 09/28/23  0440   CR 3.72* 4.54* 5.09*     I have reviewed history, examined patient and discussed plan with the fellow/resident/ALISHA.    I concur with the findings in this note.    Time spent on discharge activities: 45 minutes.

## 2023-09-28 NOTE — PLAN OF CARE
Goal Outcome Evaluation:      Plan of Care Reviewed With: patient, spouse, parent, child, family, grandparent    Overall Patient Progress: improvingOverall Patient Progress: improving

## 2023-09-28 NOTE — PHARMACY-ADMISSION MEDICATION HISTORY
Pharmacy Intern Admission Medication History    Admission medication history is complete. The information provided in this note is only as accurate as the sources available at the time of the update.    Medication reconciliation/reorder completed by provider prior to medication history? Yes    Information Source(s): Patient and CareEverywhere/SureScripts via in-person    Pertinent Information: Patient is a good historian    Changes made to PTA medication list:  Added: None  Deleted: None  Changed: Atorvastatin 20 to 40mg    Allergies reviewed with patient and updates made in EHR: no    Medication History Completed By: Hallie Galvez 9/28/2023 8:19 AM    Prior to Admission medications    Medication Sig Last Dose Taking? Auth Provider Long Term End Date   amLODIPine (NORVASC) 10 MG tablet Take 10 mg by mouth daily 9/26/2023 at AM Yes Reported, Patient Yes    atorvastatin (LIPITOR) 40 MG tablet Take 40 mg by mouth daily 9/26/2023 at PM Yes Reported, Patient Yes    calcitRIOL (ROCALTROL) 0.25 MCG capsule Take 0.25 mcg by mouth once daily on Monday, Wednesday, Friday and Sundays 9/25/2023 Yes Reported, Patient Yes    cholecalciferol 25 MCG (1000 UT) TABS Take 1,000 Units by mouth daily 9/26/2023 at AM Yes Reported, Patient     ferrous sulfate (FEROSUL) 325 (65 Fe) MG tablet Take 325 mg by mouth daily (with breakfast) 9/26/2023 at AM Yes Reported, Patient     levothyroxine (SYNTHROID/LEVOTHROID) 50 MCG tablet Take 50 mcg by mouth daily 9/26/2023 at AM Yes Reported, Patient Yes    sodium bicarbonate 650 MG tablet Take 650 mg by mouth 2 times daily 9/26/2023 Yes Reported, Patient

## 2023-09-28 NOTE — PLAN OF CARE
"/69   Pulse 74   Temp 98.8  F (37.1  C) (Oral)   Resp 17   Ht 1.499 m (4' 11\")   Wt 73.8 kg (162 lb 11.2 oz)   SpO2 94%   BMI 32.86 kg/m   Tmax-99.2. OVSS on 3 L /NC. Patient c/o incisional pain which is worse with movement. Prn IV Dilaudid 0.2 mg x 1. Pt has declined all other offers for pain meds. Patient denies nausea. Urine Output - 125-225 ml/ hr. Bowel Function - BS +. Nutrition - drank 2 cups of water & 1 cup of apple juice. Diet advanced to regular. Pt stated that she will order breakfast and try to eat this morning. Activity - pt has not been OOB this shift but did stand at the bedside last evening. CVP-9-11. Tele-NSR. Op dressing with moderate amount of dried bloody drainage. Slept in between cares.  in room.                          "

## 2023-09-29 ENCOUNTER — TELEPHONE (OUTPATIENT)
Dept: TRANSPLANT | Facility: CLINIC | Age: 43
End: 2023-09-29
Payer: COMMERCIAL

## 2023-09-29 ENCOUNTER — TELEPHONE (OUTPATIENT)
Dept: PHARMACY | Facility: CLINIC | Age: 43
End: 2023-09-29
Payer: COMMERCIAL

## 2023-09-29 DIAGNOSIS — Z94.0 KIDNEY TRANSPLANTED: Primary | ICD-10-CM

## 2023-09-29 LAB
ANION GAP SERPL CALCULATED.3IONS-SCNC: 13 MMOL/L (ref 7–15)
BASOPHILS # BLD AUTO: 0 10E3/UL (ref 0–0.2)
BASOPHILS NFR BLD AUTO: 0 %
BUN SERPL-MCNC: 52.9 MG/DL (ref 6–20)
CALCIUM SERPL-MCNC: 8.7 MG/DL (ref 8.6–10)
CELL TYPE ALLO: NORMAL
CELL TYPE ALLO: NORMAL
CELL TYPE AUTO: NORMAL
CHANNELSHIFTALLOB1: -26
CHANNELSHIFTALLOB1: -36
CHANNELSHIFTALLOB2: -4
CHANNELSHIFTALLOT1: -37
CHANNELSHIFTALLOT1: -39
CHANNELSHIFTALLOT2: -45
CHANNELSHIFTAUTOB1: -70
CHANNELSHIFTAUTOT1: -49
CHLORIDE SERPL-SCNC: 107 MMOL/L (ref 98–107)
CREAT SERPL-MCNC: 4.54 MG/DL (ref 0.51–0.95)
CROSSMATCHDATEALLO: NORMAL
CROSSMATCHDATEALLO: NORMAL
CROSSMATCHDATEAUTO: NORMAL
DEPRECATED HCO3 PLAS-SCNC: 19 MMOL/L (ref 22–29)
DONOR ALLO: NORMAL
DONOR ALLO: NORMAL
DONOR AUTO: NORMAL
DONORCELLDATE ALLO: NORMAL
DONORCELLDATE ALLO: NORMAL
DONORCELLDATE AUTO: NORMAL
EGFRCR SERPLBLD CKD-EPI 2021: 12 ML/MIN/1.73M2
EOSINOPHIL # BLD AUTO: 0 10E3/UL (ref 0–0.7)
EOSINOPHIL NFR BLD AUTO: 0 %
ERYTHROCYTE [DISTWIDTH] IN BLOOD BY AUTOMATED COUNT: 13.4 % (ref 10–15)
GLUCOSE SERPL-MCNC: 156 MG/DL (ref 70–99)
HCT VFR BLD AUTO: 28.7 % (ref 35–47)
HGB BLD-MCNC: 9.3 G/DL (ref 11.7–15.7)
IMM GRANULOCYTES # BLD: 0 10E3/UL
IMM GRANULOCYTES NFR BLD: 0 %
LYMPHOCYTES # BLD AUTO: 0.1 10E3/UL (ref 0.8–5.3)
LYMPHOCYTES NFR BLD AUTO: 1 %
MAGNESIUM SERPL-MCNC: 2 MG/DL (ref 1.7–2.3)
MCH RBC QN AUTO: 29.3 PG (ref 26.5–33)
MCHC RBC AUTO-ENTMCNC: 32.4 G/DL (ref 31.5–36.5)
MCV RBC AUTO: 91 FL (ref 78–100)
MONOCYTES # BLD AUTO: 0.3 10E3/UL (ref 0–1.3)
MONOCYTES NFR BLD AUTO: 4 %
NEUTROPHILS # BLD AUTO: 8.3 10E3/UL (ref 1.6–8.3)
NEUTROPHILS NFR BLD AUTO: 95 %
NRBC # BLD AUTO: 0 10E3/UL
NRBC BLD AUTO-RTO: 0 /100
PHOSPHATE SERPL-MCNC: 4.3 MG/DL (ref 2.5–4.5)
PLATELET # BLD AUTO: 144 10E3/UL (ref 150–450)
POS CUT OFF ALLO B: >69
POS CUT OFF ALLO B: >69
POS CUT OFF ALLO T: >53
POS CUT OFF ALLO T: >53
POS CUT OFF AUTO B: >69
POS CUT OFF AUTO T: >53
POTASSIUM SERPL-SCNC: 4.1 MMOL/L (ref 3.4–5.3)
RBC # BLD AUTO: 3.17 10E6/UL (ref 3.8–5.2)
RESULT ALLO B1: NORMAL
RESULT ALLO B1: NORMAL
RESULT ALLO B2: NORMAL
RESULT ALLO T1: NORMAL
RESULT ALLO T1: NORMAL
RESULT ALLO T2: NORMAL
RESULT AUTO B1: NORMAL
RESULT AUTO T1: NORMAL
SERUM DATE ALLO B1: NORMAL
SERUM DATE ALLO B1: NORMAL
SERUM DATE ALLO B2: NORMAL
SERUM DATE ALLO T1: NORMAL
SERUM DATE ALLO T1: NORMAL
SERUM DATE ALLO T2: NORMAL
SERUM DATE AUTO B1: NORMAL
SERUM DATE AUTO T1: NORMAL
SODIUM SERPL-SCNC: 139 MMOL/L (ref 135–145)
TESTMETHODALLO: NORMAL
TESTMETHODALLO: NORMAL
TESTMETHODAUTO: NORMAL
TREATMENT ALLO B1: NORMAL
TREATMENT ALLO B1: NORMAL
TREATMENT ALLO B2: NORMAL
TREATMENT ALLO T1: NORMAL
TREATMENT ALLO T1: NORMAL
TREATMENT ALLO T2: NORMAL
TREATMENT AUTO B1: NORMAL
TREATMENT AUTO T1: NORMAL
WBC # BLD AUTO: 8.8 10E3/UL (ref 4–11)
ZZZCOMMENT ALLOB1: NORMAL
ZZZCOMMENT ALLOB2: NORMAL

## 2023-09-29 PROCEDURE — 250N000011 HC RX IP 250 OP 636: Mod: JZ | Performed by: PHYSICIAN ASSISTANT

## 2023-09-29 PROCEDURE — 80048 BASIC METABOLIC PNL TOTAL CA: CPT | Performed by: SURGERY

## 2023-09-29 PROCEDURE — 250N000013 HC RX MED GY IP 250 OP 250 PS 637: Performed by: PHYSICIAN ASSISTANT

## 2023-09-29 PROCEDURE — 99233 SBSQ HOSP IP/OBS HIGH 50: CPT | Mod: FS

## 2023-09-29 PROCEDURE — 120N000011 HC R&B TRANSPLANT UMMC

## 2023-09-29 PROCEDURE — 99207 PR NO BILLABLE SERVICE THIS VISIT: CPT

## 2023-09-29 PROCEDURE — 258N000003 HC RX IP 258 OP 636: Performed by: PHYSICIAN ASSISTANT

## 2023-09-29 PROCEDURE — 250N000013 HC RX MED GY IP 250 OP 250 PS 637: Performed by: NURSE PRACTITIONER

## 2023-09-29 PROCEDURE — 250N000011 HC RX IP 250 OP 636: Performed by: SURGERY

## 2023-09-29 PROCEDURE — 36592 COLLECT BLOOD FROM PICC: CPT | Performed by: SURGERY

## 2023-09-29 PROCEDURE — 84100 ASSAY OF PHOSPHORUS: CPT | Performed by: SURGERY

## 2023-09-29 PROCEDURE — 250N000013 HC RX MED GY IP 250 OP 250 PS 637: Performed by: SURGERY

## 2023-09-29 PROCEDURE — 83735 ASSAY OF MAGNESIUM: CPT | Performed by: SURGERY

## 2023-09-29 PROCEDURE — 250N000013 HC RX MED GY IP 250 OP 250 PS 637

## 2023-09-29 PROCEDURE — 250N000012 HC RX MED GY IP 250 OP 636 PS 637: Performed by: SURGERY

## 2023-09-29 PROCEDURE — 85025 COMPLETE CBC W/AUTO DIFF WBC: CPT | Performed by: SURGERY

## 2023-09-29 RX ORDER — SODIUM BICARBONATE 650 MG/1
1300 TABLET ORAL 2 TIMES DAILY
Status: DISCONTINUED | OUTPATIENT
Start: 2023-09-29 | End: 2023-09-30 | Stop reason: HOSPADM

## 2023-09-29 RX ORDER — METHYLPREDNISOLONE SODIUM SUCCINATE 125 MG/2ML
100 INJECTION, POWDER, LYOPHILIZED, FOR SOLUTION INTRAMUSCULAR; INTRAVENOUS ONCE
Status: COMPLETED | OUTPATIENT
Start: 2023-09-29 | End: 2023-09-29

## 2023-09-29 RX ORDER — DIPHENHYDRAMINE HCL 25 MG
25-50 CAPSULE ORAL ONCE
Status: COMPLETED | OUTPATIENT
Start: 2023-09-29 | End: 2023-09-29

## 2023-09-29 RX ORDER — ACETAMINOPHEN 325 MG/1
650 TABLET ORAL ONCE
Status: COMPLETED | OUTPATIENT
Start: 2023-09-29 | End: 2023-09-29

## 2023-09-29 RX ORDER — DIPHENHYDRAMINE HCL 12.5MG/5ML
25-50 LIQUID (ML) ORAL ONCE
Status: COMPLETED | OUTPATIENT
Start: 2023-09-29 | End: 2023-09-29

## 2023-09-29 RX ADMIN — ACETAMINOPHEN 975 MG: 325 TABLET, FILM COATED ORAL at 16:20

## 2023-09-29 RX ADMIN — SODIUM BICARBONATE 650 MG TABLET 650 MG: at 07:32

## 2023-09-29 RX ADMIN — ACETAMINOPHEN 650 MG: 325 TABLET, FILM COATED ORAL at 13:19

## 2023-09-29 RX ADMIN — ACETAMINOPHEN 975 MG: 325 TABLET, FILM COATED ORAL at 07:32

## 2023-09-29 RX ADMIN — ACETAMINOPHEN 975 MG: 325 TABLET, FILM COATED ORAL at 22:47

## 2023-09-29 RX ADMIN — POLYETHYLENE GLYCOL 3350 17 G: 17 POWDER, FOR SOLUTION ORAL at 07:32

## 2023-09-29 RX ADMIN — LEVOTHYROXINE SODIUM 50 MCG: 0.05 TABLET ORAL at 07:32

## 2023-09-29 RX ADMIN — SODIUM BICARBONATE 650 MG TABLET 1300 MG: at 19:57

## 2023-09-29 RX ADMIN — MYCOPHENOLATE MOFETIL 750 MG: 250 CAPSULE ORAL at 18:31

## 2023-09-29 RX ADMIN — SENNOSIDES AND DOCUSATE SODIUM 1 TABLET: 50; 8.6 TABLET ORAL at 07:32

## 2023-09-29 RX ADMIN — METHYLPREDNISOLONE SODIUM SUCCINATE 100 MG: 125 INJECTION, POWDER, LYOPHILIZED, FOR SOLUTION INTRAMUSCULAR; INTRAVENOUS at 13:21

## 2023-09-29 RX ADMIN — TACROLIMUS 2.5 MG: 1 CAPSULE ORAL at 07:34

## 2023-09-29 RX ADMIN — SENNOSIDES AND DOCUSATE SODIUM 1 TABLET: 50; 8.6 TABLET ORAL at 19:56

## 2023-09-29 RX ADMIN — DIPHENHYDRAMINE HYDROCHLORIDE 50 MG: 25 CAPSULE ORAL at 13:19

## 2023-09-29 RX ADMIN — ANTI-THYMOCYTE GLOBULIN (RABBIT) 150 MG: 5 INJECTION, POWDER, LYOPHILIZED, FOR SOLUTION INTRAVENOUS at 14:02

## 2023-09-29 RX ADMIN — MAGNESIUM OXIDE TAB 400 MG (241.3 MG ELEMENTAL MG) 400 MG: 400 (241.3 MG) TAB at 13:21

## 2023-09-29 RX ADMIN — SULFAMETHOXAZOLE AND TRIMETHOPRIM 1 TABLET: 400; 80 TABLET ORAL at 10:03

## 2023-09-29 RX ADMIN — FAMOTIDINE 10 MG: 10 TABLET, FILM COATED ORAL at 07:33

## 2023-09-29 RX ADMIN — ATORVASTATIN CALCIUM 40 MG: 40 TABLET, FILM COATED ORAL at 19:57

## 2023-09-29 RX ADMIN — SODIUM CHLORIDE, SODIUM LACTATE, POTASSIUM CHLORIDE, CALCIUM CHLORIDE AND DEXTROSE MONOHYDRATE: 5; 600; 310; 30; 20 INJECTION, SOLUTION INTRAVENOUS at 01:35

## 2023-09-29 RX ADMIN — TACROLIMUS 2.5 MG: 1 CAPSULE ORAL at 18:30

## 2023-09-29 RX ADMIN — MYCOPHENOLATE MOFETIL 750 MG: 250 CAPSULE ORAL at 07:34

## 2023-09-29 ASSESSMENT — ACTIVITIES OF DAILY LIVING (ADL)
ADLS_ACUITY_SCORE: 22
ADLS_ACUITY_SCORE: 22
ADLS_ACUITY_SCORE: 20
ADLS_ACUITY_SCORE: 22
ADLS_ACUITY_SCORE: 22
ADLS_ACUITY_SCORE: 20
ADLS_ACUITY_SCORE: 22

## 2023-09-29 NOTE — PHARMACY-TRANSPLANT NOTE
Solid Organ Transplant Recipient Prior to Discharge Note    43 year old female s/p  donor kidney transplant on 23 d/t renal agenesis/dysgenesis s/p multiple urologic procedures resulting in right nephrectomy .       Planned immunosuppression regimen per kidney transplant protocol:  INDUCTION: High intensity, PRA 97%  Thymoglobulin 6 mg/kg total as divided below:   (POD 0): thymoglobulin 2 mg/kg (150), methylprednisolone 500 mg IV   (POD 1): thymoglobulin 2 mg/kg (150), methylprednisolone 250 mg IV   (POD 2): thymoglobulin 2 mg/kg (150), methylprednisolone 100 mg IV     MAINTENANCE:   - Mycophenolate 750 mg BID  - Tacrolimus with goal trough levels of 8-10 mcg/L for first 6 months post-transplant  - Pending Tacrolimus level, current regimen 2.5 mg BID     Opportunistic pathogen prophylaxis includes:  - PJP: trimethoprim/sulfamethoxazole - Bactrim three times per week Monday, Wednesday, Friday indefinitely  - CMV D+/-/R+: Valganciclovir 450 mg two times per week Monday and Thursday for 3/6 months duration tentatively     Patient was educated on Mycophenolate REMS for woman with childbearing potential    Pharmacy has monitored for medication interactions and immunosuppression levels in conjunction with the multidisciplinary team. In anticipation for discharge, medication therapy needs have been addressed daily throughout the current admission via multidisciplinary rounds and/or discussions, order verification, daily clinical pharmacy review, and communication with prescribers.

## 2023-09-29 NOTE — PROGRESS NOTES
[facility]   Transplant Nephrology Progress Note  Date of Admission:  9/27/2023  Today's Date: 09/29/2023    Recommendations:  - Increase sodium bicarbonate to 1300 mg PO BID (ordered).  - No indication for dialysis.      Assessment & Plan   # DDKT: Trend down in serum creatinine.   - Baseline Creatinine: ~ TBD   - Proteinuria: Nephrotic range (>3 grams) (prior to transplant)   - Date DSA Last Checked: Sep/2023      Latest DSA:  (in process)   - BK Viremia: Not checked post transplant   - Kidney Tx Biopsy: No   - Transplant Ureteral Stent: Yes    # Immunosuppression: Tacrolimus immediate release (goal 8-10), Mycophenolate mofetil (dose 750 mg every 12 hours), and Methylprednisolone (dose taper)   - Patient is in an immunosuppressed state and will continue to monitor for efficacy and toxicity of immunosuppression medications.   - Induction with Recent Transplant:  High Intensity Protocol   - Changes: No    # Infection Prophylaxis:   - PJP: Sulfa/TMP (Bactrim)  - CMV: Valganciclovir (Valcyte), CMV IgG Ab positive     # Hypertension: Controlled;  Goal BP: < 150/90   - Volume status: Euvolemic     - Changes: No    Prior to admission antihypertensives: amlodipine 10 mg PO daily,    # Anemia in Chronic Renal Disease: Hgb: Stable      KAREN: No   - Iron studies: Replete 3/2023    # Mineral Bone Disorder:    - Secondary renal hyperparathyroidism; PTH level: Normal (15-65 pg/ml)   (prior to transplant)     On treatment: None  - Vitamin D; level: Normal  5/2023       On supplement: No  - Calcium; level:  Normal        On supplement: No  - Phosphorus; level: Normal         On supplement: No    # Electrolytes:   - Potassium; level: Normal          On supplement: No  - Magnesium; level: Normal          On supplement: Yes, magnesium oxide 400 mg PO daily   - Bicarbonate; level: Low          On supplement: Yes, sodium bicarbonate 650 mg PO BID  - Sodium; level: Normal    # Complex  Urologic history: possible reflux/obstruction and recurrent UTI as a child requiring multiple urologic procedures, last of which was right native nephrectomy at age 13.  Cleared by urology for transplant. Voiding cystourethrogram which showed no evidence of vesicoureteral reflux and she was noted to empty her bladder to completion. CT imaging was unremarkable outside of left solitary kidney.     # Transplant History:  Etiology of Kidney Failure: Unknown etiology  Tx: DDKT  Transplant: 9/27/2023 (Kidney)  Crossmatch at time of Tx: (in process)  Significant changes in immunosuppression: None  Significant transplant-related complications: None    Recommendations were communicated to the primary team verbally.    Discussed with Dr. Nino.    Pj Lilly, APRN CNP   Pager: [phone]    Interval History   Ms. Flores's creatinine is 4.54 (09/29 0538); Trend down from 5.09 yesterday.   I/O last 3 completed shifts:  In: 30 [I.V.:30]  Out: 3650 [Urine:3650]   Other significant labs/tests/vitals: SBP 130s - 140s overnight. Afebrile  No acute events overnight.  No chest pain or shortness of breath.  No leg swelling.  No nausea and vomiting.  No BM yet since surgery.        Review of Systems   4 point ROS was obtained and negative except as noted in the Interval History.    MEDICATIONS:   acetaminophen  975 mg Oral Q8H    atorvastatin  40 mg Oral Daily    famotidine  10 mg Oral Daily    levothyroxine  50 mcg Oral Daily    magnesium oxide  400 mg Oral Daily with lunch    mycophenolate  750 mg Oral BID IS    polyethylene glycol  17 g Oral Daily    senna-docusate  1 tablet Oral BID    sodium bicarbonate  650 mg Oral BID    sodium chloride (PF)  10 mL Intracatheter Q8H    sulfamethoxazole-trimethoprim  1 tablet Oral Once per day on Mon Wed Fri    tacrolimus  2.5 mg Oral BID IS    valGANciclovir  450 mg Oral Once per day on Mon Thu      dextrose 5% lactated ringers 100 mL/hr at 09/29/23 0135       Physical Exam   Temp  Avg:  "98.9  F (37.2  C)  Min: 98.1  F (36.7  C)  Max: 99.6  F (37.6  C)      Pulse  Av.2  Min: 68  Max: 97 Resp  Av.3  Min: 10  Max: 30  SpO2  Av.1 %  Min: 92 %  Max: 100 %    CVP (mmHg): 12 mmHgBP 133/79 (BP Location: Right arm)   Pulse 75   Temp 98.1  F (36.7  C) (Oral)   Resp 18   Ht 1.499 m (4' 11\")   Wt 76.8 kg (169 lb 4.8 oz)   SpO2 93%   BMI 34.19 kg/m     Date 23 07 - 23 0659   Shift 2564-6430 8904-8994 0980-4197 24 Hour Total   INTAKE   Shift Total(mL/kg)       OUTPUT   Urine 200   200   Shift Total(mL/kg) 200(2.71)   200(2.71)   Weight (kg) 73.8 73.8 73.8 73.8      Admit Weight: 73.8 kg (162 lb 11.2 oz)     GENERAL APPEARANCE: alert and no distress  RESP: lungs clear to auscultation - no rales, rhonchi or wheezes  CV: regular rhythm, normal rate, no rub, no murmur  EDEMA: no LE edema bilaterally  ABDOMEN: soft, nondistended, appropriately tender, bowel sounds normal  MS: extremities normal - no gross deformities noted, no evidence of inflammation in joints, no muscle tenderness  SKIN: no rash  PSYCH: mentation appears normal and affect normal/bright  DIALYSIS ACCESS:  LUE AV fistula +bruit/+thrill    Data   All labs reviewed by me.  CMP  Recent Labs   Lab 23  0538 23  1328 23  0936 23  0619 23  0440 23  1735 23  1222 23  0509 23  0114     --   --   --  137  --  141 143 142   POTASSIUM 4.1 4.0 4.0  --  4.2  4.2   < > 4.0 4.2 3.8   CHLORIDE 107  --   --   --  105  --  107 108* 106   CO2 19*  --   --   --  18*  --  18* 19* 19*   ANIONGAP 13  --   --   --  14  --  16* 16* 17*   *  --   --  159* 144*  --  147* 97 98   BUN 52.9*  --   --   --  46.8*  --  49.0* 51.6* 53.3*   CR 4.54*  --   --   --  5.09*  --  5.36* 5.60* 5.58*   GFRESTIMATED 12*  --   --   --  10*  --  10* 9* 9*   RON 8.7  --   --   --  7.8*  --  8.1* 9.2 8.7   MAG 2.0  --   --   --  1.7  --  1.9 2.2  --    PHOS 4.3  --   --   --  4.4  --  4.8* 5.7*  " --    PROTTOTAL  --   --   --   --   --   --   --   --  7.7   ALBUMIN  --   --   --   --  3.3*  --   --   --  4.5   BILITOTAL  --   --   --   --   --   --   --   --  0.3   ALKPHOS  --   --   --   --   --   --   --   --  81   AST  --   --   --   --   --   --   --   --  15   ALT  --   --   --   --   --   --   --   --  16    < > = values in this interval not displayed.     CBC  Recent Labs   Lab 09/29/23  0538 09/28/23  1328 09/28/23  0936 09/28/23  0440 09/27/23  1735 09/27/23  1222 09/27/23  0509   HGB 9.3* 8.9* 9.5* 9.1*   < > 10.2* 11.4*   WBC 8.8  --   --  20.3*  --  13.7* 10.9   RBC 3.17*  --   --  3.14*  --  3.45* 3.96   HCT 28.7*  --   --  28.1*  --  31.5* 35.7   MCV 91  --   --  90  --  91 90   MCH 29.3  --   --  29.0  --  29.6 28.8   MCHC 32.4  --   --  32.4  --  32.4 31.9   RDW 13.4  --   --  13.4  --  13.2 13.1   *  --   --  214  --  239 332    < > = values in this interval not displayed.     INR  Recent Labs   Lab 09/27/23  0114   INR 1.02   PTT 30     ABGNo lab results found in last 7 days.   Urine Studies  Recent Labs   Lab Test 09/27/23  0107 11/05/20  1150   COLOR Straw Straw   APPEARANCE Clear Clear   URINEGLC 30* Negative   URINEBILI Negative Negative   URINEKETONE Negative Negative   SG 1.005 1.006   UBLD Trace* Negative   URINEPH 7.0 6.0   PROTEIN 70* 100*   NITRITE Negative Negative   LEUKEST Negative Negative   RBCU 1 2   WBCU 2 5     No lab results found.  PTH  Recent Labs   Lab Test 07/06/23  1028 05/05/23  0930   PTHI 64 90*     Iron Studies  No lab results found.    IMAGING:  All imaging studies reviewed by me.

## 2023-09-29 NOTE — PROGRESS NOTES
Care Management Follow Up    Length of Stay (days): 2    Expected Discharge Date: 09/30/2023     Concerns to be Addressed: all concerns addressed in this encounter       Patient plan of care discussed at interdisciplinary rounds: Yes    Anticipated Discharge Disposition:  (Local Hotel)    [facility]  Address: [address] [place] [id number]  Departments: [address] [place] [address],  [place]  Phone: [phone]    confirmation # [id number], check in 9/30 check out 10/7    Anticipated Discharge Services: None  Anticipated Discharge DME: None    Patient/family educated on Medicare website which has current facility and service quality ratings: no  Education Provided on the Discharge Plan: Yes  Patient/Family in Agreement with the Plan: yes    Referrals Placed by CM/CHITO: External Care Coordination    Private pay costs discussed: private room/amenity fees and transportation costs  CHITO provided the number for [place] to obtain [place]   Visit [place] or call [phone]   (voice) or [phone] (TTY).    Additional Information:  CHITO received message that patient and family have additional questions on lodging. CHITO checked on patient for a visit. Roseann advised CHITO to speak with her mother and daughter for logistical concerns. She appeared to continue to have discomfort post transplant. Patient resides about 1.5 hour from [facility]. The family is working to obtain [place] for lodging but noted that it is a financial stressor that they are not able to solidify reimbursement in time.     CHITO spoke with accommodations- [facility] stay has been arranged with a start date of tomorrow. CHITO was advised to send this to Roseann Coto's father in law. CHITO forwarded the confirmation information to Nnamdi. CHITO left a voicemail to daughter Lisa ([phone]). Her daughter and mother are the primary individuals assisting for post transplant cares. Her mother indicated that she  is unable to write in english and prefers that written information be given to Lisa or Nnamdi. Patient family was very grateful of support. SW notified weekend SW and RNCC, if needs arise over the weekend. SW will continue to follow for psychosocial support, resources and advocate on behalf of the patient.       GERMAN Zepeda, LICSW   [facility]  Outpatient Kidney/Pancreas/Auto Islet Transplant Program   [address] [place] [address] [place] [address]  [email or web address]  Office: [phone] I Fax: [phone]   BENJAMIN Zepeda

## 2023-09-29 NOTE — PLAN OF CARE
"/84 (BP Location: Right arm)   Pulse 79   Temp 98.6  F (37  C) (Oral)   Resp 18   Ht 1.499 m (4' 11\")   Wt 76.8 kg (169 lb 4.8 oz)   SpO2 96%   BMI 34.19 kg/m       Patient alert and oriented. VS stable. Patient on 2 L O2 via NC. Patient denies pain. Patient denies nausea. Urine Output - beach catheter in use. Adequate urine output Bowel Function - BM during day shift. Nutrition - Regular diet. Abdominal incision. AV Fistula. CVC - thymo infusing. Activity - SBA with IV pole. Plan of Care - Will continue with plan of care and notify care team of any changes.    "

## 2023-09-29 NOTE — PLAN OF CARE
"/84   Pulse 67   Temp 98.7  F (37.1  C) (Oral)   Resp 16   Ht 1.499 m (4' 11\")   Wt 76.8 kg (169 lb 4.8 oz)   SpO2 97%   BMI 34.19 kg/m      Shift: 8205-0700  VS: VSS on RA, afebrile  Neuro: Aox4  Behaviors: cooperative  Labs: creat 4.54  Respiratory: WDL  Cardiac: WDL  Pain/Nausea: denies  Diet: Regular  IV Access: RIJ, LAV fistula   Infusion(s): thymo  Lines/Drains: beach catheter  GI/: good beach output, Last BM today   Skin: right abd incision stapled VIOLA  Mobility: SBA   Events/Education: pharmacy education this afternoon  Plan: started thymo this afternoon , possible discharge tomorrow                          "

## 2023-09-29 NOTE — PROGRESS NOTES
CLINICAL NUTRITION SERVICES - DISCHARGE NOTE    Patient s discharge needs assessed and discharge planning has been conducted with the multidisciplinary transplant care team including physicians, pharmacy, social work and transplant coordinator.    Follow up/Monitoring:  Once discharged, place outpatient nutrition consult via the transplant team if nutrition concerns arise.    Preethi Pfeiffer RD, LD  Pager: [phone]

## 2023-09-29 NOTE — TELEPHONE ENCOUNTER
A pharmacist spent 30 minutes providing medication teaching with Ariane Flores for discharge with a focus on new medications/dose changes.  The discharge medication list was reviewed with the patient/family and the following points were discussed, as applicable: Name, description, purpose, dose/strength, duration of medications, common side effects, food/medications to avoid, action to be taken if dose is missed, when to call MD, and how to obtain refills.  The patient will be responsible for managing medications. Additionally, the following transplant related education was covered: Purpose of medication card, Medication videos, Timing of medications and day of lab draw considerations , Emesis, Prescription Insurance , and Discharge process for receiving meds   Patient will  transplant supplies including 7 day pill organizer, thermometer, and BP monitor at the discharge pharmacy along with medications.  Patient will use local pharmacy or other mail order for outpatient medications.   Clinical Pharmacy Consult:                                                      Transplant Specific:   Date of Transplant: 9/27/2023  Type of Transplant: kidney  First Transplant: yes  History of rejection: no    Immunosuppression Regimen   TAC 2.5mg qAM & 2.5mg qPM and MMF 750mg qAM & 750mg qPM  Patient specific goal: 8 to 10  Most recent level: pending, date:pending  Immunosuppressant Levels:  pending  Pt adherent to lab draws: yes  Scr:   Lab Results   Component Value Date    CR 4.54 09/29/2023    CR 2.50 11/05/2020     Side effects: no side effects    Prophylactic Medications  Antibacterial:  Bactrim SS daily  Scheduled Discontinue Date: Lifelong    Antifungal: Not needed thus far  Scheduled Discontinue Date: N/A    Antiviral: CrCl 10 to 24 mL/minute: Valcyte 450 mg twice weekly   Scheduled Discontinue Date: 3 months    Acid Reducer: Pepcid (famotidine)  Scheduled Reviewed Date:  TBD    Thrombosis Prevention:  TBD  Scheduled Discontinue Date:  TBD    Blood Pressure Management  Frequency of home Blood Pressure checks: TBD  Most recent home BP: TBD  Patient Blood pressure goal: TBD  Patient blood pressure at goal:  TBD  Hospitalizations/ER visits since last assessment: 0      Med rec/DUR performed: N/A  Med Rec Discrepancies: N/A    Reminders:    Bring to first clinic appt: med box, med card, bp monitor, all medications being taken, and lab book.  2.   MTM pharmacist visit on first clinic appt and if ok, again in 3 to 4 months during follow up appt.  3.   Avoid Grapefruit and Grapefruit juice.   4.   Avoid herbal supplements. If wish to take other medications or supplements, call your coordinator.   5.   Keep lab appts.   6.   Can use apps on phone like [employer] to help manage medication lists and reminders.   7.   Make sure you are protecting your skin by wearing long sleeves and applying sunscreen to exposed skin, for any significant time in the sun.     Transplant Coordinator is Lynn Estes.      Nnamdi Buchanan, [facility]

## 2023-09-29 NOTE — PROGRESS NOTES
"BP (!) 144/85 (BP Location: Right arm)   Pulse 78   Temp 98  F (36.7  C) (Oral)   Resp 18   Ht 1.499 m (4' 11\")   Wt 76.1 kg (167 lb 12.3 oz)   SpO2 93%   BMI 33.89 kg/m      Status: S/p kidney tx 9/27  Activity: SBA  Neuros: A&Ox4, no deficits noted.  Cardiac: WDL, denies chest pain.  Respiratory: WDL on RA when awake at rest; up to 3 L NC when asleep or walking.  GI/: +gas, no BM since surgery. AUOP via beach cath.  Diet: Tolerating regular diet  Skin/Incisions: WDL ex abd incision dressing with dried drainage unchanged.  Lines/Drains: R internal jugular CVC Saline locked x2 + D5LR at 100. Beach cath CDI with AUOP.  Pain/Nausea: Denies.  New Changes: No acute changes this shift.  Plan: Pharmacy education at 1100 today    "

## 2023-09-29 NOTE — PROGRESS NOTES
Care Management Follow Up    Length of Stay (days): 2    Expected Discharge Date: 09/30/2023     Concerns to be Addressed: all concerns addressed in this encounter     Patient plan of care discussed at interdisciplinary rounds: Yes    Anticipated Discharge Disposition:  (Local Hotel)     Anticipated Discharge Services: None  Anticipated Discharge DME: None    Patient/family educated on Medicare website which has current facility and service quality ratings: no  Education Provided on the Discharge Plan: Yes  Patient/Family in Agreement with the Plan: yes    Referrals Placed by CM/SW: External Care Coordination  Private pay costs discussed: Not applicable    Additional Information:    Pt s/p kidney transplant.  Per care team rounds anticipate possible discharge Saturday 9/30.      Met with pt.  Introduced RNCC role.  Reviewed anticipated plan for discharge, ATC appointments, transplant labs M/TH and home care RN services.  Pt PCP [facility] at [facility].  Pt declines home care services.  Per discussion with pt she was working part time prior to her transplant.  Pt plans to take 3 months off from work.  Per pt her mother and daughter will be staying with her in a local hotel post hospitalization. Pt requesting assistance in securing a hotel, agreed to follow up with CHITO Serrano.    Reviewed above with CHITO Serrano.    Eboni Yanes, RN BSN, PHN, ACM-RN  7A RN Care Coordinator  Phone: [phone]  Pager [phone]    To contact the weekend RNCC  [facility] (0800 - 1630) Saturday and Sunday    Units: 5A,5B,5C [phone]    Units: 6A, [phone]    Units 6B, 6C, 6D [phone]    Units: 7A, 7B, 7C, 7D, [phone]    [facility] ([phone]) Saturday and Sunday    Units: 5 Ortho, 8A, 10 ICU, & Pediatric Units-Pager 4: [phone]    9/29/2023 10:51 AM

## 2023-09-29 NOTE — PROGRESS NOTES
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 She had a normal voiding cystourethrogram in . S/p pre-emptive  donor kidney transplant with ureteral stent on 23.    Graft function: POD #2   Kidney: Cr 5.4->5.1->4.5. Expect slow graft function. Made urine prior to transplant.    Immunosuppressed status secondary to medications:   -PRA 97  Thymo: 150/150/150. Goal total is 450mg (6.1mg/kg)  Steroids:  500/250/100  MMF: 750mg BID  Tacro: Goal level 8-10  Complexity of management: High, induction    Hematology:   Anemia of chronic disease: Hgb ~ 9, stable.    Cardiorespiratory:   HTN: BP controlled, monitor.    GI/Nutrition:   Diet: Regular  Bowel regimen: Senna, PEG    Endocrine: No acute issues.     Fluid/Electrolytes: MIVF: Straight rate  Hypocalcemia: Asymptomatic. Replaced  per Neph.  Low bicarb: Increase NaBicarb    : Cook to remain due to new surgical anastomosis x3 days.    Infectious disease: Afebrile    Prophylaxis: DVT, fall, GI, viral (Valcyte), pneumocystis (TMP/sulfa)    Disposition: 7A, plan for discharge     ALISHA/Fellow/Resident Provider: Shannon Mota PA-C [id number]    Faculty: Linette Mills MD   _________________________________________________________________      Interval History: History is obtained from the patient  Overnight events: Pain controlled. Feeling bloated. Passing gas, + BM.    ROS:   A 10-point review of systems was negative except as noted above.    Meds:   acetaminophen  975 mg Oral Q8H    anti-thymocyte globulin  150 mg Intravenous Central line Once    atorvastatin  40 mg Oral Daily    famotidine  10 mg Oral Daily    levothyroxine  50 mcg Oral Daily    magnesium oxide  400 mg Oral  "Daily with lunch    mycophenolate  750 mg Oral BID IS    polyethylene glycol  17 g Oral Daily    senna-docusate  1 tablet Oral BID    sodium bicarbonate  1,300 mg Oral BID    sodium chloride (PF)  10 mL Intracatheter Q8H    sulfamethoxazole-trimethoprim  1 tablet Oral Once per day on Mon Wed Fri    tacrolimus  2.5 mg Oral BID IS    valGANciclovir  450 mg Oral Once per day on Mon Thu       Physical Exam:     Admit Weight: 73.8 kg (162 lb 11.2 oz)    Current vitals:   BP (!) 143/85 (BP Location: Left arm)   Pulse 79   Temp 98.7  F (37.1  C) (Oral)   Resp 16   Ht 1.499 m (4' 11\")   Wt 76.8 kg (169 lb 4.8 oz)   SpO2 94%   BMI 34.19 kg/m      Vital sign ranges:    Temp:  [98  F (36.7  C)-98.7  F (37.1  C)] 98.7  F (37.1  C)  Pulse:  [70-83] 79  Resp:  [16-20] 16  BP: (126-144)/(72-88) 143/85  SpO2:  [93 %-96 %] 94 %    General Appearance: in no apparent distress.   Skin: Warm, perfused  Heart: NSR  Lungs: Unlabored, 3L O2  Abdomen: The abdomen is Soft, incision stapled, c/d/i  : beach is present. Urine is yellow.  Extremities: edema: none, strength 5/5  Neurologic: A&Ox4    Data:   CMP  Recent Labs   Lab 09/29/23  0538 09/28/23  1328 09/28/23  0936 09/28/23  0619 09/28/23  0440 09/27/23  0509 09/27/23  0114     --   --   --  137   < > 142   POTASSIUM 4.1 4.0   < >  --  4.2  4.2   < > 3.8   CHLORIDE 107  --   --   --  105   < > 106   CO2 19*  --   --   --  18*   < > 19*   *  --   --  159* 144*   < > 98   BUN 52.9*  --   --   --  46.8*   < > 53.3*   CR 4.54*  --   --   --  5.09*   < > 5.58*   GFRESTIMATED 12*  --   --   --  10*   < > 9*   RON 8.7  --   --   --  7.8*   < > 8.7   MAG 2.0  --   --   --  1.7   < >  --    PHOS 4.3  --   --   --  4.4   < >  --    ALBUMIN  --   --   --   --  3.3*  --  4.5   BILITOTAL  --   --   --   --   --   --  0.3   ALKPHOS  --   --   --   --   --   --  81   AST  --   --   --   --   --   --  15   ALT  --   --   --   --   --   --  16    < > = values in this interval not " displayed.       CBC  Recent Labs   Lab 09/29/23  0538 09/28/23  1328 09/28/23  0936 09/28/23  0440 09/27/23  0509 09/27/23  0114   HGB 9.3* 8.9*   < > 9.1*   < > 10.9*   WBC 8.8  --   --  20.3*   < > 12.6*   *  --   --  214   < > 341   A1C  --   --   --   --   --  5.5    < > = values in this interval not displayed.

## 2023-09-29 NOTE — PLAN OF CARE
"Goal Outcome Evaluation:  BP (!) 140/88 (BP Location: Right arm)   Pulse 70   Temp 98.1  F (36.7  C) (Oral)   Resp 18   Ht 1.499 m (4' 11\")   Wt 76.1 kg (167 lb 12.3 oz)   SpO2 95%   BMI 33.89 kg/m      Shift: 2543-7205  VS: HTN, all other vitals stable, 3 L nasal cannula overnight, afebrile  Neuro: Alert and oriented x4   BG: Sugar checks daily POD1 + POD2   Labs: Awaiting AM labs   Pain/Nausea: Denies pain or nausea   Diet: Regular diet   IV Access: Triple lumen internal jugular, L AV fistula   Infusion(s): D5LR at 100   GI/: Cook in place, good output, no BM yet, passing gas  Skin: RLQ abdominal incision with dressing in place   Mobility: SBA   Plan: Continue with POC and notify team with any changes. Plan for pharmacy education 9/29 at 11am   "

## 2023-09-30 VITALS
RESPIRATION RATE: 16 BRPM | HEIGHT: 59 IN | OXYGEN SATURATION: 96 % | WEIGHT: 165.3 LBS | HEART RATE: 68 BPM | SYSTOLIC BLOOD PRESSURE: 136 MMHG | BODY MASS INDEX: 33.32 KG/M2 | DIASTOLIC BLOOD PRESSURE: 78 MMHG | TEMPERATURE: 98.6 F

## 2023-09-30 LAB
ANION GAP SERPL CALCULATED.3IONS-SCNC: 13 MMOL/L (ref 7–15)
BASOPHILS # BLD AUTO: 0 10E3/UL (ref 0–0.2)
BASOPHILS NFR BLD AUTO: 0 %
BUN SERPL-MCNC: 56.9 MG/DL (ref 6–20)
CALCIUM SERPL-MCNC: 8.5 MG/DL (ref 8.6–10)
CHLORIDE SERPL-SCNC: 108 MMOL/L (ref 98–107)
CREAT SERPL-MCNC: 3.72 MG/DL (ref 0.51–0.95)
DEPRECATED HCO3 PLAS-SCNC: 20 MMOL/L (ref 22–29)
EGFRCR SERPLBLD CKD-EPI 2021: 15 ML/MIN/1.73M2
EOSINOPHIL # BLD AUTO: 0 10E3/UL (ref 0–0.7)
EOSINOPHIL NFR BLD AUTO: 0 %
ERYTHROCYTE [DISTWIDTH] IN BLOOD BY AUTOMATED COUNT: 13.8 % (ref 10–15)
GLUCOSE SERPL-MCNC: 161 MG/DL (ref 70–99)
HCT VFR BLD AUTO: 27.3 % (ref 35–47)
HGB BLD-MCNC: 8.9 G/DL (ref 11.7–15.7)
IMM GRANULOCYTES # BLD: 0 10E3/UL
IMM GRANULOCYTES NFR BLD: 0 %
LYMPHOCYTES # BLD AUTO: 0.1 10E3/UL (ref 0.8–5.3)
LYMPHOCYTES NFR BLD AUTO: 2 %
MAGNESIUM SERPL-MCNC: 2 MG/DL (ref 1.7–2.3)
MCH RBC QN AUTO: 29.3 PG (ref 26.5–33)
MCHC RBC AUTO-ENTMCNC: 32.6 G/DL (ref 31.5–36.5)
MCV RBC AUTO: 90 FL (ref 78–100)
MONOCYTES # BLD AUTO: 0.2 10E3/UL (ref 0–1.3)
MONOCYTES NFR BLD AUTO: 9 %
NEUTROPHILS # BLD AUTO: 2.1 10E3/UL (ref 1.6–8.3)
NEUTROPHILS NFR BLD AUTO: 89 %
NRBC # BLD AUTO: 0 10E3/UL
NRBC BLD AUTO-RTO: 0 /100
PHOSPHATE SERPL-MCNC: 3.7 MG/DL (ref 2.5–4.5)
PLATELET # BLD AUTO: 131 10E3/UL (ref 150–450)
POTASSIUM SERPL-SCNC: 4.2 MMOL/L (ref 3.4–5.3)
RBC # BLD AUTO: 3.04 10E6/UL (ref 3.8–5.2)
SODIUM SERPL-SCNC: 141 MMOL/L (ref 135–145)
TACROLIMUS BLD-MCNC: 5.6 UG/L (ref 5–15)
TME LAST DOSE: NORMAL H
TME LAST DOSE: NORMAL H
WBC # BLD AUTO: 2.4 10E3/UL (ref 4–11)

## 2023-09-30 PROCEDURE — 85025 COMPLETE CBC W/AUTO DIFF WBC: CPT | Performed by: SURGERY

## 2023-09-30 PROCEDURE — 80197 ASSAY OF TACROLIMUS: CPT | Performed by: SURGERY

## 2023-09-30 PROCEDURE — 80048 BASIC METABOLIC PNL TOTAL CA: CPT | Performed by: SURGERY

## 2023-09-30 PROCEDURE — 250N000013 HC RX MED GY IP 250 OP 250 PS 637

## 2023-09-30 PROCEDURE — 250N000012 HC RX MED GY IP 250 OP 636 PS 637: Performed by: SURGERY

## 2023-09-30 PROCEDURE — 84100 ASSAY OF PHOSPHORUS: CPT | Performed by: SURGERY

## 2023-09-30 PROCEDURE — 99233 SBSQ HOSP IP/OBS HIGH 50: CPT | Mod: 24 | Performed by: NURSE PRACTITIONER

## 2023-09-30 PROCEDURE — 83735 ASSAY OF MAGNESIUM: CPT | Performed by: SURGERY

## 2023-09-30 PROCEDURE — 250N000013 HC RX MED GY IP 250 OP 250 PS 637: Performed by: SURGERY

## 2023-09-30 PROCEDURE — 36415 COLL VENOUS BLD VENIPUNCTURE: CPT | Performed by: SURGERY

## 2023-09-30 RX ORDER — VALGANCICLOVIR 450 MG/1
450 TABLET, FILM COATED ORAL
Qty: 60 TABLET | Refills: 2 | Status: SHIPPED | OUTPATIENT
Start: 2023-10-02 | End: 2023-10-06

## 2023-09-30 RX ORDER — TACROLIMUS 1 MG/1
CAPSULE ORAL
Qty: 120 CAPSULE | Refills: 11 | Status: SHIPPED | OUTPATIENT
Start: 2023-09-30 | End: 2023-09-30

## 2023-09-30 RX ORDER — OXYCODONE HYDROCHLORIDE 5 MG/1
2.5 TABLET ORAL EVERY 4 HOURS PRN
Qty: 5 TABLET | Refills: 0 | Status: SHIPPED | OUTPATIENT
Start: 2023-09-30 | End: 2023-10-04

## 2023-09-30 RX ORDER — ACETAMINOPHEN 325 MG/1
325-650 TABLET ORAL EVERY 4 HOURS PRN
Qty: 100 TABLET | Refills: 0 | Status: SHIPPED | OUTPATIENT
Start: 2023-09-30 | End: 2023-11-29

## 2023-09-30 RX ORDER — TACROLIMUS 0.5 MG/1
CAPSULE ORAL
Qty: 60 CAPSULE | Refills: 11 | Status: SHIPPED | OUTPATIENT
Start: 2023-09-30 | End: 2023-09-30

## 2023-09-30 RX ORDER — SULFAMETHOXAZOLE AND TRIMETHOPRIM 400; 80 MG/1; MG/1
1 TABLET ORAL
Qty: 30 TABLET | Refills: 11 | Status: SHIPPED | OUTPATIENT
Start: 2023-10-02 | End: 2023-10-06

## 2023-09-30 RX ORDER — TACROLIMUS 1 MG/1
3 CAPSULE ORAL 2 TIMES DAILY
Qty: 180 CAPSULE | Refills: 11 | Status: SHIPPED | OUTPATIENT
Start: 2023-09-30 | End: 2023-10-01

## 2023-09-30 RX ORDER — AMOXICILLIN 250 MG
1 CAPSULE ORAL 2 TIMES DAILY
Qty: 100 TABLET | Refills: 0 | Status: SHIPPED | OUTPATIENT
Start: 2023-09-30 | End: 2023-10-06

## 2023-09-30 RX ORDER — MYCOPHENOLATE MOFETIL 250 MG/1
750 CAPSULE ORAL 2 TIMES DAILY
Qty: 180 CAPSULE | Refills: 11 | Status: SHIPPED | OUTPATIENT
Start: 2023-09-30 | End: 2023-10-06

## 2023-09-30 RX ORDER — POLYETHYLENE GLYCOL 3350 17 G/17G
17 POWDER, FOR SOLUTION ORAL DAILY
Qty: 510 G | Refills: 0 | Status: SHIPPED | OUTPATIENT
Start: 2023-09-30 | End: 2023-10-06

## 2023-09-30 RX ORDER — TACROLIMUS 0.5 MG/1
CAPSULE ORAL
Qty: 60 CAPSULE | Refills: 11 | Status: SHIPPED | OUTPATIENT
Start: 2023-09-30 | End: 2023-10-10

## 2023-09-30 RX ORDER — SODIUM BICARBONATE 650 MG/1
1300 TABLET ORAL 2 TIMES DAILY
Qty: 120 TABLET | Refills: 11 | Status: SHIPPED | OUTPATIENT
Start: 2023-09-30 | End: 2023-11-29

## 2023-09-30 RX ORDER — TACROLIMUS 1 MG/1
3 CAPSULE ORAL
Status: DISCONTINUED | OUTPATIENT
Start: 2023-09-30 | End: 2023-09-30 | Stop reason: HOSPADM

## 2023-09-30 RX ORDER — MAGNESIUM OXIDE 400 MG/1
400 TABLET ORAL
Qty: 30 TABLET | Refills: 11 | Status: SHIPPED | OUTPATIENT
Start: 2023-09-30 | End: 2023-10-11

## 2023-09-30 RX ADMIN — SODIUM BICARBONATE 650 MG TABLET 1300 MG: at 09:10

## 2023-09-30 RX ADMIN — MYCOPHENOLATE MOFETIL 750 MG: 250 CAPSULE ORAL at 09:10

## 2023-09-30 RX ADMIN — TACROLIMUS 2.5 MG: 1 CAPSULE ORAL at 09:05

## 2023-09-30 RX ADMIN — POLYETHYLENE GLYCOL 3350 17 G: 17 POWDER, FOR SOLUTION ORAL at 09:14

## 2023-09-30 RX ADMIN — FAMOTIDINE 10 MG: 10 TABLET, FILM COATED ORAL at 06:36

## 2023-09-30 RX ADMIN — LEVOTHYROXINE SODIUM 50 MCG: 0.05 TABLET ORAL at 09:11

## 2023-09-30 RX ADMIN — MAGNESIUM OXIDE TAB 400 MG (241.3 MG ELEMENTAL MG) 400 MG: 400 (241.3 MG) TAB at 13:45

## 2023-09-30 RX ADMIN — SENNOSIDES AND DOCUSATE SODIUM 1 TABLET: 50; 8.6 TABLET ORAL at 09:11

## 2023-09-30 RX ADMIN — ACETAMINOPHEN 975 MG: 325 TABLET, FILM COATED ORAL at 09:03

## 2023-09-30 ASSESSMENT — ACTIVITIES OF DAILY LIVING (ADL)
ADLS_ACUITY_SCORE: 22

## 2023-09-30 NOTE — PLAN OF CARE
"/84 (BP Location: Right arm)   Pulse 77   Temp 97.8  F (36.6  C) (Oral)   Resp 16   Ht 1.499 m (4' 11\")   Wt 76.8 kg (169 lb 4.8 oz)   SpO2 93%   BMI 34.19 kg/m      Shift: 9475-3534  VS: VSS on RA, afebrile  Neuro: AOx4  BG: none  Labs: am labs, cr 4.54 yesterday  Respiratory: mild HICKS  Pain/Nausea/PRN: denies pain and nausea  Diet: regular  LDA: internal jugular SL, L AVF  GI/: beach- great UO, straw yellow in color; pt complained of bloating, passing minimal gas; improved with Bmx2 after a walk  Skin: RLQ incision- stapled, VIOLA with no drainage  Mobility: SBA  Plan: beach removal, possible discharge today.     Handoff given to following RN.                          "

## 2023-09-30 NOTE — CARE PLAN
Patient's Name: Ariane Flores    Procedure Date: 09/30/23  Procedure Time 1pm    Procedure Performed: Central line removal    The Central Venous Catheter (CVC) was removed per provider order.     The central venous catheter was located: Right Internal Jugular vein, with a total of 3 lumens.     The patient was placed in Trendelenburg position with Insertion site lower than heart.     Valsalva response achieved by: Patient instructed to take a deep breath and bear down while the CVC was removed with a constant steady motion.     Firm pressure was applied at the access site for 5 minutes until bleeding stopped.     Post removal site assessment; Clean and dry without bleeding. Occlusive dressing applied: Yes    CVC tip was inspected and intact: Yes    Tip was sent to lab for culture per provider order: No    Patient tolerated the procedure: well    2nd RN at bedside during removal (if applicable): Yakelin Boo RN   9/30/2023   1:02 PM

## 2023-09-30 NOTE — PROGRESS NOTES
[facility]   Transplant Nephrology Progress Note  Date of Admission:  9/27/2023  Today's Date: 09/30/2023    Recommendations:  - No acute indication for dialysis.    Assessment & Plan   # DDKT: Trend down in serum creatinine.   - Baseline Creatinine: ~ TBD   - Proteinuria: Nephrotic range (>3 grams) (prior to transplant)   - Date DSA Last Checked: Sep/2023      Latest DSA:  (in process)   - BK Viremia: Not checked post transplant   - Kidney Tx Biopsy: No   - Transplant Ureteral Stent: Yes    # Immunosuppression: Tacrolimus immediate release (goal 8-10), Mycophenolate mofetil (dose 750 mg every 12 hours), and Methylprednisolone (dose taper)   - Patient is in an immunosuppressed state and will continue to monitor for efficacy and toxicity of immunosuppression medications.   - Induction with Recent Transplant:  High Intensity Protocol   - Changes: No    # Infection Prophylaxis:   - PJP: Sulfa/TMP (Bactrim)  - CMV: Valganciclovir (Valcyte), CMV IgG Ab positive     # Hypertension: Controlled;  Goal BP: < 150/90   - Volume status: Euvolemic     - Changes: No    Prior to admission antihypertensives: amlodipine 10 mg PO daily,    # Anemia in Chronic Renal Disease: Hgb: Trend down      KAREN: No   - Iron studies: Not checked recently     # Mineral Bone Disorder:    - Secondary renal hyperparathyroidism; PTH level: Normal (15-65 pg/ml)   (prior to transplant)     On treatment: None  - Vitamin D; level: Normal  5/2023       On supplement: No  - Calcium; level:  Low        On supplement: No  - Phosphorus; level: Normal         On supplement: No    # Electrolytes:   - Potassium; level: Normal          On supplement: No  - Magnesium; level: Normal          On supplement: Yes, magnesium oxide 400 mg PO daily   - Bicarbonate; level: Low          On supplement: Yes, sodium bicarbonate 1300 mg PO BID  - Sodium; level: Normal    # Complex Urologic history: possible reflux/obstruction and  recurrent UTI as a child requiring multiple urologic procedures, last of which was right native nephrectomy at age 13.  Cleared by urology for transplant. Voiding cystourethrogram which showed no evidence of vesicoureteral reflux and she was noted to empty her bladder to completion. CT imaging was unremarkable outside of left solitary kidney.     # Transplant History:  Etiology of Kidney Failure: Unknown etiology  Tx: DDKT  Transplant: 9/27/2023 (Kidney)  Crossmatch at time of Tx: (in process)  Significant changes in immunosuppression: None  Significant transplant-related complications: None    Recommendations were communicated to the primary team verbally.    Seen and discussed with Dr. Jamison Avila NP   Pager: [phone]    Physician Attestation     I saw and evaluated Ariane Flores as part of a shared APRN/PA visit.     I personally reviewed the vital signs, medications, and labs.    I personally performed the substantive portion of the medical decision making for this visit - please see the ALISHA's documentation for full details.    Key management decisions made by me and carried out under my direction: No acute indications for dialysis.  Would continue on present immunosuppression.    Familia Swift MD  Date of Service (when I saw the patient): 09/30/23    Interval History   Ms. Flores's creatinine is 3.72 (09/30 0543); Stable.  Good urine output.  Other significant labs/tests/vitals: VSS  No events overnight.  no chest pain or shortness of breath.  no leg swelling.  no nausea and vomiting.  Bowel movements are none.  no fever, sweats or chills.    I/O last 3 completed shifts:  In: 1905 [P.O.:1600; I.V.:305]  Out: 4600 [Urine:4600]     Review of Systems   4 point ROS was obtained and negative except as noted in the Interval History.    MEDICATIONS:   acetaminophen  975 mg Oral Q8H    atorvastatin  40 mg Oral Daily    famotidine  10 mg Oral Daily    levothyroxine  50 mcg Oral Daily     "magnesium oxide  400 mg Oral Daily with lunch    mycophenolate  750 mg Oral BID IS    polyethylene glycol  17 g Oral Daily    senna-docusate  1 tablet Oral BID    sodium bicarbonate  1,300 mg Oral BID    sodium chloride (PF)  10 mL Intracatheter Q8H    sulfamethoxazole-trimethoprim  1 tablet Oral Once per day on     tacrolimus  2.5 mg Oral BID IS    valGANciclovir  450 mg Oral Once per day on        Physical Exam   Temp  Av.9  F (37.2  C)  Min: 98.1  F (36.7  C)  Max: 99.6  F (37.6  C)      Pulse  Av.2  Min: 68  Max: 97 Resp  Av.3  Min: 10  Max: 30  SpO2  Av.1 %  Min: 92 %  Max: 100 %    CVP (mmHg): 12 mmHgBP 128/79 (BP Location: Right arm)   Pulse 69   Temp 97.9  F (36.6  C) (Oral)   Resp 16   Ht 1.499 m (4' 11\")   Wt 75 kg (165 lb 4.8 oz)   SpO2 94%   BMI 33.39 kg/m     Date 23 07 - 23 0659   Shift 2948-8330 2629-7992 4192-3503 24 Hour Total   INTAKE   Shift Total(mL/kg)       OUTPUT   Urine 200   200   Shift Total(mL/kg) 200(2.71)   200(2.71)   Weight (kg) 73.8 73.8 73.8 73.8      Admit Weight: 73.8 kg (162 lb 11.2 oz)     GENERAL APPEARANCE: alert and no distress  RESP: lungs clear to auscultation - no rales, rhonchi or wheezes  CV: regular rhythm, normal rate, no rub, no murmur  EDEMA: no LE edema bilaterally  ABDOMEN: soft, nondistended, appropriately tender, bowel sounds normal  MS: extremities normal - no gross deformities noted, no evidence of inflammation in joints, no muscle tenderness  SKIN: no rash  PSYCH: mentation appears normal and affect normal/bright  DIALYSIS ACCESS:  LUE AV fistula +bruit/+thrill    Data   All labs reviewed by me.  CMP  Recent Labs   Lab 23  0543 23  0538 23  1328 23  0936 23  0619 23  0440 23  1735 23  1222 23  0509 23  0114    139  --   --   --  137  --  141   < > 142   POTASSIUM 4.2 4.1 4.0 4.0  --  4.2  4.2   < > 4.0   < > 3.8   CHLORIDE 108* 107  --   " --   --  105  --  107   < > 106   CO2 20* 19*  --   --   --  18*  --  18*   < > 19*   ANIONGAP 13 13  --   --   --  14  --  16*   < > 17*   * 156*  --   --  159* 144*  --  147*   < > 98   BUN 56.9* 52.9*  --   --   --  46.8*  --  49.0*   < > 53.3*   CR 3.72* 4.54*  --   --   --  5.09*  --  5.36*   < > 5.58*   GFRESTIMATED 15* 12*  --   --   --  10*  --  10*   < > 9*   RON 8.5* 8.7  --   --   --  7.8*  --  8.1*   < > 8.7   MAG 2.0 2.0  --   --   --  1.7  --  1.9   < >  --    PHOS 3.7 4.3  --   --   --  4.4  --  4.8*   < >  --    PROTTOTAL  --   --   --   --   --   --   --   --   --  7.7   ALBUMIN  --   --   --   --   --  3.3*  --   --   --  4.5   BILITOTAL  --   --   --   --   --   --   --   --   --  0.3   ALKPHOS  --   --   --   --   --   --   --   --   --  81   AST  --   --   --   --   --   --   --   --   --  15   ALT  --   --   --   --   --   --   --   --   --  16    < > = values in this interval not displayed.     CBC  Recent Labs   Lab 09/30/23  0543 09/29/23  0538 09/28/23  1328 09/28/23  0936 09/28/23  0440 09/27/23  1735 09/27/23  1222   HGB 8.9* 9.3* 8.9* 9.5* 9.1*   < > 10.2*   WBC 2.4* 8.8  --   --  20.3*  --  13.7*   RBC 3.04* 3.17*  --   --  3.14*  --  3.45*   HCT 27.3* 28.7*  --   --  28.1*  --  31.5*   MCV 90 91  --   --  90  --  91   MCH 29.3 29.3  --   --  29.0  --  29.6   MCHC 32.6 32.4  --   --  32.4  --  32.4   RDW 13.8 13.4  --   --  13.4  --  13.2   * 144*  --   --  214  --  239    < > = values in this interval not displayed.     INR  Recent Labs   Lab 09/27/23  0114   INR 1.02   PTT 30     ABGNo lab results found in last 7 days.   Urine Studies  Recent Labs   Lab Test 09/27/23 0107 11/05/20  1150   COLOR Straw Straw   APPEARANCE Clear Clear   URINEGLC 30* Negative   URINEBILI Negative Negative   URINEKETONE Negative Negative   SG 1.005 1.006   UBLD Trace* Negative   URINEPH 7.0 6.0   PROTEIN 70* 100*   NITRITE Negative Negative   LEUKEST Negative Negative   RBCU 1 2   WBCU 2 5      No lab results found.  PTH  Recent Labs   Lab Test 07/06/23  1028 05/05/23  0930   PTHI 64 90*     Iron Studies  No lab results found.    IMAGING:  All imaging studies reviewed by me.

## 2023-09-30 NOTE — PLAN OF CARE
DISCHARGE:  Patient with orders to discharge to Hotel.     Education Provided:   Med Card - up to date and given to patient  Lab Book - up to date and given to patient  Handouts - n/a  Specialty Pharmacy - 9/30  LDAs - n/a    Discharge instructions, medications & follow ups reviewed with patient and daughters. Copy of discharge summary given to patient. PIV and IJ removed. Belongings returned from security medications from. [facility] notified, report given; yes, per AM nurse.    Patient in stable condition. AVSS. Patient and daughters had no further questions regarding discharge instructions and medications. Patient transferred out by wheelchair.

## 2023-09-30 NOTE — PLAN OF CARE
"/78 (BP Location: Right arm)   Pulse 68   Temp 98.6  F (37  C) (Oral)   Resp 16   Ht 1.499 m (4' 11\")   Wt 75 kg (165 lb 4.8 oz)   SpO2 96%   BMI 33.39 kg/m      Shift: 6456-1156  VS: VSS on RA, afebrile  Neuro: Aox4  Behaviors: cooperative  Labs: creat 3.72  Respiratory: WDL   Cardiac: WDL  Pain/Nausea: denies  Diet: Regular  GI/: good urine output, Last BM today   Skin: right abd incision stapled VIOLA  Mobility: SBA   Events/Education: beach and internal jugular line taken out today   Plan: discharge today                         "

## 2023-10-01 ENCOUNTER — INFUSION THERAPY VISIT (OUTPATIENT)
Dept: INFUSION THERAPY | Facility: CLINIC | Age: 43
End: 2023-10-01
Attending: INTERNAL MEDICINE
Payer: COMMERCIAL

## 2023-10-01 ENCOUNTER — TELEPHONE (OUTPATIENT)
Dept: TRANSPLANT | Facility: CLINIC | Age: 43
End: 2023-10-01

## 2023-10-01 VITALS
RESPIRATION RATE: 18 BRPM | TEMPERATURE: 98.8 F | SYSTOLIC BLOOD PRESSURE: 128 MMHG | BODY MASS INDEX: 32.64 KG/M2 | WEIGHT: 161.6 LBS | DIASTOLIC BLOOD PRESSURE: 74 MMHG | HEART RATE: 80 BPM | OXYGEN SATURATION: 100 %

## 2023-10-01 DIAGNOSIS — D63.8 ANEMIA OF CHRONIC DISEASE: ICD-10-CM

## 2023-10-01 DIAGNOSIS — Z94.0 STATUS POST KIDNEY TRANSPLANT: Primary | ICD-10-CM

## 2023-10-01 DIAGNOSIS — Z94.0 S/P KIDNEY TRANSPLANT: ICD-10-CM

## 2023-10-01 DIAGNOSIS — N18.4 CKD (CHRONIC KIDNEY DISEASE) STAGE 4, GFR 15-29 ML/MIN (H): ICD-10-CM

## 2023-10-01 DIAGNOSIS — E55.9 VITAMIN D DEFICIENCY: ICD-10-CM

## 2023-10-01 LAB
ANION GAP SERPL CALCULATED.3IONS-SCNC: 11 MMOL/L (ref 7–15)
BASOPHILS # BLD AUTO: 0 10E3/UL (ref 0–0.2)
BASOPHILS NFR BLD AUTO: 0 %
BUN SERPL-MCNC: 50.9 MG/DL (ref 6–20)
CALCIUM SERPL-MCNC: 8.9 MG/DL (ref 8.6–10)
CHLORIDE SERPL-SCNC: 108 MMOL/L (ref 98–107)
CREAT SERPL-MCNC: 2.72 MG/DL (ref 0.51–0.95)
DEPRECATED HCO3 PLAS-SCNC: 22 MMOL/L (ref 22–29)
EGFRCR SERPLBLD CKD-EPI 2021: 21 ML/MIN/1.73M2
EOSINOPHIL # BLD AUTO: 0 10E3/UL (ref 0–0.7)
EOSINOPHIL NFR BLD AUTO: 1 %
ERYTHROCYTE [DISTWIDTH] IN BLOOD BY AUTOMATED COUNT: 13.2 % (ref 10–15)
GLUCOSE SERPL-MCNC: 99 MG/DL (ref 70–99)
HCT VFR BLD AUTO: 29.6 % (ref 35–47)
HGB BLD-MCNC: 9.7 G/DL (ref 11.7–15.7)
IMM GRANULOCYTES # BLD: 0 10E3/UL
IMM GRANULOCYTES NFR BLD: 0 %
LYMPHOCYTES # BLD AUTO: 0.2 10E3/UL (ref 0.8–5.3)
LYMPHOCYTES NFR BLD AUTO: 3 %
MAGNESIUM SERPL-MCNC: 2 MG/DL (ref 1.7–2.3)
MCH RBC QN AUTO: 29.1 PG (ref 26.5–33)
MCHC RBC AUTO-ENTMCNC: 32.8 G/DL (ref 31.5–36.5)
MCV RBC AUTO: 89 FL (ref 78–100)
MONOCYTES # BLD AUTO: 0.3 10E3/UL (ref 0–1.3)
MONOCYTES NFR BLD AUTO: 5 %
NEUTROPHILS # BLD AUTO: 5.3 10E3/UL (ref 1.6–8.3)
NEUTROPHILS NFR BLD AUTO: 91 %
NRBC # BLD AUTO: 0 10E3/UL
NRBC BLD AUTO-RTO: 0 /100
PHOSPHATE SERPL-MCNC: 2.4 MG/DL (ref 2.5–4.5)
PLATELET # BLD AUTO: 123 10E3/UL (ref 150–450)
POTASSIUM SERPL-SCNC: 3.7 MMOL/L (ref 3.4–5.3)
RBC # BLD AUTO: 3.33 10E6/UL (ref 3.8–5.2)
SODIUM SERPL-SCNC: 141 MMOL/L (ref 135–145)
TACROLIMUS BLD-MCNC: 6.5 UG/L (ref 5–15)
TME LAST DOSE: NORMAL H
TME LAST DOSE: NORMAL H
WBC # BLD AUTO: 5.8 10E3/UL (ref 4–11)

## 2023-10-01 PROCEDURE — 36415 COLL VENOUS BLD VENIPUNCTURE: CPT

## 2023-10-01 PROCEDURE — 85025 COMPLETE CBC W/AUTO DIFF WBC: CPT

## 2023-10-01 PROCEDURE — 258N000003 HC RX IP 258 OP 636: Performed by: NURSE PRACTITIONER

## 2023-10-01 PROCEDURE — 83735 ASSAY OF MAGNESIUM: CPT

## 2023-10-01 PROCEDURE — 84100 ASSAY OF PHOSPHORUS: CPT

## 2023-10-01 PROCEDURE — 80197 ASSAY OF TACROLIMUS: CPT

## 2023-10-01 PROCEDURE — 96360 HYDRATION IV INFUSION INIT: CPT

## 2023-10-01 PROCEDURE — 80048 BASIC METABOLIC PNL TOTAL CA: CPT

## 2023-10-01 RX ORDER — TACROLIMUS 1 MG/1
5 CAPSULE ORAL 2 TIMES DAILY
Qty: 300 CAPSULE | Refills: 11
Start: 2023-10-01 | End: 2023-10-02

## 2023-10-01 RX ADMIN — SODIUM CHLORIDE, POTASSIUM CHLORIDE, SODIUM LACTATE AND CALCIUM CHLORIDE 1000 ML: 600; 310; 30; 20 INJECTION, SOLUTION INTRAVENOUS at 08:38

## 2023-10-01 NOTE — PROGRESS NOTES
"Ariane Flores came to [facility] today for a lab and assess following a kidney transplant transplant on 9/27/23.      Discharge date: 9/29/23  Transplant coordinator: Lynn Estes   Phone number patient can be reached at: [phone]       Physical Assessment:  See physical assessment located under \"Document Flowsheets\".  Incision site: clean, dry, intact, no drainage  Lines: N/A  Cook: N/A  Urine clarity: yellow, clear  Hydration: drank 0.5 L water, 12 oz cranberry juice yesterday  Nutrition: eating less than baseline, 2 small meals yesterday  Last BM: AM, diarrhea 3x today  Pain: incisional, upon ambulation  My transplant place videos watched: No    Labs drawn by [facility] staff Yes    Plan of care for today: Patient seen at [facility] s/p day 1 kidney transplant. Patient reported 3x diarrhea today. Infused 1000mL LR. Patient provided education on medications and plan of care for tomorrow.     Medication changes: None    Medications administered:    Administrations This Visit       lactated ringers BOLUS 1,000 mL       Admin Date  10/01/2023 Action  $New Bag Dose  1,000 mL Rate  1,000 mL/hr Route  Intravenous Administered By  Peggy Wilkinson RN                     Patient education:    The following teaching topics were addressed: Importance of drinking 2L of non-caffeinated fluids daily, Incisional care, Signs/symptoms of infection, Good handwashing, Medications (purposes, doses and times of administration), Phone numbers to call with concenrs (Transplant coordinator, [address] and [facility]), 7A discharge check list, and Plan of care   Patient and daughter, friend  verbalized understanding and all questions answered.    Drug level:  Patient took 3mg of tacrolimus last evening at 1900. Care coordinator to follow up with the result.    Face to face time: 1.5 hours    Discharge Plan    Pt will follow up with [facility] 10/2  Discharge instructions reviewed with patient: YES  Patient/Representative verbalized understanding, " all questions answered: YES    Discharged from unit at 1004 with whom: daughter and friend to hotel.    mEma Gutierrez, Nursing Student

## 2023-10-02 ENCOUNTER — INFUSION THERAPY VISIT (OUTPATIENT)
Dept: INFUSION THERAPY | Facility: CLINIC | Age: 43
End: 2023-10-02
Attending: INTERNAL MEDICINE
Payer: COMMERCIAL

## 2023-10-02 ENCOUNTER — OFFICE VISIT (OUTPATIENT)
Dept: INFUSION THERAPY | Facility: CLINIC | Age: 43
End: 2023-10-02
Attending: NURSE PRACTITIONER
Payer: COMMERCIAL

## 2023-10-02 ENCOUNTER — TELEPHONE (OUTPATIENT)
Dept: PHARMACY | Facility: CLINIC | Age: 43
End: 2023-10-02

## 2023-10-02 ENCOUNTER — LAB (OUTPATIENT)
Dept: LAB | Facility: CLINIC | Age: 43
End: 2023-10-02
Attending: INTERNAL MEDICINE
Payer: COMMERCIAL

## 2023-10-02 ENCOUNTER — TELEPHONE (OUTPATIENT)
Dept: TRANSPLANT | Facility: CLINIC | Age: 43
End: 2023-10-02

## 2023-10-02 VITALS
BODY MASS INDEX: 32.46 KG/M2 | TEMPERATURE: 98.6 F | RESPIRATION RATE: 16 BRPM | WEIGHT: 160.7 LBS | SYSTOLIC BLOOD PRESSURE: 132 MMHG | HEART RATE: 70 BPM | DIASTOLIC BLOOD PRESSURE: 80 MMHG | OXYGEN SATURATION: 100 %

## 2023-10-02 DIAGNOSIS — Z94.0 STATUS POST KIDNEY TRANSPLANT: ICD-10-CM

## 2023-10-02 DIAGNOSIS — E55.9 VITAMIN D DEFICIENCY: ICD-10-CM

## 2023-10-02 DIAGNOSIS — Z94.0 STATUS POST KIDNEY TRANSPLANT: Primary | ICD-10-CM

## 2023-10-02 DIAGNOSIS — D63.8 ANEMIA OF CHRONIC DISEASE: ICD-10-CM

## 2023-10-02 DIAGNOSIS — Z94.0 S/P KIDNEY TRANSPLANT: ICD-10-CM

## 2023-10-02 DIAGNOSIS — N18.4 CKD (CHRONIC KIDNEY DISEASE) STAGE 4, GFR 15-29 ML/MIN (H): ICD-10-CM

## 2023-10-02 LAB
ANION GAP SERPL CALCULATED.3IONS-SCNC: 10 MMOL/L (ref 7–15)
BASOPHILS # BLD AUTO: 0 10E3/UL (ref 0–0.2)
BASOPHILS NFR BLD AUTO: 0 %
BUN SERPL-MCNC: 38.7 MG/DL (ref 6–20)
CALCIUM SERPL-MCNC: 9.1 MG/DL (ref 8.6–10)
CHLORIDE SERPL-SCNC: 107 MMOL/L (ref 98–107)
CREAT SERPL-MCNC: 1.94 MG/DL (ref 0.51–0.95)
DEPRECATED HCO3 PLAS-SCNC: 23 MMOL/L (ref 22–29)
EGFRCR SERPLBLD CKD-EPI 2021: 32 ML/MIN/1.73M2
EOSINOPHIL # BLD AUTO: 0.1 10E3/UL (ref 0–0.7)
EOSINOPHIL NFR BLD AUTO: 1 %
ERYTHROCYTE [DISTWIDTH] IN BLOOD BY AUTOMATED COUNT: 13.1 % (ref 10–15)
FERRITIN SERPL-MCNC: 270 NG/ML (ref 6–175)
GLUCOSE SERPL-MCNC: 109 MG/DL (ref 70–99)
HCT VFR BLD AUTO: 31.2 % (ref 35–47)
HGB BLD-MCNC: 10.2 G/DL (ref 11.7–15.7)
IMM GRANULOCYTES # BLD: 0.1 10E3/UL
IMM GRANULOCYTES NFR BLD: 1 %
IRON BINDING CAPACITY (ROCHE): 220 UG/DL (ref 240–430)
IRON SATN MFR SERPL: 16 % (ref 15–46)
IRON SERPL-MCNC: 35 UG/DL (ref 37–145)
LYMPHOCYTES # BLD AUTO: 0.2 10E3/UL (ref 0.8–5.3)
LYMPHOCYTES NFR BLD AUTO: 4 %
MAGNESIUM SERPL-MCNC: 1.8 MG/DL (ref 1.7–2.3)
MCH RBC QN AUTO: 29.3 PG (ref 26.5–33)
MCHC RBC AUTO-ENTMCNC: 32.7 G/DL (ref 31.5–36.5)
MCV RBC AUTO: 90 FL (ref 78–100)
MONOCYTES # BLD AUTO: 0.4 10E3/UL (ref 0–1.3)
MONOCYTES NFR BLD AUTO: 6 %
MYCOPHENOLATE SERPL LC/MS/MS-MCNC: 1.07 MG/L (ref 1–3.5)
MYCOPHENOLATE-G SERPL LC/MS/MS-MCNC: 61.5 MG/L (ref 30–95)
NEUTROPHILS # BLD AUTO: 6 10E3/UL (ref 1.6–8.3)
NEUTROPHILS NFR BLD AUTO: 88 %
NRBC # BLD AUTO: 0 10E3/UL
NRBC BLD AUTO-RTO: 0 /100
PHOSPHATE SERPL-MCNC: 3 MG/DL (ref 2.5–4.5)
PLATELET # BLD AUTO: 130 10E3/UL (ref 150–450)
POTASSIUM SERPL-SCNC: 3.9 MMOL/L (ref 3.4–5.3)
PTH-INTACT SERPL-MCNC: 43 PG/ML (ref 15–65)
RBC # BLD AUTO: 3.48 10E6/UL (ref 3.8–5.2)
SODIUM SERPL-SCNC: 140 MMOL/L (ref 135–145)
TACROLIMUS BLD-MCNC: 13.2 UG/L (ref 5–15)
TME LAST DOSE: NORMAL H
VIT D+METAB SERPL-MCNC: 32 NG/ML (ref 20–50)
WBC # BLD AUTO: 6.7 10E3/UL (ref 4–11)

## 2023-10-02 PROCEDURE — 84100 ASSAY OF PHOSPHORUS: CPT | Performed by: PATHOLOGY

## 2023-10-02 PROCEDURE — 36415 COLL VENOUS BLD VENIPUNCTURE: CPT | Performed by: PATHOLOGY

## 2023-10-02 PROCEDURE — 99213 OFFICE O/P EST LOW 20 MIN: CPT | Mod: 24 | Performed by: NURSE PRACTITIONER

## 2023-10-02 PROCEDURE — 83735 ASSAY OF MAGNESIUM: CPT | Performed by: PATHOLOGY

## 2023-10-02 PROCEDURE — 80048 BASIC METABOLIC PNL TOTAL CA: CPT | Performed by: PATHOLOGY

## 2023-10-02 PROCEDURE — 83540 ASSAY OF IRON: CPT | Performed by: PATHOLOGY

## 2023-10-02 PROCEDURE — 83550 IRON BINDING TEST: CPT | Performed by: PATHOLOGY

## 2023-10-02 PROCEDURE — 82306 VITAMIN D 25 HYDROXY: CPT | Performed by: INTERNAL MEDICINE

## 2023-10-02 PROCEDURE — 83970 ASSAY OF PARATHORMONE: CPT | Performed by: PATHOLOGY

## 2023-10-02 PROCEDURE — 85025 COMPLETE CBC W/AUTO DIFF WBC: CPT | Performed by: PATHOLOGY

## 2023-10-02 PROCEDURE — 99000 SPECIMEN HANDLING OFFICE-LAB: CPT | Performed by: PATHOLOGY

## 2023-10-02 PROCEDURE — 96360 HYDRATION IV INFUSION INIT: CPT

## 2023-10-02 PROCEDURE — 82728 ASSAY OF FERRITIN: CPT | Performed by: PATHOLOGY

## 2023-10-02 PROCEDURE — 80197 ASSAY OF TACROLIMUS: CPT | Performed by: INTERNAL MEDICINE

## 2023-10-02 PROCEDURE — 258N000003 HC RX IP 258 OP 636: Performed by: NURSE PRACTITIONER

## 2023-10-02 PROCEDURE — 80180 DRUG SCRN QUAN MYCOPHENOLATE: CPT | Performed by: INTERNAL MEDICINE

## 2023-10-02 RX ORDER — TACROLIMUS 1 MG/1
4 CAPSULE ORAL 2 TIMES DAILY
Qty: 280 CAPSULE | Refills: 11 | Status: SHIPPED | OUTPATIENT
Start: 2023-10-02 | End: 2023-10-06

## 2023-10-02 RX ADMIN — SODIUM CHLORIDE, POTASSIUM CHLORIDE, SODIUM LACTATE AND CALCIUM CHLORIDE 1000 ML: 600; 310; 30; 20 INJECTION, SOLUTION INTRAVENOUS at 09:15

## 2023-10-02 NOTE — TELEPHONE ENCOUNTER
Kidney and Pancreas Transplant Coordinator Transition to Outpatient Discharge Note    Pt Name: Ariane Flores  : 1980    Transplant Date: 23  Hospital Discharge Date: 23    Surgeon (updated in Transplant Information tab): Little  Nephrologist (updated in Transplant Information tab): Ricky Hess  Transplant Coordinator: AUGUSTIN Powers SUBHASH Flores DDRT d/t   Hypoplasia/Dysplasia/Dysgenesis/Agenesis , right side side   Stent YES placed.        Immunosuppression:   CNI: tac  Antimetabolite: mycophenolate  Prednisone taper: No.    Prophylaxis:   CMV: D?/R+ : Valcyte 3 months; renal dosing  EBV: D?/R+  Antibiotic: Bactrim    High Risk DSA: No.        Pharmacy after discharge: [facility]  Lab after discharge: [facility]      Education:  Writer spoke with Ariane SUBHASH Flores   We discussed immunosuppression medications, indications, side effects, dose adjustments, and lab monitoring.   Lab draw schedule was provided via [employer] / mail to the patient and fully explained.    DSA  kits needed:  Yes.    If so, request submitted to centralized team for send-out.  LMyTransplant Place: RNCC encouraged on-going review, emphasis on   post-transplant content.   MyChart: RNCC encouraged regular self-review of lab values via [employer] and  educated about importance of utilization for awareness of required follow   up and communication with SOT team members.     Further education was provided on typical post transplant course, labs examined with thresholds, biopsy, infection prevention, office contact numbers, and when to call.       Discharge Transition Plan:   Pt will transition home, with assistance from family. Pt will not have home care   Pt states having working BP monitor, scale, and thermometer at home.      Follow Up:  Orders for post-transplant follow-up appointments placed: Yes.  Patient Status Checklist Task updated: Yes.      Pt questions for follow up: pt reports diarrhea. RNCC  encouraged fluid intake and repeat labs Thursday. ALISHA aware of diarrhea at this morning's appt.     Tac level 13.2  PLAN:   Please decrease tac dose from 5 mg BID to 4 mg BID  Pt voiced understanding.     Lynn Estes RN  Post-Kidney Transplant Coordinator  (ph) [phone]

## 2023-10-02 NOTE — PROGRESS NOTES
"Ariane Flores came to [facility] today for a lab and assess following a kidney transplant transplant on 9/27/23.      Discharge date: 9/29/23  Transplant coordinator: Lynn Estes   Phone number patient can be reached at: [phone]     Physical Assessment:  See physical assessment located under \"Document Flowsheets\".  Incision site: Clean, dry, intact. Approximated with staples. Patient denies burning, itching, or drainage.  Lines: N/A  Cook: N/A  Urine clarity: Lighter yellow than yesterday, clear. Denies pain/discomfort with voiding.  Hydration: 1.5 L of water since yesterday's appointment. Patient educated on the importance of getting 2-3 L of non-caffeinated fluids each day. Patient verbalized understanding and made plan to set out 6 plastic water bottles (each 16.9 ounces) on her counter to remind her to drink fluids.  Nutrition: Fair appetite. Ate 2 small meals yesterday with protein. No nausea/vomiting.  Last BM: Loose stools continued throughout yesterday, resolving today. Solid soft stool this AM.  Pain: 4-5/10 incisional pain with movement. Takes Tylenol as needed and this is effective per patient.  My transplant place videos watched: No. Link provided in AVS.  Labs drawn by [facility] staff: No, drawn by lab prior to [facility] appointment.    Plan of care for today:  -Follow-up on education from yesterday  -Medication management  -Assessment by provider, Aruna Avila    Medication changes:   Tacrolimus increased yesterday to 5 mg twice daily. Med card accurately updated by patient.    Drug level:  Patient took 5mg of Tacrolimus last evening at 2000. Patient took 5 mg of Tacrolimus today at 0830 after morning labs. Care coordinator to follow up with the result.    Medications administered:  Administrations This Visit       lactated ringers BOLUS 1,000 mL       Admin Date  10/02/2023 Action  $New Bag Dose  1,000 mL Rate  1,000 mL/hr Route  Intravenous Administered By  Paulette Nava, RN                Patient " education:  The following teaching topics were addressed: Importance of drinking 2L of non-caffeinated fluids daily, Incisional care, Signs/symptoms of infection, Good handwashing, Medications (purposes, doses and times of administration), Phone numbers to call with concenrs (Transplant coordinator, [facility] and [facility]), and Plan of care. Patient and family verbalized understanding and all questions answered.    Discharge Plan  Pt will follow up with care coordinator. Pending lab results from today to determine if labs are needed tomorrow vs. Thursday. Patient given lab phone number to schedule and care coordinator, Lynn Estes, notified to follow-up with patient after labs result today.  Discharge instructions reviewed with patient: YES  Patient/Representative verbalized understanding, all questions answered: YES  Face to face time: 1.5 hours  Discharged from unit at 103 with whom: family to hotel where she will be staying for a couple of weeks.      Paulette Nava RN

## 2023-10-02 NOTE — PATIENT INSTRUCTIONS
Dear Ariane Flores    Thank you for choosing [facility] ([facility]) for your follow up care.  The following information is a summary of our appointment as well as important reminders.      Please make sure your phone is available today because your transplant coordinator may call to update you with your anti-rejection drug levels and possibly make changes to your anti-rejection dosages.    If you are a transplant patient and require transplant education, please click on this link: [email or web address].    We look forward in seeing you on your next appointment here at  [facility] ([facility]).  Please don t hesitate to call us at [phone] to reschedule any of your appointments or to speak with one of the [facility] registered nurses.  It was a pleasure taking care of you today.    Sincerely,    [facility]  [address] [place] [address]  [address]  Phone:  [phone]     PLAN:  -Drink 3 liters (or about 101 ounces) of fluids per day. This is about 6 plastic water bottles (each 16.9 ounces)  -Eat frequent small meals high in protein. Choose heart healthy foods that are low in saturated/trans fat and low in sodium  -Labs to be drawn on Tuesday or Thursday depending on your lab results from today - your transplant coordinator will update you with further directions after all your labs have resulted. To schedule a lab appointment call [phone]  -Keep up the great work!    WHEN TO CONTACT YOUR COORDINATOR:   Transplant Coordinator: Lynn - [phone]  -Notify your coordinator if you have pain over your kidney, increased redness or drainage from your incision, fever greater than 100F, decreased urine output or new or increased amount of blood in urine.   -Notify your coordinator immediately if you are ever unable to take your  immunosuppressive medications for any reason.   -If you have URGENT concerns after office hours, please call the hospital switchboard at [phone] and ask to have the organ transplant nurse on-call paged. If you have a life-threatening emergency, go to the nearest emergency room.

## 2023-10-02 NOTE — TELEPHONE ENCOUNTER
Ariane Flores is a post-kidney transplant patient who discharged on 9/30/23. Patient will get medications from local or mail order pharmacy. The patient will be responsible for managing medications.    HEALTH BENEFIT: ([facility]) MEDICARE PART B & PART D ADVANTAGE PLAN  ID# [id number] GRP# [id number] (EFFECTIVE (DATE: 7/1/2021) )   PROCESSING INFO: ID# [id number] GRP# [id number] PCN# [id number] BIN# [id number] (EFFECTIVE (DATE: 7/1/2021) )  (DO NOT BILL TO ORIGINAL MEDICARE ID# (PART A EFFECTIVE (DATE) , PART B EFFECTIVE (DATE) )  HEALTH BENEFIT: ([place]  ID# [id number] (EFFECTIVE (DATE: 1/1/2023) )   PROCESSOR NAME; [place] MANUAL BILL ID#: [id number] BIN: [id number]  PT WILL PAY $0 COST OF MEDICATION AT TIME OF SERVICE  PHARMACY BENEFIT: ([facility]) PART D  PROCESSING INFO: ID# [id number] GRP# [id number] N# [id number] BIN# [id number] (EFFECTIVE (DATE: 7/1/2021) )  NO DEDUCTIBLE OR MAX OUT-OF-POCKET DUE TO LOW-INCOME SUBSIDY  COPAY STRUCTURE:  $ (0) FOR GENERIC  $ (0) FOR BRAND     BILL OTC S TO RX PROCESSOR ([place] MEDICA[place] BIN#[id number] ID#[id number].    TEST CLAIM SPECIALTY #28  MYCOPHENOLATE 250MG (#240/30DS)=$0  PROGRAF 1MG (#180/30DS)=PRODUCT SEVICE NOT COVERED/ PLAN EXCLUSION  TACROLIMUS 1MG (#60/30DS)=$0  CYCLOSPORINE 100MG (#60/30DS)=$0  VALGANCICLOVIR 450MG (#60/30DS)=$0  VALACYCLOVIR 1GM (#90/30DS)=$0    TEST CLAIM DISCHARGE #27 (18 YEARS AND OLDER):  MYCOPHENOLATE 250MG (#240/30DS)=$0  PROGRAF 1MG (#180/30DS)=PRODUCT SEVICE NOT COVERED/ PLAN EXCLUSION  TACROLIMUS 1MG (#60/30DS)=$0  CYCLOSPORINE 100MG (#60/30DS)=$0  VALGANCICLOVIR 450MG (#60/30DS)=$0  VALACYCLOVIR 1GM (#90/30DS)=$0    **CAN PATIENT FILL AT [facility] FOR MEDICATIONS LISTED? PATIENT CAN FILL WITH [facility] HOWEVER PT HAS AN INSURANCE PLAN THAT PROCESSES THROUGH [employer], AND PER OUR CONTRACT WITH THEM WE CANNOT SHIP OR MAIL OUT ORDERS. PT CAN EITHER  ORDER AT [facility] SITE 28 OR HAVE SITE 28 DELIVER TO LOCAL [facility] TO  THERE  (PICKUP AT RETAIL) OR A LOCAL PHARMACY. IF THEY PREFER DELIVERY OR MAIL THEY WILL NEED TO UTILIZE [facility] ([phone]).      **BILL 1ST FILL OF IMMUNOS TO [facility] AT DISCHARGE    Belen Edwards, [facility]

## 2023-10-02 NOTE — LETTER
10/2/2023       RE: Ariane Flores  Lot [birth year]  1971 16 1/[address] [place] [address]    Dear Colleague,    Thank you for referring your patient, Ariane Flores, to the [facility]. Please see a copy of my visit note below.    TRANSPLANT NEPHROLOGY EARLY POST TRANSPLANT VISIT    Assessment & Plan  # DDKT: Trend down   - Baseline Creatinine: ~ TBD   - Proteinuria: Normal (<0.2 grams)   - Date DSA Last Checked: Sep/2023      Latest DSA: No DSA at time of transplant   - BK Viremia: No   - Kidney Tx Biopsy: No   - Transplant Ureteral Stent: Yes    # Immunosuppression: Tacrolimus immediate release (goal 8-10), Mycophenolate mofetil (dose 750 mg every 12 hours),  - Induction with Recent Transplant:  High Intensity Protocol   - Continue with intensive monitoring of immunosuppression for efficacy and toxicity.   - Changes: Not at this time    # Infection Prophylaxis:   - PJP: Sulfa/TMP (Bactrim)  - CMV: Valganciclovir (Valcyte)     # Hypertension: Controlled;  Goal BP: < 140/90   - Volume status: Euvolemic    - Changes: Not at this time    # Anemia in Chronic Renal Disease: Hgb: Stable      KAREN: No   - Iron studies: Not checked recently    # Mineral Bone Disorder:    - Secondary renal hyperparathyroidism; PTH level: Normal (15-65 pg/ml)        On treatment: None  - Vitamin D; level: Not checked recently        On supplement: No  - Calcium; level: Normal        On supplement: No  - Phosphorus; level: Normal        On supplement: No    # Electrolytes:   - Potassium; level: Normal        On supplement: No  - Magnesium; level: Low        On supplement: Yes  - Bicarbonate; level: Normal        On supplement: Yes  - Sodium; level: Normal    # Complex Urologic history: possible reflux/obstruction and recurrent UTI as a child requiring multiple urologic procedures, last of which was right native nephrectomy at age 13.  Cleared by urology for transplant. Voiding cystourethrogram which  showed no evidence of vesicoureteral reflux and she was noted to empty her bladder to completion. CT imaging was unremarkable outside of left solitary kidney.     # Medical Compliance: Yes    # Health Maintenance and Vaccination Review: Reviewed and up to date    # Transplant History:  Etiology of Kidney Failure: Unknown etiology  Tx: DDKT  Transplant: 9/27/2023 (Kidney)  Donor Type: Donation after Brain Death Donor Class: Standard Criteria Donor  Crossmatch at time of Tx: negative  DSA at time of Tx: No  Significant changes in immunosuppression: None  CMV IgG Ab High Risk Discordance (D+/R-): No  EBV IgG Ab High Risk Discordance (D+/R-): No  Significant transplant-related complications: None    Transplant Office Phone Number: [phone]    Assessment and plan was discussed with the patient and she voiced her understanding and agreement.    Return visit: No follow-ups on file.    Aruna Avila NP    Chief Complaint  Ms. Flores is a 43 year old here for kidney transplant.     History of Present Illness   Ariane Flores is a 43 year old female with history of ESRD from unclear etiology (possible reflux/obstruction and recurrent UTI as a child requiring multiple urologic procedures, last of which was right native nephrectomy at age 13) who is now s/p preemptive DDKT with stent.  She also has a history of HTN, HLD, and migraines.  Final crossmatch in process and most recent PRA 97%.  Induction was with rATG.     Doing well.  No acute complaints.      Home BP: Not checked    Problem List  Patient Active Problem List   Diagnosis    CKD (chronic kidney disease) stage 4, GFR 15-29 ml/min (H)    Migraine    Hyperparathyroidism, secondary renal (H24)    Hyperlipidemia    HTN (hypertension)    Vitamin D deficiency    Metabolic acidosis    Status post kidney transplant    Immunosuppressed status (H24)    Anemia of chronic disease    Hypocalcemia    Low bicarbonate level       Allergies  Allergies   Allergen Reactions     Cephalosporins Other (See Comments)     Pt does not know rx    Codeine Other (See Comments)     Gets dizzy    Iodinated Contrast Media Other (See Comments)     Only has 1 kidney.  Only has 1 kidney.  Only has 1 kidney.      Nsaids Other (See Comments)     Kidney Damage. Have Only has one kidney        Medications  Current Outpatient Medications   Medication Sig    acetaminophen (TYLENOL) 325 MG tablet Take 1-2 tablets (325-650 mg) by mouth every 4 hours as needed for other (For optimal non-opioid multimodal pain management to improve pain control.)    atorvastatin (LIPITOR) 40 MG tablet Take 40 mg by mouth daily    cholecalciferol 25 MCG (1000 UT) TABS Take 1,000 Units by mouth daily    ferrous sulfate (FEROSUL) 325 (65 Fe) MG tablet Take 325 mg by mouth daily (with breakfast)    levothyroxine (SYNTHROID/LEVOTHROID) 50 MCG tablet Take 50 mcg by mouth daily    magnesium oxide (MAG-OX) 400 MG tablet Take 1 tablet (400 mg) by mouth daily (with lunch)    mycophenolate (GENERIC EQUIVALENT) 250 MG capsule Take 3 capsules (750 mg) by mouth 2 times daily    polyethylene glycol (MIRALAX) 17 GM/Dose powder Take 17 g by mouth daily Until having regular bowel movements, HOLD for loose stools (Patient not taking: Reported on 10/1/2023)    psyllium (METAMUCIL/KONSYL) 58.6 % powder Take 6 g (1 teaspoonful) by mouth daily    senna-docusate (SENOKOT-S/PERICOLACE) 8.6-50 MG tablet Take 1 tablet by mouth 2 times daily Hold for loose stools (Patient not taking: Reported on 10/1/2023)    sodium bicarbonate 650 MG tablet Take 2 tablets (1,300 mg) by mouth 2 times daily    sulfamethoxazole-trimethoprim (BACTRIM) 400-80 MG tablet Take 1 tablet by mouth three times a week Increase to 1 tab by mouth daily when directed by transplant team (based on improving kidney function) (Patient taking differently: Take 1 tablet by mouth daily Increase to 1 tab by mouth daily when directed by transplant team (based on improving kidney function))     tacrolimus (GENERIC EQUIVALENT) 0.5 MG capsule Take one tab by mouth twice daily when directed by transplant team for dose titration. (Patient not taking: Reported on 10/1/2023)    tacrolimus (GENERIC) 1 MG capsule Take 4 capsules (4 mg) by mouth 2 times daily Profile Rx: patient will contact pharmacy when needed    valGANciclovir (VALCYTE) 450 MG tablet Take 1 tablet (450 mg) by mouth twice a week Increase to 2 tabs by mouth daily as directed by transplant team (based on improving kidney function) (Patient taking differently: Take 450 mg by mouth daily Increase to 2 tabs by mouth daily as directed by transplant team (based on improving kidney function))     No current facility-administered medications for this visit.     There are no discontinued medications.    Physical Exam  Vital Signs: There were no vitals taken for this visit.    GENERAL APPEARANCE: alert and no distress  HENT: mouth without ulcers or lesions  RESP: lungs clear to auscultation - no rales, rhonchi or wheezes  CV: regular rhythm, normal rate, no rub, no murmur  EDEMA: no LE edema bilaterally  ABDOMEN: soft, nondistended, nontender, bowel sounds normal  MS: extremities normal - no gross deformities noted, no evidence of inflammation in joints, no muscle tenderness  SKIN: no rash    Data        Latest Ref Rng & Units 10/5/2023     9:16 AM 10/2/2023     7:12 AM 10/1/2023     7:35 AM   Renal   Sodium 135 - 145 mmol/L 140  140  141    K 3.4 - 5.3 mmol/L 4.6  3.9  3.7    Cl 98 - 107 mmol/L 107  107  108    Cl (external) 98 - 107 mmol/L 107  107  108    CO2 22 - 29 mmol/L 23  23  22    Urea Nitrogen 6.0 - 20.0 mg/dL 21.8  38.7  50.9    Creatinine 0.51 - 0.95 mg/dL 1.51  1.94  2.72    Glucose 70 - 99 mg/dL 134  109  99    Calcium 8.6 - 10.0 mg/dL 9.2  9.1  8.9    Magnesium 1.7 - 2.3 mg/dL 1.3  1.8  2.0          Latest Ref Rng & Units 10/5/2023     9:16 AM 10/2/2023     7:12 AM 10/1/2023     7:35 AM   Bone Health   Phosphorus 2.5 - 4.5 mg/dL 3.0  3.0   2.4    Parathyroid Hormone Intact 15 - 65 pg/mL  43     Vit D Def 20 - 50 ng/mL  32           Latest Ref Rng & Units 10/5/2023     9:16 AM 10/2/2023     7:12 AM 10/1/2023     7:35 AM   Heme   WBC 4.0 - 11.0 10e3/uL 8.3  6.7  5.8    Hgb 11.7 - 15.7 g/dL 9.9  10.2  9.7    Plt 150 - 450 10e3/uL 285  130  123          Latest Ref Rng & Units 9/28/2023     4:40 AM 9/27/2023     1:14 AM 11/5/2020    11:28 AM   Liver   AP 35 - 104 U/L  81  84    TBili <=1.2 mg/dL  0.3  0.5    ALT 0 - 50 U/L  16  24    AST 0 - 45 U/L  15  16    Tot Protein 6.4 - 8.3 g/dL  7.7  7.4    Albumin 3.4 - 5.0 g/dL   3.4    Albumin 3.5 - 5.2 g/dL 3.3  4.5           Latest Ref Rng & Units 9/27/2023     1:14 AM   Pancreas   A1C <5.7 % 5.5          Latest Ref Rng & Units 10/2/2023     7:12 AM   Iron studies   Iron 37 - 145 ug/dL 35    Iron Sat Index 15 - 46 % 16    Ferritin 6 - 175 ng/mL 270          Latest Ref Rng & Units 9/27/2023     1:14 AM 11/5/2020    11:28 AM   [facility] Txp Virology   EBV CAPSID ANTIBODY IGG No detectable antibody. Positive  >8.0    Hep B Core NR^Nonreactive  Nonreactive        Recent Labs   Lab Test 09/30/23  0543 10/01/23  0735 10/02/23  0712   DOSTAC  --  9/30/2023 10/1/2023   TACROL 5.6 6.5 13.2     Recent Labs   Lab Test 10/02/23  0712   DOSMPA 10/1/2023   8:00 PM   MPACID 1.07   MPAG 61.5

## 2023-10-03 DIAGNOSIS — Z20.828 CONTACT WITH AND (SUSPECTED) EXPOSURE TO OTHER VIRAL COMMUNICABLE DISEASES: ICD-10-CM

## 2023-10-03 DIAGNOSIS — Z94.0 KIDNEY REPLACED BY TRANSPLANT: Primary | ICD-10-CM

## 2023-10-03 DIAGNOSIS — Z79.899 ENCOUNTER FOR LONG-TERM CURRENT USE OF MEDICATION: ICD-10-CM

## 2023-10-03 DIAGNOSIS — Z98.890 OTHER SPECIFIED POSTPROCEDURAL STATES: ICD-10-CM

## 2023-10-03 DIAGNOSIS — Z48.298 AFTERCARE FOLLOWING ORGAN TRANSPLANT: ICD-10-CM

## 2023-10-03 LAB
HBV DNA SERPL QL NAA+PROBE: NORMAL
HCV RNA SERPL QL NAA+PROBE: NORMAL
HIV1+2 RNA SERPL QL NAA+PROBE: NORMAL

## 2023-10-04 ENCOUNTER — VIRTUAL VISIT (OUTPATIENT)
Dept: PHARMACY | Facility: CLINIC | Age: 43
End: 2023-10-04
Payer: COMMERCIAL

## 2023-10-04 ENCOUNTER — TELEPHONE (OUTPATIENT)
Dept: TRANSPLANT | Facility: CLINIC | Age: 43
End: 2023-10-04
Payer: COMMERCIAL

## 2023-10-04 DIAGNOSIS — Z78.9 TAKES DIETARY SUPPLEMENTS: ICD-10-CM

## 2023-10-04 DIAGNOSIS — R19.5 LOOSE STOOLS: ICD-10-CM

## 2023-10-04 DIAGNOSIS — Z94.0 STATUS POST KIDNEY TRANSPLANT: Primary | ICD-10-CM

## 2023-10-04 DIAGNOSIS — E03.9 HYPOTHYROIDISM, UNSPECIFIED TYPE: ICD-10-CM

## 2023-10-04 DIAGNOSIS — R52 PAIN: ICD-10-CM

## 2023-10-04 DIAGNOSIS — E78.5 DYSLIPIDEMIA: ICD-10-CM

## 2023-10-04 PROCEDURE — 99207 PR NO CHARGE LOS: CPT | Mod: 93 | Performed by: PHARMACIST

## 2023-10-04 NOTE — TELEPHONE ENCOUNTER
Post Kidney and Pancreas Transplant Team Conference  Date: 10/4/2023  Transplant Coordinator: Lynn Estes     Attendees:  [x]  Dr. Swift [] Danielle Musa, RN [x] Joan Bee LPN     [x]  Dr. Rizvi [x] Annamarie Banks, RN [] Marlyn Gabriel LPN    [x] Dr. Kennedy [x] Helena Wise RN    [x] Dr. Nino [x] Lynn Estes, RN [] Cierra Wang RN   [] Dr. Aly [] Simona Weldon, RN    [] Dr. Hensley [] Grace Pickett, AUGUSTIN    [x]  Dr. Fitch [] Tess Castro RN    [] Dr. Mills [] Isiah Beaver RN    [x] Aruna Avila, NP [] Flor Rowland RN    [] Claudia Quintero NP [] Mayte Moore RN        Verbal Plan Read Back:   Continue current treatment plan    Routed to RN Coordinator   Joan Bee LPN

## 2023-10-04 NOTE — Clinical Note
Patient having 5-6 loose stools daily for the past 6 days. I started fiber, no stool samples that I saw in the chart. Thoughts on switching Mycophenolate Mofetil to Myfortic?

## 2023-10-04 NOTE — PROGRESS NOTES
Medication Therapy Management (MTM) Encounter    ASSESSMENT:                            Medication Adherence/Access: No issues identified    Kidney Transplant:    Per creatinine clearance will increase Valganciclovir and Bactrim dose frequency.     Supplements:   Stable.     Hyperlipidemia   Stable.     Diarrhea:   Recommended patient start Metamucil for loose stools. Will discuss with transplant team about switching from Mycophenolate Mofetil to Mycophenolic acid to improve diarrhea symptoms.     Hypothyroidism:   Stable.    Pain:   Stable.    PLAN:                            Start Metamucil Fiber 1 teaspoonful once daily mixed in glass of water, you can increase to two or three times daily if tolerated.   Increase Valganciclovir (Valcyte) to 1 tablet (450mg) once daily.   Increase Sulfamethoxazole/Trimethoprim (Bactrim) to 1 tablet once daily.     Txp team...  Consider switching from Mycophenolate Mofetil to Myfortic.     Follow-up: 2 months post transplant    SUBJECTIVE/OBJECTIVE:                          Ariane Flores is a 43 year old female called for a transitions of care visit. She was discharged from [facility] on 9/30 for kidney transplant.      Reason for visit: initial post transplant med review.     Allergies/ADRs: Reviewed in chart  Past Medical History: Reviewed in chart  Tobacco: She reports that she has never smoked. She has never used smokeless tobacco.  Alcohol: not currently using  THC/CBD: none    Medication Adherence/Access: Patient uses pill box(es) and uses reminders/alarms.  Patient takes medications 3 time(s) per day. 8am and 8pm, 11am  Per patient, misses medication 0 times per week.   The patient fills medications at [facility]: NO, fills medications at [facility].    Kidney Transplant:    Tacrolimus 4 mg twice daily  Mycophenolate Mofetil 750 mg twice daily.   Pt reports Diarrhea  Transplant date: 9/27/23  CMV prophylaxis: CrCl 10 to 24 mL/minute: Valcyte 450 mg twice weekly Treat 3 months post tx    PJP prophylaxis: Bactrim SS daily  Tx Coordinator: Candace Powers MD: Ricky Hess Using Med Card: Yes  Immunizations: annual flu shot unknown; Pneumovax 23:  2020; Prevnar 13: 2023; TDaP:  2016; Shingrix: unknown, HBV: immunity per last titers, COVID: Primary x 3    Estimated Creatinine Clearance: 43 mL/min (A) (based on SCr of 1.94 mg/dL (H)).    Supplements:   Mag Oxide 400mg daily ( 2 hours from MMF)  Sodium bicarb 1300mg twice daily.  Ferrous sulfate 325mg daily  Vitamin D3 25mcg daily  Lab Results   Component Value Date    MAG 1.8 10/02/2023    CO2 23 10/02/2023    CO2 22 10/01/2023    HGB 10.2 (L) 10/02/2023    HGB 9.7 (L) 10/01/2023    HGB 8.9 (L) 09/30/2023    NAGA 270 (H) 10/02/2023    IRONSAT 16 10/02/2023   Vitamin D Deficiency Screening Results:  Lab Results   Component Value Date    VITDT 32 10/02/2023    VITDT 45 05/05/2023     Hyperlipidemia   Atorvastatin 40mg daily  Patient reports no significant myalgias or other side effects.  The 10-year ASCVD risk score (Audrey MCNEAL, et al., 2019) is: 1%    Values used to calculate the score:      Age: 43 years      Sex: Female      Is Non- : No      Diabetic: No      Tobacco smoker: No      Systolic Blood Pressure: 132 mmHg      Is BP treated: No      HDL Cholesterol: 36 mg/dL      Total Cholesterol: 160 mg/dL   Recent Labs   Lab Test 09/27/23  0114   CHOL 160   HDL 36*   LDL 94   TRIG 148     Diarrhea:   5-6 loose stools daily. Drinking 2x32 ounce bottle.   Not using Miralax or senna.     Hypothyroidism:   Levothyroxine 50 mcg daily  Patient is having the following symptoms: none.   No results found for: TSH    Pain:   Acetaminophen prn  Not using oxycodone  Has some pain on walking, minimal. None at rest.     Today's Vitals: There were no vitals taken for this visit.  ----------------  Post Discharge Medication Reconciliation Status: discharge medications reconciled and changed, per note/orders.    I spent 23 minutes with this  patient today. All changes were made via collaborative practice agreement with Dr. Ricky Hess. A copy of the visit note was provided to the patient's provider(s).    A summary of these recommendations was sent via [employer].    Doris LlanosD  [facility] Pharmacist    Phone: [phone]     Telemedicine Visit Details  Type of service:  Telephone visit  Start Time: 9:04 AM  End Time: 9:27 AM     Medication Therapy Recommendations  Loose stools    Rationale: Untreated condition - Needs additional medication therapy - Indication   Recommendation: Start Medication - Metamucil 48.57 % Powd   Status: Accepted per CPA         Status post kidney transplant    Current Medication: mycophenolate (GENERIC EQUIVALENT) 250 MG capsule   Rationale: Undesirable effect - Adverse medication event - Safety   Recommendation: Change Medication Formulation  - MYFORTIC PO   Status: Contact Provider - Awaiting Response          Current Medication: sulfamethoxazole-trimethoprim (BACTRIM) 400-80 MG tablet   Rationale: Dose too low - Dosage too low - Effectiveness   Recommendation: Increase Frequency   Status: Accepted per CPA          Current Medication: valGANciclovir (VALCYTE) 450 MG tablet   Rationale: Dose too low - Dosage too low - Effectiveness   Recommendation: Increase Frequency   Status: Accepted per CPA

## 2023-10-04 NOTE — PATIENT INSTRUCTIONS
"Recommendations from today's MTM visit:                                                       Start Metamucil Fiber 1 teaspoonful once daily mixed in glass of water, you can increase to two or three times daily if tolerated.   Increase Valganciclovir (Valcyte) to 1 tablet (450mg) once daily.   Increase Sulfamethoxazole/Trimethoprim (Bactrim) to 1 tablet once daily.     Follow-up: 2 months post transplant    It was great speaking with you today.  I value your experience and would be very thankful for your time in providing feedback in our clinic survey. In the next few days, you may receive an email or text message from [employer] with a link to a survey related to your  clinical pharmacist.\"     To schedule another MTM appointment, please call the clinic directly or you may call the MTM scheduling line at [phone] or toll-free at [phone].     My Clinical Pharmacist's contact information:                                                      Please feel free to contact me with any questions or concerns you have.      Mukul Cunningham, PharmD  MTM Pharmacist    Phone: [phone]     "

## 2023-10-04 NOTE — TELEPHONE ENCOUNTER
BRIEF SOCIAL WORK NOTE      SW received call from daughter Lisa. Family is staying at the [facility]. The family has attempted to get reimbursement via [employer] with no luck. The hotel is currently reserved until 10/7. Patient asking to extend this. SW spoke with accommodations to extend this to 10/13. Accommodations will assist with attempting to obtain reimbursement. Transplant funds were used given magnitude of financial stressors for post transplant stay. SW will continue to follow for psychosocial support, resources and advocate on behalf of the patient.     Maggie Vizcaino   [facility]  Outpatient Kidney/Pancreas/[place] Transplant Program   [address] [place] [address] [place] [id number]  [email or web address]  Office: [phone] I Fax: [phone]

## 2023-10-05 ENCOUNTER — APPOINTMENT (OUTPATIENT)
Dept: GENERAL RADIOLOGY | Facility: CLINIC | Age: 43
End: 2023-10-05
Attending: STUDENT IN AN ORGANIZED HEALTH CARE EDUCATION/TRAINING PROGRAM
Payer: COMMERCIAL

## 2023-10-05 ENCOUNTER — LAB (OUTPATIENT)
Dept: LAB | Facility: CLINIC | Age: 43
End: 2023-10-05
Payer: COMMERCIAL

## 2023-10-05 ENCOUNTER — HOSPITAL ENCOUNTER (OUTPATIENT)
Facility: CLINIC | Age: 43
Setting detail: OBSERVATION
Discharge: HOME | End: 2023-10-06
Attending: STUDENT IN AN ORGANIZED HEALTH CARE EDUCATION/TRAINING PROGRAM | Admitting: SURGERY
Payer: COMMERCIAL

## 2023-10-05 DIAGNOSIS — R50.9 FEVER, UNSPECIFIED FEVER CAUSE: ICD-10-CM

## 2023-10-05 DIAGNOSIS — Z98.890 OTHER SPECIFIED POSTPROCEDURAL STATES: ICD-10-CM

## 2023-10-05 DIAGNOSIS — Z48.298 AFTERCARE FOLLOWING ORGAN TRANSPLANT: ICD-10-CM

## 2023-10-05 DIAGNOSIS — Z20.828 CONTACT WITH AND (SUSPECTED) EXPOSURE TO OTHER VIRAL COMMUNICABLE DISEASES: ICD-10-CM

## 2023-10-05 DIAGNOSIS — Z79.899 ENCOUNTER FOR LONG-TERM CURRENT USE OF MEDICATION: ICD-10-CM

## 2023-10-05 DIAGNOSIS — Z94.0 KIDNEY TRANSPLANTED: ICD-10-CM

## 2023-10-05 DIAGNOSIS — Z94.0 KIDNEY REPLACED BY TRANSPLANT: ICD-10-CM

## 2023-10-05 LAB
ALBUMIN SERPL BCG-MCNC: 3.7 G/DL (ref 3.5–5.2)
ALP SERPL-CCNC: 65 U/L (ref 35–104)
ALT SERPL W P-5'-P-CCNC: 32 U/L (ref 0–50)
ANION GAP SERPL CALCULATED.3IONS-SCNC: 10 MMOL/L (ref 7–15)
ANION GAP SERPL CALCULATED.3IONS-SCNC: 13 MMOL/L (ref 7–15)
AST SERPL W P-5'-P-CCNC: 18 U/L (ref 0–45)
BASO+EOS+MONOS # BLD AUTO: ABNORMAL 10*3/UL
BASO+EOS+MONOS NFR BLD AUTO: ABNORMAL %
BASOPHILS # BLD AUTO: 0 10E3/UL (ref 0–0.2)
BASOPHILS NFR BLD AUTO: 0 %
BILIRUB SERPL-MCNC: 0.3 MG/DL
BK VIRUS IGG ANTIBODY: ABNORMAL
BUN SERPL-MCNC: 21.8 MG/DL (ref 6–20)
BUN SERPL-MCNC: 22.3 MG/DL (ref 6–20)
CALCIUM SERPL-MCNC: 9.1 MG/DL (ref 8.6–10)
CALCIUM SERPL-MCNC: 9.2 MG/DL (ref 8.6–10)
CHLORIDE SERPL-SCNC: 104 MMOL/L (ref 98–107)
CHLORIDE SERPL-SCNC: 107 MMOL/L (ref 98–107)
CREAT SERPL-MCNC: 1.51 MG/DL (ref 0.51–0.95)
CREAT SERPL-MCNC: 1.52 MG/DL (ref 0.51–0.95)
DEPRECATED HCO3 PLAS-SCNC: 20 MMOL/L (ref 22–29)
DEPRECATED HCO3 PLAS-SCNC: 23 MMOL/L (ref 22–29)
EGFRCR SERPLBLD CKD-EPI 2021: 43 ML/MIN/1.73M2
EGFRCR SERPLBLD CKD-EPI 2021: 44 ML/MIN/1.73M2
EOSINOPHIL # BLD AUTO: 0.2 10E3/UL (ref 0–0.7)
EOSINOPHIL NFR BLD AUTO: 2 %
ERYTHROCYTE [DISTWIDTH] IN BLOOD BY AUTOMATED COUNT: 12.9 % (ref 10–15)
ERYTHROCYTE [DISTWIDTH] IN BLOOD BY AUTOMATED COUNT: 12.9 % (ref 10–15)
FLUAV RNA SPEC QL NAA+PROBE: NEGATIVE
FLUBV RNA RESP QL NAA+PROBE: NEGATIVE
GLUCOSE SERPL-MCNC: 105 MG/DL (ref 70–99)
GLUCOSE SERPL-MCNC: 134 MG/DL (ref 70–99)
HCG SERPL QL: NEGATIVE
HCT VFR BLD AUTO: 29.1 % (ref 35–47)
HCT VFR BLD AUTO: 30.7 % (ref 35–47)
HGB BLD-MCNC: 9.4 G/DL (ref 11.7–15.7)
HGB BLD-MCNC: 9.9 G/DL (ref 11.7–15.7)
IMM GRANULOCYTES # BLD: 0.3 10E3/UL
IMM GRANULOCYTES NFR BLD: 3 %
LIPASE SERPL-CCNC: 33 U/L (ref 13–60)
LYMPHOCYTES # BLD AUTO: 0.4 10E3/UL (ref 0.8–5.3)
LYMPHOCYTES NFR BLD AUTO: 4 %
MAGNESIUM SERPL-MCNC: 1.3 MG/DL (ref 1.7–2.3)
MAGNESIUM SERPL-MCNC: 1.3 MG/DL (ref 1.7–2.3)
MCH RBC QN AUTO: 28.7 PG (ref 26.5–33)
MCH RBC QN AUTO: 28.8 PG (ref 26.5–33)
MCHC RBC AUTO-ENTMCNC: 32.2 G/DL (ref 31.5–36.5)
MCHC RBC AUTO-ENTMCNC: 32.3 G/DL (ref 31.5–36.5)
MCV RBC AUTO: 89 FL (ref 78–100)
MCV RBC AUTO: 89 FL (ref 78–100)
MONOCYTES # BLD AUTO: 0.8 10E3/UL (ref 0–1.3)
MONOCYTES NFR BLD AUTO: 8 %
MYCOPHENOLATE SERPL LC/MS/MS-MCNC: 0.86 MG/L (ref 1–3.5)
MYCOPHENOLATE-G SERPL LC/MS/MS-MCNC: 56.3 MG/L (ref 30–95)
NEUTROPHILS # BLD AUTO: 8.5 10E3/UL (ref 1.6–8.3)
NEUTROPHILS NFR BLD AUTO: 83 %
NRBC # BLD AUTO: 0 10E3/UL
NRBC BLD AUTO-RTO: 0 /100
PHOSPHATE SERPL-MCNC: 3 MG/DL (ref 2.5–4.5)
PLATELET # BLD AUTO: 285 10E3/UL (ref 150–450)
PLATELET # BLD AUTO: 319 10E3/UL (ref 150–450)
POTASSIUM SERPL-SCNC: 3.9 MMOL/L (ref 3.4–5.3)
POTASSIUM SERPL-SCNC: 4.6 MMOL/L (ref 3.4–5.3)
PROT SERPL-MCNC: 6.6 G/DL (ref 6.4–8.3)
RBC # BLD AUTO: 3.26 10E6/UL (ref 3.8–5.2)
RBC # BLD AUTO: 3.45 10E6/UL (ref 3.8–5.2)
RSV RNA SPEC NAA+PROBE: NEGATIVE
SARS-COV-2 RNA RESP QL NAA+PROBE: NEGATIVE
SODIUM SERPL-SCNC: 137 MMOL/L (ref 135–145)
SODIUM SERPL-SCNC: 140 MMOL/L (ref 135–145)
TACROLIMUS BLD-MCNC: 18.8 UG/L (ref 5–15)
TME LAST DOSE: ABNORMAL H
WBC # BLD AUTO: 10.2 10E3/UL (ref 4–11)
WBC # BLD AUTO: 8.3 10E3/UL (ref 4–11)

## 2023-10-05 PROCEDURE — 87040 BLOOD CULTURE FOR BACTERIA: CPT | Performed by: STUDENT IN AN ORGANIZED HEALTH CARE EDUCATION/TRAINING PROGRAM

## 2023-10-05 PROCEDURE — 83735 ASSAY OF MAGNESIUM: CPT | Performed by: PATHOLOGY

## 2023-10-05 PROCEDURE — 36415 COLL VENOUS BLD VENIPUNCTURE: CPT | Performed by: STUDENT IN AN ORGANIZED HEALTH CARE EDUCATION/TRAINING PROGRAM

## 2023-10-05 PROCEDURE — 71046 X-RAY EXAM CHEST 2 VIEWS: CPT | Mod: 26 | Performed by: RADIOLOGY

## 2023-10-05 PROCEDURE — 99285 EMERGENCY DEPT VISIT HI MDM: CPT | Mod: 25 | Performed by: STUDENT IN AN ORGANIZED HEALTH CARE EDUCATION/TRAINING PROGRAM

## 2023-10-05 PROCEDURE — 80069 RENAL FUNCTION PANEL: CPT | Performed by: PATHOLOGY

## 2023-10-05 PROCEDURE — 83690 ASSAY OF LIPASE: CPT | Performed by: STUDENT IN AN ORGANIZED HEALTH CARE EDUCATION/TRAINING PROGRAM

## 2023-10-05 PROCEDURE — 96365 THER/PROPH/DIAG IV INF INIT: CPT | Performed by: STUDENT IN AN ORGANIZED HEALTH CARE EDUCATION/TRAINING PROGRAM

## 2023-10-05 PROCEDURE — 80197 ASSAY OF TACROLIMUS: CPT | Performed by: INTERNAL MEDICINE

## 2023-10-05 PROCEDURE — 36415 COLL VENOUS BLD VENIPUNCTURE: CPT | Performed by: PATHOLOGY

## 2023-10-05 PROCEDURE — 71046 X-RAY EXAM CHEST 2 VIEWS: CPT

## 2023-10-05 PROCEDURE — 250N000011 HC RX IP 250 OP 636: Performed by: STUDENT IN AN ORGANIZED HEALTH CARE EDUCATION/TRAINING PROGRAM

## 2023-10-05 PROCEDURE — 99024 POSTOP FOLLOW-UP VISIT: CPT | Mod: GC | Performed by: SURGERY

## 2023-10-05 PROCEDURE — 99285 EMERGENCY DEPT VISIT HI MDM: CPT | Performed by: STUDENT IN AN ORGANIZED HEALTH CARE EDUCATION/TRAINING PROGRAM

## 2023-10-05 PROCEDURE — 83735 ASSAY OF MAGNESIUM: CPT | Performed by: STUDENT IN AN ORGANIZED HEALTH CARE EDUCATION/TRAINING PROGRAM

## 2023-10-05 PROCEDURE — 85025 COMPLETE CBC W/AUTO DIFF WBC: CPT | Performed by: PATHOLOGY

## 2023-10-05 PROCEDURE — 85027 COMPLETE CBC AUTOMATED: CPT | Performed by: STUDENT IN AN ORGANIZED HEALTH CARE EDUCATION/TRAINING PROGRAM

## 2023-10-05 PROCEDURE — 80180 DRUG SCRN QUAN MYCOPHENOLATE: CPT | Performed by: INTERNAL MEDICINE

## 2023-10-05 PROCEDURE — 99000 SPECIMEN HANDLING OFFICE-LAB: CPT | Performed by: PATHOLOGY

## 2023-10-05 PROCEDURE — 84703 CHORIONIC GONADOTROPIN ASSAY: CPT | Performed by: STUDENT IN AN ORGANIZED HEALTH CARE EDUCATION/TRAINING PROGRAM

## 2023-10-05 PROCEDURE — 87637 SARSCOV2&INF A&B&RSV AMP PRB: CPT | Performed by: STUDENT IN AN ORGANIZED HEALTH CARE EDUCATION/TRAINING PROGRAM

## 2023-10-05 RX ORDER — MAGNESIUM SULFATE HEPTAHYDRATE 40 MG/ML
4 INJECTION, SOLUTION INTRAVENOUS ONCE
Status: COMPLETED | OUTPATIENT
Start: 2023-10-05 | End: 2023-10-06

## 2023-10-05 RX ADMIN — MAGNESIUM SULFATE IN WATER 4 G: 40 INJECTION, SOLUTION INTRAVENOUS at 22:35

## 2023-10-05 ASSESSMENT — ACTIVITIES OF DAILY LIVING (ADL)
ADLS_ACUITY_SCORE: 35
ADLS_ACUITY_SCORE: 33

## 2023-10-05 NOTE — ED TRIAGE NOTES
Patient presents to ED for fever of 100.2 after kidney TxP a week ago. Pt denies pain.      Triage Assessment       Row Name 10/05/23 8717       Triage Assessment (Adult)    Airway WDL WDL       Respiratory WDL    Respiratory WDL WDL       Skin Circulation/Temperature WDL    Skin Circulation/Temperature WDL WDL       Cardiac WDL    Cardiac WDL WDL       Peripheral/Neurovascular WDL    Peripheral Neurovascular WDL WDL       Cognitive/Neuro/Behavioral WDL    Cognitive/Neuro/Behavioral WDL WDL       [place] Coma Scale    Best Eye Response 4-->(E4) spontaneous    Best Motor Response 6-->(M6) obeys commands    Best Verbal Response 5-->(V5) oriented    Rian Coma Scale Score 15

## 2023-10-05 NOTE — PROGRESS NOTES
TRANSPLANT NEPHROLOGY EARLY POST TRANSPLANT VISIT    Assessment & Plan   # DDKT: Trend down   - Baseline Creatinine: ~ TBD   - Proteinuria: Normal (<0.2 grams)   - Date DSA Last Checked: Sep/2023      Latest DSA: No DSA at time of transplant   - BK Viremia: No   - Kidney Tx Biopsy: No   - Transplant Ureteral Stent: Yes    # Immunosuppression: Tacrolimus immediate release (goal 8-10), Mycophenolate mofetil (dose 750 mg every 12 hours),  - Induction with Recent Transplant:  High Intensity Protocol   - Continue with intensive monitoring of immunosuppression for efficacy and toxicity.   - Changes: Not at this time    # Infection Prophylaxis:   - PJP: Sulfa/TMP (Bactrim)  - CMV: Valganciclovir (Valcyte)     # Hypertension: Controlled;  Goal BP: < 140/90   - Volume status: Euvolemic    - Changes: Not at this time    # Anemia in Chronic Renal Disease: Hgb: Stable      KAREN: No   - Iron studies: Not checked recently    # Mineral Bone Disorder:    - Secondary renal hyperparathyroidism; PTH level: Normal (15-65 pg/ml)        On treatment: None  - Vitamin D; level: Not checked recently        On supplement: No  - Calcium; level: Normal        On supplement: No  - Phosphorus; level: Normal        On supplement: No    # Electrolytes:   - Potassium; level: Normal        On supplement: No  - Magnesium; level: Low        On supplement: Yes  - Bicarbonate; level: Normal        On supplement: Yes  - Sodium; level: Normal    # Complex Urologic history: possible reflux/obstruction and recurrent UTI as a child requiring multiple urologic procedures, last of which was right native nephrectomy at age 13.  Cleared by urology for transplant. Voiding cystourethrogram which showed no evidence of vesicoureteral reflux and she was noted to empty her bladder to completion. CT imaging was unremarkable outside of left solitary kidney.     # Medical Compliance: Yes    # Health Maintenance and Vaccination Review: Reviewed and up to date    #  Transplant History:  Etiology of Kidney Failure: Unknown etiology  Tx: DDKT  Transplant: 9/27/2023 (Kidney)  Donor Type: Donation after Brain Death Donor Class: Standard Criteria Donor  Crossmatch at time of Tx: negative  DSA at time of Tx: No  Significant changes in immunosuppression: None  CMV IgG Ab High Risk Discordance (D+/R-): No  EBV IgG Ab High Risk Discordance (D+/R-): No  Significant transplant-related complications: None    Transplant Office Phone Number: [phone]    Assessment and plan was discussed with the patient and she voiced her understanding and agreement.    Return visit: No follow-ups on file.    Aruna Avila NP    Chief Complaint   Ms. Flores is a 43 year old here for kidney transplant.     History of Present Illness    Ariane Flores is a 43 year old female with history of ESRD from unclear etiology (possible reflux/obstruction and recurrent UTI as a child requiring multiple urologic procedures, last of which was right native nephrectomy at age 13) who is now s/p preemptive DDKT with stent.  She also has a history of HTN, HLD, and migraines.  Final crossmatch in process and most recent PRA 97%.  Induction was with rATG.     Doing well.  No acute complaints.      Home BP: Not checked    Problem List   Patient Active Problem List   Diagnosis    CKD (chronic kidney disease) stage 4, GFR 15-29 ml/min (H)    Migraine    Hyperparathyroidism, secondary renal (H24)    Hyperlipidemia    HTN (hypertension)    Vitamin D deficiency    Metabolic acidosis    Status post kidney transplant    Immunosuppressed status (H24)    Anemia of chronic disease    Hypocalcemia    Low bicarbonate level       Allergies   Allergies   Allergen Reactions    Cephalosporins Other (See Comments)     Pt does not know rx    Codeine Other (See Comments)     Gets dizzy    Iodinated Contrast Media Other (See Comments)     Only has 1 kidney.  Only has 1 kidney.  Only has 1 kidney.      Nsaids Other (See Comments)      Kidney Damage. Have Only has one kidney        Medications   Current Outpatient Medications   Medication Sig    acetaminophen (TYLENOL) 325 MG tablet Take 1-2 tablets (325-650 mg) by mouth every 4 hours as needed for other (For optimal non-opioid multimodal pain management to improve pain control.)    atorvastatin (LIPITOR) 40 MG tablet Take 40 mg by mouth daily    cholecalciferol 25 MCG (1000 UT) TABS Take 1,000 Units by mouth daily    ferrous sulfate (FEROSUL) 325 (65 Fe) MG tablet Take 325 mg by mouth daily (with breakfast)    levothyroxine (SYNTHROID/LEVOTHROID) 50 MCG tablet Take 50 mcg by mouth daily    magnesium oxide (MAG-OX) 400 MG tablet Take 1 tablet (400 mg) by mouth daily (with lunch)    mycophenolate (GENERIC EQUIVALENT) 250 MG capsule Take 3 capsules (750 mg) by mouth 2 times daily    polyethylene glycol (MIRALAX) 17 GM/Dose powder Take 17 g by mouth daily Until having regular bowel movements, HOLD for loose stools (Patient not taking: Reported on 10/1/2023)    psyllium (METAMUCIL/KONSYL) 58.6 % powder Take 6 g (1 teaspoonful) by mouth daily    senna-docusate (SENOKOT-S/PERICOLACE) 8.6-50 MG tablet Take 1 tablet by mouth 2 times daily Hold for loose stools (Patient not taking: Reported on 10/1/2023)    sodium bicarbonate 650 MG tablet Take 2 tablets (1,300 mg) by mouth 2 times daily    sulfamethoxazole-trimethoprim (BACTRIM) 400-80 MG tablet Take 1 tablet by mouth three times a week Increase to 1 tab by mouth daily when directed by transplant team (based on improving kidney function) (Patient taking differently: Take 1 tablet by mouth daily Increase to 1 tab by mouth daily when directed by transplant team (based on improving kidney function))    tacrolimus (GENERIC EQUIVALENT) 0.5 MG capsule Take one tab by mouth twice daily when directed by transplant team for dose titration. (Patient not taking: Reported on 10/1/2023)    tacrolimus (GENERIC) 1 MG capsule Take 4 capsules (4 mg) by mouth 2 times daily  Profile Rx: patient will contact pharmacy when needed    valGANciclovir (VALCYTE) 450 MG tablet Take 1 tablet (450 mg) by mouth twice a week Increase to 2 tabs by mouth daily as directed by transplant team (based on improving kidney function) (Patient taking differently: Take 450 mg by mouth daily Increase to 2 tabs by mouth daily as directed by transplant team (based on improving kidney function))     No current facility-administered medications for this visit.     There are no discontinued medications.    Physical Exam   Vital Signs: There were no vitals taken for this visit.    GENERAL APPEARANCE: alert and no distress  HENT: mouth without ulcers or lesions  RESP: lungs clear to auscultation - no rales, rhonchi or wheezes  CV: regular rhythm, normal rate, no rub, no murmur  EDEMA: no LE edema bilaterally  ABDOMEN: soft, nondistended, nontender, bowel sounds normal  MS: extremities normal - no gross deformities noted, no evidence of inflammation in joints, no muscle tenderness  SKIN: no rash    Data         Latest Ref Rng & Units 10/5/2023     9:16 AM 10/2/2023     7:12 AM 10/1/2023     7:35 AM   Renal   Sodium 135 - 145 mmol/L 140  140  141    K 3.4 - 5.3 mmol/L 4.6  3.9  3.7    Cl 98 - 107 mmol/L 107  107  108    Cl (external) 98 - 107 mmol/L 107  107  108    CO2 22 - 29 mmol/L 23  23  22    Urea Nitrogen 6.0 - 20.0 mg/dL 21.8  38.7  50.9    Creatinine 0.51 - 0.95 mg/dL 1.51  1.94  2.72    Glucose 70 - 99 mg/dL 134  109  99    Calcium 8.6 - 10.0 mg/dL 9.2  9.1  8.9    Magnesium 1.7 - 2.3 mg/dL 1.3  1.8  2.0          Latest Ref Rng & Units 10/5/2023     9:16 AM 10/2/2023     7:12 AM 10/1/2023     7:35 AM   Bone Health   Phosphorus 2.5 - 4.5 mg/dL 3.0  3.0  2.4    Parathyroid Hormone Intact 15 - 65 pg/mL  43     Vit D Def 20 - 50 ng/mL  32           Latest Ref Rng & Units 10/5/2023     9:16 AM 10/2/2023     7:12 AM 10/1/2023     7:35 AM   Heme   WBC 4.0 - 11.0 10e3/uL 8.3  6.7  5.8    Hgb 11.7 - 15.7 g/dL 9.9   10.2  9.7    Plt 150 - 450 10e3/uL 285  130  123          Latest Ref Rng & Units 9/28/2023     4:40 AM 9/27/2023     1:14 AM 11/5/2020    11:28 AM   Liver   AP 35 - 104 U/L  81  84    TBili <=1.2 mg/dL  0.3  0.5    ALT 0 - 50 U/L  16  24    AST 0 - 45 U/L  15  16    Tot Protein 6.4 - 8.3 g/dL  7.7  7.4    Albumin 3.4 - 5.0 g/dL   3.4    Albumin 3.5 - 5.2 g/dL 3.3  4.5           Latest Ref Rng & Units 9/27/2023     1:14 AM   Pancreas   A1C <5.7 % 5.5          Latest Ref Rng & Units 10/2/2023     7:12 AM   Iron studies   Iron 37 - 145 ug/dL 35    Iron Sat Index 15 - 46 % 16    Ferritin 6 - 175 ng/mL 270          Latest Ref Rng & Units 9/27/2023     1:14 AM 11/5/2020    11:28 AM   [facility] Txp Virology   EBV CAPSID ANTIBODY IGG No detectable antibody. Positive  >8.0    Hep B Core NR^Nonreactive  Nonreactive        Recent Labs   Lab Test 09/30/23  0543 10/01/23  0735 10/02/23  0712   DOSTAC  --  9/30/2023 10/1/2023   TACROL 5.6 6.5 13.2     Recent Labs   Lab Test 10/02/23  0712   DOSMPA 10/1/2023   8:00 PM   MPACID 1.07   MPAG 61.5

## 2023-10-06 ENCOUNTER — TELEPHONE (OUTPATIENT)
Dept: TRANSPLANT | Facility: CLINIC | Age: 43
End: 2023-10-06

## 2023-10-06 VITALS
HEIGHT: 60 IN | SYSTOLIC BLOOD PRESSURE: 124 MMHG | BODY MASS INDEX: 31.41 KG/M2 | WEIGHT: 160 LBS | DIASTOLIC BLOOD PRESSURE: 65 MMHG | RESPIRATION RATE: 16 BRPM | HEART RATE: 77 BPM | OXYGEN SATURATION: 8 % | TEMPERATURE: 99 F

## 2023-10-06 DIAGNOSIS — Z94.0 S/P KIDNEY TRANSPLANT: ICD-10-CM

## 2023-10-06 DIAGNOSIS — E83.42 HYPOMAGNESEMIA: Primary | ICD-10-CM

## 2023-10-06 PROBLEM — R19.7 DIARRHEA: Status: ACTIVE | Noted: 2023-10-06

## 2023-10-06 LAB
ADV 40+41 DNA STL QL NAA+NON-PROBE: NEGATIVE
ALBUMIN SERPL BCG-MCNC: 3.4 G/DL (ref 3.5–5.2)
ALBUMIN UR-MCNC: 50 MG/DL
ALP SERPL-CCNC: 65 U/L (ref 35–104)
ALT SERPL W P-5'-P-CCNC: 27 U/L (ref 0–50)
ANION GAP SERPL CALCULATED.3IONS-SCNC: 12 MMOL/L (ref 7–15)
APPEARANCE UR: ABNORMAL
AST SERPL W P-5'-P-CCNC: 19 U/L (ref 0–45)
ASTRO TYP 1-8 RNA STL QL NAA+NON-PROBE: NEGATIVE
BASO+EOS+MONOS # BLD AUTO: ABNORMAL 10*3/UL
BASO+EOS+MONOS NFR BLD AUTO: ABNORMAL %
BASOPHILS # BLD AUTO: 0 10E3/UL (ref 0–0.2)
BASOPHILS NFR BLD AUTO: 0 %
BILIRUB SERPL-MCNC: 0.4 MG/DL
BILIRUB UR QL STRIP: NEGATIVE
BUN SERPL-MCNC: 23 MG/DL (ref 6–20)
C CAYETANENSIS DNA STL QL NAA+NON-PROBE: NEGATIVE
CALCIUM SERPL-MCNC: 9 MG/DL (ref 8.6–10)
CAMPYLOBACTER DNA SPEC NAA+PROBE: NEGATIVE
CHLORIDE SERPL-SCNC: 107 MMOL/L (ref 98–107)
CMV DNA SPEC NAA+PROBE-ACNC: NOT DETECTED IU/ML
COLOR UR AUTO: ABNORMAL
CREAT SERPL-MCNC: 1.57 MG/DL (ref 0.51–0.95)
CRYPTOSP DNA STL QL NAA+NON-PROBE: NEGATIVE
DEPRECATED HCO3 PLAS-SCNC: 18 MMOL/L (ref 22–29)
E COLI O157 DNA STL QL NAA+NON-PROBE: NORMAL
E HISTOLYT DNA STL QL NAA+NON-PROBE: NEGATIVE
EAEC ASTA GENE ISLT QL NAA+PROBE: NEGATIVE
EC STX1+STX2 GENES STL QL NAA+NON-PROBE: NEGATIVE
EGFRCR SERPLBLD CKD-EPI 2021: 42 ML/MIN/1.73M2
EOSINOPHIL # BLD AUTO: 0.2 10E3/UL (ref 0–0.7)
EOSINOPHIL NFR BLD AUTO: 1 %
EPEC EAE GENE STL QL NAA+NON-PROBE: NEGATIVE
ERYTHROCYTE [DISTWIDTH] IN BLOOD BY AUTOMATED COUNT: 12.9 % (ref 10–15)
ETEC LTA+ST1A+ST1B TOX ST NAA+NON-PROBE: NEGATIVE
G LAMBLIA DNA STL QL NAA+NON-PROBE: NEGATIVE
GLUCOSE SERPL-MCNC: 114 MG/DL (ref 70–99)
GLUCOSE UR STRIP-MCNC: NEGATIVE MG/DL
HCT VFR BLD AUTO: 29.7 % (ref 35–47)
HGB BLD-MCNC: 9.5 G/DL (ref 11.7–15.7)
HGB UR QL STRIP: ABNORMAL
HOLD SPECIMEN: NORMAL
HYALINE CASTS: 5 /LPF
IMM GRANULOCYTES # BLD: 0.2 10E3/UL
IMM GRANULOCYTES NFR BLD: 1 %
KETONES UR STRIP-MCNC: NEGATIVE MG/DL
LEUKOCYTE ESTERASE UR QL STRIP: ABNORMAL
LYMPHOCYTES # BLD AUTO: 0.2 10E3/UL (ref 0.8–5.3)
LYMPHOCYTES NFR BLD AUTO: 1 %
MCH RBC QN AUTO: 28.9 PG (ref 26.5–33)
MCHC RBC AUTO-ENTMCNC: 32 G/DL (ref 31.5–36.5)
MCV RBC AUTO: 90 FL (ref 78–100)
MONOCYTES # BLD AUTO: 1.2 10E3/UL (ref 0–1.3)
MONOCYTES NFR BLD AUTO: 8 %
MUCOUS THREADS #/AREA URNS LPF: PRESENT /LPF
NEUTROPHILS # BLD AUTO: 12.9 10E3/UL (ref 1.6–8.3)
NEUTROPHILS NFR BLD AUTO: 89 %
NITRATE UR QL: NEGATIVE
NOROVIRUS GI+II RNA STL QL NAA+NON-PROBE: NEGATIVE
NRBC # BLD AUTO: 0 10E3/UL
NRBC BLD AUTO-RTO: 0 /100
P SHIGELLOIDES DNA STL QL NAA+NON-PROBE: NEGATIVE
PH UR STRIP: 5 [PH] (ref 5–7)
PLATELET # BLD AUTO: 313 10E3/UL (ref 150–450)
POTASSIUM SERPL-SCNC: 4.7 MMOL/L (ref 3.4–5.3)
PROT SERPL-MCNC: 6.3 G/DL (ref 6.4–8.3)
RBC # BLD AUTO: 3.29 10E6/UL (ref 3.8–5.2)
RBC URINE: 89 /HPF
RVA RNA STL QL NAA+NON-PROBE: NEGATIVE
SALMONELLA SP RPOD STL QL NAA+PROBE: NEGATIVE
SAPO I+II+IV+V RNA STL QL NAA+NON-PROBE: NEGATIVE
SHIGELLA SP+EIEC IPAH ST NAA+NON-PROBE: NEGATIVE
SODIUM SERPL-SCNC: 137 MMOL/L (ref 135–145)
SP GR UR STRIP: 1.01 (ref 1–1.03)
SQUAMOUS EPITHELIAL: 8 /HPF
TRANSITIONAL EPI: <1 /HPF
UROBILINOGEN UR STRIP-MCNC: NORMAL MG/DL
V CHOLERAE DNA SPEC QL NAA+PROBE: NEGATIVE
VIBRIO DNA SPEC NAA+PROBE: NEGATIVE
WBC # BLD AUTO: 14.8 10E3/UL (ref 4–11)
WBC URINE: 20 /HPF
Y ENTEROCOL DNA STL QL NAA+PROBE: NEGATIVE

## 2023-10-06 PROCEDURE — 36415 COLL VENOUS BLD VENIPUNCTURE: CPT | Performed by: PHARMACIST

## 2023-10-06 PROCEDURE — 250N000013 HC RX MED GY IP 250 OP 250 PS 637: Performed by: PHARMACIST

## 2023-10-06 PROCEDURE — 85025 COMPLETE CBC W/AUTO DIFF WBC: CPT | Performed by: PHARMACIST

## 2023-10-06 PROCEDURE — 96366 THER/PROPH/DIAG IV INF ADDON: CPT

## 2023-10-06 PROCEDURE — 250N000012 HC RX MED GY IP 250 OP 636 PS 637: Performed by: PHARMACIST

## 2023-10-06 PROCEDURE — 80053 COMPREHEN METABOLIC PANEL: CPT | Performed by: PHARMACIST

## 2023-10-06 PROCEDURE — 258N000003 HC RX IP 258 OP 636: Performed by: PHYSICIAN ASSISTANT

## 2023-10-06 PROCEDURE — 99024 POSTOP FOLLOW-UP VISIT: CPT | Performed by: SURGERY

## 2023-10-06 PROCEDURE — 81001 URINALYSIS AUTO W/SCOPE: CPT | Performed by: STUDENT IN AN ORGANIZED HEALTH CARE EDUCATION/TRAINING PROGRAM

## 2023-10-06 PROCEDURE — G0378 HOSPITAL OBSERVATION PER HR: HCPCS

## 2023-10-06 PROCEDURE — 36415 COLL VENOUS BLD VENIPUNCTURE: CPT | Performed by: STUDENT IN AN ORGANIZED HEALTH CARE EDUCATION/TRAINING PROGRAM

## 2023-10-06 PROCEDURE — 250N000012 HC RX MED GY IP 250 OP 636 PS 637: Performed by: PHYSICIAN ASSISTANT

## 2023-10-06 PROCEDURE — 87086 URINE CULTURE/COLONY COUNT: CPT | Performed by: STUDENT IN AN ORGANIZED HEALTH CARE EDUCATION/TRAINING PROGRAM

## 2023-10-06 PROCEDURE — 96361 HYDRATE IV INFUSION ADD-ON: CPT

## 2023-10-06 PROCEDURE — 258N000003 HC RX IP 258 OP 636: Performed by: PHARMACIST

## 2023-10-06 RX ORDER — MAGNESIUM OXIDE 400 MG/1
400 TABLET ORAL 2 TIMES DAILY
Qty: 180 TABLET | Refills: 3 | Status: SHIPPED | OUTPATIENT
Start: 2023-10-06 | End: 2023-10-11

## 2023-10-06 RX ORDER — MYCOPHENOLATE MOFETIL 250 MG/1
750 CAPSULE ORAL 2 TIMES DAILY
Status: DISCONTINUED | OUTPATIENT
Start: 2023-10-06 | End: 2023-10-06

## 2023-10-06 RX ORDER — LEVOTHYROXINE SODIUM 50 UG/1
50 TABLET ORAL DAILY
Status: DISCONTINUED | OUTPATIENT
Start: 2023-10-06 | End: 2023-10-06 | Stop reason: HOSPADM

## 2023-10-06 RX ORDER — ONDANSETRON 4 MG/1
4 TABLET, ORALLY DISINTEGRATING ORAL EVERY 6 HOURS PRN
Status: DISCONTINUED | OUTPATIENT
Start: 2023-10-06 | End: 2023-10-06 | Stop reason: HOSPADM

## 2023-10-06 RX ORDER — SULFAMETHOXAZOLE AND TRIMETHOPRIM 400; 80 MG/1; MG/1
1 TABLET ORAL DAILY
Status: DISCONTINUED | OUTPATIENT
Start: 2023-10-06 | End: 2023-10-06 | Stop reason: HOSPADM

## 2023-10-06 RX ORDER — TACROLIMUS 1 MG/1
4 CAPSULE ORAL 2 TIMES DAILY
Status: DISCONTINUED | OUTPATIENT
Start: 2023-10-06 | End: 2023-10-06 | Stop reason: HOSPADM

## 2023-10-06 RX ORDER — SODIUM BICARBONATE 650 MG/1
1300 TABLET ORAL 2 TIMES DAILY
Status: DISCONTINUED | OUTPATIENT
Start: 2023-10-06 | End: 2023-10-06 | Stop reason: HOSPADM

## 2023-10-06 RX ORDER — ACETAMINOPHEN 325 MG/1
650 TABLET ORAL EVERY 6 HOURS PRN
Status: DISCONTINUED | OUTPATIENT
Start: 2023-10-06 | End: 2023-10-06 | Stop reason: HOSPADM

## 2023-10-06 RX ORDER — VALGANCICLOVIR 450 MG/1
450 TABLET, FILM COATED ORAL DAILY
Status: DISCONTINUED | OUTPATIENT
Start: 2023-10-06 | End: 2023-10-06 | Stop reason: HOSPADM

## 2023-10-06 RX ORDER — TACROLIMUS 1 MG/1
2 CAPSULE ORAL 2 TIMES DAILY
Qty: 120 CAPSULE | Refills: 11 | Status: SHIPPED | OUTPATIENT
Start: 2023-10-06 | End: 2023-10-10

## 2023-10-06 RX ORDER — ONDANSETRON 2 MG/ML
4 INJECTION INTRAMUSCULAR; INTRAVENOUS EVERY 6 HOURS PRN
Status: DISCONTINUED | OUTPATIENT
Start: 2023-10-06 | End: 2023-10-06 | Stop reason: HOSPADM

## 2023-10-06 RX ORDER — ATORVASTATIN CALCIUM 40 MG/1
40 TABLET, FILM COATED ORAL DAILY
Status: DISCONTINUED | OUTPATIENT
Start: 2023-10-06 | End: 2023-10-06 | Stop reason: HOSPADM

## 2023-10-06 RX ORDER — SODIUM CHLORIDE, SODIUM LACTATE, POTASSIUM CHLORIDE, CALCIUM CHLORIDE 600; 310; 30; 20 MG/100ML; MG/100ML; MG/100ML; MG/100ML
INJECTION, SOLUTION INTRAVENOUS CONTINUOUS
Status: DISCONTINUED | OUTPATIENT
Start: 2023-10-06 | End: 2023-10-06

## 2023-10-06 RX ORDER — VALGANCICLOVIR 450 MG/1
450 TABLET, FILM COATED ORAL DAILY
Start: 2023-10-07 | End: 2023-10-20

## 2023-10-06 RX ORDER — MYCOPHENOLIC ACID 180 MG/1
540 TABLET, DELAYED RELEASE ORAL 2 TIMES DAILY
Qty: 180 TABLET | Refills: 11 | Status: SHIPPED | OUTPATIENT
Start: 2023-10-06 | End: 2023-10-20

## 2023-10-06 RX ORDER — SULFAMETHOXAZOLE AND TRIMETHOPRIM 400; 80 MG/1; MG/1
1 TABLET ORAL DAILY
Start: 2023-10-07 | End: 2023-10-20

## 2023-10-06 RX ADMIN — ACETAMINOPHEN 650 MG: 325 TABLET, FILM COATED ORAL at 10:14

## 2023-10-06 RX ADMIN — SODIUM BICARBONATE 650 MG TABLET 1300 MG: at 10:17

## 2023-10-06 RX ADMIN — LEVOTHYROXINE SODIUM 50 MCG: 0.05 TABLET ORAL at 10:17

## 2023-10-06 RX ADMIN — SODIUM CHLORIDE 500 ML: 9 INJECTION, SOLUTION INTRAVENOUS at 16:06

## 2023-10-06 RX ADMIN — MYCOPHENOLIC ACID 540 MG: 360 TABLET, DELAYED RELEASE ORAL at 10:14

## 2023-10-06 RX ADMIN — SODIUM CHLORIDE, POTASSIUM CHLORIDE, SODIUM LACTATE AND CALCIUM CHLORIDE: 600; 310; 30; 20 INJECTION, SOLUTION INTRAVENOUS at 01:33

## 2023-10-06 RX ADMIN — SULFAMETHOXAZOLE AND TRIMETHOPRIM 1 TABLET: 400; 80 TABLET ORAL at 10:15

## 2023-10-06 RX ADMIN — ATORVASTATIN CALCIUM 40 MG: 40 TABLET, FILM COATED ORAL at 10:16

## 2023-10-06 RX ADMIN — VALGANCICLOVIR 450 MG: 450 TABLET, FILM COATED ORAL at 10:16

## 2023-10-06 RX ADMIN — TACROLIMUS 4 MG: 1 CAPSULE ORAL at 10:15

## 2023-10-06 ASSESSMENT — ACTIVITIES OF DAILY LIVING (ADL)
ADLS_ACUITY_SCORE: 35

## 2023-10-06 NOTE — TELEPHONE ENCOUNTER
ISSUE: Mag level 1.3    Bryant Arias MD Reilly, Megan, RN  Mg oxide 400 mg po bid    PLAN/LPN TASK:    Please call pt and instruct to start mag -ox 400mg po with lunch and supper

## 2023-10-06 NOTE — DISCHARGE SUMMARY
[facility]    Discharge Summary  Transplant Surgery    Date of Admission:  10/5/2023  Date of Discharge:  10/6/2023  Discharging Provider: Tish Sanchez PA-C/Daniel Fitch MD\\    Attestation: I saw and examined this patient with Tish Sanchez PA-C, and the transplant team. I independently reviewed all pertinent laboratory and imaging information and made independent management decisions including immunosuppression adjustment. I agree with the findings and plan as documented in this note.  Daniel Fitch MD    Discharge Diagnoses   Principal Problem:    Kidney transplanted  Active Problems:    Fever, unspecified fever cause     History of Present Illness   Ariane Flores is an 43 year old female who with history of CKD presumed 2/2 unilateral kidney (s/p right nephrectomy as a child for recurrent pyelonephritis) and secondary FSGS, numerous unspecified urologic procedures as a child, HTN, GERD, s/p DDKT w/ stent on 9/27 presents to ED with temperature up to 100.2 in the setting of multiple episodes of diarrhea for 3 days.     Hospital Course   Patient was observed in the ED for the evening and was worked up for infection. As of discharge patient had only 2 episodes of diarrhea for the day and was able to stay hydrated.     Diarrhea: Enteric panel and CMV negative. C diff pending on discharge, if negative patient can start imodium as needed. Changed Cellcept to Myfortic 540 mg BID.     Low grade fever: Tmax 100.4, AF on admission. Tmax 14.8 from 10.2, monitor. RVP negative. Blood cultures NGTD. UA dirty with contamination, awaiting UC, will follow up.     Graft function: Cr 1.5 on admission, lowest since time of transplant.     Hypomagnesemia: Received 4g IV    Low bicarb: Continue Na bicarb 1300 mg BID.     Pending Results   These results will be followed up by transplant coordinator  Unresulted Labs Ordered in the Past 30 Days of this Admission       Date  and Time Order Name Status Description    10/6/2023  1:57 AM Urine Culture In process     10/5/2023  7:42 PM Blood Culture Peripheral Blood Preliminary     10/5/2023  7:42 PM Blood Culture Peripheral Blood Preliminary             Code Status   Full    Primary Care Physician   Dwayne Pandya    Physical Exam   Temp: 98.5  F (36.9  C) Temp src: Oral BP: 114/69 Pulse: 80   Resp: 16 SpO2: 95 % O2 Device: None (Room air)    Vitals:    10/05/23 1858   Weight: 72.6 kg (160 lb)     Vital Signs with Ranges  Temp:  [98  F (36.7  C)-99.2  F (37.3  C)] 98.5  F (36.9  C)  Pulse:  [80-97] 80  Resp:  [16] 16  BP: (111-133)/(54-74) 114/69  SpO2:  [95 %-100 %] 95 %  No intake/output data recorded.    Constitutional: Awake, alert, cooperative, no apparent distress, and appears stated age.  Eyes: Lids and lashes normal, pupils equal, round and reactive to light, extra ocular muscles intact, sclera clear, conjunctiva normal.  Respiratory: No increased work of breathing, good air exchange, clear to auscultation bilaterally, no crackles or wheezing.  Cardiovascular: Regular rate and rhythm, normal S1 and S2, no S3 or S4, and no murmur noted.  GI: Incision c/d/I in RLQ. Staples in place.   Neurologic: Awake, alert, oriented to name, place and time. Gait is normal.   Neuropsychiatric: Calm, normal eye contact, alert, normal affect, oriented to self, place, time and situation    Time Spent on this Encounter   Tish JUAREZ PA-C, personally saw the patient today and spent greater than 30 minutes discharging this patient.    Discharge Disposition   Discharged to home  Condition at discharge: Stable    Consultations This Hospital Stay   TRANSPLANT SURGERY KIDNEY ADULT IP CONSULT    Discharge Orders      Reason for your hospital stay    Diarrhea.     Activity    Your activity upon discharge: Walk at least four times a day, lift no greater than 10 pounds for 6-8 weeks from the time of surgery.  No driving while taking narcotics or 3 weeks  after surgery.     Follow Up and recommended labs and tests    Follow up as previously scheduled.     C diff pending, if it is negative you can take imodium as needed for diarrhea.     Remember to always bring an updated medication list to all appointments.        Call your Transplant Coordinator ([phone]) with questions about Transplant Center appointment scheduling.     LABS:     CBC, BMP, magnesium, phosphorus, tacrolimus level to be drawn daily while in ATC, then every Monday and Thursday by home health care nurse if arranged, or at an outpatient lab.     When to contact your care team    WHEN TO CONTACT YOUR  COORDINATOR:     Transplant Coordinator [phone]     Notify your coordinator if you have pain over your kidney, increased redness or drainage from your incision, fever greater than 100F, decreased urine output or new or increased amount of blood in urine.     Notify your coordinator immediately if you are ever unable to take your immunosuppressive medications for any reason.     If you have URGENT concerns after office hours, please call the hospital switchboard at [phone] and ask to have the organ transplant nurse on-call paged. If you have a life-threatening emergency, go to the nearest emergency room.     Also contact if you have increasing diarrhea or are unable to stay hydrated.     Diet    Follow this diet upon discharge: Diet recommendations post-transplant: Heart healthy dietary habits long term (low saturated/trans fat, low sodium). High protein diet x 8 weeks. Practice food safety precautions.     Discharge Medications   Current Discharge Medication List        START taking these medications    Details   mycophenolic acid (GENERIC EQUIVALENT) 180 MG EC tablet Take 3 tablets (540 mg) by mouth 2 times daily  Qty: 180 tablet, Refills: 11    Associated Diagnoses: Kidney transplanted           CONTINUE these medications which have CHANGED    Details   sulfamethoxazole-trimethoprim  (BACTRIM) 400-80 MG tablet Take 1 tablet by mouth daily    Associated Diagnoses: Kidney transplanted      valGANciclovir (VALCYTE) 450 MG tablet Take 1 tablet (450 mg) by mouth daily    Associated Diagnoses: Kidney transplanted           CONTINUE these medications which have NOT CHANGED    Details   acetaminophen (TYLENOL) 325 MG tablet Take 1-2 tablets (325-650 mg) by mouth every 4 hours as needed for other (For optimal non-opioid multimodal pain management to improve pain control.)  Qty: 100 tablet, Refills: 0    Associated Diagnoses: S/P kidney transplant      atorvastatin (LIPITOR) 40 MG tablet Take 40 mg by mouth daily      cholecalciferol 25 MCG (1000 UT) TABS Take 1,000 Units by mouth daily      ferrous sulfate (FEROSUL) 325 (65 Fe) MG tablet Take 325 mg by mouth daily (with breakfast)      levothyroxine (SYNTHROID/LEVOTHROID) 50 MCG tablet Take 50 mcg by mouth daily      magnesium oxide (MAG-OX) 400 MG tablet Take 1 tablet (400 mg) by mouth daily (with lunch)  Qty: 30 tablet, Refills: 11    Associated Diagnoses: S/P kidney transplant      psyllium (METAMUCIL/KONSYL) 58.6 % powder Take 6 g (1 teaspoonful) by mouth daily  Qty: 500 g, Refills: 1    Associated Diagnoses: Loose stools      sodium bicarbonate 650 MG tablet Take 2 tablets (1,300 mg) by mouth 2 times daily  Qty: 120 tablet, Refills: 11    Associated Diagnoses: S/P kidney transplant      tacrolimus (GENERIC EQUIVALENT) 0.5 MG capsule Take one tab by mouth twice daily when directed by transplant team for dose titration.  Qty: 60 capsule, Refills: 11    Associated Diagnoses: S/P kidney transplant      tacrolimus (GENERIC) 1 MG capsule Take 4 capsules (4 mg) by mouth 2 times daily Profile Rx: patient will contact pharmacy when needed  Qty: 280 capsule, Refills: 11    Comments: TXP DT 9/27/2023 (Kidney) TXP Dischg DT 9/30/2023 DX Kidney replaced by transplant Z94.0 TX Center [facility] ([place], [place])  Associated  Diagnoses: S/P kidney transplant           STOP taking these medications       mycophenolate (GENERIC EQUIVALENT) 250 MG capsule Comments:   Reason for Stopping:         polyethylene glycol (MIRALAX) 17 GM/Dose powder Comments:   Reason for Stopping:         senna-docusate (SENOKOT-S/PERICOLACE) 8.6-50 MG tablet Comments:   Reason for Stopping:             Allergies   Allergies   Allergen Reactions    Cephalosporins Other (See Comments)     Pt does not know rx    Codeine Other (See Comments)     Gets dizzy    Iodinated Contrast Media Other (See Comments)     Only has 1 kidney.  Only has 1 kidney.  Only has 1 kidney.      Nsaids Other (See Comments)     Kidney Damage. Have Only has one kidney      Data   Most Recent 3 CBC's:  Recent Labs   Lab Test 10/06/23  0649 10/05/23  2234 10/05/23  0916   WBC 14.8* 10.2 8.3   HGB 9.5* 9.4* 9.9*   MCV 90 89 89    319 285      Most Recent 3 BMP's:  Recent Labs   Lab Test 10/06/23  0649 10/05/23  2234 10/05/23  0916    137 140   POTASSIUM 4.7 3.9 4.6   CHLORIDE 107 104 107   CO2 18* 20* 23   BUN 23.0* 22.3* 21.8*   CR 1.57* 1.52* 1.51*   ANIONGAP 12 13 10   RON 9.0 9.1 9.2   * 105* 134*     Most Recent 2 LFT's:  Recent Labs   Lab Test 10/06/23  0649 10/05/23  2234   AST 19 18   ALT 27 32   ALKPHOS 65 65   BILITOTAL 0.4 0.3     Most Recent INR's and Anticoagulation Dosing History:  Anticoagulation Dose History          Latest Ref Rng & Units 11/5/2020 9/27/2023   Recent Dosing and Labs   INR 0.85 - 1.15 0.96  1.02      Most Recent 3 Troponin's:No lab results found.  Most Recent Cholesterol Panel:  Recent Labs   Lab Test 09/27/23 0114   CHOL 160   LDL 94   HDL 36*   TRIG 148     Most Recent 6 Bacteria Isolates From Any Culture (See EPIC Reports for Culture Details):No lab results found.  Most Recent TSH, T4 and A1c Labs:  Recent Labs   Lab Test 09/27/23 0114   A1C 5.5

## 2023-10-06 NOTE — PROGRESS NOTES
Discharge instructions reviewed, understood, and signed by patient. VSS, PIV removed, new medications reviewed and understood, patient has all belongings. Patient left unit with daughter through lobby.

## 2023-10-06 NOTE — H&P
[facility]    History and Physical - Kidney Transplant Service       Date of Admission:  10/5/2023    Assessment & Plan: Surgery   Ariane Flores is a 43 year old female s/p DDKT on 9/27 who presents with 3 days of watery diarrhea and temperature of 100.4.     - Admit to obs under transplant team  - Blood cultures, respiratory viral panel, CMV, c diff, UA, enteric bacteria/viral panel  - Recheck labs in AM  - IV fluids  - Continue PTA meds (next dose due in AM)     Diet:  Regular diet  DVT Prophylaxis: Pneumatic Compression Devices, ambulation  Cook Catheter: Not present  Lines: None     Drains: None     Cardiac Monitoring: None  Code Status:  Full    Clinically Significant Risk Factors Present on Admission            # Hypomagnesemia: Lowest Mg = 1.3 mg/dL in last 2 days, will replace as needed         # Hypertension: Noted on problem list      # Obesity: Estimated body mass index is 31.25 kg/m  as calculated from the following:    Height as of this encounter: 1.524 m (5').    Weight as of this encounter: 72.6 kg (160 lb).              Disposition Plan      Expected Discharge Date: 10/06/2023                 The patient's care was discussed with the Chief Resident/Fellow.    Peggy Oneill MD  [facility]  Non-urgent messages: Securely message with Vocera (more info)  Text page via [employer] Paging/Directory     Attestation: I saw and examined this patient with Peggy Oneill MD, and the transplant team. I independently reviewed all pertinent laboratory and imaging information and made independent management decisions including immunosuppression adjustment. I agree with the findings and plan as documented in this note.  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     Notes that she is having non-bloody watery diarrhea 7-8 times a day for the past 3 days. She's been drinking water and Pedialyte to stay hydrated (2 large water bottles daily). Her head feels warm and she's been checking her temperature, highest reading at home 100.2. No other associated symptoms. No body aches, chills, runny nose, sore throat, cough, SOB, CP, abdominal pain, N/V. Making her normal amount of clear, yellow urine. Last took her meds this evening. No missed doses. She's had a decreased appetite since surgery though this has not change in past few days.     Past Medical History    Past Medical History:   Diagnosis Date    CKD (chronic kidney disease) stage 4, GFR 15-29 ml/min (H)     Hyperlipidemia     Hypertension     Metabolic acidosis     Migraines     Recurrent UTI     in childhood    Vitamin D deficiency        Past Surgical History   Past Surgical History:   Procedure Laterality Date    APPENDECTOMY      BENCH KIDNEY  2023    Procedure: Bench kidney;  Surgeon: Daniel Fitch MD;  Location: [facility] OR    HC REPAIR ACHILLES TENDON,PRIMARY      HERNIA REPAIR      NEPHRECTOMY Right     age 13    OTHER SURGICAL HISTORY      complete detail unknown, but required multiple urologic procedures before the age of 10 years    TRANSPLANT KIDNEY RECIPIENT  DONOR Right 2023    Procedure: Transplant kidney recipient  donor, with ureteral stenting;  Surgeon: Daniel Fitch MD;  Location: [facility] OR       Prior to Admission Medications   Prior to Admission Medications   Prescriptions Last  Dose Informant Patient Reported? Taking?   acetaminophen (TYLENOL) 325 MG tablet   No No   Sig: Take 1-2 tablets (325-650 mg) by mouth every 4 hours as needed for other (For optimal non-opioid multimodal pain management to improve pain control.)   atorvastatin (LIPITOR) 40 MG tablet   Yes No   Sig: Take 40 mg by mouth daily   cholecalciferol 25 MCG (1000 UT) TABS   Yes No   Sig: Take 1,000 Units by mouth daily   ferrous sulfate (FEROSUL) 325 (65 Fe) MG tablet   Yes No   Sig: Take 325 mg by mouth daily (with breakfast)   levothyroxine (SYNTHROID/LEVOTHROID) 50 MCG tablet   Yes No   Sig: Take 50 mcg by mouth daily   magnesium oxide (MAG-OX) 400 MG tablet   No No   Sig: Take 1 tablet (400 mg) by mouth daily (with lunch)   mycophenolate (GENERIC EQUIVALENT) 250 MG capsule   No No   Sig: Take 3 capsules (750 mg) by mouth 2 times daily   polyethylene glycol (MIRALAX) 17 GM/Dose powder   No No   Sig: Take 17 g by mouth daily Until having regular bowel movements, HOLD for loose stools   Patient not taking: Reported on 10/1/2023   psyllium (METAMUCIL/KONSYL) 58.6 % powder   No No   Sig: Take 6 g (1 teaspoonful) by mouth daily   senna-docusate (SENOKOT-S/PERICOLACE) 8.6-50 MG tablet   No No   Sig: Take 1 tablet by mouth 2 times daily Hold for loose stools   Patient not taking: Reported on 10/1/2023   sodium bicarbonate 650 MG tablet   No No   Sig: Take 2 tablets (1,300 mg) by mouth 2 times daily   sulfamethoxazole-trimethoprim (BACTRIM) 400-80 MG tablet   No No   Sig: Take 1 tablet by mouth three times a week Increase to 1 tab by mouth daily when directed by transplant team (based on improving kidney function)   Patient taking differently: Take 1 tablet by mouth daily Increase to 1 tab by mouth daily when directed by transplant team (based on improving kidney function)   tacrolimus (GENERIC EQUIVALENT) 0.5 MG capsule   No No   Sig: Take one tab by mouth twice daily when directed by transplant team for dose titration.    Patient not taking: Reported on 10/1/2023   tacrolimus (GENERIC) 1 MG capsule   No No   Sig: Take 4 capsules (4 mg) by mouth 2 times daily Profile Rx: patient will contact pharmacy when needed   valGANciclovir (VALCYTE) 450 MG tablet   No No   Sig: Take 1 tablet (450 mg) by mouth twice a week Increase to 2 tabs by mouth daily as directed by transplant team (based on improving kidney function)   Patient taking differently: Take 450 mg by mouth daily Increase to 2 tabs by mouth daily as directed by transplant team (based on improving kidney function)      Facility-Administered Medications: None           Physical Exam   Vital Signs: Temp: 99.2  F (37.3  C) Temp src: Oral BP: 133/74 Pulse: 97   Resp: 16 SpO2: 99 % O2 Device: None (Room air)    Weight: 160 lbs 0 ozNo intake or output data in the 24 hours ending 10/05/23 2330  ----------------------------------------------------------------------------------------------------------------------------------  Gen: Non-toxic appearing, no acute distress  HEENT: Atraumatic, normocephalic  Neuro: Alert and oriented. No gross neurologic deficits   Pulm: nonlabored breathing, comfortable on room air, no cough  CV: RRR by peripheral pulse, noncyanotic   ABD: Soft, nontender, mildly distended, No guarding or rebound  MSK:  Normal active range of motion, no edema  Skin: Warm, dry, no rashes or abrasions on exposed skin. Incision C/D/I with staples in place  Psych: Cooperative, appropriate mood and affect      Data     I have personally reviewed the following data over the past 24 hrs:    10.2  \   9.4 (L)   / 319     137 104 22.3 (H) /  105 (H)   3.9 20 (L) 1.52 (H) \     ALT: 32 AST: 18 AP: 65 TBILI: 0.3   ALB: 3.7 TOT PROTEIN: 6.6 LIPASE: 33

## 2023-10-06 NOTE — ED PROVIDER NOTES
[facility] EMERGENCY DEPARTMENT ([facility])    10/05/23       History     Chief Complaint   Patient presents with    Post-op Problem     HPI  Ariane Flores is a 43 year old female who with history of CKD presumed secondary to unilateral kidney (s/p right nephrectomy as a child for recurrent pyelonephritis) and secondary FSGS, numerous unspecified urologic procedures as a child, HTN, GERD, status post donor kidney transplant with ureteral stent on  presenting to the emergency department today due to fever up to 100.2 in the setting of multiple episodes of diarrhea for the last 4 to 5 days.    Notes that she is having profuse watery diarrhea 7-8 times a day and has been every single day for the last 4 to 5 days.  Has been trying to drink water to keep up with this, and denies any other significant complaints including any significant body aches, runny nose, sore throat, cough, shortness of breath, abdominal pain, nausea, vomiting.  No chills.  Notes that she was told that if she had a temperature over 100 she needed to get checked out because of her kidney transplant     Past Medical History  Past Medical History:   Diagnosis Date    CKD (chronic kidney disease) stage 4, GFR 15-29 ml/min (H)     Hyperlipidemia     Hypertension     Metabolic acidosis     Migraines     Recurrent UTI     in childhood    Vitamin D deficiency      Past Surgical History:   Procedure Laterality Date    APPENDECTOMY      BENCH KIDNEY  2023    Procedure: Bench kidney;  Surgeon: Daniel Fitch MD;  Location: [facility] OR     REPAIR ACHILLES TENDON,PRIMARY      HERNIA REPAIR      NEPHRECTOMY Right     age 13    OTHER SURGICAL HISTORY      complete detail unknown, but required multiple urologic procedures before the age of 10 years    TRANSPLANT KIDNEY RECIPIENT  DONOR Right 2023    Procedure: Transplant kidney recipient  donor, with ureteral stenting;  Surgeon: Daniel Fitch  MD Sheldon;  Location: [facility] OR     acetaminophen (TYLENOL) 325 MG tablet  atorvastatin (LIPITOR) 40 MG tablet  cholecalciferol 25 MCG (1000 UT) TABS  ferrous sulfate (FEROSUL) 325 (65 Fe) MG tablet  levothyroxine (SYNTHROID/LEVOTHROID) 50 MCG tablet  magnesium oxide (MAG-OX) 400 MG tablet  mycophenolate (GENERIC EQUIVALENT) 250 MG capsule  polyethylene glycol (MIRALAX) 17 GM/Dose powder  psyllium (METAMUCIL/KONSYL) 58.6 % powder  senna-docusate (SENOKOT-S/PERICOLACE) 8.6-50 MG tablet  sodium bicarbonate 650 MG tablet  sulfamethoxazole-trimethoprim (BACTRIM) 400-80 MG tablet  tacrolimus (GENERIC EQUIVALENT) 0.5 MG capsule  tacrolimus (GENERIC) 1 MG capsule  valGANciclovir (VALCYTE) 450 MG tablet      Allergies   Allergen Reactions    Cephalosporins Other (See Comments)     Pt does not know rx    Codeine Other (See Comments)     Gets dizzy    Iodinated Contrast Media Other (See Comments)     Only has 1 kidney.  Only has 1 kidney.  Only has 1 kidney.      Nsaids Other (See Comments)     Kidney Damage. Have Only has one kidney      Family History  Family History   Problem Relation Age of Onset    Kidney Disease Daughter      Social History   Social History     Tobacco Use    Smoking status: Never    Smokeless tobacco: Never   Substance Use Topics    Alcohol use: Not Currently    Drug use: Never         A medically appropriate review of systems was performed with pertinent positives and negatives noted in the HPI, and all other systems negative.    Physical Exam   BP: 133/74  Pulse: 97  Temp: 99.2  F (37.3  C)  Resp: 16  Height: 152.4 cm (5')  Weight: 72.6 kg (160 lb)  SpO2: 99 %  Physical Exam  GEN: Well appearing, non toxic, cooperative  HEENT: normocephalic and atraumatic, PERRLA, EOMI  CV: well-perfused, normal skin color for ethnicity, regular rate and rhythm  PULM: breathing comfortably, in no respiratory distress, clear to auscultation upper and lower lung fields  ABD: nondistended, soft, nontender, palpable  donor kidney in the right lower quadrant with overlying surgical incision that appears a little red and irritated, but no significant large surrounding erythema, drainage, or underlying fluctuance.  EXT: Full range of motion.  No edema.  NEURO: awake, conversant, grossly normal bilateral upper and lower extremity strength & ROM   SKIN: No rashes, ecchymosis, or lacerations  PSYCH: Calm and cooperative, interactive      ED Course, Procedures, & Data      Procedures             Results for orders placed or performed during the hospital encounter of 10/05/23   XR Chest 2 Views     Status: None    Narrative    EXAM: XR CHEST 2 VIEWS  LOCATION: [facility]  DATE: 10/5/2023    INDICATION: fever, post kid transplant  COMPARISON: 9/27/2023      Impression    IMPRESSION: Heart is normal in size. Minimal atelectasis at the right lung base. Lungs are otherwise clear.   Comprehensive metabolic panel     Status: Abnormal   Result Value Ref Range    Sodium 137 135 - 145 mmol/L    Potassium 3.9 3.4 - 5.3 mmol/L    Carbon Dioxide (CO2) 20 (L) 22 - 29 mmol/L    Anion Gap 13 7 - 15 mmol/L    Urea Nitrogen 22.3 (H) 6.0 - 20.0 mg/dL    Creatinine 1.52 (H) 0.51 - 0.95 mg/dL    GFR Estimate 43 (L) >60 mL/min/1.73m2    Calcium 9.1 8.6 - 10.0 mg/dL    Chloride 104 98 - 107 mmol/L    Glucose 105 (H) 70 - 99 mg/dL    Alkaline Phosphatase 65 35 - 104 U/L    AST 18 0 - 45 U/L    ALT 32 0 - 50 U/L    Protein Total 6.6 6.4 - 8.3 g/dL    Albumin 3.7 3.5 - 5.2 g/dL    Bilirubin Total 0.3 <=1.2 mg/dL   Lipase     Status: Normal   Result Value Ref Range    Lipase 33 13 - 60 U/L   HCG qualitative Blood     Status: Normal   Result Value Ref Range    hCG Serum Qualitative Negative Negative   Magnesium     Status: Abnormal   Result Value Ref Range    Magnesium 1.3 (L) 1.7 - 2.3 mg/dL   CBC with platelets and differential     Status: Abnormal   Result Value Ref Range    WBC Count 10.2 4.0 - 11.0 10e3/uL    RBC  Count 3.26 (L) 3.80 - 5.20 10e6/uL    Hemoglobin 9.4 (L) 11.7 - 15.7 g/dL    Hematocrit 29.1 (L) 35.0 - 47.0 %    MCV 89 78 - 100 fL    MCH 28.8 26.5 - 33.0 pg    MCHC 32.3 31.5 - 36.5 g/dL    RDW 12.9 10.0 - 15.0 %    Platelet Count 319 150 - 450 10e3/uL    % Neutrophils 83 %    % Lymphocytes 4 %    % Monocytes 8 %    Mids % (Monos, Eos, Basos)      % Eosinophils 2 %    % Basophils 0 %    % Immature Granulocytes 3 %    NRBCs per 100 WBC 0 <1 /100    Absolute Neutrophils 8.5 (H) 1.6 - 8.3 10e3/uL    Absolute Lymphocytes 0.4 (L) 0.8 - 5.3 10e3/uL    Absolute Monocytes 0.8 0.0 - 1.3 10e3/uL    Mids Abs (Monos, Eos, Basos)      Absolute Eosinophils 0.2 0.0 - 0.7 10e3/uL    Absolute Basophils 0.0 0.0 - 0.2 10e3/uL    Absolute Immature Granulocytes 0.3 <=0.4 10e3/uL    Absolute NRBCs 0.0 10e3/uL   [place] Draw     Status: None (In process)    Narrative    The following orders were created for panel order [place] Draw.  Procedure                               Abnormality         Status                     ---------                               -----------         ------                     Extra Blue Top Tube[[id number]]                              In process                   Please view results for these tests on the individual orders.   CBC with platelets differential     Status: Abnormal    Narrative    The following orders were created for panel order CBC with platelets differential.  Procedure                               Abnormality         Status                     ---------                               -----------         ------                     CBC with platelets and d...[[id number]]  Abnormal            Final result                 Please view results for these tests on the individual orders.   Results for orders placed or performed in visit on 10/05/23   Basic metabolic panel     Status: Abnormal   Result Value Ref Range    Sodium 140 135 - 145 mmol/L    Potassium 4.6 3.4 - 5.3 mmol/L    Chloride 107 98  - 107 mmol/L    Carbon Dioxide (CO2) 23 22 - 29 mmol/L    Anion Gap 10 7 - 15 mmol/L    Urea Nitrogen 21.8 (H) 6.0 - 20.0 mg/dL    Creatinine 1.51 (H) 0.51 - 0.95 mg/dL    GFR Estimate 44 (L) >60 mL/min/1.73m2    Calcium 9.2 8.6 - 10.0 mg/dL    Glucose 134 (H) 70 - 99 mg/dL   CBC with platelets     Status: Abnormal   Result Value Ref Range    WBC Count 8.3 4.0 - 11.0 10e3/uL    RBC Count 3.45 (L) 3.80 - 5.20 10e6/uL    Hemoglobin 9.9 (L) 11.7 - 15.7 g/dL    Hematocrit 30.7 (L) 35.0 - 47.0 %    MCV 89 78 - 100 fL    MCH 28.7 26.5 - 33.0 pg    MCHC 32.2 31.5 - 36.5 g/dL    RDW 12.9 10.0 - 15.0 %    Platelet Count 285 150 - 450 10e3/uL   Mycophenolic Acid by Tandem Mass Spectrometry     Status: Abnormal   Result Value Ref Range    Mycophenolic Acid by Tandem Mass Spectrometry 0.86 (L) 1.00 - 3.50 mg/L    MPA Glucuronide by Tandem Mass Spectrometry 56.3 30.0 - 95.0 mg/L    Mycophenolic Acid Last Dose Date 10/4/2023     Mycopheolic Acid Last Dose Time  8:00 PM     Narrative    This test was developed and its performance characteristics determined by the [facility],  Special Chemistry Laboratory. It has not been cleared or approved by the FDA. The laboratory is regulated under CLIA as qualified to perform high-complexity testing. This test is used for clinical purposes. It should not be regarded as investigational or for research.   Phosphorus     Status: Normal   Result Value Ref Range    Phosphorus 3.0 2.5 - 4.5 mg/dL   Magnesium     Status: Abnormal   Result Value Ref Range    Magnesium 1.3 (L) 1.7 - 2.3 mg/dL   Tacrolimus by Tandem Mass Spectrometry     Status: Abnormal   Result Value Ref Range    Tacrolimus by Tandem Mass Spectrometry 18.8 (H) 5.0 - 15.0 ug/L    Tacrolimus Last Dose Date 10/4/2023     Tacrolimus Last Dose Time  8:00 PM     Narrative    This test was developed and its performance characteristics determined by the [facility],  Special Chemistry  Laboratory. It has not been cleared or approved by the FDA. The laboratory is regulated under CLIA as qualified to perform high-complexity testing. This test is used for clinical purposes. It should not be regarded as investigational or for research.     Medications   magnesium sulfate 4 g in 100 mL sterile water intermittent infusion (4 g Intravenous $New Bag 10/5/23 3961)     Labs Ordered and Resulted from Time of ED Arrival to Time of ED Departure   COMPREHENSIVE METABOLIC PANEL - Abnormal       Result Value    Sodium 137      Potassium 3.9      Carbon Dioxide (CO2) 20 (*)     Anion Gap 13      Urea Nitrogen 22.3 (*)     Creatinine 1.52 (*)     GFR Estimate 43 (*)     Calcium 9.1      Chloride 104      Glucose 105 (*)     Alkaline Phosphatase 65      AST 18      ALT 32      Protein Total 6.6      Albumin 3.7      Bilirubin Total 0.3     MAGNESIUM - Abnormal    Magnesium 1.3 (*)    CBC WITH PLATELETS AND DIFFERENTIAL - Abnormal    WBC Count 10.2      RBC Count 3.26 (*)     Hemoglobin 9.4 (*)     Hematocrit 29.1 (*)     MCV 89      MCH 28.8      MCHC 32.3      RDW 12.9      Platelet Count 319      % Neutrophils 83      % Lymphocytes 4      % Monocytes 8      Mids % (Monos, Eos, Basos)        % Eosinophils 2      % Basophils 0      % Immature Granulocytes 3      NRBCs per 100 WBC 0      Absolute Neutrophils 8.5 (*)     Absolute Lymphocytes 0.4 (*)     Absolute Monocytes 0.8      Mids Abs (Monos, Eos, Basos)        Absolute Eosinophils 0.2      Absolute Basophils 0.0      Absolute Immature Granulocytes 0.3      Absolute NRBCs 0.0     LIPASE - Normal    Lipase 33     HCG QUALITATIVE PREGNANCY - Normal    hCG Serum Qualitative Negative     ROUTINE UA WITH MICROSCOPIC REFLEX TO CULTURE   INFLUENZA A/B, RSV, & SARS-COV2 PCR   BLOOD CULTURE   BLOOD CULTURE   ENTERIC BACTERIA AND VIRUS PANEL BY PCR   C. DIFFICILE TOXIN B PCR WITH REFLEX TO C. DIFFICILE ANTIGEN AND TOXINS A/B EIA   CMV QUANTITATIVE, PCR     XR Chest 2 Views    Final Result   IMPRESSION: Heart is normal in size. Minimal atelectasis at the right lung base. Lungs are otherwise clear.             Critical care was not performed.     Medical Decision Making  The patient's presentation was of high complexity (an acute health issue posing potential threat to life or bodily function).    The patient's evaluation involved:  review of external note(s) from 3+ sources (see separate area of note for details)  review of 3+ test result(s) ordered prior to this encounter (see separate area of note for details)  ordering and/or review of 3+ test(s) in this encounter (see separate area of note for details)  discussion of management or test interpretation with another health professional (see separate area of note for details)    The patient's management necessitated high risk (a decision regarding hospitalization).    Assessment & Plan    43-year-old female currently 8 days postop from a donor kidney transplant presenting to the emergency department due to temperature of 100.2 today as well as 4 to 5 days of profuse frequent watery diarrhea.    Hemodynamically stable on evaluation, temperature here is 99.2, and at home up to 100.2.  She was told to come in to be evaluated with any temperature over 100.  Patient has no significant pain at this point in time, and her surgical incision is well-appearing.  Patient denies any urinary tract infection symptoms, but will evaluate for UTI.  Due to her immunocompromise state we will also plan for septic work-up including blood cultures, chest x-ray, COVID swab.With all the diarrhea that she has been having, as well as her immunocompromise state, I have significant concerns for C. difficile colitis as well as other infectious diarrhea.  Abdomen is soft and nontender with low suspicion of an acute intra-abdominal process.    We will also plan to consult transplant surgery due to her recency of her transplant.  We will hold on empiric antibiotics  pending lab work, and conversation with transplant surgery as her labs earlier today were reassuring without any significant leukocytosis.    11:29 PM kidney transplant team would like her admitted in observation status overnight.  Hold antibiotics further recommendation, add on CMV testing which was done.  Cultures were previously sent which they agree with.  We will plan for this at this time    I have reviewed the nursing notes. I have reviewed the findings, diagnosis, plan and need for follow up with the patient.    New Prescriptions    No medications on file       Final diagnoses:   Fever, unspecified fever cause   Kidney transplanted       Paulette Mckeon MD  [facility] EMERGENCY DEPARTMENT  10/5/2023     Paulette Mckeon MD  10/05/23 4017

## 2023-10-07 LAB — BACTERIA UR CULT: NORMAL

## 2023-10-09 ENCOUNTER — LAB (OUTPATIENT)
Dept: LAB | Facility: CLINIC | Age: 43
End: 2023-10-09
Payer: COMMERCIAL

## 2023-10-09 DIAGNOSIS — Z48.298 AFTERCARE FOLLOWING ORGAN TRANSPLANT: ICD-10-CM

## 2023-10-09 DIAGNOSIS — Z98.890 OTHER SPECIFIED POSTPROCEDURAL STATES: ICD-10-CM

## 2023-10-09 DIAGNOSIS — Z20.828 CONTACT WITH AND (SUSPECTED) EXPOSURE TO OTHER VIRAL COMMUNICABLE DISEASES: ICD-10-CM

## 2023-10-09 DIAGNOSIS — Z79.899 ENCOUNTER FOR LONG-TERM CURRENT USE OF MEDICATION: ICD-10-CM

## 2023-10-09 DIAGNOSIS — Z94.0 KIDNEY REPLACED BY TRANSPLANT: ICD-10-CM

## 2023-10-09 LAB
ANION GAP SERPL CALCULATED.3IONS-SCNC: 12 MMOL/L (ref 7–15)
BUN SERPL-MCNC: 16 MG/DL (ref 6–20)
CALCIUM SERPL-MCNC: 9.5 MG/DL (ref 8.6–10)
CHLORIDE SERPL-SCNC: 107 MMOL/L (ref 98–107)
CREAT SERPL-MCNC: 1.33 MG/DL (ref 0.51–0.95)
DEPRECATED HCO3 PLAS-SCNC: 20 MMOL/L (ref 22–29)
EGFRCR SERPLBLD CKD-EPI 2021: 51 ML/MIN/1.73M2
ERYTHROCYTE [DISTWIDTH] IN BLOOD BY AUTOMATED COUNT: 12.9 % (ref 10–15)
FERRITIN SERPL-MCNC: 348 NG/ML (ref 6–175)
GLUCOSE SERPL-MCNC: 115 MG/DL (ref 70–99)
HCT VFR BLD AUTO: 29.6 % (ref 35–47)
HGB BLD-MCNC: 9.6 G/DL (ref 11.7–15.7)
IRON BINDING CAPACITY (ROCHE): 254 UG/DL (ref 240–430)
IRON SATN MFR SERPL: 21 % (ref 15–46)
IRON SERPL-MCNC: 54 UG/DL (ref 37–145)
MAGNESIUM SERPL-MCNC: 1.3 MG/DL (ref 1.7–2.3)
MCH RBC QN AUTO: 29.4 PG (ref 26.5–33)
MCHC RBC AUTO-ENTMCNC: 32.4 G/DL (ref 31.5–36.5)
MCV RBC AUTO: 91 FL (ref 78–100)
PHOSPHATE SERPL-MCNC: 3.8 MG/DL (ref 2.5–4.5)
PLATELET # BLD AUTO: 412 10E3/UL (ref 150–450)
POTASSIUM SERPL-SCNC: 4.7 MMOL/L (ref 3.4–5.3)
PTH-INTACT SERPL-MCNC: 44 PG/ML (ref 15–65)
RBC # BLD AUTO: 3.27 10E6/UL (ref 3.8–5.2)
SODIUM SERPL-SCNC: 139 MMOL/L (ref 135–145)
TACROLIMUS BLD-MCNC: 12.9 UG/L (ref 5–15)
TME LAST DOSE: NORMAL H
TME LAST DOSE: NORMAL H
VIT D+METAB SERPL-MCNC: 34 NG/ML (ref 20–50)
WBC # BLD AUTO: 9.6 10E3/UL (ref 4–11)

## 2023-10-09 PROCEDURE — 84100 ASSAY OF PHOSPHORUS: CPT | Performed by: PATHOLOGY

## 2023-10-09 PROCEDURE — 82728 ASSAY OF FERRITIN: CPT | Performed by: PATHOLOGY

## 2023-10-09 PROCEDURE — 36415 COLL VENOUS BLD VENIPUNCTURE: CPT | Performed by: PATHOLOGY

## 2023-10-09 PROCEDURE — 80048 BASIC METABOLIC PNL TOTAL CA: CPT | Performed by: PATHOLOGY

## 2023-10-09 PROCEDURE — 83550 IRON BINDING TEST: CPT | Performed by: PATHOLOGY

## 2023-10-09 PROCEDURE — 83970 ASSAY OF PARATHORMONE: CPT | Performed by: PATHOLOGY

## 2023-10-09 PROCEDURE — 99000 SPECIMEN HANDLING OFFICE-LAB: CPT | Performed by: PATHOLOGY

## 2023-10-09 PROCEDURE — 85027 COMPLETE CBC AUTOMATED: CPT | Performed by: PATHOLOGY

## 2023-10-09 PROCEDURE — 80197 ASSAY OF TACROLIMUS: CPT | Performed by: INTERNAL MEDICINE

## 2023-10-09 PROCEDURE — 83540 ASSAY OF IRON: CPT | Performed by: PATHOLOGY

## 2023-10-09 PROCEDURE — 82306 VITAMIN D 25 HYDROXY: CPT | Performed by: INTERNAL MEDICINE

## 2023-10-09 PROCEDURE — 83735 ASSAY OF MAGNESIUM: CPT | Performed by: PATHOLOGY

## 2023-10-10 DIAGNOSIS — Z94.0 S/P KIDNEY TRANSPLANT: Primary | ICD-10-CM

## 2023-10-10 LAB
BACTERIA BLD CULT: NO GROWTH
BACTERIA BLD CULT: NO GROWTH

## 2023-10-10 RX ORDER — TACROLIMUS 0.5 MG/1
0.5 CAPSULE ORAL 2 TIMES DAILY
Qty: 60 CAPSULE | Refills: 11 | Status: SHIPPED | OUTPATIENT
Start: 2023-10-10 | End: 2023-10-17

## 2023-10-10 RX ORDER — TACROLIMUS 1 MG/1
1 CAPSULE ORAL 2 TIMES DAILY
Qty: 60 CAPSULE | Refills: 11 | Status: SHIPPED | OUTPATIENT
Start: 2023-10-10 | End: 2023-10-17

## 2023-10-10 NOTE — TELEPHONE ENCOUNTER
Spoke to patient who confirms this was an accurate 12-hour trough and verified current Tacrolimus IR dose of 2 mg BID.    Patient confirms decrease Tacrolimus IR dose to 1.5 mg  BID and repeat labs Thursday.  Reviewed upcoming visits.  Patient reports that diarrhea is getting better, only 1 episode today.  Instructed patient to inform txp team if diarrhea goes back to 4-6 times daily.

## 2023-10-10 NOTE — TELEPHONE ENCOUNTER
ISSUE; tac above goal 8-10    PLAN/LPN TASK:    Please call pt with dose change:    Your recent  Tacrolimus IR drug level was 12.9.    Please confirm this was an accurate 12-hour trough and verify your current Tacrolimus IR dose of 2 mg BID.    If both are accurate, please decrease your  Tacrolimus IR dose to 1.5 mg  BID and repeat labs Thursday.     Please adjust med list accordingly

## 2023-10-11 ENCOUNTER — TELEPHONE (OUTPATIENT)
Dept: TRANSPLANT | Facility: CLINIC | Age: 43
End: 2023-10-11
Payer: COMMERCIAL

## 2023-10-11 DIAGNOSIS — E83.42 HYPOMAGNESEMIA: ICD-10-CM

## 2023-10-11 DIAGNOSIS — Z94.0 S/P KIDNEY TRANSPLANT: ICD-10-CM

## 2023-10-11 RX ORDER — MAGNESIUM OXIDE 400 MG/1
TABLET ORAL
Qty: 180 TABLET | Refills: 3 | Status: SHIPPED | OUTPATIENT
Start: 2023-10-11 | End: 2023-11-07

## 2023-10-11 NOTE — TELEPHONE ENCOUNTER
Post Kidney and Pancreas Transplant Team Conference  Date: 10/11/2023  Transplant Coordinator: Lynn Estes     Attendees:  [x]  Dr. Swift [] Danielle Musa, RN [x] Joan Bee LPN     [x]  Dr. Rizvi [x] Annamarie Banks, RN [] Marlyn Gabriel LPN    [x] Dr. Kennedy [] Helena Wise RN    [x] Dr. Nino [x] Lynn Estes, RN [x] Cierra Wang RN   [] Dr. Aly [] Simona Weldon, RN    [] Dr. Hensley [] Grace Pickett, [place]    [x]  Dr. Fitch [] Tess Castro RN    [] Dr. Mills [] Isiah Beaver RN    [x] Aruna Avila, NP [] Flor Rowland RN    [x] Claudia Quintero NP [] Mayte Moore RN        Verbal Plan Read Back:   Magnesium increase 800 mg at lunch and 400 mg at supper time    Routed to RN Coordinator   Joan Bee LPN

## 2023-10-11 NOTE — TELEPHONE ENCOUNTER
Rx updated and sent to [facility] [place] [facility].     PLAN/LPN TASK: please call pt to inform of new dose plan:    Mag ox 800 mg with lunch and 400 mg with supper.

## 2023-10-12 ENCOUNTER — LAB (OUTPATIENT)
Dept: LAB | Facility: CLINIC | Age: 43
End: 2023-10-12
Payer: COMMERCIAL

## 2023-10-12 DIAGNOSIS — Z98.890 OTHER SPECIFIED POSTPROCEDURAL STATES: ICD-10-CM

## 2023-10-12 DIAGNOSIS — Z79.899 ENCOUNTER FOR LONG-TERM CURRENT USE OF MEDICATION: ICD-10-CM

## 2023-10-12 DIAGNOSIS — Z94.0 KIDNEY REPLACED BY TRANSPLANT: ICD-10-CM

## 2023-10-12 DIAGNOSIS — Z48.298 AFTERCARE FOLLOWING ORGAN TRANSPLANT: ICD-10-CM

## 2023-10-12 DIAGNOSIS — Z20.828 CONTACT WITH AND (SUSPECTED) EXPOSURE TO OTHER VIRAL COMMUNICABLE DISEASES: ICD-10-CM

## 2023-10-12 LAB
ANION GAP SERPL CALCULATED.3IONS-SCNC: 11 MMOL/L (ref 7–15)
BUN SERPL-MCNC: 12.7 MG/DL (ref 6–20)
CALCIUM SERPL-MCNC: 9.9 MG/DL (ref 8.6–10)
CHLORIDE SERPL-SCNC: 105 MMOL/L (ref 98–107)
CREAT SERPL-MCNC: 1.35 MG/DL (ref 0.51–0.95)
DEPRECATED HCO3 PLAS-SCNC: 22 MMOL/L (ref 22–29)
EGFRCR SERPLBLD CKD-EPI 2021: 50 ML/MIN/1.73M2
ERYTHROCYTE [DISTWIDTH] IN BLOOD BY AUTOMATED COUNT: 12.9 % (ref 10–15)
GLUCOSE SERPL-MCNC: 114 MG/DL (ref 70–99)
HCT VFR BLD AUTO: 29.8 % (ref 35–47)
HGB BLD-MCNC: 9.6 G/DL (ref 11.7–15.7)
MAGNESIUM SERPL-MCNC: 1.4 MG/DL (ref 1.7–2.3)
MCH RBC QN AUTO: 29.3 PG (ref 26.5–33)
MCHC RBC AUTO-ENTMCNC: 32.2 G/DL (ref 31.5–36.5)
MCV RBC AUTO: 91 FL (ref 78–100)
PHOSPHATE SERPL-MCNC: 4 MG/DL (ref 2.5–4.5)
PLATELET # BLD AUTO: 384 10E3/UL (ref 150–450)
POTASSIUM SERPL-SCNC: 4.8 MMOL/L (ref 3.4–5.3)
RBC # BLD AUTO: 3.28 10E6/UL (ref 3.8–5.2)
SODIUM SERPL-SCNC: 138 MMOL/L (ref 135–145)
TACROLIMUS BLD-MCNC: 9.9 UG/L (ref 5–15)
TME LAST DOSE: NORMAL H
TME LAST DOSE: NORMAL H
WBC # BLD AUTO: 7.5 10E3/UL (ref 4–11)

## 2023-10-12 PROCEDURE — 99000 SPECIMEN HANDLING OFFICE-LAB: CPT | Performed by: PATHOLOGY

## 2023-10-12 PROCEDURE — 80197 ASSAY OF TACROLIMUS: CPT | Performed by: INTERNAL MEDICINE

## 2023-10-12 PROCEDURE — 80048 BASIC METABOLIC PNL TOTAL CA: CPT | Performed by: PATHOLOGY

## 2023-10-12 PROCEDURE — 83735 ASSAY OF MAGNESIUM: CPT | Performed by: PATHOLOGY

## 2023-10-12 PROCEDURE — 85027 COMPLETE CBC AUTOMATED: CPT | Performed by: PATHOLOGY

## 2023-10-12 PROCEDURE — 80180 DRUG SCRN QUAN MYCOPHENOLATE: CPT | Performed by: INTERNAL MEDICINE

## 2023-10-12 PROCEDURE — 36415 COLL VENOUS BLD VENIPUNCTURE: CPT | Performed by: PATHOLOGY

## 2023-10-12 PROCEDURE — 84100 ASSAY OF PHOSPHORUS: CPT | Performed by: PATHOLOGY

## 2023-10-13 LAB
MYCOPHENOLATE SERPL LC/MS/MS-MCNC: 1.5 MG/L (ref 1–3.5)
MYCOPHENOLATE-G SERPL LC/MS/MS-MCNC: 57.4 MG/L (ref 30–95)
TME LAST DOSE: NORMAL H
TME LAST DOSE: NORMAL H

## 2023-10-16 ENCOUNTER — OFFICE VISIT (OUTPATIENT)
Dept: TRANSPLANT | Facility: CLINIC | Age: 43
End: 2023-10-16
Attending: INTERNAL MEDICINE
Payer: COMMERCIAL

## 2023-10-16 ENCOUNTER — LAB (OUTPATIENT)
Dept: LAB | Facility: CLINIC | Age: 43
End: 2023-10-16
Payer: COMMERCIAL

## 2023-10-16 VITALS
OXYGEN SATURATION: 100 % | WEIGHT: 154.8 LBS | HEART RATE: 86 BPM | SYSTOLIC BLOOD PRESSURE: 122 MMHG | DIASTOLIC BLOOD PRESSURE: 78 MMHG | TEMPERATURE: 98.7 F | BODY MASS INDEX: 30.23 KG/M2

## 2023-10-16 DIAGNOSIS — Z48.298 AFTERCARE FOLLOWING ORGAN TRANSPLANT: ICD-10-CM

## 2023-10-16 DIAGNOSIS — Z20.828 CONTACT WITH AND (SUSPECTED) EXPOSURE TO OTHER VIRAL COMMUNICABLE DISEASES: ICD-10-CM

## 2023-10-16 DIAGNOSIS — Z79.899 ENCOUNTER FOR LONG-TERM CURRENT USE OF MEDICATION: ICD-10-CM

## 2023-10-16 DIAGNOSIS — Z98.890 OTHER SPECIFIED POSTPROCEDURAL STATES: ICD-10-CM

## 2023-10-16 DIAGNOSIS — Z94.0 KIDNEY TRANSPLANTED: ICD-10-CM

## 2023-10-16 DIAGNOSIS — Z94.0 KIDNEY REPLACED BY TRANSPLANT: ICD-10-CM

## 2023-10-16 LAB
ANION GAP SERPL CALCULATED.3IONS-SCNC: 10 MMOL/L (ref 7–15)
BUN SERPL-MCNC: 11.5 MG/DL (ref 6–20)
CALCIUM SERPL-MCNC: 9.8 MG/DL (ref 8.6–10)
CHLORIDE SERPL-SCNC: 104 MMOL/L (ref 98–107)
CREAT SERPL-MCNC: 1.34 MG/DL (ref 0.51–0.95)
DEPRECATED HCO3 PLAS-SCNC: 25 MMOL/L (ref 22–29)
EGFRCR SERPLBLD CKD-EPI 2021: 50 ML/MIN/1.73M2
ERYTHROCYTE [DISTWIDTH] IN BLOOD BY AUTOMATED COUNT: 13 % (ref 10–15)
GLUCOSE SERPL-MCNC: 114 MG/DL (ref 70–99)
HCT VFR BLD AUTO: 29.9 % (ref 35–47)
HGB BLD-MCNC: 9.5 G/DL (ref 11.7–15.7)
MAGNESIUM SERPL-MCNC: 1.4 MG/DL (ref 1.7–2.3)
MCH RBC QN AUTO: 29.3 PG (ref 26.5–33)
MCHC RBC AUTO-ENTMCNC: 31.8 G/DL (ref 31.5–36.5)
MCV RBC AUTO: 92 FL (ref 78–100)
PHOSPHATE SERPL-MCNC: 4 MG/DL (ref 2.5–4.5)
PLATELET # BLD AUTO: 404 10E3/UL (ref 150–450)
POTASSIUM SERPL-SCNC: 4.9 MMOL/L (ref 3.4–5.3)
RBC # BLD AUTO: 3.24 10E6/UL (ref 3.8–5.2)
SODIUM SERPL-SCNC: 139 MMOL/L (ref 135–145)
TACROLIMUS BLD-MCNC: 7.3 UG/L (ref 5–15)
TME LAST DOSE: NORMAL H
TME LAST DOSE: NORMAL H
WBC # BLD AUTO: 5.2 10E3/UL (ref 4–11)

## 2023-10-16 PROCEDURE — 87799 DETECT AGENT NOS DNA QUANT: CPT | Performed by: INTERNAL MEDICINE

## 2023-10-16 PROCEDURE — 80048 BASIC METABOLIC PNL TOTAL CA: CPT | Performed by: PATHOLOGY

## 2023-10-16 PROCEDURE — 99213 OFFICE O/P EST LOW 20 MIN: CPT | Mod: 24 | Performed by: SURGERY

## 2023-10-16 PROCEDURE — 99000 SPECIMEN HANDLING OFFICE-LAB: CPT | Performed by: PATHOLOGY

## 2023-10-16 PROCEDURE — 80197 ASSAY OF TACROLIMUS: CPT | Performed by: INTERNAL MEDICINE

## 2023-10-16 PROCEDURE — 85027 COMPLETE CBC AUTOMATED: CPT | Performed by: PATHOLOGY

## 2023-10-16 PROCEDURE — 83735 ASSAY OF MAGNESIUM: CPT | Performed by: PATHOLOGY

## 2023-10-16 PROCEDURE — 36415 COLL VENOUS BLD VENIPUNCTURE: CPT | Performed by: PATHOLOGY

## 2023-10-16 PROCEDURE — 84100 ASSAY OF PHOSPHORUS: CPT | Performed by: PATHOLOGY

## 2023-10-16 PROCEDURE — G0463 HOSPITAL OUTPT CLINIC VISIT: HCPCS | Performed by: SURGERY

## 2023-10-16 PROCEDURE — 99213 OFFICE O/P EST LOW 20 MIN: CPT | Performed by: SURGERY

## 2023-10-16 ASSESSMENT — PAIN SCALES - GENERAL: PAINLEVEL: NO PAIN (0)

## 2023-10-16 NOTE — LETTER
10/16/2023         RE: Ariane Flores  Lot [id number]  1971 16 1/2 [place] [address]        Dear Colleague,    Thank you for referring your patient, Ariane Flores, to the [facility]. Please see a copy of my visit note below.    Transplant Surgery Progress Note    Transplants:  9/27/2023 (Kidney); Postoperative day:  19  S: s/p DDKT. Recovering well. Was previously having diarrhea but has slowed down to ~1-2 episodes daily, more formed. Eating well, no nausea. No dysuria. No pain. No complaints.   Transplant History:    Transplant Type:  DDKT  Donor Type: Donation after Brain Death   Transplant Date:  9/27/2023 (Kidney)   Ureteral Stent:  Yes   Crossmatch:  negative   DSA at Tx:  No  Baseline Cr: 1.3-1.5   DeNovo DSA: No    Acute Rejection Hx:  No    Present Maintenance Immunosuppression:  Tacrolimus and Mycophenolic acid    CMV IgG Ab Discordance:  No R+  EBV IgG Ab Discordance:  No R+    BK Viremia:  No  EBV Viremia:  No    Transplant Coordinator: Lynn Estes     Transplant Office Phone Number: [phone]     Immunosuppressant Medications       Immunosuppressive Agents Disp Start End     mycophenolic acid (GENERIC EQUIVALENT) 180 MG EC tablet    180 tablet 10/6/2023     Sig - Route: Take 3 tablets (540 mg) by mouth 2 times daily - Oral    Class: E-Prescribe     tacrolimus (GENERIC) 0.5 MG capsule    60 capsule 10/10/2023     Sig - Route: Take 1 capsule (0.5 mg) by mouth 2 times daily Total dose = 1.5 mg twice per day - Oral    Class: E-Prescribe     tacrolimus (GENERIC) 1 MG capsule    60 capsule 10/10/2023     Sig - Route: Take 1 capsule (1 mg) by mouth 2 times daily Total dose = 1.5 mg twice per day - Oral    Class: E-Prescribe            Possible Immunosuppression-related side effects:   []             headache  []             vivid dreams  []             irritability  []             cognitive difficuties  []             fine tremor  []             nausea  []              diarrhea  []             neuropathy      []             edema  []             renal calcineurin toxicity  []             hyperkalemia  []             post-transplant diabetes  []             decreased appetite  []             increased appetite  []             other:  []             none    Prescription Medications as of 10/16/2023         Rx Number Disp Refills Start End Last Dispensed Date Next Fill Date Owning Pharmacy    acetaminophen (TYLENOL) 325 MG tablet  100 tablet 0 2023    [place], [place] - [address] [place] [address]    Sig: Take 1-2 tablets (325-650 mg) by mouth every 4 hours as needed for other (For optimal non-opioid multimodal pain management to improve pain control.)    Class: E-Prescribe    Route: Oral    atorvastatin (LIPITOR) 40 MG tablet            Sig: Take 40 mg by mouth daily    Class: Historical    Route: Oral    cholecalciferol 25 MCG (1000 UT) TABS            Sig: Take 1,000 Units by mouth daily    Class: Historical    Route: Oral    ferrous sulfate (FEROSUL) 325 (65 Fe) MG tablet            Sig: Take 325 mg by mouth daily (with breakfast)    Class: Historical    Route: Oral    levothyroxine (SYNTHROID/LEVOTHROID) 50 MCG tablet            Sig: Take 50 mcg by mouth daily    Class: Historical    Route: Oral    magnesium oxide (MAG-OX) 400 MG tablet  180 tablet 3 10/11/2023    [address]    Si mg with lunch and 400 mg with dinner    Class: E-Prescribe    mycophenolic acid (GENERIC EQUIVALENT) 180 MG EC tablet  180 tablet 11 10/6/2023    [place], [place] - [address] [place] [address]    Sig: Take 3 tablets (540 mg) by mouth 2 times daily    Class: E-Prescribe    Route: Oral    psyllium (METAMUCIL/KONSYL) 58.6 % powder  500 g 1 10/4/2023    [place], [place] - [address] [facility] [address]    Sig: Take 6 g (1 teaspoonful) by mouth daily    Class: E-Prescribe     Route: Oral    sodium bicarbonate 650 MG tablet  120 tablet 11 9/30/2023    [place], [place] - [address] [facility]    Sig: Take 2 tablets (1,300 mg) by mouth 2 times daily    Class: E-Prescribe    Route: Oral    sulfamethoxazole-trimethoprim (BACTRIM) 400-80 MG tablet    10/7/2023        Sig: Take 1 tablet by mouth daily    Class: No Print Out    Route: Oral    tacrolimus (GENERIC) 0.5 MG capsule  60 capsule 11 10/10/2023    [address]    Sig: Take 1 capsule (0.5 mg) by mouth 2 times daily Total dose = 1.5 mg twice per day    Class: E-Prescribe    Route: Oral    tacrolimus (GENERIC) 1 MG capsule  60 capsule 11 10/10/2023    [address]    Sig: Take 1 capsule (1 mg) by mouth 2 times daily Total dose = 1.5 mg twice per day    Class: E-Prescribe    Route: Oral    valGANciclovir (VALCYTE) 450 MG tablet    10/7/2023        Sig: Take 1 tablet (450 mg) by mouth daily    Class: No Print Out    Route: Oral            O:   Temp:  [98.7  F (37.1  C)] 98.7  F (37.1  C)  Pulse:  [86] 86  BP: (122)/(78) 122/78  SpO2:  [100 %] 100 %  General Appearance: in no apparent distress.   Skin: Normal, no rashes or jaundice  Heart: regular rate and rhythm, normal S1 and S2  Lungs: easy respirations, no audible wheezing.  Abdomen: abd soft, flat, nontender. RLQ wound well-healed, staples in place. No erythema, no drainage, no induration, no hernia  Extremities: Tremor absent.   Edema: present bilaterally. trace        Latest Ref Rng & Units 10/16/2023    11:27 AM 10/12/2023     9:06 AM 10/9/2023     9:22 AM 10/6/2023     6:49 AM 10/5/2023    10:34 PM   Transplant Immunosuppression Labs   Creat 0.51 - 0.95 mg/dL 1.34  1.35  1.33  1.57  1.52    Urea Nitrogen 6.0 - 20.0 mg/dL 11.5  12.7  16.0  23.0  22.3    WBC 4.0 - 11.0 10e3/uL 5.2  7.5  9.6  14.8  10.2    Neutrophil %    89  83        Chemistries:   Recent Labs   Lab  Test 10/16/23  1127   BUN 11.5   CR 1.34*   GFRESTIMATED 50*   *     Lab Results   Component Value Date    A1C 5.5 09/27/2023     Recent Labs   Lab Test 10/06/23  0649   ALBUMIN 3.4*   BILITOTAL 0.4   ALKPHOS 65   AST 19   ALT 27     Urine Studies:  Recent Labs   Lab Test 10/06/23  0144   COLOR Light Yellow   APPEARANCE Slightly Cloudy*   URINEGLC Negative   URINEBILI Negative   URINEKETONE Negative   SG 1.008   UBLD Large*   URINEPH 5.0   PROTEIN 50*   NITRITE Negative   LEUKEST Moderate*   RBCU 89*   WBCU 20*     No lab results found.  Hematology:   Recent Labs   Lab Test 10/16/23  1127 10/12/23  0906 10/09/23  0922   HGB 9.5* 9.6* 9.6*    384 412   WBC 5.2 7.5 9.6     Coags:   Recent Labs   Lab Test 09/27/23  0114 11/05/20  1128   INR 1.02 0.96     HLA antibodies:   SA1 Hi Risk Brenda   Date Value Ref Range Status   06/24/2021 A:11 25 26 43 66  Final     SA1 HI RISK BRENDA   Date Value Ref Range Status   09/27/2023 A:11 25 26 43 66  Final     SA1 Mod Risk Brenda   Date Value Ref Range Status   06/24/2021 B:44 45 76  Final     SA1 MOD RISK BRENDA   Date Value Ref Range Status   09/27/2023 None  Final     SA2 Hi Risk Brenda   Date Value Ref Range Status   06/24/2021 None  Final     SA2 HI RISK BRENDA   Date Value Ref Range Status   09/27/2023 None  Final     SA2 Mod Risk Brenda   Date Value Ref Range Status   06/24/2021 DR:8 11 12 13 14 17 18 DRw:52 DQ:6  Final     SA2 MOD RISK BRENDA   Date Value Ref Range Status   09/27/2023 DR:8 13 14 17 18  Final       Assessment: Arianegold Flores is doing well s/p DDKT:  Issues we addressed during her visit include:    -wound mgmt: staples removed. Steri strips placed    Plan:    1. Graft function: good, stable  2. Immunosuppression Management: No change continue tac and mpa. MPA due to diarrhea, which is improvnig .  Complexity of management:Medium.  Contributing factors: anemia  Followup: as needed    Total Time: 15 min,   Counselling Time: 10 min.      Daniel Fitch MD

## 2023-10-16 NOTE — PROGRESS NOTES
Transplant Surgery Progress Note    Transplants:  9/27/2023 (Kidney); Postoperative day:  19  S: s/p DDKT. Recovering well. Was previously having diarrhea but has slowed down to ~1-2 episodes daily, more formed. Eating well, no nausea. No dysuria. No pain. No complaints.   Transplant History:    Transplant Type:  DDKT  Donor Type: Donation after Brain Death   Transplant Date:  9/27/2023 (Kidney)   Ureteral Stent:  Yes   Crossmatch:  negative   DSA at Tx:  No  Baseline Cr: 1.3-1.5   DeNovo DSA: No    Acute Rejection Hx:  No    Present Maintenance Immunosuppression:  Tacrolimus and Mycophenolic acid    CMV IgG Ab Discordance:  No R+  EBV IgG Ab Discordance:  No R+    BK Viremia:  No  EBV Viremia:  No    Transplant Coordinator: Lynn Estes     Transplant Office Phone Number: [phone]     Immunosuppressant Medications       Immunosuppressive Agents Disp Start End     mycophenolic acid (GENERIC EQUIVALENT) 180 MG EC tablet    180 tablet 10/6/2023     Sig - Route: Take 3 tablets (540 mg) by mouth 2 times daily - Oral    Class: E-Prescribe     tacrolimus (GENERIC) 0.5 MG capsule    60 capsule 10/10/2023     Sig - Route: Take 1 capsule (0.5 mg) by mouth 2 times daily Total dose = 1.5 mg twice per day - Oral    Class: E-Prescribe     tacrolimus (GENERIC) 1 MG capsule    60 capsule 10/10/2023     Sig - Route: Take 1 capsule (1 mg) by mouth 2 times daily Total dose = 1.5 mg twice per day - Oral    Class: E-Prescribe            Possible Immunosuppression-related side effects:   []             headache  []             vivid dreams  []             irritability  []             cognitive difficuties  []             fine tremor  []             nausea  []             diarrhea  []             neuropathy      []             edema  []             renal calcineurin toxicity  []             hyperkalemia  []             post-transplant diabetes  []             decreased appetite  []             increased appetite  []              other:  []             none    Prescription Medications as of 10/16/2023         Rx Number Disp Refills Start End Last Dispensed Date Next Fill Date Owning Pharmacy    acetaminophen (TYLENOL) 325 MG tablet  100 tablet 0 2023    [address]    Sig: Take 1-2 tablets (325-650 mg) by mouth every 4 hours as needed for other (For optimal non-opioid multimodal pain management to improve pain control.)    Class: E-Prescribe    Route: Oral    atorvastatin (LIPITOR) 40 MG tablet            Sig: Take 40 mg by mouth daily    Class: Historical    Route: Oral    cholecalciferol 25 MCG (1000 UT) TABS            Sig: Take 1,000 Units by mouth daily    Class: Historical    Route: Oral    ferrous sulfate (FEROSUL) 325 (65 Fe) MG tablet            Sig: Take 325 mg by mouth daily (with breakfast)    Class: Historical    Route: Oral    levothyroxine (SYNTHROID/LEVOTHROID) 50 MCG tablet            Sig: Take 50 mcg by mouth daily    Class: Historical    Route: Oral    magnesium oxide (MAG-OX) 400 MG tablet  180 tablet 3 10/11/2023    [address]    Si mg with lunch and 400 mg with dinner    Class: E-Prescribe    mycophenolic acid (GENERIC EQUIVALENT) 180 MG EC tablet  180 tablet 11 10/6/2023    [address]    Sig: Take 3 tablets (540 mg) by mouth 2 times daily    Class: E-Prescribe    Route: Oral    psyllium (METAMUCIL/KONSYL) 58.6 % powder  500 g 1 10/4/2023    [place], [place] - [address] [facility] [address]    Sig: Take 6 g (1 teaspoonful) by mouth daily    Class: E-Prescribe    Route: Oral    sodium bicarbonate 650 MG tablet  120 tablet 11 2023    [address]    Sig: Take 2 tablets (1,300 mg) by mouth 2 times daily    Class: E-Prescribe    Route: Oral     sulfamethoxazole-trimethoprim (BACTRIM) 400-80 MG tablet    10/7/2023        Sig: Take 1 tablet by mouth daily    Class: No Print Out    Route: Oral    tacrolimus (GENERIC) 0.5 MG capsule  60 capsule 11 10/10/2023    [address]    Sig: Take 1 capsule (0.5 mg) by mouth 2 times daily Total dose = 1.5 mg twice per day    Class: E-Prescribe    Route: Oral    tacrolimus (GENERIC) 1 MG capsule  60 capsule 11 10/10/2023    [address]    Sig: Take 1 capsule (1 mg) by mouth 2 times daily Total dose = 1.5 mg twice per day    Class: E-Prescribe    Route: Oral    valGANciclovir (VALCYTE) 450 MG tablet    10/7/2023        Sig: Take 1 tablet (450 mg) by mouth daily    Class: No Print Out    Route: Oral            O:   Temp:  [98.7  F (37.1  C)] 98.7  F (37.1  C)  Pulse:  [86] 86  BP: (122)/(78) 122/78  SpO2:  [100 %] 100 %  General Appearance: in no apparent distress.   Skin: Normal, no rashes or jaundice  Heart: regular rate and rhythm, normal S1 and S2  Lungs: easy respirations, no audible wheezing.  Abdomen: abd soft, flat, nontender. RLQ wound well-healed, staples in place. No erythema, no drainage, no induration, no hernia  Extremities: Tremor absent.   Edema: present bilaterally. trace        Latest Ref Rng & Units 10/16/2023    11:27 AM 10/12/2023     9:06 AM 10/9/2023     9:22 AM 10/6/2023     6:49 AM 10/5/2023    10:34 PM   Transplant Immunosuppression Labs   Creat 0.51 - 0.95 mg/dL 1.34  1.35  1.33  1.57  1.52    Urea Nitrogen 6.0 - 20.0 mg/dL 11.5  12.7  16.0  23.0  22.3    WBC 4.0 - 11.0 10e3/uL 5.2  7.5  9.6  14.8  10.2    Neutrophil %    89  83        Chemistries:   Recent Labs   Lab Test 10/16/23  1127   BUN 11.5   CR 1.34*   GFRESTIMATED 50*   *     Lab Results   Component Value Date    A1C 5.5 09/27/2023     Recent Labs   Lab Test 10/06/23  0649   ALBUMIN 3.4*   BILITOTAL 0.4   ALKPHOS 65   AST 19   ALT 27     Urine  Studies:  Recent Labs   Lab Test 10/06/23  0144   COLOR Light Yellow   APPEARANCE Slightly Cloudy*   URINEGLC Negative   URINEBILI Negative   URINEKETONE Negative   SG 1.008   UBLD Large*   URINEPH 5.0   PROTEIN 50*   NITRITE Negative   LEUKEST Moderate*   RBCU 89*   WBCU 20*     No lab results found.  Hematology:   Recent Labs   Lab Test 10/16/23  1127 10/12/23  0906 10/09/23  0922   HGB 9.5* 9.6* 9.6*    384 412   WBC 5.2 7.5 9.6     Coags:   Recent Labs   Lab Test 09/27/23  0114 11/05/20  1128   INR 1.02 0.96     HLA antibodies:   SA1 Hi Risk Brenda   Date Value Ref Range Status   06/24/2021 A:11 25 26 43 66  Final     SA1 HI RISK BRENDA   Date Value Ref Range Status   09/27/2023 A:11 25 26 43 66  Final     SA1 Mod Risk Brenda   Date Value Ref Range Status   06/24/2021 B:44 45 76  Final     SA1 MOD RISK BRENDA   Date Value Ref Range Status   09/27/2023 None  Final     SA2 Hi Risk Brenda   Date Value Ref Range Status   06/24/2021 None  Final     SA2 HI RISK BRENDA   Date Value Ref Range Status   09/27/2023 None  Final     SA2 Mod Risk Brenda   Date Value Ref Range Status   06/24/2021 DR:8 11 12 13 14 17 18 DRw:52 DQ:6  Final     SA2 MOD RISK BRENDA   Date Value Ref Range Status   09/27/2023 DR:8 13 14 17 18  Final       Assessment: Ariane Flores is doing well s/p DDKT:  Issues we addressed during her visit include:    -wound mgmt: staples removed. Steri strips placed    Plan:    1. Graft function: good, stable  2. Immunosuppression Management: No change continue tac and mpa. MPA due to diarrhea, which is improvnig .  Complexity of management:Medium.  Contributing factors: anemia  Followup: as needed    Total Time: 15 min,   Counselling Time: 10 min.      Daniel Fitch MD

## 2023-10-16 NOTE — NURSING NOTE
Chief Complaint   Patient presents with    Follow Up     Kidney transplant follow up     Vital signs:  Temp: 98.7  F (37.1  C) Temp src: Oral BP: 122/78 Pulse: 86     SpO2: 100 %       Weight: 70.2 kg (154 lb 12.8 oz)  Estimated body mass index is 30.23 kg/m  as calculated from the following:    Height as of 10/5/23: 1.524 m (5').    Weight as of this encounter: 70.2 kg (154 lb 12.8 oz).      Richy Allen RN on 10/16/2023 at 1:45 PM

## 2023-10-17 DIAGNOSIS — Z94.0 S/P KIDNEY TRANSPLANT: Primary | ICD-10-CM

## 2023-10-17 LAB — BKV DNA # SPEC NAA+PROBE: NOT DETECTED COPIES/ML

## 2023-10-17 RX ORDER — TACROLIMUS 1 MG/1
CAPSULE ORAL
Qty: 90 CAPSULE | Refills: 11 | Status: SHIPPED | OUTPATIENT
Start: 2023-10-17 | End: 2023-11-14

## 2023-10-17 RX ORDER — TACROLIMUS 0.5 MG/1
0.5 CAPSULE ORAL EVERY EVENING
Qty: 30 CAPSULE | Refills: 11 | Status: SHIPPED | OUTPATIENT
Start: 2023-10-17 | End: 2023-11-14

## 2023-10-17 NOTE — TELEPHONE ENCOUNTER
Spoke to patient who confirms this was an accurate 12-hour trough and verified your current Tacrolimus IR dose of 1.5 mg BID.    Patient confirms increase Tacrolimus IR dose to 2 mg in the morning and 1.5 mg in the evening and repeat labs Thursday.

## 2023-10-17 NOTE — TELEPHONE ENCOUNTER
ISSUE: tac below goal     LPN TASK:   Please call pt with dose adjustment:    Your recent  Tacrolimus IR drug level was 7.3.    Please confirm this was an accurate 12-hour trough and verify your current Tacrolimus IR dose of 1.5 mg BID.    If both are accurate, please increase your  Tacrolimus IR dose to 2 mg in the morning and 1.5 mg in the evening and repeat labs Thursday.     Please adjust med list accordingly

## 2023-10-18 ENCOUNTER — TELEPHONE (OUTPATIENT)
Dept: TRANSPLANT | Facility: CLINIC | Age: 43
End: 2023-10-18
Payer: COMMERCIAL

## 2023-10-18 NOTE — TELEPHONE ENCOUNTER
Pt fell alseep and missed her 1130 meds  she took them this AM at 1130  needs to review if needs another dose?

## 2023-10-18 NOTE — TELEPHONE ENCOUNTER
Spoke to patient who will continue current scheduled dosing of her meds.  Patient missed last night's dose because she slept through her phone alarm.

## 2023-10-20 ENCOUNTER — DOCUMENTATION ONLY (OUTPATIENT)
Dept: TRANSPLANT | Facility: CLINIC | Age: 43
End: 2023-10-20
Payer: COMMERCIAL

## 2023-10-20 DIAGNOSIS — Z94.0 KIDNEY TRANSPLANTED: Primary | ICD-10-CM

## 2023-10-20 RX ORDER — VALGANCICLOVIR 450 MG/1
450 TABLET, FILM COATED ORAL DAILY
Qty: 30 TABLET | Refills: 11 | Status: SHIPPED | OUTPATIENT
Start: 2023-10-20 | End: 2023-11-29

## 2023-10-20 RX ORDER — SULFAMETHOXAZOLE AND TRIMETHOPRIM 400; 80 MG/1; MG/1
1 TABLET ORAL DAILY
Qty: 30 TABLET | Refills: 11 | Status: SHIPPED | OUTPATIENT
Start: 2023-10-20 | End: 2023-11-07

## 2023-10-20 RX ORDER — MYCOPHENOLIC ACID 180 MG/1
540 TABLET, DELAYED RELEASE ORAL 2 TIMES DAILY
Qty: 180 TABLET | Refills: 11 | Status: SHIPPED | OUTPATIENT
Start: 2023-10-20 | End: 2023-11-30

## 2023-10-20 NOTE — TELEPHONE ENCOUNTER
Patient Call: Medication Refill  Route to LPN  Instruct the patient to first contact their pharmacy. If they have called their pharmacy and require further assistance, route to LPN.    Pharmacy Name: [facility]  Pharmacy Location: [facility]  Name of Medication: Valganciclovir- 450 mg- Mycophenolate- 180 mg-TMP sulfa  400-80  When will the patient be out of this medication?: Less than 3 days (Route high priority)

## 2023-10-25 ENCOUNTER — TELEPHONE (OUTPATIENT)
Dept: TRANSPLANT | Facility: CLINIC | Age: 43
End: 2023-10-25
Payer: COMMERCIAL

## 2023-10-25 DIAGNOSIS — Z48.298 AFTERCARE FOLLOWING ORGAN TRANSPLANT: Primary | ICD-10-CM

## 2023-10-25 NOTE — TELEPHONE ENCOUNTER
Voicemail    Date/Time: 10/25/2023@ 3:30 PM      Reason for call: Uh Yes, hi. My name is Aj Flores and I got a kidney transplant like three weeks ago and my surgery is opening just a little bit. The other part is fine. It's just down underneath my, my stomach. Um, can Lynn or anybody, please give me a call. My name is Nicole Nice and my number is [phone], [phone]. Thank you. Have a good day.

## 2023-10-25 NOTE — TELEPHONE ENCOUNTER
Went to [place], [place] ED  Per Ariane her surgical site opened and she was seen by a ED doctor., ED doctor is saying it is very superficial  and dressing with a bandage 1/2 -2 CC in length no drainage.  DR. Narayan direct # [phone]

## 2023-10-25 NOTE — LETTER
OUTPATIENT LABORATORY TEST ORDER     Patient Name: Ariane Flores   YOB: 1980     [facility] MR# [if applicable]: [id number]   Date & Time: October 26, 2023  10:21 AM  Expiration Date: 1 year after date issued      Diagnosis: Kidney Transplant (ICD-10 Z94.0)    Aftercare following organ transplant (ICD-10 Z48.288)    Long term use of medications (ICD-10 Z79.899)    Contact with and (suspected) exposure to other viral communicable    diseases (Z20.828)     We ask your assistance in obtaining the following laboratory tests, which are part of our routine surveillance program for Solid Organ Transplant patients.     Please fax each result to [phone], same day as resulted/available    Critical lab results page [phone]  Monday - Friday 8 am to 5 pm  Evening/Weekend/Holiday communicate Critical labs results [phone]    First 8 weeks post-transplant (9/27/2023 - 11/27/2023)  Labs 2x/week (Monday and Thursday)  CBC with platelets  Basic Metabolic Panel (Sodium, Potassium, Chloride, Creatinine, CO2, Urea Nitrogen, glucose, Calcium)  Tacrolimus/Prograf/ drug level    Labs weekly (Monday)  Phosphorus  Magnesium    Labs every 2 weeks  Mycophenolic Acid Level    Months 2-4 post-transplant (11/28/2023 - 1/28/2024)  Labs 1x weekly (Monday or Tuesday)  CBC with platelets  Basic Metabolic Panel (Sodium, Potassium, Chloride, Creatinine, CO2, Urea Nitrogen, glucose, Calcium)  Tacrolimus/Prograf/ drug level  Labs monthly   Phosphorus  Magnesium  Months 4-7 post-transplant (1/29/2024 - 4/29/2024)  Labs every other week  CBC with platelets  Basic Metabolic Panel (Sodium, Potassium, Chloride, Creatinine, CO2, Urea Nitrogen, glucose, Calcium)  Tacrolimus/Prograf/ drug level  Monthly  Phosphorus  Magnesium  BK (Polyoma Virus) PCR Quantitative/Plasma    Months 7-12 post-transplant (4/30/2024 - 9/27/2024)  Monthly  CBC with platelets  Basic Metabolic Panel (Sodium, Potassium, Chloride,  Creatinine, CO2, Urea Nitrogen, glucose, Calcium)  Tacrolimus/Prograf/ drug level  BK (Polyoma Virus) PCR Quantitative/Plasma    At 1-month post-transplant (Due: 10/27/2023)  DSA PRA (patient to supply )   BK Virus PCR Quantitative/Plasma  Urine protein/creatinine  Iron Panel  Vitamin D Deficiency Screening  PTH Intact  HBV, HCV, HIV by YANET testing  If unable to conduct YANET testing, please substitute the following:   Hepatitis B DNA Quantitative, Real-Time PCR   Hepatitis C RNA, Quantitation   HIV 1 RNA, Quantitation   HIV 2 RNA, Quantitation     At 2 months post-transplant (Due: 11/27/2023)   DSA PRA (patient to supply  kit)  BK Virus PCR Quantitative/Plasma  Urine protein/creatinine    At month 3 only (Due: 12/27/2023)  BK (Polyoma virus) PCR Quantitative/Plasma    At 4 months post-transplant (Due: 1/27/2024)  DSA PRA (patient to supply )  PTH  Urine protein/creatinine        At 6 months post-transplant (Fasting Labs) (Due: 3/27/2024)  Hepatic panel  Hemoglobin A1c  Uric Acid  Lipid panel  Urine protein/creatinine    At 7 months post-transplant (Due: 4/27/2024)   PRA/DSA (patient to supply )    At 9 months post-transplant (Due: 6/27/2024)   Urine protein/creatinine    At 12 months post-transplant (Fasting labs) Due: 9/27/2024   Hepatic panel  Hemoglobin A1c  Uric Acid  Lipid panel  Urine protein/creatinine  PTH  Vitamin D    If you have any questions please call the Transplant Center at [phone]. All lab results should be faxed to [phone]      Bryant Rizvi MD   none

## 2023-10-25 NOTE — LETTER
OUTPATIENT LABORATORY TEST ORDER     Patient Name: Ariane Flores   YOB: 1980     [facility] MR# [if applicable]: [id number]   Date & Time: October 26, 2023  10:21 AM  Expiration Date: 1 year after date issued      Diagnosis: Kidney Transplant (ICD-10 Z94.0)    Aftercare following organ transplant (ICD-10 Z48.288)    Long term use of medications (ICD-10 Z79.899)    Contact with and (suspected) exposure to other viral communicable    diseases (Z20.828)     We ask your assistance in obtaining the following laboratory tests, which are part of our routine surveillance program for Solid Organ Transplant patients.     Please fax each result to [phone], same day as resulted/available    Critical lab results page [phone]  Monday - Friday 8 am to 5 pm  Evening/Weekend/Holiday communicate Critical labs results [phone]    First 8 weeks post-transplant (9/27/2023 - 11/27/2023)  Labs 2x/week (Monday and Thursday)  CBC with platelets  Basic Metabolic Panel (Sodium, Potassium, Chloride, Creatinine, CO2, Urea Nitrogen, glucose, Calcium)  Tacrolimus/Prograf/ drug level    Labs weekly (Monday)  Phosphorus  Magnesium    Labs every 2 weeks  Mycophenolic Acid Level    Months 2-4 post-transplant (11/28/2023 - 1/28/2024)  Labs 1x weekly (Monday or Tuesday)  CBC with platelets  Basic Metabolic Panel (Sodium, Potassium, Chloride, Creatinine, CO2, Urea Nitrogen, glucose, Calcium)  Tacrolimus/Prograf/ drug level  Labs monthly   Phosphorus  Magnesium  Months 4-7 post-transplant (1/29/2024 - 4/29/2024)  Labs every other week  CBC with platelets  Basic Metabolic Panel (Sodium, Potassium, Chloride, Creatinine, CO2, Urea Nitrogen, glucose, Calcium)  Tacrolimus/Prograf/ drug level  Monthly  Phosphorus  Magnesium  BK (Polyoma Virus) PCR Quantitative/Plasma    Months 7-12 post-transplant (4/30/2024 - 9/27/2024)  Monthly  CBC with platelets  Basic Metabolic Panel (Sodium, Potassium, Chloride,  Creatinine, CO2, Urea Nitrogen, glucose, Calcium)  Tacrolimus/Prograf/ drug level  BK (Polyoma Virus) PCR Quantitative/Plasma    At 1-month post-transplant (Due: 10/27/2023)  DSA PRA (patient to supply )   BK Virus PCR Quantitative/Plasma  Urine protein/creatinine  Iron Panel  Vitamin D Deficiency Screening  PTH Intact  HBV, HCV, HIV by YANET testing  If unable to conduct YANET testing, please substitute the following:   Hepatitis B DNA Quantitative, Real-Time PCR   Hepatitis C RNA, Quantitation   HIV 1 RNA, Quantitation   HIV 2 RNA, Quantitation     At 2 months post-transplant (Due: 11/27/2023)   DSA PRA (patient to supply  kit)  BK Virus PCR Quantitative/Plasma  Urine protein/creatinine    At month 3 only (Due: 12/27/2023)  BK (Polyoma virus) PCR Quantitative/Plasma    At 4 months post-transplant (Due: 1/27/2024)  DSA PRA (patient to supply )  PTH  Urine protein/creatinine        At 6 months post-transplant (Fasting Labs) (Due: 3/27/2024)  Hepatic panel  Hemoglobin A1c  Uric Acid  Lipid panel  Urine protein/creatinine    At 7 months post-transplant (Due: 4/27/2024)   PRA/DSA (patient to supply )    At 9 months post-transplant (Due: 6/27/2024)   Urine protein/creatinine    At 12 months post-transplant (Fasting labs) Due: 9/27/2024   Hepatic panel  Hemoglobin A1c  Uric Acid  Lipid panel  Urine protein/creatinine  PTH  Vitamin D    If you have any questions please call the Transplant Center at [phone]. All lab results should be faxed to [phone]      Bryant Rizvi MD

## 2023-10-26 NOTE — TELEPHONE ENCOUNTER
"RN[facility] called to check in with Ariane. She denies pain or redness at the incision site, and reports that ED physician placed a bandage and \"glue\" at the end of her incision.   Will make appt to see ALISHA to assess.     Pt to complete labs tomorrow. Was confused about appointments at local lab.   [place] [facility]  [phone]    "

## 2023-10-27 ENCOUNTER — LAB (OUTPATIENT)
Dept: LAB | Facility: CLINIC | Age: 43
End: 2023-10-27
Payer: COMMERCIAL

## 2023-10-27 ENCOUNTER — OFFICE VISIT (OUTPATIENT)
Dept: TRANSPLANT | Facility: CLINIC | Age: 43
End: 2023-10-27
Attending: NURSE PRACTITIONER
Payer: COMMERCIAL

## 2023-10-27 VITALS
RESPIRATION RATE: 16 BRPM | WEIGHT: 154 LBS | OXYGEN SATURATION: 100 % | SYSTOLIC BLOOD PRESSURE: 119 MMHG | HEART RATE: 74 BPM | BODY MASS INDEX: 30.08 KG/M2 | DIASTOLIC BLOOD PRESSURE: 74 MMHG

## 2023-10-27 DIAGNOSIS — Z20.828 CONTACT WITH AND (SUSPECTED) EXPOSURE TO OTHER VIRAL COMMUNICABLE DISEASES: ICD-10-CM

## 2023-10-27 DIAGNOSIS — Z94.0 KIDNEY REPLACED BY TRANSPLANT: ICD-10-CM

## 2023-10-27 DIAGNOSIS — Z98.890 OTHER SPECIFIED POSTPROCEDURAL STATES: ICD-10-CM

## 2023-10-27 DIAGNOSIS — Z48.298 AFTERCARE FOLLOWING ORGAN TRANSPLANT: ICD-10-CM

## 2023-10-27 DIAGNOSIS — Z48.298 AFTERCARE FOLLOWING ORGAN TRANSPLANT: Primary | ICD-10-CM

## 2023-10-27 DIAGNOSIS — Z79.899 ENCOUNTER FOR LONG-TERM CURRENT USE OF MEDICATION: ICD-10-CM

## 2023-10-27 LAB
ALBUMIN UR-MCNC: NEGATIVE MG/DL
ANION GAP SERPL CALCULATED.3IONS-SCNC: 11 MMOL/L (ref 7–15)
APPEARANCE UR: CLEAR
BILIRUB UR QL STRIP: NEGATIVE
BUN SERPL-MCNC: 16.4 MG/DL (ref 6–20)
CALCIUM SERPL-MCNC: 9.5 MG/DL (ref 8.6–10)
CHLORIDE SERPL-SCNC: 104 MMOL/L (ref 98–107)
COLOR UR AUTO: NORMAL
CREAT SERPL-MCNC: 1.28 MG/DL (ref 0.51–0.95)
DEPRECATED HCO3 PLAS-SCNC: 24 MMOL/L (ref 22–29)
EGFRCR SERPLBLD CKD-EPI 2021: 53 ML/MIN/1.73M2
ERYTHROCYTE [DISTWIDTH] IN BLOOD BY AUTOMATED COUNT: 14.3 % (ref 10–15)
GLUCOSE SERPL-MCNC: 108 MG/DL (ref 70–99)
GLUCOSE UR STRIP-MCNC: NEGATIVE MG/DL
HCT VFR BLD AUTO: 31.8 % (ref 35–47)
HGB BLD-MCNC: 10.3 G/DL (ref 11.7–15.7)
HGB UR QL STRIP: NEGATIVE
KETONES UR STRIP-MCNC: NEGATIVE MG/DL
LEUKOCYTE ESTERASE UR QL STRIP: NEGATIVE
MAGNESIUM SERPL-MCNC: 1.6 MG/DL (ref 1.7–2.3)
MCH RBC QN AUTO: 29.9 PG (ref 26.5–33)
MCHC RBC AUTO-ENTMCNC: 32.4 G/DL (ref 31.5–36.5)
MCV RBC AUTO: 92 FL (ref 78–100)
MYCOPHENOLATE SERPL LC/MS/MS-MCNC: 3.41 MG/L (ref 1–3.5)
MYCOPHENOLATE-G SERPL LC/MS/MS-MCNC: 69 MG/L (ref 30–95)
NITRATE UR QL: NEGATIVE
PH UR STRIP: 6.5 [PH] (ref 5–7)
PHOSPHATE SERPL-MCNC: 3.7 MG/DL (ref 2.5–4.5)
PLATELET # BLD AUTO: 333 10E3/UL (ref 150–450)
POTASSIUM SERPL-SCNC: 4.4 MMOL/L (ref 3.4–5.3)
RBC # BLD AUTO: 3.44 10E6/UL (ref 3.8–5.2)
SODIUM SERPL-SCNC: 139 MMOL/L (ref 135–145)
SP GR UR STRIP: 1 (ref 1–1.03)
TME LAST DOSE: NORMAL H
TME LAST DOSE: NORMAL H
UROBILINOGEN UR STRIP-MCNC: NORMAL MG/DL
WBC # BLD AUTO: 4.4 10E3/UL (ref 4–11)

## 2023-10-27 PROCEDURE — G0463 HOSPITAL OUTPT CLINIC VISIT: HCPCS | Performed by: NURSE PRACTITIONER

## 2023-10-27 PROCEDURE — 81003 URINALYSIS AUTO W/O SCOPE: CPT | Performed by: NURSE PRACTITIONER

## 2023-10-27 PROCEDURE — 80180 DRUG SCRN QUAN MYCOPHENOLATE: CPT | Performed by: INTERNAL MEDICINE

## 2023-10-27 PROCEDURE — 36415 COLL VENOUS BLD VENIPUNCTURE: CPT | Performed by: PATHOLOGY

## 2023-10-27 PROCEDURE — 99000 SPECIMEN HANDLING OFFICE-LAB: CPT | Performed by: PATHOLOGY

## 2023-10-27 PROCEDURE — 85027 COMPLETE CBC AUTOMATED: CPT | Performed by: PATHOLOGY

## 2023-10-27 PROCEDURE — 84100 ASSAY OF PHOSPHORUS: CPT | Performed by: PATHOLOGY

## 2023-10-27 PROCEDURE — 99213 OFFICE O/P EST LOW 20 MIN: CPT | Mod: 24 | Performed by: NURSE PRACTITIONER

## 2023-10-27 PROCEDURE — 83735 ASSAY OF MAGNESIUM: CPT | Performed by: PATHOLOGY

## 2023-10-27 PROCEDURE — 87086 URINE CULTURE/COLONY COUNT: CPT | Performed by: NURSE PRACTITIONER

## 2023-10-27 PROCEDURE — 80048 BASIC METABOLIC PNL TOTAL CA: CPT | Performed by: PATHOLOGY

## 2023-10-27 NOTE — LETTER
10/27/2023         RE: Ariane Flores  Lot [address]  1971 [address] [place] [address]        Dear Colleague,    Thank you for referring your patient, Ariane Flores, to the [facility]. Please see a copy of my visit note below.    Transplant Surgery Progress Note    Transplants:  9/27/2023 (Kidney); Postoperative day:  30  S: Ariane Flores is an 43 year old female who with history of CKD presumed 2/2 unilateral kidney (s/p right nephrectomy as a child for recurrent pyelonephritis) and secondary FSGS, numerous unspecified urologic procedures as a child, HTN, GERD, s/p DDKT w/ stent on 9/27 presents to ED with temperature up to 100.2 in the setting of multiple episodes of diarrhea for 3 days   Discharged 10/6/2023.     Was at her local ER with an open incision and local ER closed the incision.      States she wanted it looked at.    Area is closed.    Transplant History:    Transplant Type:  DDKT  Donor Type: Donation after Brain Death   Transplant Date:  9/27/2023 (Kidney)   Ureteral Stent:  Yes   Crossmatch:  negative   DSA at Tx:  No  Baseline Cr: 1.2-1.4  DeNovo DSA: No    Acute Rejection Hx:  No    Present Maintenance Immunosuppression:  Tacrolimus and Mycophenolic acid    CMV IgG Ab Discordance:  No  EBV IgG Ab Discordance:  No    BK Viremia:  No  EBV Viremia:  No    Transplant Coordinator: Lynn Estes     Transplant Office Phone Number: [phone]     Immunosuppressant Medications       Immunosuppressive Agents Disp Start End     mycophenolic acid (GENERIC EQUIVALENT) 180 MG EC tablet    180 tablet 10/20/2023     Sig - Route: Take 3 tablets (540 mg) by mouth 2 times daily - Oral    Class: E-Prescribe     tacrolimus (GENERIC) 0.5 MG capsule    30 capsule 10/17/2023     Sig - Route: Take 1 capsule (0.5 mg) by mouth every evening Total dose = 2 mg in the AM and 1.5 mg in the PM - Oral    Class: E-Prescribe     tacrolimus (GENERIC) 1 MG capsule    90 capsule 10/17/2023     Sig:  Total dose = 2 mg in the AM and 1.5 mg in the PM    Class: E-Prescribe            Possible Immunosuppression-related side effects:   []             headache  []             vivid dreams  []             irritability  []             cognitive difficuties  []             fine tremor  []             nausea  []             diarrhea  []             neuropathy      []             edema  []             renal calcineurin toxicity  []             hyperkalemia  []             post-transplant diabetes  []             decreased appetite  []             increased appetite  []             other:  []             none    Prescription Medications as of 10/27/2023         Rx Number Disp Refills Start End Last Dispensed Date Next Fill Date Owning Pharmacy    acetaminophen (TYLENOL) 325 MG tablet  100 tablet 0 2023    [facility] - [place], [place] - [address] [facility]    Sig: Take 1-2 tablets (325-650 mg) by mouth every 4 hours as needed for other (For optimal non-opioid multimodal pain management to improve pain control.)    Class: E-Prescribe    Route: Oral    atorvastatin (LIPITOR) 40 MG tablet            Sig: Take 40 mg by mouth daily    Class: Historical    Route: Oral    cholecalciferol 25 MCG (1000 UT) TABS            Sig: Take 1,000 Units by mouth daily    Class: Historical    Route: Oral    ferrous sulfate (FEROSUL) 325 (65 Fe) MG tablet            Sig: Take 325 mg by mouth daily (with breakfast)    Class: Historical    Route: Oral    levothyroxine (SYNTHROID/LEVOTHROID) 50 MCG tablet            Sig: Take 50 mcg by mouth daily    Class: Historical    Route: Oral    magnesium oxide (MAG-OX) 400 MG tablet  180 tablet 3 10/11/2023    [address]    Si mg with lunch and 400 mg with dinner    Class: E-Prescribe    mycophenolic acid (GENERIC EQUIVALENT) 180 MG EC tablet  180 tablet 11 10/20/2023    [address]     Sig: Take 3 tablets (540 mg) by mouth 2 times daily    Class: E-Prescribe    Route: Oral    psyllium (METAMUCIL/KONSYL) 58.6 % powder  500 g 1 10/4/2023    [place], [place] - [address] [facility] [address]    Sig: Take 6 g (1 teaspoonful) by mouth daily    Class: E-Prescribe    Route: Oral    sodium bicarbonate 650 MG tablet  120 tablet 11 9/30/2023    [place], [place] - [address] [facility]    Sig: Take 2 tablets (1,300 mg) by mouth 2 times daily    Class: E-Prescribe    Route: Oral    sulfamethoxazole-trimethoprim (BACTRIM) 400-80 MG tablet  30 tablet 11 10/20/2023    [address]    Sig: Take 1 tablet by mouth daily    Class: E-Prescribe    Route: Oral    tacrolimus (GENERIC) 0.5 MG capsule  30 capsule 11 10/17/2023    [address]    Sig: Take 1 capsule (0.5 mg) by mouth every evening Total dose = 2 mg in the AM and 1.5 mg in the PM    Class: E-Prescribe    Route: Oral    tacrolimus (GENERIC) 1 MG capsule  90 capsule 11 10/17/2023    [address]    Sig: Total dose = 2 mg in the AM and 1.5 mg in the PM    Class: E-Prescribe    valGANciclovir (VALCYTE) 450 MG tablet  30 tablet 11 10/20/2023    [address]    Sig: Take 1 tablet (450 mg) by mouth daily    Class: E-Prescribe    Route: Oral            O:      General Appearance: in no apparent distress.   Skin: Normal, no rashes or jaundice  Heart: regular rate and rhythm  Lungs: easy respirations, no audible wheezing.  Abdomen: rounded, The wound is dry and intact, without hernia. The abdomen is non-tender. The kidney graft is not tender.  There is no ascites.  Extremities: Tremor absent.   Edema: absent.         Latest Ref Rng & Units 10/27/2023     8:44 AM 10/16/2023    11:27 AM 10/12/2023     9:06 AM 10/9/2023     9:22 AM  10/6/2023     6:49 AM   Transplant Immunosuppression Labs   Creat 0.51 - 0.95 mg/dL 1.28  1.34  1.35  1.33  1.57    Urea Nitrogen 6.0 - 20.0 mg/dL 16.4  11.5  12.7  16.0  23.0    WBC 4.0 - 11.0 10e3/uL 4.4  5.2  7.5  9.6  14.8    Neutrophil %     89        Chemistries:   Recent Labs   Lab Test 10/27/23  0844   BUN 16.4   CR 1.28*   GFRESTIMATED 53*   *     Lab Results   Component Value Date    A1C 5.5 09/27/2023     Recent Labs   Lab Test 10/06/23  0649   ALBUMIN 3.4*   BILITOTAL 0.4   ALKPHOS 65   AST 19   ALT 27     Urine Studies:  Recent Labs   Lab Test 10/06/23  0144   COLOR Light Yellow   APPEARANCE Slightly Cloudy*   URINEGLC Negative   URINEBILI Negative   URINEKETONE Negative   SG 1.008   UBLD Large*   URINEPH 5.0   PROTEIN 50*   NITRITE Negative   LEUKEST Moderate*   RBCU 89*   WBCU 20*     No lab results found.  Hematology:   Recent Labs   Lab Test 10/27/23  0844 10/16/23  1127 10/12/23  0906   HGB 10.3* 9.5* 9.6*    404 384   WBC 4.4 5.2 7.5     Coags:   Recent Labs   Lab Test 09/27/23  0114 11/05/20  1128   INR 1.02 0.96     HLA antibodies:   SA1 Hi Risk Brenda   Date Value Ref Range Status   06/24/2021 A:11 25 26 43 66  Final     SA1 HI RISK BRENDA   Date Value Ref Range Status   09/27/2023 A:11 25 26 43 66  Final     SA1 Mod Risk Brenda   Date Value Ref Range Status   06/24/2021 B:44 45 76  Final     SA1 MOD RISK BRENDA   Date Value Ref Range Status   09/27/2023 None  Final     SA2 Hi Risk Brenda   Date Value Ref Range Status   06/24/2021 None  Final     SA2 HI RISK BRENDA   Date Value Ref Range Status   09/27/2023 None  Final     SA2 Mod Risk Brenda   Date Value Ref Range Status   06/24/2021 DR:8 11 12 13 14 17 18 DRw:52 DQ:6  Final     SA2 MOD RISK BRENDA   Date Value Ref Range Status   09/27/2023 DR:8 13 14 17 18  Final       Assessment: Ariane Flores is doing well s/p DDKT:  Issues we addressed during her visit include:    Plan:    1. Graft function: stable.  Drinking 1-2L per day  2. Immunosuppression  Management: No change continue same IS  .  Complexity of management:Medium.  Contributing factors:  recent txp  3. Incision: area closed.  No drainage.  '  UA/UC: some dysuria   Followup: as directed    Total Time: 25 min,   Counselling Time: 15 min.

## 2023-10-27 NOTE — PROGRESS NOTES
Transplant Surgery Progress Note    Transplants:  9/27/2023 (Kidney); Postoperative day:  30  S: Ariane Flores is an 43 year old female who with history of CKD presumed 2/2 unilateral kidney (s/p right nephrectomy as a child for recurrent pyelonephritis) and secondary FSGS, numerous unspecified urologic procedures as a child, HTN, GERD, s/p DDKT w/ stent on 9/27 presents to ED with temperature up to 100.2 in the setting of multiple episodes of diarrhea for 3 days   Discharged 10/6/2023.     Was at her local ER with an open incision and local ER closed the incision.      States she wanted it looked at.    Area is closed.    Transplant History:    Transplant Type:  DDKT  Donor Type: Donation after Brain Death   Transplant Date:  9/27/2023 (Kidney)   Ureteral Stent:  Yes   Crossmatch:  negative   DSA at Tx:  No  Baseline Cr: 1.2-1.4  DeNovo DSA: No    Acute Rejection Hx:  No    Present Maintenance Immunosuppression:  Tacrolimus and Mycophenolic acid    CMV IgG Ab Discordance:  No  EBV IgG Ab Discordance:  No    BK Viremia:  No  EBV Viremia:  No    Transplant Coordinator: Lynn Estes     Transplant Office Phone Number: [phone]     Immunosuppressant Medications       Immunosuppressive Agents Disp Start End     mycophenolic acid (GENERIC EQUIVALENT) 180 MG EC tablet    180 tablet 10/20/2023     Sig - Route: Take 3 tablets (540 mg) by mouth 2 times daily - Oral    Class: E-Prescribe     tacrolimus (GENERIC) 0.5 MG capsule    30 capsule 10/17/2023     Sig - Route: Take 1 capsule (0.5 mg) by mouth every evening Total dose = 2 mg in the AM and 1.5 mg in the PM - Oral    Class: E-Prescribe     tacrolimus (GENERIC) 1 MG capsule    90 capsule 10/17/2023     Sig: Total dose = 2 mg in the AM and 1.5 mg in the PM    Class: E-Prescribe            Possible Immunosuppression-related side effects:   []             headache  []             vivid dreams  []             irritability  []             cognitive difficuties  []              fine tremor  []             nausea  []             diarrhea  []             neuropathy      []             edema  []             renal calcineurin toxicity  []             hyperkalemia  []             post-transplant diabetes  []             decreased appetite  []             increased appetite  []             other:  []             none    Prescription Medications as of 10/27/2023         Rx Number Disp Refills Start End Last Dispensed Date Next Fill Date Owning Pharmacy    acetaminophen (TYLENOL) 325 MG tablet  100 tablet 0 2023    [place], [place] - [address] [facility]    Sig: Take 1-2 tablets (325-650 mg) by mouth every 4 hours as needed for other (For optimal non-opioid multimodal pain management to improve pain control.)    Class: E-Prescribe    Route: Oral    atorvastatin (LIPITOR) 40 MG tablet            Sig: Take 40 mg by mouth daily    Class: Historical    Route: Oral    cholecalciferol 25 MCG (1000 UT) TABS            Sig: Take 1,000 Units by mouth daily    Class: Historical    Route: Oral    ferrous sulfate (FEROSUL) 325 (65 Fe) MG tablet            Sig: Take 325 mg by mouth daily (with breakfast)    Class: Historical    Route: Oral    levothyroxine (SYNTHROID/LEVOTHROID) 50 MCG tablet            Sig: Take 50 mcg by mouth daily    Class: Historical    Route: Oral    magnesium oxide (MAG-OX) 400 MG tablet  180 tablet 3 10/11/2023    [address]    Si mg with lunch and 400 mg with dinner    Class: E-Prescribe    mycophenolic acid (GENERIC EQUIVALENT) 180 MG EC tablet  180 tablet 11 10/20/2023    [address]    Sig: Take 3 tablets (540 mg) by mouth 2 times daily    Class: E-Prescribe    Route: Oral    psyllium (METAMUCIL/KONSYL) 58.6 % powder  500 g 1 10/4/2023    [place], [place] - [address] [facility] [address]    Sig: Take 6 g (1  teaspoonful) by mouth daily    Class: E-Prescribe    Route: Oral    sodium bicarbonate 650 MG tablet  120 tablet 11 9/30/2023    [place], [place] - [address] [place] [address]    Sig: Take 2 tablets (1,300 mg) by mouth 2 times daily    Class: E-Prescribe    Route: Oral    sulfamethoxazole-trimethoprim (BACTRIM) 400-80 MG tablet  30 tablet 11 10/20/2023    [address]    Sig: Take 1 tablet by mouth daily    Class: E-Prescribe    Route: Oral    tacrolimus (GENERIC) 0.5 MG capsule  30 capsule 11 10/17/2023    [address]    Sig: Take 1 capsule (0.5 mg) by mouth every evening Total dose = 2 mg in the AM and 1.5 mg in the PM    Class: E-Prescribe    Route: Oral    tacrolimus (GENERIC) 1 MG capsule  90 capsule 11 10/17/2023    [address]    Sig: Total dose = 2 mg in the AM and 1.5 mg in the PM    Class: E-Prescribe    valGANciclovir (VALCYTE) 450 MG tablet  30 tablet 11 10/20/2023    [address]    Sig: Take 1 tablet (450 mg) by mouth daily    Class: E-Prescribe    Route: Oral            O:      General Appearance: in no apparent distress.   Skin: Normal, no rashes or jaundice  Heart: regular rate and rhythm  Lungs: easy respirations, no audible wheezing.  Abdomen: rounded, The wound is dry and intact, without hernia. The abdomen is non-tender. The kidney graft is not tender.  There is no ascites.  Extremities: Tremor absent.   Edema: absent.         Latest Ref Rng & Units 10/27/2023     8:44 AM 10/16/2023    11:27 AM 10/12/2023     9:06 AM 10/9/2023     9:22 AM 10/6/2023     6:49 AM   Transplant Immunosuppression Labs   Creat 0.51 - 0.95 mg/dL 1.28  1.34  1.35  1.33  1.57    Urea Nitrogen 6.0 - 20.0 mg/dL 16.4  11.5  12.7  16.0  23.0    WBC 4.0 - 11.0 10e3/uL 4.4  5.2  7.5  9.6  14.8    Neutrophil %     89         Chemistries:   Recent Labs   Lab Test 10/27/23  0844   BUN 16.4   CR 1.28*   GFRESTIMATED 53*   *     Lab Results   Component Value Date    A1C 5.5 09/27/2023     Recent Labs   Lab Test 10/06/23  0649   ALBUMIN 3.4*   BILITOTAL 0.4   ALKPHOS 65   AST 19   ALT 27     Urine Studies:  Recent Labs   Lab Test 10/06/23  0144   COLOR Light Yellow   APPEARANCE Slightly Cloudy*   URINEGLC Negative   URINEBILI Negative   URINEKETONE Negative   SG 1.008   UBLD Large*   URINEPH 5.0   PROTEIN 50*   NITRITE Negative   LEUKEST Moderate*   RBCU 89*   WBCU 20*     No lab results found.  Hematology:   Recent Labs   Lab Test 10/27/23  0844 10/16/23  1127 10/12/23  0906   HGB 10.3* 9.5* 9.6*    404 384   WBC 4.4 5.2 7.5     Coags:   Recent Labs   Lab Test 09/27/23  0114 11/05/20  1128   INR 1.02 0.96     HLA antibodies:   SA1 Hi Risk Brenda   Date Value Ref Range Status   06/24/2021 A:11 25 26 43 66  Final     SA1 HI RISK BRENDA   Date Value Ref Range Status   09/27/2023 A:11 25 26 43 66  Final     SA1 Mod Risk Brenda   Date Value Ref Range Status   06/24/2021 B:44 45 76  Final     SA1 MOD RISK BRENDA   Date Value Ref Range Status   09/27/2023 None  Final     SA2 Hi Risk Brenda   Date Value Ref Range Status   06/24/2021 None  Final     SA2 HI RISK BRENDA   Date Value Ref Range Status   09/27/2023 None  Final     SA2 Mod Risk Brenda   Date Value Ref Range Status   06/24/2021 DR:8 11 12 13 14 17 18 DRw:52 DQ:6  Final     SA2 MOD RISK BRENDA   Date Value Ref Range Status   09/27/2023 DR:8 13 14 17 18  Final       Assessment: Arianegold Flores is doing well s/p DDKT:  Issues we addressed during her visit include:    Plan:    1. Graft function: stable.  Drinking 1-2L per day  2. Immunosuppression Management: No change continue same IS  .  Complexity of management:Medium.  Contributing factors:  recent txp  3. Incision: area closed.  No drainage.  '  UA/UC: some dysuria   Followup: as directed    Total Time: 25 min,   Counselling Time: 15  min.

## 2023-10-27 NOTE — NURSING NOTE
Chief Complaint   Patient presents with    Follow Up     Incision check     /74 (BP Location: Right arm, Patient Position: Sitting, Cuff Size: Adult Regular)   Pulse 74   Resp 16   Wt 69.9 kg (154 lb)   SpO2 100%   BMI 30.08 kg/m      ESTEBAN MEYER, RN on 10/27/2023 at 9:31 AM'

## 2023-10-28 LAB — BACTERIA UR CULT: NORMAL

## 2023-11-01 ENCOUNTER — TELEPHONE (OUTPATIENT)
Dept: TRANSPLANT | Facility: CLINIC | Age: 43
End: 2023-11-01
Payer: COMMERCIAL

## 2023-11-01 NOTE — TELEPHONE ENCOUNTER
Post Kidney and Pancreas Transplant Team Conference  Date: 11/1/2023  Transplant Coordinator: Lynn Estes     Attendees:  [x]  Dr. Swift [x] Danielle Musa, RN [x] Joan Bee LPN     [x]  Dr. Rizvi [x] Annamarie Banks, RN [] Marlyn Gabriel LPN    [x] Dr. Kennedy [x] Helena Wise RN    [] Dr. Nino [x] Lynn Estes, RN [x] Cierra Wang RN   [] Dr. Aly [] Simona Weldno, RN    [] Dr. Hensley [] Grace Pickett RN    []  Dr. Fitch [] Tess Castro RN    [] Dr. Mills [] Isiah Beaver RN    [x] Aruna Avila NP [] Flor Rowland RN    [x] Claudia Quintero NP [] Mayte Moore RN        Verbal Plan Read Back:   Check in with patient regarding incision/wound healing.    Routed to RN Coordinator   Joan Bee LPN

## 2023-11-06 ENCOUNTER — LAB REQUISITION (OUTPATIENT)
Dept: LAB | Facility: CLINIC | Age: 43
End: 2023-11-06

## 2023-11-06 PROCEDURE — 80197 ASSAY OF TACROLIMUS: CPT

## 2023-11-06 PROCEDURE — 80180 DRUG SCRN QUAN MYCOPHENOLATE: CPT

## 2023-11-06 PROCEDURE — 87535 HIV-1 PROBE&REVERSE TRNSCRPJ: CPT

## 2023-11-06 PROCEDURE — 87799 DETECT AGENT NOS DNA QUANT: CPT

## 2023-11-06 PROCEDURE — 83970 ASSAY OF PARATHORMONE: CPT

## 2023-11-07 ENCOUNTER — OFFICE VISIT (OUTPATIENT)
Dept: TRANSPLANT | Facility: CLINIC | Age: 43
End: 2023-11-07
Attending: INTERNAL MEDICINE
Payer: COMMERCIAL

## 2023-11-07 ENCOUNTER — OFFICE VISIT (OUTPATIENT)
Dept: TRANSPLANT | Facility: CLINIC | Age: 43
End: 2023-11-07
Attending: NURSE PRACTITIONER
Payer: COMMERCIAL

## 2023-11-07 ENCOUNTER — ANCILLARY PROCEDURE (OUTPATIENT)
Dept: GENERAL RADIOLOGY | Facility: CLINIC | Age: 43
End: 2023-11-07
Attending: NURSE PRACTITIONER
Payer: COMMERCIAL

## 2023-11-07 VITALS
TEMPERATURE: 98.4 F | WEIGHT: 153.2 LBS | HEART RATE: 65 BPM | SYSTOLIC BLOOD PRESSURE: 126 MMHG | BODY MASS INDEX: 30.08 KG/M2 | HEIGHT: 60 IN | OXYGEN SATURATION: 100 % | RESPIRATION RATE: 16 BRPM | DIASTOLIC BLOOD PRESSURE: 70 MMHG

## 2023-11-07 DIAGNOSIS — D84.9 IMMUNOSUPPRESSED STATUS (H): ICD-10-CM

## 2023-11-07 DIAGNOSIS — K59.1 FUNCTIONAL DIARRHEA: ICD-10-CM

## 2023-11-07 DIAGNOSIS — E83.42 HYPOMAGNESEMIA: ICD-10-CM

## 2023-11-07 DIAGNOSIS — Z48.298 AFTERCARE FOLLOWING ORGAN TRANSPLANT: ICD-10-CM

## 2023-11-07 DIAGNOSIS — Z94.0 KIDNEY REPLACED BY TRANSPLANT: Primary | ICD-10-CM

## 2023-11-07 DIAGNOSIS — Z94.0 KIDNEY TRANSPLANTED: ICD-10-CM

## 2023-11-07 PROBLEM — E87.8 LOW BICARBONATE LEVEL: Status: RESOLVED | Noted: 2023-09-28 | Resolved: 2023-11-07

## 2023-11-07 PROBLEM — R50.9 FEVER, UNSPECIFIED FEVER CAUSE: Status: RESOLVED | Noted: 2023-10-05 | Resolved: 2023-11-07

## 2023-11-07 PROBLEM — N18.31 ANEMIA IN STAGE 3A CHRONIC KIDNEY DISEASE (H): Status: ACTIVE | Noted: 2023-09-28

## 2023-11-07 PROBLEM — D63.1 ANEMIA IN STAGE 3A CHRONIC KIDNEY DISEASE (H): Status: ACTIVE | Noted: 2023-09-28

## 2023-11-07 PROBLEM — E83.51 HYPOCALCEMIA: Status: RESOLVED | Noted: 2023-09-28 | Resolved: 2023-11-07

## 2023-11-07 LAB
BKV DNA # SPEC NAA+PROBE: NOT DETECTED COPIES/ML
MYCOPHENOLATE SERPL LC/MS/MS-MCNC: 1.52 MG/L (ref 1–3.5)
MYCOPHENOLATE-G SERPL LC/MS/MS-MCNC: 44.3 MG/L (ref 30–95)
PTH-INTACT SERPL-MCNC: 33 PG/ML (ref 15–65)
TACROLIMUS BLD-MCNC: 8.9 UG/L (ref 5–15)
TME LAST DOSE: NORMAL H

## 2023-11-07 PROCEDURE — 250N000013 HC RX MED GY IP 250 OP 250 PS 637: Performed by: NURSE PRACTITIONER

## 2023-11-07 PROCEDURE — 74018 RADEX ABDOMEN 1 VIEW: CPT | Mod: GC | Performed by: RADIOLOGY

## 2023-11-07 PROCEDURE — 99213 OFFICE O/P EST LOW 20 MIN: CPT | Performed by: INTERNAL MEDICINE

## 2023-11-07 PROCEDURE — 99215 OFFICE O/P EST HI 40 MIN: CPT | Mod: 24 | Performed by: INTERNAL MEDICINE

## 2023-11-07 PROCEDURE — 250N000009 HC RX 250: Performed by: NURSE PRACTITIONER

## 2023-11-07 RX ORDER — MAGNESIUM GLYCINATE 100 MG
200 TABLET ORAL 2 TIMES DAILY
Qty: 360 TABLET | Refills: 0 | Status: SHIPPED | OUTPATIENT
Start: 2023-11-07 | End: 2023-11-14

## 2023-11-07 RX ORDER — LEVOFLOXACIN 250 MG/1
500 TABLET, FILM COATED ORAL DAILY
Status: CANCELLED | OUTPATIENT
Start: 2023-11-07

## 2023-11-07 RX ORDER — LEVOFLOXACIN 250 MG/1
500 TABLET, FILM COATED ORAL ONCE
Status: COMPLETED | OUTPATIENT
Start: 2023-11-07 | End: 2023-11-07

## 2023-11-07 RX ORDER — SULFAMETHOXAZOLE AND TRIMETHOPRIM 400; 80 MG/1; MG/1
1 TABLET ORAL DAILY
Qty: 90 TABLET | Refills: 4 | Status: SHIPPED | OUTPATIENT
Start: 2023-11-07 | End: 2023-11-30

## 2023-11-07 RX ORDER — LIDOCAINE HYDROCHLORIDE 20 MG/ML
JELLY TOPICAL ONCE
Status: CANCELLED | OUTPATIENT
Start: 2023-11-07 | End: 2023-11-07

## 2023-11-07 RX ORDER — LIDOCAINE HYDROCHLORIDE 20 MG/ML
JELLY TOPICAL ONCE
Status: COMPLETED | OUTPATIENT
Start: 2023-11-07 | End: 2023-11-07

## 2023-11-07 RX ADMIN — LEVOFLOXACIN 500 MG: 250 TABLET, FILM COATED ORAL at 11:10

## 2023-11-07 RX ADMIN — LIDOCAINE HYDROCHLORIDE: 20 JELLY TOPICAL at 11:10

## 2023-11-07 ASSESSMENT — PAIN SCALES - GENERAL: PAINLEVEL: NO PAIN (0)

## 2023-11-07 NOTE — PROCEDURES
Transplant Surgery  Operative Note    Preop dx: Status post  Donor kidney transplant.  Post op dx: same   Procedure: Flexible cystoscopy and ureteral stent removal   Provider: VIRGILIO Navarrete  Assistant: Lynn Patricia RN   Xray reviewed today and stent confirmed.   Anesthesia: local  EBL: 0   Specimens: none.  Findings: none abnormal. Stent inspected and noted to be intact.   Indication: The patient is status post kidney transplant and the ureteral stent is no longer needed.    Procedure: The patient was positioned supine on the table.  The groin was sterilely prepped and draped in the usual fashion. Time out was done. Urojet was applied to the urethra. A flexible cystoscope was inserted and advanced thru the urethra into the bladder. The stent was visualized and grasped. The cystoscope, grasper and stent were removed en-mass. The stent was visualized to be intact.  The bladder mucosa was normal in appearance. Antibiotics were administered. The patient tolerated the procedure well and was asked to void before leaving the clinic.

## 2023-11-07 NOTE — NURSING NOTE
Chief Complaint   Patient presents with    RECHECK     S/P Kidney TX 9/27/2023     Vital signs:  Temp: 98.4  F (36.9  C) Temp src: Oral BP: 126/70 Pulse: 65   Resp: 16 SpO2: 100 %     Height: 152.4 cm (5') Weight: 69.5 kg (153 lb 3.2 oz)  Estimated body mass index is 29.92 kg/m  as calculated from the following:    Height as of this encounter: 1.524 m (5').    Weight as of this encounter: 69.5 kg (153 lb 3.2 oz).      Belén Dobbs, [facility]  11/7/2023 8:59 AM

## 2023-11-07 NOTE — PROGRESS NOTES
TRANSPLANT NEPHROLOGY EARLY POST TRANSPLANT VISIT    Assessment & Plan   # DDKT: Trend down. BMP not obtained 11/6   - Baseline Creatinine: ~ 1.2-1.4   - Proteinuria: Not checked post transplant. Was 4g/g prior to transplant. Repeat with next labs   - Date DSA Last Checked: Sep/2023      Latest DSA: No DSA at time of transplant   - BK Viremia: No   - Kidney Tx Biopsy: No   - Transplant Ureteral Stent:  To be removed today 11/7  Estimated Creatinine Clearance: 49.5 mL/min (A) (based on SCr of 1.28 mg/dL (H)).    # Immunosuppression: Tacrolimus immediate release (goal 8-10) and Mycophenolate mofetil (dose 750 mg every 12 hours)   - Induction with Recent Transplant:  High Intensity Protocol   - Continue with intensive monitoring of immunosuppression for efficacy and toxicity.   - Changes: No. Follow-up Tac and MPA levels 11/6    # Infection Prophylaxis:    - PJP: Sulfa/TMP (Bactrim)   - CMV: Valganciclovir (Valcyte), CMV IgG Ab positive       # Hypertension: Controlled;   Goal BP: < 140/90              - Volume status: Euvolemic                              - Changes: No    # Anemia/Erythrocytosis Hgb: 9.5 (10/16)    KAREN: No              - Iron studies: (10/9) iron 54, IRBC 254, Ferritin 348    # Mineral Bone Disorder:    - Secondary renal hyperparathyroidism; PTH level: Normal (15-65 pg/ml)        On treatment: None   - Vitamin D; level: Normal        On supplement: Yes   - Calcium; level: Normal        On supplement: No   - Phosphorus; level: Normal        On supplement: No      # Electrolytes:   - Potassium; level: Normal                             On supplement: No  - Magnesium; level: Low Normal                            On supplement: Yes, magnesium oxide 800 mg/400mg. Stop, change to mag glycinate 200mg bid   - Bicarbonate; level: Normal                                On supplement: Yes, sodium bicarbonate 1300 mg PO BID  - Sodium; level: Normal    # Complex Urologic history: possible reflux/obstruction and  recurrent UTI as a child requiring multiple urologic procedures, last of which was right native nephrectomy at age 13.  Cleared by urology for transplant. Voiding cystourethrogram which showed no evidence of vesicoureteral reflux and she was noted to empty her bladder to completion. CT imaging was unremarkable outside of left solitary kidney.     # Wound dehiscence:   -Partial dehiscence of inferior aspect of incision noted on ED visit 10/25. Appears to be healing    #Diarrhea    -had diarrhea for the past 2 days. No nausea or vomiting, good appetite   -Previously with diarrhea 10/5-10/6 with negative stool testing. Improved after switching MMF->MPA   -Stop magnesium oxide   -Start magnesium glycinate (Doctor's Best magnesium) 200mg twice daily    -Follow-up MPA level 11/6      # Transplant History:  Etiology of Kidney Failure: Unknown etiology  Tx: DDKT  Transplant: 9/27/2023 (Kidney)  Donor Type: Donation after Brain Death Donor Class: Standard Criteria Donor  Crossmatch at time of Tx: negative  DSA at time of Tx: No  Significant changes in immunosuppression: None  CMV IgG Ab High Risk Discordance (D+/R-): No  EBV IgG Ab High Risk Discordance (D+/R-): No  Significant transplant-related complications: None      Assessment and plan was discussed with the patient and she voiced her understanding and agreement.    Return visit: Return in 22 days (on 11/29/2023) for previously scheduled next visit with Dr. Kennedy.    Roberto Huang MD     Physician Attestation   I, Issac Kennedy MD, personally examined and evaluated this patient.  I discussed the patient with the resident/fellow and care team, and agree with the assessment and plan of care as documented in the note on 11/07/23 .      I personally reviewed vital signs, medications, labs, and imaging.    Issac Kennedy MD  Date of Service (when I saw the patient): 11/07/23          Chief Complaint   Ms. Flores is a 43 year old here for a follow-up post kidney  transplant     History of Present Illness   Ariane Flores is a 43 year old female who with history of CKD presumed 2/2 unilateral kidney (s/p right nephrectomy as a child for recurrent pyelonephritis) and secondary FSGS, numerous unspecified urologic procedures as a child, HTN, GERD, s/p DDKT w/ stent on 9/27     Was admitted in 10/2023 for 3 days diarrhea and fever  was observed in the ED 10/6 for the evening and was worked up for infection. Enteric panel and CMV negative. Changed Cellcept to Myfortic 540 mg BID.      100.4 on admission. Tmax 14.8 from 10.2, monitor. RVP negative. Blood cultures NGTD. UA dirty with contamination, negative UC    Today 11/7 she feels well, had diarrhea for the past 2 days. No nausea or vomiting, good appetite, no fever or chills or urinary symptoms      Home BP:  110-120s systolic    Problem List   Patient Active Problem List   Diagnosis     CKD (chronic kidney disease) stage 4, GFR 15-29 ml/min (H)     Migraine     Hyperparathyroidism, secondary renal (H24)     Hyperlipidemia     HTN (hypertension)     Vitamin D deficiency     Metabolic acidosis     Status post kidney transplant     Immunosuppressed status (H24)     Anemia of chronic disease     Hypocalcemia     Low bicarbonate level     Kidney transplanted     Fever, unspecified fever cause     Diarrhea       Allergies   Allergies   Allergen Reactions     Cephalosporins Other (See Comments)     Pt does not know rx     Codeine Other (See Comments)     Gets dizzy     Iodinated Contrast Media Other (See Comments)     Only has 1 kidney.  Only has 1 kidney.  Only has 1 kidney.       Nsaids Other (See Comments)     Kidney Damage. Have Only has one kidney        Medications   Current Outpatient Medications   Medication Sig     acetaminophen (TYLENOL) 325 MG tablet Take 1-2 tablets (325-650 mg) by mouth every 4 hours as needed for other (For optimal non-opioid multimodal pain management to improve pain control.)     atorvastatin  (LIPITOR) 40 MG tablet Take 40 mg by mouth daily     cholecalciferol 25 MCG (1000 UT) TABS Take 1,000 Units by mouth daily     ferrous sulfate (FEROSUL) 325 (65 Fe) MG tablet Take 325 mg by mouth daily (with breakfast)     levothyroxine (SYNTHROID/LEVOTHROID) 50 MCG tablet Take 50 mcg by mouth daily     magnesium oxide (MAG-OX) 400 MG tablet 800 mg with lunch and 400 mg with dinner     mycophenolic acid (GENERIC EQUIVALENT) 180 MG EC tablet Take 3 tablets (540 mg) by mouth 2 times daily     psyllium (METAMUCIL/KONSYL) 58.6 % powder Take 6 g (1 teaspoonful) by mouth daily     sodium bicarbonate 650 MG tablet Take 2 tablets (1,300 mg) by mouth 2 times daily     sulfamethoxazole-trimethoprim (BACTRIM) 400-80 MG tablet Take 1 tablet by mouth daily     tacrolimus (GENERIC) 0.5 MG capsule Take 1 capsule (0.5 mg) by mouth every evening Total dose = 2 mg in the AM and 1.5 mg in the PM     tacrolimus (GENERIC) 1 MG capsule Total dose = 2 mg in the AM and 1.5 mg in the PM     valGANciclovir (VALCYTE) 450 MG tablet Take 1 tablet (450 mg) by mouth daily     No current facility-administered medications for this visit.     There are no discontinued medications.    Physical Exam   Vital Signs: /70 (BP Location: Right arm, Patient Position: Sitting, Cuff Size: Adult Regular)   Pulse 65   Temp 98.4  F (36.9  C) (Oral)   Resp 16   Ht 1.524 m (5')   Wt 69.5 kg (153 lb 3.2 oz)   SpO2 100%   BMI 29.92 kg/m      GENERAL APPEARANCE: alert and no distress  GENERAL APPEARANCE: healthy  HENT: mouth without ulcers or lesions  RESP: lungs clear to auscultation - no rales, rhonchi or wheezes  CV: regular rhythm, normal rate, no rub, no murmur  EDEMA: no LE edema bilaterally  ABDOMEN: soft, right lower quadrant wound is healing nicely, no erythema or discharge   MS: extremities normal - no gross deformities noted, no evidence of inflammation in joints, no muscle tenderness  SKIN: no rash    Data         Latest Ref Rng & Units  10/27/2023     8:44 AM 10/16/2023    11:27 AM 10/12/2023     9:06 AM   Renal   Sodium 135 - 145 mmol/L 139  139  138    K 3.4 - 5.3 mmol/L 4.4  4.9  4.8    Cl 98 - 107 mmol/L 104  104  105    Cl (external) 98 - 107 mmol/L 104  104  105    CO2 22 - 29 mmol/L 24  25  22    Urea Nitrogen 6.0 - 20.0 mg/dL 16.4  11.5  12.7    Creatinine 0.51 - 0.95 mg/dL 1.28  1.34  1.35    Glucose 70 - 99 mg/dL 108  114  114    Calcium 8.6 - 10.0 mg/dL 9.5  9.8  9.9    Magnesium 1.7 - 2.3 mg/dL 1.6  1.4  1.4          Latest Ref Rng & Units 10/27/2023     8:44 AM 10/16/2023    11:27 AM 10/12/2023     9:06 AM   Bone Health   Phosphorus 2.5 - 4.5 mg/dL 3.7  4.0  4.0          Latest Ref Rng & Units 10/27/2023     8:44 AM 10/16/2023    11:27 AM 10/12/2023     9:06 AM   Heme   WBC 4.0 - 11.0 10e3/uL 4.4  5.2  7.5    Hgb 11.7 - 15.7 g/dL 10.3  9.5  9.6    Plt 150 - 450 10e3/uL 333  404  384          Latest Ref Rng & Units 10/6/2023     6:49 AM 10/5/2023    10:34 PM 9/28/2023     4:40 AM   Liver   AP 35 - 104 U/L 65  65     TBili <=1.2 mg/dL 0.4  0.3     ALT 0 - 50 U/L 27  32     AST 0 - 45 U/L 19  18     Tot Protein 6.4 - 8.3 g/dL 6.3  6.6     Albumin 3.5 - 5.2 g/dL 3.4  3.7  3.3          Latest Ref Rng & Units 10/5/2023    10:34 PM 9/27/2023     1:14 AM   Pancreas   A1C <5.7 %  5.5    Lipase (Roche) 13 - 60 U/L 33           Latest Ref Rng & Units 10/9/2023     9:22 AM 10/2/2023     7:12 AM   Iron studies   Iron 37 - 145 ug/dL 54  35    Iron Sat Index 15 - 46 % 21  16    Ferritin 6 - 175 ng/mL 348  270          Latest Ref Rng & Units 9/27/2023     1:14 AM 11/5/2020    11:28 AM   [facility] Txp Virology   EBV CAPSID ANTIBODY IGG No detectable antibody. Positive  >8.0    Hep B Core NR^Nonreactive  Nonreactive        Recent Labs   Lab Test 10/09/23  0923 10/12/23  0906 10/16/23  1127   DOSTAC 10/8/2023 10/11/2023 10/15/2023   TACROL 12.9 9.9 7.3     Recent Labs   Lab Test 10/05/23  0916 10/12/23  0906 10/27/23  0844   DOSMPA 10/4/2023   8:00 PM  10/11/2023  10:22 PM  --    MPACID 0.86* 1.50 3.41   MPAG 56.3 57.4 69.0

## 2023-11-07 NOTE — LETTER
11/7/2023         RE: Ariane Flores  [address]  [address]  [facility] [address]        Dear Colleague,    Thank you for referring your patient, Ariane Flores, to the [facility] TRANSPLANT [facility]. Please see a copy of my visit note below.    TRANSPLANT NEPHROLOGY EARLY POST TRANSPLANT VISIT    Assessment & Plan   # DDKT: Trend down. BMP not obtained 11/6   - Baseline Creatinine: ~ 1.2-1.4   - Proteinuria: Not checked post transplant. Was 4g/g prior to transplant. Repeat with next labs   - Date DSA Last Checked: Sep/2023      Latest DSA: No DSA at time of transplant   - BK Viremia: No   - Kidney Tx Biopsy: No   - Transplant Ureteral Stent:  To be removed today 11/7  Estimated Creatinine Clearance: 49.5 mL/min (A) (based on SCr of 1.28 mg/dL (H)).    # Immunosuppression: Tacrolimus immediate release (goal 8-10) and Mycophenolate mofetil (dose 750 mg every 12 hours)   - Induction with Recent Transplant:  High Intensity Protocol   - Continue with intensive monitoring of immunosuppression for efficacy and toxicity.   - Changes: No. Follow-up Tac and MPA levels 11/6    # Infection Prophylaxis:    - PJP: Sulfa/TMP (Bactrim)   - CMV: Valganciclovir (Valcyte), CMV IgG Ab positive       # Hypertension: Controlled;   Goal BP: < 140/90              - Volume status: Euvolemic                              - Changes: No    # Anemia/Erythrocytosis Hgb: 9.5 (10/16)    KAREN: No              - Iron studies: (10/9) iron 54, IRBC 254, Ferritin 348    # Mineral Bone Disorder:    - Secondary renal hyperparathyroidism; PTH level: Normal (15-65 pg/ml)        On treatment: None   - Vitamin D; level: Normal        On supplement: Yes   - Calcium; level: Normal        On supplement: No   - Phosphorus; level: Normal        On supplement: No      # Electrolytes:   - Potassium; level: Normal                             On supplement: No  - Magnesium; level: Low Normal                            On supplement: Yes, magnesium  oxide 800 mg/400mg. Stop, change to mag glycinate 200mg bid   - Bicarbonate; level: Normal                                On supplement: Yes, sodium bicarbonate 1300 mg PO BID  - Sodium; level: Normal    # Complex Urologic history: possible reflux/obstruction and recurrent UTI as a child requiring multiple urologic procedures, last of which was right native nephrectomy at age 13.  Cleared by urology for transplant. Voiding cystourethrogram which showed no evidence of vesicoureteral reflux and she was noted to empty her bladder to completion. CT imaging was unremarkable outside of left solitary kidney.     # Wound dehiscence:   -Partial dehiscence of inferior aspect of incision noted on ED visit 10/25. Appears to be healing    #Diarrhea    -had diarrhea for the past 2 days. No nausea or vomiting, good appetite   -Previously with diarrhea 10/5-10/6 with negative stool testing. Improved after switching MMF->MPA   -Stop magnesium oxide   -Start magnesium glycinate (Doctor's Best magnesium) 200mg twice daily    -Follow-up MPA level 11/6      # Transplant History:  Etiology of Kidney Failure: Unknown etiology  Tx: DDKT  Transplant: 9/27/2023 (Kidney)  Donor Type: Donation after Brain Death Donor Class: Standard Criteria Donor  Crossmatch at time of Tx: negative  DSA at time of Tx: No  Significant changes in immunosuppression: None  CMV IgG Ab High Risk Discordance (D+/R-): No  EBV IgG Ab High Risk Discordance (D+/R-): No  Significant transplant-related complications: None      Assessment and plan was discussed with the patient and she voiced her understanding and agreement.    Return visit: Return in 22 days (on 11/29/2023) for previously scheduled next visit with Dr. Kennedy.    Roberto Huang MD     Physician Attestation   I, Issac Kennedy MD, personally examined and evaluated this patient.  I discussed the patient with the resident/fellow and care team, and agree with the assessment and plan of care as documented in  the note on 11/07/23 .      I personally reviewed vital signs, medications, labs, and imaging.    Issac Kennedy MD  Date of Service (when I saw the patient): 11/07/23          Chief Complaint   Ms. Flores is a 43 year old here for a follow-up post kidney transplant     History of Present Illness   Ariane Flores is a 43 year old female who with history of CKD presumed 2/2 unilateral kidney (s/p right nephrectomy as a child for recurrent pyelonephritis) and secondary FSGS, numerous unspecified urologic procedures as a child, HTN, GERD, s/p DDKT w/ stent on 9/27     Was admitted in 10/2023 for 3 days diarrhea and fever  was observed in the ED 10/6 for the evening and was worked up for infection. Enteric panel and CMV negative. Changed Cellcept to Myfortic 540 mg BID.      100.4 on admission. Tmax 14.8 from 10.2, monitor. RVP negative. Blood cultures NGTD. UA dirty with contamination, negative UC    Today 11/7 she feels well, had diarrhea for the past 2 days. No nausea or vomiting, good appetite, no fever or chills or urinary symptoms      Home BP:  110-120s systolic    Problem List   Patient Active Problem List   Diagnosis     CKD (chronic kidney disease) stage 4, GFR 15-29 ml/min (H)     Migraine     Hyperparathyroidism, secondary renal (H24)     Hyperlipidemia     HTN (hypertension)     Vitamin D deficiency     Metabolic acidosis     Status post kidney transplant     Immunosuppressed status (H24)     Anemia of chronic disease     Hypocalcemia     Low bicarbonate level     Kidney transplanted     Fever, unspecified fever cause     Diarrhea       Allergies   Allergies   Allergen Reactions     Cephalosporins Other (See Comments)     Pt does not know rx     Codeine Other (See Comments)     Gets dizzy     Iodinated Contrast Media Other (See Comments)     Only has 1 kidney.  Only has 1 kidney.  Only has 1 kidney.       Nsaids Other (See Comments)     Kidney Damage. Have Only has one kidney        Medications   Current  Outpatient Medications   Medication Sig     acetaminophen (TYLENOL) 325 MG tablet Take 1-2 tablets (325-650 mg) by mouth every 4 hours as needed for other (For optimal non-opioid multimodal pain management to improve pain control.)     atorvastatin (LIPITOR) 40 MG tablet Take 40 mg by mouth daily     cholecalciferol 25 MCG (1000 UT) TABS Take 1,000 Units by mouth daily     ferrous sulfate (FEROSUL) 325 (65 Fe) MG tablet Take 325 mg by mouth daily (with breakfast)     levothyroxine (SYNTHROID/LEVOTHROID) 50 MCG tablet Take 50 mcg by mouth daily     magnesium oxide (MAG-OX) 400 MG tablet 800 mg with lunch and 400 mg with dinner     mycophenolic acid (GENERIC EQUIVALENT) 180 MG EC tablet Take 3 tablets (540 mg) by mouth 2 times daily     psyllium (METAMUCIL/KONSYL) 58.6 % powder Take 6 g (1 teaspoonful) by mouth daily     sodium bicarbonate 650 MG tablet Take 2 tablets (1,300 mg) by mouth 2 times daily     sulfamethoxazole-trimethoprim (BACTRIM) 400-80 MG tablet Take 1 tablet by mouth daily     tacrolimus (GENERIC) 0.5 MG capsule Take 1 capsule (0.5 mg) by mouth every evening Total dose = 2 mg in the AM and 1.5 mg in the PM     tacrolimus (GENERIC) 1 MG capsule Total dose = 2 mg in the AM and 1.5 mg in the PM     valGANciclovir (VALCYTE) 450 MG tablet Take 1 tablet (450 mg) by mouth daily     No current facility-administered medications for this visit.     There are no discontinued medications.    Physical Exam   Vital Signs: /70 (BP Location: Right arm, Patient Position: Sitting, Cuff Size: Adult Regular)   Pulse 65   Temp 98.4  F (36.9  C) (Oral)   Resp 16   Ht 1.524 m (5')   Wt 69.5 kg (153 lb 3.2 oz)   SpO2 100%   BMI 29.92 kg/m      GENERAL APPEARANCE: alert and no distress  GENERAL APPEARANCE: healthy  HENT: mouth without ulcers or lesions  RESP: lungs clear to auscultation - no rales, rhonchi or wheezes  CV: regular rhythm, normal rate, no rub, no murmur  EDEMA: no LE edema bilaterally  ABDOMEN:  soft, right lower quadrant wound is healing nicely, no erythema or discharge   MS: extremities normal - no gross deformities noted, no evidence of inflammation in joints, no muscle tenderness  SKIN: no rash    Data         Latest Ref Rng & Units 10/27/2023     8:44 AM 10/16/2023    11:27 AM 10/12/2023     9:06 AM   Renal   Sodium 135 - 145 mmol/L 139  139  138    K 3.4 - 5.3 mmol/L 4.4  4.9  4.8    Cl 98 - 107 mmol/L 104  104  105    Cl (external) 98 - 107 mmol/L 104  104  105    CO2 22 - 29 mmol/L 24  25  22    Urea Nitrogen 6.0 - 20.0 mg/dL 16.4  11.5  12.7    Creatinine 0.51 - 0.95 mg/dL 1.28  1.34  1.35    Glucose 70 - 99 mg/dL 108  114  114    Calcium 8.6 - 10.0 mg/dL 9.5  9.8  9.9    Magnesium 1.7 - 2.3 mg/dL 1.6  1.4  1.4          Latest Ref Rng & Units 10/27/2023     8:44 AM 10/16/2023    11:27 AM 10/12/2023     9:06 AM   Bone Health   Phosphorus 2.5 - 4.5 mg/dL 3.7  4.0  4.0          Latest Ref Rng & Units 10/27/2023     8:44 AM 10/16/2023    11:27 AM 10/12/2023     9:06 AM   Heme   WBC 4.0 - 11.0 10e3/uL 4.4  5.2  7.5    Hgb 11.7 - 15.7 g/dL 10.3  9.5  9.6    Plt 150 - 450 10e3/uL 333  404  384          Latest Ref Rng & Units 10/6/2023     6:49 AM 10/5/2023    10:34 PM 9/28/2023     4:40 AM   Liver   AP 35 - 104 U/L 65  65     TBili <=1.2 mg/dL 0.4  0.3     ALT 0 - 50 U/L 27  32     AST 0 - 45 U/L 19  18     Tot Protein 6.4 - 8.3 g/dL 6.3  6.6     Albumin 3.5 - 5.2 g/dL 3.4  3.7  3.3          Latest Ref Rng & Units 10/5/2023    10:34 PM 9/27/2023     1:14 AM   Pancreas   A1C <5.7 %  5.5    Lipase (Roche) 13 - 60 U/L 33           Latest Ref Rng & Units 10/9/2023     9:22 AM 10/2/2023     7:12 AM   Iron studies   Iron 37 - 145 ug/dL 54  35    Iron Sat Index 15 - 46 % 21  16    Ferritin 6 - 175 ng/mL 348  270          Latest Ref Rng & Units 9/27/2023     1:14 AM 11/5/2020    11:28 AM   [facility] Txp Virology   EBV CAPSID ANTIBODY IGG No detectable antibody. Positive  >8.0    Hep B Core NR^Nonreactive  Nonreactive         Recent Labs   Lab Test 10/09/23  0923 10/12/23  0906 10/16/23  1127   DOSTAC 10/8/2023 10/11/2023 10/15/2023   TACROL 12.9 9.9 7.3     Recent Labs   Lab Test 10/05/23  0916 10/12/23  0906 10/27/23  0844   DOSMPA 10/4/2023   8:00 PM 10/11/2023  10:22 PM  --    MPACID 0.86* 1.50 3.41   MPAG 56.3 57.4 69.0          Again, thank you for allowing me to participate in the care of your patient.        Sincerely,        Issac Kennedy MD

## 2023-11-07 NOTE — LETTER
11/7/2023         RE: Ariane Flores  [address]  [address]  [facility] [address]        Dear Colleague,    Thank you for referring your patient, Ariane Flores, to the [facility] TRANSPLANT [facility]. Please see a copy of my visit note below.    See procedure note.       Again, thank you for allowing me to participate in the care of your patient.        Sincerely,        VIRGILIO Navarrete CNP

## 2023-11-13 ENCOUNTER — LAB REQUISITION (OUTPATIENT)
Dept: LAB | Facility: CLINIC | Age: 43
End: 2023-11-13

## 2023-11-13 PROCEDURE — 80197 ASSAY OF TACROLIMUS: CPT

## 2023-11-14 ENCOUNTER — TELEPHONE (OUTPATIENT)
Dept: TRANSPLANT | Facility: CLINIC | Age: 43
End: 2023-11-14
Payer: COMMERCIAL

## 2023-11-14 DIAGNOSIS — Z94.0 KIDNEY REPLACED BY TRANSPLANT: ICD-10-CM

## 2023-11-14 DIAGNOSIS — Z94.0 S/P KIDNEY TRANSPLANT: ICD-10-CM

## 2023-11-14 LAB
TACROLIMUS BLD-MCNC: 6.5 UG/L (ref 5–15)
TME LAST DOSE: NORMAL H
TME LAST DOSE: NORMAL H

## 2023-11-14 RX ORDER — MAGNESIUM GLYCINATE 100 MG
200 TABLET ORAL 2 TIMES DAILY
Qty: 360 TABLET | Refills: 3 | Status: SHIPPED | OUTPATIENT
Start: 2023-11-14 | End: 2023-11-29

## 2023-11-14 RX ORDER — TACROLIMUS 0.5 MG/1
CAPSULE ORAL
Qty: 30 CAPSULE | Refills: 11 | Status: SHIPPED | OUTPATIENT
Start: 2023-11-14 | End: 2023-11-17

## 2023-11-14 RX ORDER — TACROLIMUS 1 MG/1
2 CAPSULE ORAL 2 TIMES DAILY
Qty: 120 CAPSULE | Refills: 11 | Status: SHIPPED | OUTPATIENT
Start: 2023-11-14 | End: 2023-11-17

## 2023-11-14 NOTE — TELEPHONE ENCOUNTER
ISSUE: tac below goal 8-10    Pt confirms current dose of 2mg/1.5mg BID.   Please increase dose to 2mg BID.     Pt voiced understanding of dose change.     RNCC called to [place] lab to get labs results. Results are not coming through to our system.  will fax results again and contact IT.     11/13: [place] 1.16

## 2023-11-15 ENCOUNTER — TELEPHONE (OUTPATIENT)
Dept: TRANSPLANT | Facility: CLINIC | Age: 43
End: 2023-11-15
Payer: COMMERCIAL

## 2023-11-15 NOTE — TELEPHONE ENCOUNTER
"Please call Ariane she has not started taking the 'New brand of magnesium\"  they do not have at [facility].  She also wanted to know what does magnesium do?  "

## 2023-11-15 NOTE — TELEPHONE ENCOUNTER
ISSUE:  Mag 1.3    Recently switched off of mag-oxide to 's best magnesium. Left VM with patient to confirm she made the switch.

## 2023-11-16 ENCOUNTER — LAB REQUISITION (OUTPATIENT)
Dept: LAB | Facility: CLINIC | Age: 43
End: 2023-11-16

## 2023-11-16 PROCEDURE — 80197 ASSAY OF TACROLIMUS: CPT

## 2023-11-17 DIAGNOSIS — Z94.0 S/P KIDNEY TRANSPLANT: Primary | ICD-10-CM

## 2023-11-17 LAB
TACROLIMUS BLD-MCNC: 7.4 UG/L (ref 5–15)
TME LAST DOSE: NORMAL H
TME LAST DOSE: NORMAL H

## 2023-11-17 RX ORDER — TACROLIMUS 1 MG/1
2 CAPSULE ORAL 2 TIMES DAILY
Qty: 120 CAPSULE | Refills: 11 | Status: SHIPPED | OUTPATIENT
Start: 2023-11-17 | End: 2023-11-30

## 2023-11-17 RX ORDER — TACROLIMUS 0.5 MG/1
0.5 CAPSULE ORAL EVERY MORNING
Qty: 30 CAPSULE | Refills: 11 | Status: SHIPPED | OUTPATIENT
Start: 2023-11-17 | End: 2023-11-30

## 2023-11-17 NOTE — TELEPHONE ENCOUNTER
Spoke to patient who confirmed this was an accurate 12-hour trough and verified current Tacrolimus IR dose of 2 mg BID.    Patient confirms increase Tacrolimus IR dose to 2.5 mg  in the morning and 2 mg in the evening and repeat labs in 1 week.

## 2023-11-17 NOTE — TELEPHONE ENCOUNTER
ISSUE: tac below goal 8-10    PLAN/LPN TASK:    Please call pt with dose change.    Your recent  Tacrolimus IR drug level was 7.4.    Please confirm this was an accurate 12-hour trough and verify your current Tacrolimus IR dose of 2 mg BID.    If both are accurate, please increase your  Tacrolimus IR dose to 2.5 mg  in the morning and 2 mg in the evening and repeat labs in 1 week.     Please adjust med list accordingly

## 2023-11-20 ENCOUNTER — LAB REQUISITION (OUTPATIENT)
Dept: LAB | Facility: CLINIC | Age: 43
End: 2023-11-20

## 2023-11-20 PROCEDURE — 80180 DRUG SCRN QUAN MYCOPHENOLATE: CPT

## 2023-11-20 PROCEDURE — 80197 ASSAY OF TACROLIMUS: CPT

## 2023-11-21 LAB
TACROLIMUS BLD-MCNC: 8 UG/L (ref 5–15)
TME LAST DOSE: NORMAL H
TME LAST DOSE: NORMAL H

## 2023-11-22 ENCOUNTER — TELEPHONE (OUTPATIENT)
Dept: TRANSPLANT | Facility: CLINIC | Age: 43
End: 2023-11-22
Payer: COMMERCIAL

## 2023-11-22 LAB
MYCOPHENOLATE SERPL LC/MS/MS-MCNC: 3.07 MG/L (ref 1–3.5)
MYCOPHENOLATE-G SERPL LC/MS/MS-MCNC: 64 MG/L (ref 30–95)
TME LAST DOSE: NORMAL H
TME LAST DOSE: NORMAL H

## 2023-11-22 NOTE — TELEPHONE ENCOUNTER
Post Kidney and Pancreas Transplant Team Conference  Date: 11/22/2023  Transplant Coordinator: Lynn Estes     Attendees:  [x]  Dr. Swift [] Danielle Musa, RN [x] Joan Bee LPN     [x]  Dr. Rizvi [x] Annamarie Banks, RN [] Marlyn Gabriel LPN    [x] Dr. Kennedy [] Helena Wise RN    [x] Dr. Nino [x] Lynn Estes, RN [x] Cierra Wang RN   [] Dr. Aly [] Simona Weldon, RN    [] Dr. Hensley [] Grace Pickett RN    []  Dr. Fitch [] Tess Castro RN    [x] Dr. Mills [] Isiah Beaver RN    [x] Aruna Avila, NP [] Flor Rowland RN    [x] Claudia Quintero NP [] Mayte Moore RN        Verbal Plan Read Back:   Continue current treatment plan    Routed to RN Coordinator   Joan Bee LPN

## 2023-11-24 ENCOUNTER — TELEPHONE (OUTPATIENT)
Dept: TRANSPLANT | Facility: CLINIC | Age: 43
End: 2023-11-24
Payer: COMMERCIAL

## 2023-11-24 NOTE — TELEPHONE ENCOUNTER
Reviewed with [facility] pharmacist, ok for patient to take emergency contraceptive if within timeframe of unprotected intercourse. Reveiwed need for 2 forms of contraceptive and she will discuss with her ob/gyn.

## 2023-11-24 NOTE — TELEPHONE ENCOUNTER
Please call Ariane, she had unprotected sex with her  11/22/2023.  Ariane is worried about getting pregnant and is wondering if she should take the day after pill?   Also, has not received the new medication Magnesium Bisglycinate 100mg.,  was to be mailed to her home address.

## 2023-11-27 ENCOUNTER — LAB REQUISITION (OUTPATIENT)
Dept: LAB | Facility: CLINIC | Age: 43
End: 2023-11-27

## 2023-11-27 PROCEDURE — 80197 ASSAY OF TACROLIMUS: CPT

## 2023-11-28 LAB
TACROLIMUS BLD-MCNC: 8.7 UG/L (ref 5–15)
TME LAST DOSE: NORMAL H
TME LAST DOSE: NORMAL H

## 2023-11-29 ENCOUNTER — OFFICE VISIT (OUTPATIENT)
Dept: TRANSPLANT | Facility: CLINIC | Age: 43
End: 2023-11-29
Attending: INTERNAL MEDICINE
Payer: COMMERCIAL

## 2023-11-29 ENCOUNTER — VIRTUAL VISIT (OUTPATIENT)
Dept: PHARMACY | Facility: CLINIC | Age: 43
End: 2023-11-29
Payer: COMMERCIAL

## 2023-11-29 VITALS
TEMPERATURE: 98.6 F | WEIGHT: 156.9 LBS | DIASTOLIC BLOOD PRESSURE: 61 MMHG | OXYGEN SATURATION: 100 % | BODY MASS INDEX: 30.64 KG/M2 | SYSTOLIC BLOOD PRESSURE: 115 MMHG | HEART RATE: 65 BPM

## 2023-11-29 DIAGNOSIS — Z94.0 STATUS POST KIDNEY TRANSPLANT: Primary | ICD-10-CM

## 2023-11-29 DIAGNOSIS — Z94.0 KIDNEY REPLACED BY TRANSPLANT: Primary | ICD-10-CM

## 2023-11-29 DIAGNOSIS — Z78.9 TAKES DIETARY SUPPLEMENTS: ICD-10-CM

## 2023-11-29 DIAGNOSIS — D84.9 IMMUNOSUPPRESSED STATUS (H): ICD-10-CM

## 2023-11-29 DIAGNOSIS — Z48.298 AFTERCARE FOLLOWING ORGAN TRANSPLANT: ICD-10-CM

## 2023-11-29 DIAGNOSIS — Z48.298 AFTERCARE FOLLOWING ORGAN TRANSPLANT: Primary | ICD-10-CM

## 2023-11-29 DIAGNOSIS — E83.42 HYPOMAGNESEMIA: ICD-10-CM

## 2023-11-29 PROBLEM — K59.1 FUNCTIONAL DIARRHEA: Status: RESOLVED | Noted: 2023-10-06 | Resolved: 2023-11-29

## 2023-11-29 PROCEDURE — 99214 OFFICE O/P EST MOD 30 MIN: CPT | Performed by: INTERNAL MEDICINE

## 2023-11-29 PROCEDURE — G0463 HOSPITAL OUTPT CLINIC VISIT: HCPCS | Performed by: INTERNAL MEDICINE

## 2023-11-29 PROCEDURE — 99207 PR NO CHARGE LOS: CPT | Performed by: PHARMACIST

## 2023-11-29 RX ORDER — VALGANCICLOVIR 450 MG/1
900 TABLET, FILM COATED ORAL DAILY
Qty: 60 TABLET | Refills: 0 | Status: SHIPPED | OUTPATIENT
Start: 2023-11-29 | End: 2024-04-21

## 2023-11-29 RX ORDER — SODIUM BICARBONATE 650 MG/1
650 TABLET ORAL 2 TIMES DAILY
COMMUNITY
Start: 2023-11-29 | End: 2023-11-30

## 2023-11-29 RX ORDER — MAGNESIUM OXIDE 400 MG/1
TABLET ORAL
COMMUNITY
Start: 2023-11-29 | End: 2023-12-01

## 2023-11-29 ASSESSMENT — PAIN SCALES - GENERAL: PAINLEVEL: NO PAIN (0)

## 2023-11-29 NOTE — PROGRESS NOTES
Disease State Management Encounter:                          Ariane Flores is a 43 year old female called for a follow-up visit from 10/4.      Reason for visit: 2 months post transplant.    Allergies/ADRs: Reviewed in chart  Past Medical History: Reviewed in chart  Tobacco: She reports that she has never smoked. She has never used smokeless tobacco.  Alcohol: not currently using     Medication Adherence/Access: no issues reported    Kidney Transplant:    Tacrolimus 2.5 mg every morning and 2 mg every evening  Mycophenolic acid 540 mg twice daily.   Pt reports no diarrhea, taking Metamucil 1 tsp daily.   Transplant date: 9/27/23  CMV prophylaxis: Valcyte 900 mg once daily Treat 3 months post tx   PJP prophylaxis: Bactrim SS daily  Tx Coordinator: Candace Powers MD: Ricky Hess, Using Med Card: Yes  Immunizations: annual flu shot unknown; Pneumovax 23:  2020; Prevnar 13: 2023; TDaP:  2016; Shingrix: unknown, HBV: immunity per last titers, COVID: Primary x 3      Supplements:   Mag Oxide 800mg at 12pm and 400mg every evening  Sodium bicarb 1300mg twice daily.  Ferrous sulfate 325mg daily  Vitamin D3 25mcg daily  Lab Results   Component Value Date    67864 27 11/27/2023    62113 26 11/20/2023    05102 1.5 (L) 11/27/2023    15675 1.5 (L) 11/20/2023    46152 11.1 (L) 11/27/2023    81357 10.5 (L) 11/20/2023   Vitamin D Deficiency Screening Results:  Lab Results   Component Value Date    VITDT 34 10/09/2023    VITDT 32 10/02/2023    VITDT 45 05/05/2023     Today's Vitals: There were no vitals taken for this visit.    Assessment/Plan:    NO changes today      Follow-up: 2 months    I spent 15 minutes with this patient today.  A copy of the visit note was provided to the patient's provider(s).    A summary of these recommendations was sent via [employer].    Mukul Cunningham, PharmD  [facility] Pharmacist    Phone: [phone]      Medication Therapy Recommendations  No medication therapy recommendations to display

## 2023-11-29 NOTE — NURSING NOTE
Chief Complaint   Patient presents with    RECHECK       /61   Pulse 65   Temp 98.6  F (37  C) (Oral)   Wt 71.2 kg (156 lb 14.4 oz)   SpO2 100%   BMI 30.64 kg/m      Tommy Mota on 11/29/2023 at 10:24 AM

## 2023-11-29 NOTE — LETTER
11/29/2023         RE: Ariane Flores  [address]  [address]  [facility] [address]        Dear Colleague,    Thank you for referring your patient, Ariane Flores, to the [facility]PL[facility]. Please see a copy of my visit note below.    TRANSPLANT NEPHROLOGY EARLY POST TRANSPLANT VISIT    Assessment & Plan  # DDKT: Stable.    - Baseline Creatinine: ~ 1-1.3   - Proteinuria: Normal (<0.2 grams)   - Date DSA Last Checked: Sep/2023      Latest DSA: No DSA at time of transplant, repeat    - BK Viremia: No   - Kidney Tx Biopsy: No   - Transplant Ureteral Stent: Removed 11/7/23     # Immunosuppression: Tacrolimus immediate release (goal 8-10) and Mycophenolic acid (dose 540 mg every 12 hours)   - Induction with Recent Transplant:  High Intensity Protocol   - Continue with intensive monitoring of immunosuppression for efficacy and toxicity.   - Changes: No    # Infection Prophylaxis:    - PJP: Sulfa/TMP (Bactrim)   - CMV: Valganciclovir (Valcyte), CMV IgG Ab positive. Increase valcyte to 900mg daily as CrCl is 78ml/min.       # Hypertension: Controlled;  Goal BP: < 130/80   - Volume status: Euvolemic     - Changes: Not at this time      # Anemia in Chronic Renal Disease: Hgb: Trend up      KAREN: No   - Iron studies: Replete    # Mineral Bone Disorder:    - Secondary renal hyperparathyroidism; PTH level: Normal (15-65 pg/ml)        On treatment: None   - Vitamin D; level: Normal        On supplement: Yes   - Calcium; level: Normal        On supplement: No   - Phosphorus; level: Normal        On supplement: No    # Electrolytes:   - Potassium; level: Normal        On supplement: No  - Magnesium; level: Low normal        On supplement: Yes, mag oxide 800mg with lunch and 400mg qPM  - Bicarbonate; level: Normal        On supplement: Yes, decrease bicarb to 650mg bid       # Complex Urologic history: possible reflux/obstruction and recurrent UTI as a child requiring multiple urologic procedures, last  of which was right native nephrectomy at age 13.  Cleared by urology for transplant. Voiding cystourethrogram which showed no evidence of vesicoureteral reflux and she was noted to empty her bladder to completion. CT imaging was unremarkable outside of left solitary kidney.     # Wound dehiscence:   -Partial dehiscence of inferior aspect of incision noted on ED visit 10/25. Appears to be have healed.      # Transplant History:  Etiology of Kidney Failure: Unknown etiology  Tx: DDKT  Transplant: 9/27/2023 (Kidney)  Donor Type: Donation after Brain Death Donor Class: Standard Criteria Donor  Crossmatch at time of Tx: negative  DSA at time of Tx: No  Significant changes in immunosuppression: None  CMV IgG Ab High Risk Discordance (D+/R-): No  EBV IgG Ab High Risk Discordance (D+/R-): No  Significant transplant-related complications: None      Assessment and plan was discussed with the patient and she voiced her understanding and agreement.    Return visit: Return in 2 months (on 1/29/2024) for previously scheduled visit, 4 month post transplant visit.    Issac Kennedy MD       Chief Complaint  Ms. Flores is a 43 year old here for a follow-up post kidney transplant     History of Present Illness  The patient overall feels well. She denies any recent hospitalizations. She denies nausea, vomiting, diarrhea, fever, chills, shortness of breath, chest pain, LE edema, unintentional weight loss, nights sweats, dysuria, hematuria.     Home BP:  110-120s systolic    Problem List  Patient Active Problem List   Diagnosis     Migraine     Hyperparathyroidism, secondary renal (H24)     Hyperlipidemia     Vitamin D deficiency     Metabolic acidosis     Kidney replaced by transplant     Immunosuppressed status (H24)     Anemia in stage 3a chronic kidney disease (H)     Functional diarrhea     Aftercare following organ transplant     Hypomagnesemia       Allergies  Allergies   Allergen Reactions     Cephalosporins Other (See Comments)      Pt does not know rx     Codeine Other (See Comments)     Gets dizzy     Iodinated Contrast Media Other (See Comments)     Only has 1 kidney.  Only has 1 kidney.  Only has 1 kidney.       Nsaids Other (See Comments)     Kidney Damage. Have Only has one kidney        Medications  Current Outpatient Medications   Medication Sig     magnesium oxide (MAG-OX) 400 MG tablet Take 2 tablets (800 mg) by mouth daily AND 1 tablet (400 mg) every evening.     sodium bicarbonate 650 MG tablet Take 1 tablet (650 mg) by mouth 2 times daily     valGANciclovir (VALCYTE) 450 MG tablet Take 2 tablets (900 mg) by mouth daily for 30 days     acetaminophen (TYLENOL) 325 MG tablet Take 1-2 tablets (325-650 mg) by mouth every 4 hours as needed for other (For optimal non-opioid multimodal pain management to improve pain control.)     atorvastatin (LIPITOR) 40 MG tablet Take 40 mg by mouth daily     cholecalciferol 25 MCG (1000 UT) TABS Take 1,000 Units by mouth daily     ferrous sulfate (FEROSUL) 325 (65 Fe) MG tablet Take 325 mg by mouth daily (with breakfast)     levothyroxine (SYNTHROID/LEVOTHROID) 50 MCG tablet Take 50 mcg by mouth daily     mycophenolic acid (GENERIC EQUIVALENT) 180 MG EC tablet Take 3 tablets (540 mg) by mouth 2 times daily     psyllium (METAMUCIL/KONSYL) 58.6 % powder Take 6 g (1 teaspoonful) by mouth daily for 90 days     sulfamethoxazole-trimethoprim (BACTRIM) 400-80 MG tablet Take 1 tablet by mouth daily for 90 days     tacrolimus (GENERIC) 0.5 MG capsule Take 1 capsule (0.5 mg) by mouth every morning Total dose = 2.5 mg in the AM and 2 mg in the PM     tacrolimus (GENERIC) 1 MG capsule Take 2 capsules (2 mg) by mouth 2 times daily Total dose = 2.5 mg in the AM and 2 mg in the PM     No current facility-administered medications for this visit.     Medications Discontinued During This Encounter   Medication Reason     Magnesium Bisglycinate (MAG GLYCINATE) 100 MG TABS      valGANciclovir (VALCYTE) 450 MG tablet  Reorder (No AVS)     sodium bicarbonate 650 MG tablet Reorder (No AVS)       Physical Exam  Vital Signs: /61   Pulse 65   Temp 98.6  F (37  C) (Oral)   Wt 71.2 kg (156 lb 14.4 oz)   SpO2 100%   BMI 30.64 kg/m      GENERAL APPEARANCE: healthy  HENT: mouth without ulcers or lesions  RESP: lungs clear to auscultation - no rales, rhonchi or wheezes  CV: regular rhythm, normal rate, no rub, no murmur  EDEMA: no LE edema bilaterally  ABDOMEN: soft, right lower quadrant wound is healing nicely, no erythema or discharge   MS: extremities normal - no gross deformities noted, no evidence of inflammation in joints, no muscle tenderness  SKIN: no rash  Kidney transplant: healed incision    Data        Latest Ref Rng & Units 11/27/2023    10:51 AM 11/20/2023    10:55 AM 11/13/2023    10:48 AM   Renal   Na (external) 135 - 145 mmol/L 137  136  138    K (external) 3.6 - 5.2 mmol/L 4.0  4.0  4.1    Cl 98 - 108 mmol/L 102  102  102    Cl (external) 98 - 108 mmol/L 102  102  102    CO2 (external) 21 - 32 mmol/L 27  26  28    BUN (external) 7 - 18 mg/dL 19  12  16    Cr (external) 0.55 - 1.02 mg/dL 1.04  1.02  1.11    Glucose (external) 70 - 110 mg/dL 102  117  123    Ca (external) 8.5 - 10.1 mg/dL 8.7  8.5  8.7    Mg (external) 1.8 - 2.4 mg/dL 1.5  1.5  1.3          Latest Ref Rng & Units 11/27/2023    10:51 AM 11/20/2023    10:55 AM 11/13/2023    10:48 AM   Bone Health   Phos (external) 2.5 - 4.9 mg/dL 3.4  3.5  4.1          Latest Ref Rng & Units 11/27/2023    10:51 AM 11/20/2023    10:55 AM 11/13/2023    10:48 AM   Heme   WBC (external) 4.6 - 10.2 10*3/uL 3.82     4.35     3.79       Hgb (external) 12 - 16 g/dL 11.1     10.5     10.5       Plt (external) 150 - 450 K/uL 309     316     312       ABSOLUTE NEUTROPHILS (EXTERNAL) 1.5 - 7.7 K/uL 2.9     3.4     3.1       ABSOLUTE LYMPHOCYTES (EXTERNAL) 2.0 - 7.7 K/uL 0.4     0.5     0.3       ABSOLUTE MONOCYTES (EXTERNAL) 0.1 - 0.8 K/uL 0.4     0.4     0.2       ABSOLUTE  EOSINOPHILS (EXTERNAL) 0.0 - 0.4 K/uL 0.1     0.1     0.1       ABSOLUTE BASOPHILS (EXTERNAL) 0.0 - 0.1 K/uL 0.0     0.0     0.0           This result is from an external source.         Latest Ref Rng & Units 10/6/2023     6:49 AM 10/5/2023    10:34 PM 9/28/2023     4:40 AM   Liver   AP 35 - 104 U/L 65  65     TBili <=1.2 mg/dL 0.4  0.3     ALT 0 - 50 U/L 27  32     AST 0 - 45 U/L 19  18     Tot Protein 6.4 - 8.3 g/dL 6.3  6.6     Albumin 3.5 - 5.2 g/dL 3.4  3.7  3.3          Latest Ref Rng & Units 10/5/2023    10:34 PM 9/27/2023     1:14 AM   Pancreas   A1C <5.7 %  5.5    Lipase (Roche) 13 - 60 U/L 33           Latest Ref Rng & Units 10/9/2023     9:22 AM 10/2/2023     7:12 AM   Iron studies   Iron 37 - 145 ug/dL 54  35    Iron Sat Index 15 - 46 % 21  16    Ferritin 6 - 175 ng/mL 348  270          Latest Ref Rng & Units 9/27/2023     1:14 AM 11/5/2020    11:28 AM   [facility] T[facility] Virology   EBV CAPSID ANTIBODY IGG No detectable antibody. Positive  >8.0    Hep B Core NR^Nonreactive  Nonreactive        Recent Labs   Lab Test 10/12/23  0906 10/16/23  1127 11/06/23  1053 11/13/23  1048 11/16/23  1059 11/20/23  1055 11/27/23  1051   DOSTAC 10/11/2023 10/15/2023  --  11/12/2023  --   --   --    TACROL 9.9 7.3   < > 6.5 7.4 8.0 8.7    < > = values in this interval not displayed.     Recent Labs   Lab Test 10/05/23  0916 10/12/23  0906 10/27/23  0844 11/06/23  1053 11/20/23  1055   DOSMPA 10/4/2023   8:00 PM 10/11/2023  10:22 PM  --   --   --    MPACID 0.86* 1.50 3.41 1.52 3.07   MPAG 56.3 57.4 69.0 44.3 64.0          Again, thank you for allowing me to participate in the care of your patient.        Sincerely,        Issac Kennedy MD

## 2023-11-29 NOTE — PROGRESS NOTES
TRANSPLANT NEPHROLOGY EARLY POST TRANSPLANT VISIT    Assessment & Plan   # DDKT: Stable.    - Baseline Creatinine: ~ 1-1.3   - Proteinuria: Normal (<0.2 grams)   - Date DSA Last Checked: Sep/2023      Latest DSA: No DSA at time of transplant, repeat    - BK Viremia: No   - Kidney Tx Biopsy: No   - Transplant Ureteral Stent: Removed 11/7/23     # Immunosuppression: Tacrolimus immediate release (goal 8-10) and Mycophenolic acid (dose 540 mg every 12 hours)   - Induction with Recent Transplant:  High Intensity Protocol   - Continue with intensive monitoring of immunosuppression for efficacy and toxicity.   - Changes: No    # Infection Prophylaxis:    - PJP: Sulfa/TMP (Bactrim)   - CMV: Valganciclovir (Valcyte), CMV IgG Ab positive. Increase valcyte to 900mg daily as CrCl is 78ml/min.       # Hypertension: Controlled;  Goal BP: < 130/80   - Volume status: Euvolemic     - Changes: Not at this time      # Anemia in Chronic Renal Disease: Hgb: Trend up      KAREN: No   - Iron studies: Replete    # Mineral Bone Disorder:    - Secondary renal hyperparathyroidism; PTH level: Normal (15-65 pg/ml)        On treatment: None   - Vitamin D; level: Normal        On supplement: Yes   - Calcium; level: Normal        On supplement: No   - Phosphorus; level: Normal        On supplement: No    # Electrolytes:   - Potassium; level: Normal        On supplement: No  - Magnesium; level: Low normal        On supplement: Yes, mag oxide 800mg with lunch and 400mg qPM  - Bicarbonate; level: Normal        On supplement: Yes, decrease bicarb to 650mg bid       # Complex Urologic history: possible reflux/obstruction and recurrent UTI as a child requiring multiple urologic procedures, last of which was right native nephrectomy at age 13.  Cleared by urology for transplant. Voiding cystourethrogram which showed no evidence of vesicoureteral reflux and she was noted to empty her bladder to completion. CT imaging was unremarkable outside of left  solitary kidney.     # Wound dehiscence:   -Partial dehiscence of inferior aspect of incision noted on ED visit 10/25. Appears to be have healed.      # Transplant History:  Etiology of Kidney Failure: Unknown etiology  Tx: DDKT  Transplant: 9/27/2023 (Kidney)  Donor Type: Donation after Brain Death Donor Class: Standard Criteria Donor  Crossmatch at time of Tx: negative  DSA at time of Tx: No  Significant changes in immunosuppression: None  CMV IgG Ab High Risk Discordance (D+/R-): No  EBV IgG Ab High Risk Discordance (D+/R-): No  Significant transplant-related complications: None      Assessment and plan was discussed with the patient and she voiced her understanding and agreement.    Return visit: Return in 2 months (on 1/29/2024) for previously scheduled visit, 4 month post transplant visit.    Issac Kennedy MD       Chief Complaint   Ms. Flores is a 43 year old here for a follow-up post kidney transplant     History of Present Illness   The patient overall feels well. She denies any recent hospitalizations. She denies nausea, vomiting, diarrhea, fever, chills, shortness of breath, chest pain, LE edema, unintentional weight loss, nights sweats, dysuria, hematuria.     Home BP:  110-120s systolic    Problem List   Patient Active Problem List   Diagnosis    Migraine    Hyperparathyroidism, secondary renal (H24)    Hyperlipidemia    Vitamin D deficiency    Metabolic acidosis    Kidney replaced by transplant    Immunosuppressed status (H24)    Anemia in stage 3a chronic kidney disease (H)    Functional diarrhea    Aftercare following organ transplant    Hypomagnesemia       Allergies   Allergies   Allergen Reactions    Cephalosporins Other (See Comments)     Pt does not know rx    Codeine Other (See Comments)     Gets dizzy    Iodinated Contrast Media Other (See Comments)     Only has 1 kidney.  Only has 1 kidney.  Only has 1 kidney.      Nsaids Other (See Comments)     Kidney Damage. Have Only has one kidney         Medications   Current Outpatient Medications   Medication Sig    magnesium oxide (MAG-OX) 400 MG tablet Take 2 tablets (800 mg) by mouth daily AND 1 tablet (400 mg) every evening.    sodium bicarbonate 650 MG tablet Take 1 tablet (650 mg) by mouth 2 times daily    valGANciclovir (VALCYTE) 450 MG tablet Take 2 tablets (900 mg) by mouth daily for 30 days    acetaminophen (TYLENOL) 325 MG tablet Take 1-2 tablets (325-650 mg) by mouth every 4 hours as needed for other (For optimal non-opioid multimodal pain management to improve pain control.)    atorvastatin (LIPITOR) 40 MG tablet Take 40 mg by mouth daily    cholecalciferol 25 MCG (1000 UT) TABS Take 1,000 Units by mouth daily    ferrous sulfate (FEROSUL) 325 (65 Fe) MG tablet Take 325 mg by mouth daily (with breakfast)    levothyroxine (SYNTHROID/LEVOTHROID) 50 MCG tablet Take 50 mcg by mouth daily    mycophenolic acid (GENERIC EQUIVALENT) 180 MG EC tablet Take 3 tablets (540 mg) by mouth 2 times daily    psyllium (METAMUCIL/KONSYL) 58.6 % powder Take 6 g (1 teaspoonful) by mouth daily for 90 days    sulfamethoxazole-trimethoprim (BACTRIM) 400-80 MG tablet Take 1 tablet by mouth daily for 90 days    tacrolimus (GENERIC) 0.5 MG capsule Take 1 capsule (0.5 mg) by mouth every morning Total dose = 2.5 mg in the AM and 2 mg in the PM    tacrolimus (GENERIC) 1 MG capsule Take 2 capsules (2 mg) by mouth 2 times daily Total dose = 2.5 mg in the AM and 2 mg in the PM     No current facility-administered medications for this visit.     Medications Discontinued During This Encounter   Medication Reason    Magnesium Bisglycinate (MAG GLYCINATE) 100 MG TABS     valGANciclovir (VALCYTE) 450 MG tablet Reorder (No AVS)    sodium bicarbonate 650 MG tablet Reorder (No AVS)       Physical Exam   Vital Signs: /61   Pulse 65   Temp 98.6  F (37  C) (Oral)   Wt 71.2 kg (156 lb 14.4 oz)   SpO2 100%   BMI 30.64 kg/m      GENERAL APPEARANCE: healthy  HENT: mouth without  ulcers or lesions  RESP: lungs clear to auscultation - no rales, rhonchi or wheezes  CV: regular rhythm, normal rate, no rub, no murmur  EDEMA: no LE edema bilaterally  ABDOMEN: soft, right lower quadrant wound is healing nicely, no erythema or discharge   MS: extremities normal - no gross deformities noted, no evidence of inflammation in joints, no muscle tenderness  SKIN: no rash  Kidney transplant: healed incision    Data         Latest Ref Rng & Units 11/27/2023    10:51 AM 11/20/2023    10:55 AM 11/13/2023    10:48 AM   Renal   Na (external) 135 - 145 mmol/L 137  136  138    K (external) 3.6 - 5.2 mmol/L 4.0  4.0  4.1    Cl 98 - 108 mmol/L 102  102  102    Cl (external) 98 - 108 mmol/L 102  102  102    CO2 (external) 21 - 32 mmol/L 27  26  28    BUN (external) 7 - 18 mg/dL 19  12  16    Cr (external) 0.55 - 1.02 mg/dL 1.04  1.02  1.11    Glucose (external) 70 - 110 mg/dL 102  117  123    Ca (external) 8.5 - 10.1 mg/dL 8.7  8.5  8.7    Mg (external) 1.8 - 2.4 mg/dL 1.5  1.5  1.3          Latest Ref Rng & Units 11/27/2023    10:51 AM 11/20/2023    10:55 AM 11/13/2023    10:48 AM   Bone Health   Phos (external) 2.5 - 4.9 mg/dL 3.4  3.5  4.1          Latest Ref Rng & Units 11/27/2023    10:51 AM 11/20/2023    10:55 AM 11/13/2023    10:48 AM   Heme   WBC (external) 4.6 - 10.2 10*3/uL 3.82     4.35     3.79       Hgb (external) 12 - 16 g/dL 11.1     10.5     10.5       Plt (external) 150 - 450 K/uL 309     316     312       ABSOLUTE NEUTROPHILS (EXTERNAL) 1.5 - 7.7 K/uL 2.9     3.4     3.1       ABSOLUTE LYMPHOCYTES (EXTERNAL) 2.0 - 7.7 K/uL 0.4     0.5     0.3       ABSOLUTE MONOCYTES (EXTERNAL) 0.1 - 0.8 K/uL 0.4     0.4     0.2       ABSOLUTE EOSINOPHILS (EXTERNAL) 0.0 - 0.4 K/uL 0.1     0.1     0.1       ABSOLUTE BASOPHILS (EXTERNAL) 0.0 - 0.1 K/uL 0.0     0.0     0.0           This result is from an external source.         Latest Ref Rng & Units 10/6/2023     6:49 AM 10/5/2023    10:34 PM 9/28/2023     4:40 AM    Liver   AP 35 - 104 U/L 65  65     TBili <=1.2 mg/dL 0.4  0.3     ALT 0 - 50 U/L 27  32     AST 0 - 45 U/L 19  18     Tot Protein 6.4 - 8.3 g/dL 6.3  6.6     Albumin 3.5 - 5.2 g/dL 3.4  3.7  3.3          Latest Ref Rng & Units 10/5/2023    10:34 PM 9/27/2023     1:14 AM   Pancreas   A1C <5.7 %  5.5    Lipase (Roche) 13 - 60 U/L 33           Latest Ref Rng & Units 10/9/2023     9:22 AM 10/2/2023     7:12 AM   Iron studies   Iron 37 - 145 ug/dL 54  35    Iron Sat Index 15 - 46 % 21  16    Ferritin 6 - 175 ng/mL 348  270          Latest Ref Rng & Units 9/27/2023     1:14 AM 11/5/2020    11:28 AM   [facility] T[facility] Virology   EBV CAPSID ANTIBODY IGG No detectable antibody. Positive  >8.0    Hep B Core NR^Nonreactive  Nonreactive        Recent Labs   Lab Test 10/12/23  0906 10/16/23  1127 11/06/23  1053 11/13/23  1048 11/16/23  1059 11/20/23  1055 11/27/23  1051   DOSTAC 10/11/2023 10/15/2023  --  11/12/2023  --   --   --    TACROL 9.9 7.3   < > 6.5 7.4 8.0 8.7    < > = values in this interval not displayed.     Recent Labs   Lab Test 10/05/23  0916 10/12/23  0906 10/27/23  0844 11/06/23  1053 11/20/23  1055   DOSMPA 10/4/2023   8:00 PM 10/11/2023  10:22 PM  --   --   --    MPACID 0.86* 1.50 3.41 1.52 3.07   MPAG 56.3 57.4 69.0 44.3 64.0

## 2023-11-29 NOTE — PATIENT INSTRUCTIONS
Patient Recommendations:  - Increase valcyte to 900mg daily. Stop valcyte after 12/27/23  -Decrease sodium bicarbonate to 650mg twice daily   -Weekly labs  -Your next appointment with Dr. Jacob should be changed to December 2024    Transplant Patient Information  Your Post Transplant Coordinator is: Lynn Estes  For non urgent items, we encourage you to contact your coordinator/care team online via [employer]  You and your care team can also contact your transplant coordinator Monday - Friday, 8am - 5pm at [phone] (Option 2 to reach the coordinator or Option 4 to schedule an appointment).  After hours for urgent matters, please call [facility] at [phone].

## 2023-11-29 NOTE — PATIENT INSTRUCTIONS
"Recommendations from today's MTM visit:                                                       No changes    Follow-up: 1/29    It was great speaking with you today.  I value your experience and would be very thankful for your time in providing feedback in our clinic survey. In the next few days, you may receive an email or text message from [facility] "[employer]" with a link to a survey related to your  clinical pharmacist.\"     To schedule another MTM appointment, please call the clinic directly or you may call the MTM scheduling line at [phone] or toll-free at [phone].     My Clinical Pharmacist's contact information:                                                      Please feel free to contact me with any questions or concerns you have.      Mukul Cunningham, PharmD  MTM Pharmacist    Phone: [phone]     "

## 2023-11-30 DIAGNOSIS — Z94.0 KIDNEY TRANSPLANTED: ICD-10-CM

## 2023-11-30 DIAGNOSIS — Z94.0 KIDNEY REPLACED BY TRANSPLANT: ICD-10-CM

## 2023-11-30 DIAGNOSIS — Z94.0 S/P KIDNEY TRANSPLANT: ICD-10-CM

## 2023-11-30 DIAGNOSIS — Z48.298 AFTERCARE FOLLOWING ORGAN TRANSPLANT: ICD-10-CM

## 2023-11-30 RX ORDER — TACROLIMUS 0.5 MG/1
0.5 CAPSULE ORAL EVERY MORNING
Qty: 30 CAPSULE | Refills: 11 | Status: SHIPPED | OUTPATIENT
Start: 2023-11-30 | End: 2024-01-16

## 2023-11-30 RX ORDER — TACROLIMUS 1 MG/1
2 CAPSULE ORAL 2 TIMES DAILY
Qty: 120 CAPSULE | Refills: 11 | Status: SHIPPED | OUTPATIENT
Start: 2023-11-30 | End: 2024-01-16

## 2023-11-30 RX ORDER — MYCOPHENOLIC ACID 180 MG/1
540 TABLET, DELAYED RELEASE ORAL 2 TIMES DAILY
Qty: 180 TABLET | Refills: 11 | Status: SHIPPED | OUTPATIENT
Start: 2023-11-30 | End: 2024-10-03

## 2023-11-30 RX ORDER — SODIUM BICARBONATE 650 MG/1
650 TABLET ORAL 2 TIMES DAILY
Qty: 180 TABLET | Refills: 3 | Status: SHIPPED | OUTPATIENT
Start: 2023-11-30 | End: 2024-05-10

## 2023-11-30 RX ORDER — SULFAMETHOXAZOLE AND TRIMETHOPRIM 400; 80 MG/1; MG/1
1 TABLET ORAL DAILY
Qty: 90 TABLET | Refills: 4 | Status: SHIPPED | OUTPATIENT
Start: 2023-11-30 | End: 2023-12-01

## 2023-11-30 NOTE — TELEPHONE ENCOUNTER
Patient has a new pharmacy. [facility] pharmacy called and would like all medication that [facility] prescribes to send over new scripts.

## 2023-12-01 DIAGNOSIS — Z94.0 KIDNEY REPLACED BY TRANSPLANT: Primary | ICD-10-CM

## 2023-12-01 RX ORDER — MAGNESIUM OXIDE 400 MG/1
TABLET ORAL
Qty: 270 TABLET | Refills: 3 | Status: SHIPPED | OUTPATIENT
Start: 2023-12-01 | End: 2024-04-30

## 2023-12-01 RX ORDER — SULFAMETHOXAZOLE AND TRIMETHOPRIM 400; 80 MG/1; MG/1
1 TABLET ORAL DAILY
Qty: 90 TABLET | Refills: 3 | Status: SHIPPED | OUTPATIENT
Start: 2023-12-01

## 2023-12-04 ENCOUNTER — LAB (OUTPATIENT)
Dept: LAB | Facility: CLINIC | Age: 43
End: 2023-12-04
Payer: COMMERCIAL

## 2023-12-04 ENCOUNTER — LAB REQUISITION (OUTPATIENT)
Dept: LAB | Facility: CLINIC | Age: 43
End: 2023-12-04

## 2023-12-04 DIAGNOSIS — Z48.298 AFTERCARE FOLLOWING ORGAN TRANSPLANT: ICD-10-CM

## 2023-12-04 PROCEDURE — 86832 HLA CLASS I HIGH DEFIN QUAL: CPT | Performed by: INTERNAL MEDICINE

## 2023-12-04 PROCEDURE — 86833 HLA CLASS II HIGH DEFIN QUAL: CPT | Performed by: INTERNAL MEDICINE

## 2023-12-04 PROCEDURE — 80197 ASSAY OF TACROLIMUS: CPT

## 2023-12-04 PROCEDURE — 86828 HLA CLASS I&II ANTIBODY QUAL: CPT | Performed by: INTERNAL MEDICINE

## 2023-12-05 LAB
TACROLIMUS BLD-MCNC: 9.3 UG/L (ref 5–15)
TME LAST DOSE: NORMAL H
TME LAST DOSE: NORMAL H

## 2023-12-11 ENCOUNTER — LAB REQUISITION (OUTPATIENT)
Dept: LAB | Facility: CLINIC | Age: 43
End: 2023-12-11

## 2023-12-11 PROCEDURE — 80197 ASSAY OF TACROLIMUS: CPT

## 2023-12-12 LAB
TACROLIMUS BLD-MCNC: 9.2 UG/L (ref 5–15)
TME LAST DOSE: NORMAL H
TME LAST DOSE: NORMAL H

## 2023-12-13 ENCOUNTER — TELEPHONE (OUTPATIENT)
Dept: TRANSPLANT | Facility: CLINIC | Age: 43
End: 2023-12-13
Payer: COMMERCIAL

## 2023-12-13 NOTE — TELEPHONE ENCOUNTER
Post Kidney and Pancreas Transplant Team Conference  Date: 12/13/2023  Transplant Coordinator: Lynn Estes     Attendees:  [x]  Dr. Swift [x] Danielle Musa, RN [x] Joan Bee LPN     [x]  Dr. Rizvi [x] Annamarie Banks, RN [] Marlyn Gabriel LPN    [x] Dr. Kennedy [x] Helena Wise RN    [x] Dr. Nino [x] Lynn Estes, RN [] Cierra Wang RN   [] Dr. Aly [] Simona Welodn, RN    [] Dr. Hensley [] Grace Pickett RN    [x]  Dr. Fitch [] Tess Castro RN    [] Dr. Mills [] Isiah Beaevr RN    [x] Aruna Avila, NP [] Flor Rowland RN    [x] Claudia Quintero NP [] Mayte Moore RN        Verbal Plan Read Back:   Continue current treatment plan    Routed to RN Coordinator   Joan Bee LPN

## 2023-12-18 ENCOUNTER — LAB REQUISITION (OUTPATIENT)
Dept: LAB | Facility: CLINIC | Age: 43
End: 2023-12-18

## 2023-12-18 LAB
TACROLIMUS BLD-MCNC: 8.9 UG/L (ref 5–15)
TME LAST DOSE: NORMAL H
TME LAST DOSE: NORMAL H

## 2023-12-18 PROCEDURE — 80197 ASSAY OF TACROLIMUS: CPT

## 2023-12-20 LAB
DONOR IDENTIFICATION: NORMAL
DSA COMMENTS: NORMAL
DSA PRESENT: NO
DSA TEST METHOD: NORMAL
INT SUB RESULT: NORMAL
INTERF SUBSTANCE: NORMAL
INTSUB TEST METHOD: NORMAL
ORGAN: NORMAL
SA 1 CELL: NORMAL
SA 1 TEST METHOD: NORMAL
SA 2 CELL: NORMAL
SA 2 TEST METHOD: NORMAL
SA1 HI RISK ABY: NORMAL
SA1 MOD RISK ABY: NORMAL
SA2 HI RISK ABY: NORMAL
SA2 MOD RISK ABY: NORMAL
UNACCEPTABLE ANTIGENS: NORMAL
UNOS CPRA: 98
ZZZINT SUB COMMENTS: NORMAL
ZZZSA 1  COMMENTS: NORMAL
ZZZSA 2 COMMENTS: NORMAL

## 2023-12-26 ENCOUNTER — LAB REQUISITION (OUTPATIENT)
Dept: LAB | Facility: CLINIC | Age: 43
End: 2023-12-26

## 2023-12-26 PROCEDURE — 80197 ASSAY OF TACROLIMUS: CPT

## 2023-12-27 LAB
TACROLIMUS BLD-MCNC: 8.5 UG/L (ref 5–15)
TME LAST DOSE: NORMAL H
TME LAST DOSE: NORMAL H

## 2024-01-02 ENCOUNTER — LAB REQUISITION (OUTPATIENT)
Dept: LAB | Facility: CLINIC | Age: 44
End: 2024-01-02

## 2024-01-02 PROCEDURE — 80197 ASSAY OF TACROLIMUS: CPT

## 2024-01-03 LAB
TACROLIMUS BLD-MCNC: 9.6 UG/L (ref 5–15)
TME LAST DOSE: NORMAL H
TME LAST DOSE: NORMAL H

## 2024-01-08 ENCOUNTER — LAB REQUISITION (OUTPATIENT)
Dept: LAB | Facility: CLINIC | Age: 44
End: 2024-01-08

## 2024-01-08 PROCEDURE — 80197 ASSAY OF TACROLIMUS: CPT

## 2024-01-09 LAB
TACROLIMUS BLD-MCNC: 7.6 UG/L (ref 5–15)
TME LAST DOSE: NORMAL H
TME LAST DOSE: NORMAL H

## 2024-01-15 ENCOUNTER — LAB REQUISITION (OUTPATIENT)
Dept: LAB | Facility: CLINIC | Age: 44
End: 2024-01-15

## 2024-01-15 PROCEDURE — 80197 ASSAY OF TACROLIMUS: CPT

## 2024-01-16 DIAGNOSIS — Z94.0 S/P KIDNEY TRANSPLANT: Primary | ICD-10-CM

## 2024-01-16 LAB
TACROLIMUS BLD-MCNC: 6.8 UG/L (ref 5–15)
TME LAST DOSE: NORMAL H
TME LAST DOSE: NORMAL H

## 2024-01-16 RX ORDER — TACROLIMUS 1 MG/1
2 CAPSULE ORAL 2 TIMES DAILY
Qty: 120 CAPSULE | Refills: 11 | Status: SHIPPED | OUTPATIENT
Start: 2024-01-16 | End: 2024-02-13

## 2024-01-16 RX ORDER — TACROLIMUS 0.5 MG/1
0.5 CAPSULE ORAL 2 TIMES DAILY
Qty: 60 CAPSULE | Refills: 11 | Status: SHIPPED | OUTPATIENT
Start: 2024-01-16 | End: 2024-02-13

## 2024-01-16 NOTE — TELEPHONE ENCOUNTER
ISSUE:   Tacrolimus IR level 6.8 on 1/15, goal 8-10, dose 2.5/2 mg BID.    PLAN:   Call Patientand confirm this was an accurate 12-hour trough.   Verify Tacrolimus IR dose 2.5/2 mg BID.   Confirm no new medications or illness.   Confirm no missed doses.   If accurate trough and accurate dose, increase Tacrolimus IR dose to 2.5 mg BID     Is this more than a 50% increase or decrease in current IS dose: No  If YES, justification: NA    Repeat labs in 1 week.  *If > 50% change in immunosuppression dose, repeat labs in 1 week.     If trough was inaccurate, repeat labs per usual. *lab was drawn at 11 am    OUTCOME: Patient confirms this was an accurate 12-hour trough.   Verified Tacrolimus IR dose 2.5/2 mg BID.   Confirmed no new medications or illness.   Confirmed no missed doses.   Patient confirms increase Tacrolimus IR dose to 2.5 mg BID and repeat labs in 1 week.

## 2024-01-17 ENCOUNTER — TELEPHONE (OUTPATIENT)
Dept: TRANSPLANT | Facility: CLINIC | Age: 44
End: 2024-01-17
Payer: COMMERCIAL

## 2024-01-17 NOTE — TELEPHONE ENCOUNTER
ISSUE: cr elevated to 1.03    PLAN/LPN TASK:  Please call pt to ensure pt is drinking 2-3L/day, no new illness/fever/malaise/abdominal pain/edema, medication changes, or normal UOP. If the above is normal, encourage continued hydration and repeat labs next week. Please place order for BMP.

## 2024-01-17 NOTE — TELEPHONE ENCOUNTER
Spoke to patient who denies any recent illness or medication changes.  Patient will repeat labs next week.  Encouraged good oral hydration.

## 2024-01-22 ENCOUNTER — LAB REQUISITION (OUTPATIENT)
Dept: LAB | Facility: CLINIC | Age: 44
End: 2024-01-22

## 2024-01-22 PROCEDURE — 80197 ASSAY OF TACROLIMUS: CPT

## 2024-01-23 LAB
TACROLIMUS BLD-MCNC: 8.7 UG/L (ref 5–15)
TME LAST DOSE: NORMAL H
TME LAST DOSE: NORMAL H

## 2024-01-29 ENCOUNTER — OFFICE VISIT (OUTPATIENT)
Dept: TRANSPLANT | Facility: CLINIC | Age: 44
End: 2024-01-29
Attending: INTERNAL MEDICINE
Payer: COMMERCIAL

## 2024-01-29 ENCOUNTER — LAB (OUTPATIENT)
Dept: LAB | Facility: CLINIC | Age: 44
End: 2024-01-29
Attending: INTERNAL MEDICINE
Payer: COMMERCIAL

## 2024-01-29 VITALS
DIASTOLIC BLOOD PRESSURE: 63 MMHG | TEMPERATURE: 97.8 F | WEIGHT: 158.1 LBS | BODY MASS INDEX: 30.88 KG/M2 | OXYGEN SATURATION: 99 % | HEART RATE: 67 BPM | SYSTOLIC BLOOD PRESSURE: 110 MMHG

## 2024-01-29 DIAGNOSIS — E66.9 OBESITY (BMI 30-39.9): ICD-10-CM

## 2024-01-29 DIAGNOSIS — Z94.0 KIDNEY REPLACED BY TRANSPLANT: ICD-10-CM

## 2024-01-29 DIAGNOSIS — E83.42 HYPOMAGNESEMIA: ICD-10-CM

## 2024-01-29 DIAGNOSIS — Z94.0 KIDNEY TRANSPLANTED: ICD-10-CM

## 2024-01-29 DIAGNOSIS — Z98.890 OTHER SPECIFIED POSTPROCEDURAL STATES: ICD-10-CM

## 2024-01-29 DIAGNOSIS — Z48.298 AFTERCARE FOLLOWING ORGAN TRANSPLANT: ICD-10-CM

## 2024-01-29 DIAGNOSIS — Z94.0 KIDNEY REPLACED BY TRANSPLANT: Primary | ICD-10-CM

## 2024-01-29 DIAGNOSIS — Z20.828 CONTACT WITH AND (SUSPECTED) EXPOSURE TO OTHER VIRAL COMMUNICABLE DISEASES: ICD-10-CM

## 2024-01-29 DIAGNOSIS — E87.20 METABOLIC ACIDOSIS: ICD-10-CM

## 2024-01-29 DIAGNOSIS — N25.81 HYPERPARATHYROIDISM, SECONDARY RENAL (H): ICD-10-CM

## 2024-01-29 DIAGNOSIS — D84.9 IMMUNOSUPPRESSION (H): ICD-10-CM

## 2024-01-29 DIAGNOSIS — Z79.899 ENCOUNTER FOR LONG-TERM CURRENT USE OF MEDICATION: ICD-10-CM

## 2024-01-29 LAB
ALBUMIN MFR UR ELPH: 33.2 MG/DL
ANION GAP SERPL CALCULATED.3IONS-SCNC: 8 MMOL/L (ref 7–15)
BUN SERPL-MCNC: 12.6 MG/DL (ref 6–20)
CALCIUM SERPL-MCNC: 9.4 MG/DL (ref 8.6–10)
CHLORIDE SERPL-SCNC: 102 MMOL/L (ref 98–107)
CREAT SERPL-MCNC: 0.93 MG/DL (ref 0.51–0.95)
CREAT UR-MCNC: 36.1 MG/DL
DEPRECATED HCO3 PLAS-SCNC: 27 MMOL/L (ref 22–29)
EGFRCR SERPLBLD CKD-EPI 2021: 78 ML/MIN/1.73M2
ERYTHROCYTE [DISTWIDTH] IN BLOOD BY AUTOMATED COUNT: 11.9 % (ref 10–15)
GLUCOSE SERPL-MCNC: 105 MG/DL (ref 70–99)
HCT VFR BLD AUTO: 42.4 % (ref 35–47)
HGB BLD-MCNC: 14 G/DL (ref 11.7–15.7)
MAGNESIUM SERPL-MCNC: 1.6 MG/DL (ref 1.7–2.3)
MCH RBC QN AUTO: 30.9 PG (ref 26.5–33)
MCHC RBC AUTO-ENTMCNC: 33 G/DL (ref 31.5–36.5)
MCV RBC AUTO: 94 FL (ref 78–100)
PLATELET # BLD AUTO: 249 10E3/UL (ref 150–450)
POTASSIUM SERPL-SCNC: 4.6 MMOL/L (ref 3.4–5.3)
PROT/CREAT 24H UR: 0.92 MG/MG CR (ref 0–0.2)
PTH-INTACT SERPL-MCNC: 69 PG/ML (ref 15–65)
RBC # BLD AUTO: 4.53 10E6/UL (ref 3.8–5.2)
SODIUM SERPL-SCNC: 137 MMOL/L (ref 135–145)
TACROLIMUS BLD-MCNC: 12.1 UG/L (ref 5–15)
TME LAST DOSE: NORMAL H
TME LAST DOSE: NORMAL H
WBC # BLD AUTO: 4.6 10E3/UL (ref 4–11)

## 2024-01-29 PROCEDURE — 86833 HLA CLASS II HIGH DEFIN QUAL: CPT | Performed by: INTERNAL MEDICINE

## 2024-01-29 PROCEDURE — 84156 ASSAY OF PROTEIN URINE: CPT | Performed by: PATHOLOGY

## 2024-01-29 PROCEDURE — 85027 COMPLETE CBC AUTOMATED: CPT | Performed by: PATHOLOGY

## 2024-01-29 PROCEDURE — 83970 ASSAY OF PARATHORMONE: CPT | Performed by: PATHOLOGY

## 2024-01-29 PROCEDURE — 90480 ADMN SARSCOV2 VAC 1/ONLY CMP: CPT | Performed by: INTERNAL MEDICINE

## 2024-01-29 PROCEDURE — 80197 ASSAY OF TACROLIMUS: CPT | Performed by: INTERNAL MEDICINE

## 2024-01-29 PROCEDURE — 250N000011 HC RX IP 250 OP 636: Performed by: INTERNAL MEDICINE

## 2024-01-29 PROCEDURE — 80048 BASIC METABOLIC PNL TOTAL CA: CPT | Performed by: PATHOLOGY

## 2024-01-29 PROCEDURE — 99214 OFFICE O/P EST MOD 30 MIN: CPT | Performed by: INTERNAL MEDICINE

## 2024-01-29 PROCEDURE — 99000 SPECIMEN HANDLING OFFICE-LAB: CPT | Performed by: PATHOLOGY

## 2024-01-29 PROCEDURE — 87799 DETECT AGENT NOS DNA QUANT: CPT | Performed by: INTERNAL MEDICINE

## 2024-01-29 PROCEDURE — G0463 HOSPITAL OUTPT CLINIC VISIT: HCPCS | Performed by: INTERNAL MEDICINE

## 2024-01-29 PROCEDURE — 86832 HLA CLASS I HIGH DEFIN QUAL: CPT | Performed by: INTERNAL MEDICINE

## 2024-01-29 PROCEDURE — 83735 ASSAY OF MAGNESIUM: CPT | Performed by: PATHOLOGY

## 2024-01-29 PROCEDURE — 91320 SARSCV2 VAC 30MCG TRS-SUC IM: CPT | Performed by: INTERNAL MEDICINE

## 2024-01-29 PROCEDURE — 36415 COLL VENOUS BLD VENIPUNCTURE: CPT | Performed by: PATHOLOGY

## 2024-01-29 RX ORDER — BIOTIN 5 MG
5 TABLET ORAL DAILY
Qty: 90 TABLET | Refills: 3 | Status: SHIPPED | OUTPATIENT
Start: 2024-01-29 | End: 2024-02-13

## 2024-01-29 RX ADMIN — COVID-19 VACCINE, MRNA 30 MCG: 0.05 INJECTION, SUSPENSION INTRAMUSCULAR at 09:08

## 2024-01-29 ASSESSMENT — PAIN SCALES - GENERAL: PAINLEVEL: NO PAIN (0)

## 2024-01-29 NOTE — PATIENT INSTRUCTIONS
Start biotin 1 tab daily     Schedule an appointment with dietitian      Transplant Patient Information  Your Post Transplant Coordinator is: Lynn Estes  You and your care team can contact your transplant coordinator Monday - Friday, 8am - 5pm at [phone] (Option 2 to reach the coordinator or Option 4 to schedule an appointment).  You can also reach your care team online via [employer].  After hours for urgent matters, please call [facility] at [phone].

## 2024-01-29 NOTE — NURSING NOTE
Chief Complaint   Patient presents with    RECHECK       /63   Pulse 67   Temp 97.8  F (36.6  C) (Oral)   Wt 71.7 kg (158 lb 1.6 oz)   SpO2 99%   BMI 30.88 kg/m      Tomym Mota on 1/29/2024 at 8:35 AM

## 2024-01-29 NOTE — PROGRESS NOTES
TRANSPLANT NEPHROLOGY EARLY POST TRANSPLANT VISIT    Assessment & Plan   # DDKT: Stable.    - Baseline Creatinine: ~  recently 0.8-1 , prior Cr baseline 1-1.3   - Proteinuria: Normal (<0.2 grams)   - Date DSA Last Checked: Sep/2023      Latest DSA: No DSA at time of transplant, repeat    - BK Viremia: No   - Kidney Tx Biopsy: No   - Transplant Ureteral Stent: Removed 11/7/23     # Immunosuppression: Tacrolimus immediate release (goal 8-10) and Mycophenolic acid (dose 540 mg every 12 hours)   - Induction with Recent Transplant:  High Intensity Protocol   - Continue with intensive monitoring of immunosuppression for efficacy and toxicity.   - Changes: No    # Infection Prophylaxis:    - PJP: Sulfa/TMP (Bactrim)   - CMV: Valganciclovir (Valcyte), CMV IgG Ab positive. Increase valcyte to 900mg daily as CrCl is 78ml/min.       # Hypertension: Controlled;  Goal BP: < 130/80   - Volume status: Euvolemic     - Changes: Not at this time      # Anemia in Chronic Renal Disease: Hgb: Trend up      KAREN: No   - Iron studies: Replete    # Mineral Bone Disorder:    - Secondary renal hyperparathyroidism; PTH level: Normal (15-65 pg/ml)        On treatment: None   - Vitamin D; level: Normal        On supplement: Yes   - Calcium; level: Normal        On supplement: No   - Phosphorus; level: Normal        On supplement: No    # Electrolytes:   - Potassium; level: Normal        On supplement: No  - Magnesium; level: Low normal        On supplement: Yes, mag oxide 800mg with lunch and 400mg qPM  - Bicarbonate; level: Normal        On supplement: Yes, decrease bicarb to 650mg bid       # Complex Urologic history: possible reflux/obstruction and recurrent UTI as a child requiring multiple urologic procedures, last of which was right native nephrectomy at age 13.  Cleared by urology for transplant. Voiding cystourethrogram which showed no evidence of vesicoureteral reflux and she was noted to empty her bladder to completion. CT imaging was  unremarkable outside of left solitary kidney.     # Wound dehiscence:   -Partial dehiscence of inferior aspect of incision noted on ED visit 10/25. Appears to be have healed.   - wound healed    # Hair loss:   - add TFT and start biotin 5 mg po every day   - likely CNI related    # Obesity: BMI-30.8   - refer to weight management clinic   - counseled about lifestyle modifications diet and exercise    # Vaccines:   - due for covid booster   - UTD Flu    # Pregnancy counseling   - will schedule IUD Feb 2024    # Transplant History:  Etiology of Kidney Failure: Unknown etiology  Tx: DDKT  Transplant: 9/27/2023 (Kidney)  Donor Type: Donation after Brain Death Donor Class: Standard Criteria Donor  Crossmatch at time of Tx: negative  DSA at time of Tx: No  Significant changes in immunosuppression: None  CMV IgG Ab High Risk Discordance (D+/R-): No  EBV IgG Ab High Risk Discordance (D+/R-): No  Significant transplant-related complications: None      Assessment and plan was discussed with the patient and she voiced her understanding and agreement.    Return visit: 6 month post Tx visit    Bryant Rizvi MD       Chief Complaint   Ms. Flores is a 43 year old here for a follow-up post kidney transplant     History of Present Illness   Feels good overall. C/o hair loss recently. Concerned about significant weight gain since transplant, discussed lifestyle modifications: diet and exercise.   Energy level is good. Denies any fevers, chills, night sweats. No nausea, vomiting, abdominal pain, diarrhea. No chest pain, sob, leg swelling.   No recent illness or hospitalization.     Labs show: Cr-0.8 Fk 8.7 overdue for BK (last Nov) DSA neg 12/2023      IS FK 2.5/2.5 [id number]  Vaccines due for COVID booster and received Flu  Home BP:     Problem List   Patient Active Problem List   Diagnosis    Migraine    Hyperparathyroidism, secondary renal (H24)    Hyperlipidemia    Vitamin D deficiency    Metabolic acidosis    Kidney replaced  by transplant    Immunosuppressed status (H24)    Anemia in stage 3a chronic kidney disease (H)    Aftercare following organ transplant    Hypomagnesemia       Allergies   Allergies   Allergen Reactions    Cephalosporins Other (See Comments)     Pt does not know rx    Codeine Other (See Comments)     Gets dizzy    Iodinated Contrast Media Other (See Comments)     Only has 1 kidney.  Only has 1 kidney.  Only has 1 kidney.      Nsaids Other (See Comments)     Kidney Damage. Have Only has one kidney        Medications   Current Outpatient Medications   Medication Sig    atorvastatin (LIPITOR) 40 MG tablet Take 40 mg by mouth daily    cholecalciferol 25 MCG (1000 UT) TABS Take 1,000 Units by mouth daily    ferrous sulfate (FEROSUL) 325 (65 Fe) MG tablet Take 325 mg by mouth daily (with breakfast)    levothyroxine (SYNTHROID/LEVOTHROID) 50 MCG tablet Take 50 mcg by mouth daily    magnesium oxide (MAG-OX) 400 MG tablet Take 2 tablets (800 mg) by mouth daily AND 1 tablet (400 mg) every evening.    mycophenolic acid (GENERIC EQUIVALENT) 180 MG EC tablet Take 3 tablets (540 mg) by mouth 2 times daily    psyllium (METAMUCIL/KONSYL) 58.6 % powder Take 6 g (1 teaspoonful) by mouth daily for 90 days    sodium bicarbonate 650 MG tablet Take 1 tablet (650 mg) by mouth 2 times daily    sulfamethoxazole-trimethoprim (BACTRIM) 400-80 MG tablet Take 1 tablet by mouth daily    tacrolimus (GENERIC) 0.5 MG capsule Take 1 capsule (0.5 mg) by mouth 2 times daily Total dose = 2.5 mg twice per day    tacrolimus (GENERIC) 1 MG capsule Take 2 capsules (2 mg) by mouth 2 times daily Total dose = 2.5 mg twice per day    valGANciclovir (VALCYTE) 450 MG tablet Take 2 tablets (900 mg) by mouth daily for 30 days     No current facility-administered medications for this visit.     There are no discontinued medications.      Physical Exam   Vital Signs: There were no vitals taken for this visit.    GENERAL APPEARANCE: healthy  HENT: mouth without  ulcers or lesions  RESP: lungs clear to auscultation - no rales, rhonchi or wheezes  CV: regular rhythm, normal rate, no rub, no murmur  EDEMA: no LE edema bilaterally  ABDOMEN: soft, right lower quadrant wound is healing nicely, no erythema or discharge   MS: extremities normal - no gross deformities noted, no evidence of inflammation in joints, no muscle tenderness  SKIN: no rash  Kidney transplant: healed incision  Access LUE AVF +thrill/bruit  Data         Latest Ref Rng & Units 1/22/2024    10:48 AM 1/15/2024    11:00 AM 1/8/2024    10:30 AM   Renal   Na (external) 135 - 145 mmol/L 137  138  137    K (external) 3.6 - 5.2 mmol/L 4.3  4.1  4.2    Cl 98 - 108 mmol/L 103  102  101    Cl (external) 98 - 108 mmol/L 103  102  101    CO2 (external) 21 - 32 mmol/L 26  27  27    BUN (external) 7 - 18 mg/dL 13  22  17    Cr (external) 0.55 - 1.02 mg/dL 0.88  1.03  0.86    Glucose (external) 70 - 110 mg/dL 97  114  93    Ca (external) 8.5 - 10.1 mg/dL 9.1  8.8  9.1          Latest Ref Rng & Units 1/22/2024    10:48 AM 1/15/2024    11:00 AM 1/8/2024    10:30 AM   Bone Health   Phos (external) 2.5 - 4.9 mg/dL 3.1  3.8  3.8          Latest Ref Rng & Units 1/22/2024    10:48 AM 1/15/2024    11:00 AM 1/8/2024    10:30 AM   Heme   WBC (external) 4.6 - 10.2 10*3/uL 6.76  7.79  5.12    Hgb (external) 12 - 16 g/dL 13.3  12.7  13.4    Plt (external) 150 - 450 K/uL 288  312  333    ABSOLUTE NEUTROPHILS (EXTERNAL) 1.5 - 7.7 K/uL 5.0     5.6     3.5       ABSOLUTE LYMPHOCYTES (EXTERNAL) 2.0 - 7.7 K/uL 0.7     0.7     0.5       ABSOLUTE MONOCYTES (EXTERNAL) 0.1 - 0.8 K/uL 0.9     1.1     0.9       ABSOLUTE EOSINOPHILS (EXTERNAL) 0.0 - 0.4 K/uL 0.1     0.1     0.1       ABSOLUTE BASOPHILS (EXTERNAL) 0.0 - 0.1 K/uL 0.0     0.1     0.0           This result is from an external source.         Latest Ref Rng & Units 10/6/2023     6:49 AM 10/5/2023    10:34 PM 9/28/2023     4:40 AM   Liver   AP 35 - 104 U/L 65  65     TBili <=1.2 mg/dL 0.4   0.3     ALT 0 - 50 U/L 27  32     AST 0 - 45 U/L 19  18     Tot Protein 6.4 - 8.3 g/dL 6.3  6.6     Albumin 3.5 - 5.2 g/dL 3.4  3.7  3.3          Latest Ref Rng & Units 10/5/2023    10:34 PM 9/27/2023     1:14 AM   Pancreas   A1C <5.7 %  5.5    Lipase (Roche) 13 - 60 U/L 33           Latest Ref Rng & Units 10/9/2023     9:22 AM 10/2/2023     7:12 AM   Iron studies   Iron 37 - 145 ug/dL 54  35    Iron Sat Index 15 - 46 % 21  16    Ferritin 6 - 175 ng/mL 348  270          Latest Ref Rng & Units 9/27/2023     1:14 AM 11/5/2020    11:28 AM   [facility] Txp Virology   EBV CAPSID ANTIBODY IGG No detectable antibody. Positive  >8.0    Hep B Core NR^Nonreactive  Nonreactive        Recent Labs   Lab Test 12/11/23  1048 12/18/23  1039 01/02/24  1037 01/08/24  1030 01/15/24  1100 01/22/24  1048   DOSTAC 12/10/2023  --  1/1/2024  --   --  1/21/2024   TACROL 9.2   < > 9.6 7.6 6.8 8.7    < > = values in this interval not displayed.     Recent Labs   Lab Test 10/05/23  0916 10/12/23  0906 10/27/23  0844 11/06/23  1053 11/20/23  1055   DOSMPA 10/4/2023   8:00 PM 10/11/2023  10:22 PM  --   --   --    MPACID 0.86* 1.50 3.41 1.52 3.07   MPAG 56.3 57.4 69.0 44.3 64.0

## 2024-01-29 NOTE — LETTER
1/29/2024         RE: Ariane Flores  [address]  [address]  [facility] [address]        Dear Colleague,    Thank you for referring your patient, Ariane Flores, to the [facility]. Please see a copy of my visit note below.    TRANSPLANT NEPHROLOGY EARLY POST TRANSPLANT VISIT    Assessment & Plan  # DDKT: Stable.    - Baseline Creatinine: ~  recently 0.8-1 , prior Cr baseline 1-1.3   - Proteinuria: Normal (<0.2 grams)   - Date DSA Last Checked: Sep/2023      Latest DSA: No DSA at time of transplant, repeat    - BK Viremia: No   - Kidney Tx Biopsy: No   - Transplant Ureteral Stent: Removed 11/7/23     # Immunosuppression: Tacrolimus immediate release (goal 8-10) and Mycophenolic acid (dose 540 mg every 12 hours)   - Induction with Recent Transplant:  High Intensity Protocol   - Continue with intensive monitoring of immunosuppression for efficacy and toxicity.   - Changes: No    # Infection Prophylaxis:    - PJP: Sulfa/TMP (Bactrim)   - CMV: Valganciclovir (Valcyte), CMV IgG Ab positive. Increase valcyte to 900mg daily as CrCl is 78ml/min.       # Hypertension: Controlled;  Goal BP: < 130/80   - Volume status: Euvolemic     - Changes: Not at this time      # Anemia in Chronic Renal Disease: Hgb: Trend up      KAREN: No   - Iron studies: Replete    # Mineral Bone Disorder:    - Secondary renal hyperparathyroidism; PTH level: Normal (15-65 pg/ml)        On treatment: None   - Vitamin D; level: Normal        On supplement: Yes   - Calcium; level: Normal        On supplement: No   - Phosphorus; level: Normal        On supplement: No    # Electrolytes:   - Potassium; level: Normal        On supplement: No  - Magnesium; level: Low normal        On supplement: Yes, mag oxide 800mg with lunch and 400mg qPM  - Bicarbonate; level: Normal        On supplement: Yes, decrease bicarb to 650mg bid       # Complex Urologic history: possible reflux/obstruction and recurrent UTI as a child requiring  multiple urologic procedures, last of which was right native nephrectomy at age 13.  Cleared by urology for transplant. Voiding cystourethrogram which showed no evidence of vesicoureteral reflux and she was noted to empty her bladder to completion. CT imaging was unremarkable outside of left solitary kidney.     # Wound dehiscence:   -Partial dehiscence of inferior aspect of incision noted on ED visit 10/25. Appears to be have healed.   - wound healed    # Hair loss:   - add TFT and start biotin 5 mg po every day   - likely CNI related    # Obesity: BMI-30.8   - refer to weight management clinic   - counseled about lifestyle modifications diet and exercise    # Vaccines:   - due for covid booster   - UTD Flu    # Pregnancy counseling   - will schedule IUD Feb 2024    # Transplant History:  Etiology of Kidney Failure: Unknown etiology  Tx: DDKT  Transplant: 9/27/2023 (Kidney)  Donor Type: Donation after Brain Death Donor Class: Standard Criteria Donor  Crossmatch at time of Tx: negative  DSA at time of Tx: No  Significant changes in immunosuppression: None  CMV IgG Ab High Risk Discordance (D+/R-): No  EBV IgG Ab High Risk Discordance (D+/R-): No  Significant transplant-related complications: None      Assessment and plan was discussed with the patient and she voiced her understanding and agreement.    Return visit: 6 month post Tx visit    Bryant Rizvi MD       Chief Complaint  Ms. Flores is a 43 year old here for a follow-up post kidney transplant     History of Present Illness  Feels good overall. C/o hair loss recently. Concerned about significant weight gain since transplant, discussed lifestyle modifications: diet and exercise.   Energy level is good. Denies any fevers, chills, night sweats. No nausea, vomiting, abdominal pain, diarrhea. No chest pain, sob, leg swelling.   No recent illness or hospitalization.     Labs show: Cr-0.8 Fk 8.7 overdue for BK (last Nov) DSA neg 12/2023      IS FK 2.5/2.5  [id number]  Vaccines due for COVID booster and received Flu  Home BP:     Problem List  Patient Active Problem List   Diagnosis     Migraine     Hyperparathyroidism, secondary renal (H24)     Hyperlipidemia     Vitamin D deficiency     Metabolic acidosis     Kidney replaced by transplant     Immunosuppressed status (H24)     Anemia in stage 3a chronic kidney disease (H)     Aftercare following organ transplant     Hypomagnesemia       Allergies  Allergies   Allergen Reactions     Cephalosporins Other (See Comments)     Pt does not know rx     Codeine Other (See Comments)     Gets dizzy     Iodinated Contrast Media Other (See Comments)     Only has 1 kidney.  Only has 1 kidney.  Only has 1 kidney.       Nsaids Other (See Comments)     Kidney Damage. Have Only has one kidney        Medications  Current Outpatient Medications   Medication Sig     atorvastatin (LIPITOR) 40 MG tablet Take 40 mg by mouth daily     cholecalciferol 25 MCG (1000 UT) TABS Take 1,000 Units by mouth daily     ferrous sulfate (FEROSUL) 325 (65 Fe) MG tablet Take 325 mg by mouth daily (with breakfast)     levothyroxine (SYNTHROID/LEVOTHROID) 50 MCG tablet Take 50 mcg by mouth daily     magnesium oxide (MAG-OX) 400 MG tablet Take 2 tablets (800 mg) by mouth daily AND 1 tablet (400 mg) every evening.     mycophenolic acid (GENERIC EQUIVALENT) 180 MG EC tablet Take 3 tablets (540 mg) by mouth 2 times daily     psyllium (METAMUCIL/KONSYL) 58.6 % powder Take 6 g (1 teaspoonful) by mouth daily for 90 days     sodium bicarbonate 650 MG tablet Take 1 tablet (650 mg) by mouth 2 times daily     sulfamethoxazole-trimethoprim (BACTRIM) 400-80 MG tablet Take 1 tablet by mouth daily     tacrolimus (GENERIC) 0.5 MG capsule Take 1 capsule (0.5 mg) by mouth 2 times daily Total dose = 2.5 mg twice per day     tacrolimus (GENERIC) 1 MG capsule Take 2 capsules (2 mg) by mouth 2 times daily Total dose = 2.5 mg twice per day     valGANciclovir (VALCYTE) 450 MG tablet  Take 2 tablets (900 mg) by mouth daily for 30 days     No current facility-administered medications for this visit.     There are no discontinued medications.      Physical Exam  Vital Signs: There were no vitals taken for this visit.    GENERAL APPEARANCE: healthy  HENT: mouth without ulcers or lesions  RESP: lungs clear to auscultation - no rales, rhonchi or wheezes  CV: regular rhythm, normal rate, no rub, no murmur  EDEMA: no LE edema bilaterally  ABDOMEN: soft, right lower quadrant wound is healing nicely, no erythema or discharge   MS: extremities normal - no gross deformities noted, no evidence of inflammation in joints, no muscle tenderness  SKIN: no rash  Kidney transplant: healed incision  Access LUE AVF +thrill/bruit  Data        Latest Ref Rng & Units 1/22/2024    10:48 AM 1/15/2024    11:00 AM 1/8/2024    10:30 AM   Renal   Na (external) 135 - 145 mmol/L 137  138  137    K (external) 3.6 - 5.2 mmol/L 4.3  4.1  4.2    Cl 98 - 108 mmol/L 103  102  101    Cl (external) 98 - 108 mmol/L 103  102  101    CO2 (external) 21 - 32 mmol/L 26  27  27    BUN (external) 7 - 18 mg/dL 13  22  17    Cr (external) 0.55 - 1.02 mg/dL 0.88  1.03  0.86    Glucose (external) 70 - 110 mg/dL 97  114  93    Ca (external) 8.5 - 10.1 mg/dL 9.1  8.8  9.1          Latest Ref Rng & Units 1/22/2024    10:48 AM 1/15/2024    11:00 AM 1/8/2024    10:30 AM   Bone Health   Phos (external) 2.5 - 4.9 mg/dL 3.1  3.8  3.8          Latest Ref Rng & Units 1/22/2024    10:48 AM 1/15/2024    11:00 AM 1/8/2024    10:30 AM   Heme   WBC (external) 4.6 - 10.2 10*3/uL 6.76  7.79  5.12    Hgb (external) 12 - 16 g/dL 13.3  12.7  13.4    Plt (external) 150 - 450 K/uL 288  312  333    ABSOLUTE NEUTROPHILS (EXTERNAL) 1.5 - 7.7 K/uL 5.0     5.6     3.5       ABSOLUTE LYMPHOCYTES (EXTERNAL) 2.0 - 7.7 K/uL 0.7     0.7     0.5       ABSOLUTE MONOCYTES (EXTERNAL) 0.1 - 0.8 K/uL 0.9     1.1     0.9       ABSOLUTE EOSINOPHILS (EXTERNAL) 0.0 - 0.4 K/uL 0.1     0.1      0.1       ABSOLUTE BASOPHILS (EXTERNAL) 0.0 - 0.1 K/uL 0.0     0.1     0.0           This result is from an external source.         Latest Ref Rng & Units 10/6/2023     6:49 AM 10/5/2023    10:34 PM 9/28/2023     4:40 AM   Liver   AP 35 - 104 U/L 65  65     TBili <=1.2 mg/dL 0.4  0.3     ALT 0 - 50 U/L 27  32     AST 0 - 45 U/L 19  18     Tot Protein 6.4 - 8.3 g/dL 6.3  6.6     Albumin 3.5 - 5.2 g/dL 3.4  3.7  3.3          Latest Ref Rng & Units 10/5/2023    10:34 PM 9/27/2023     1:14 AM   Pancreas   A1C <5.7 %  5.5    Lipase (Roche) 13 - 60 U/L 33           Latest Ref Rng & Units 10/9/2023     9:22 AM 10/2/2023     7:12 AM   Iron studies   Iron 37 - 145 ug/dL 54  35    Iron Sat Index 15 - 46 % 21  16    Ferritin 6 - 175 ng/mL 348  270          Latest Ref Rng & Units 9/27/2023     1:14 AM 11/5/2020    11:28 AM   [facility] Txp Virology   EBV CAPSID ANTIBODY IGG No detectable antibody. Positive  >8.0    Hep B Core NR^Nonreactive  Nonreactive        Recent Labs   Lab Test 12/11/23  1048 12/18/23  1039 01/02/24  1037 01/08/24  1030 01/15/24  1100 01/22/24  1048   DOSTAC 12/10/2023  --  1/1/2024  --   --  1/21/2024   TACROL 9.2   < > 9.6 7.6 6.8 8.7    < > = values in this interval not displayed.     Recent Labs   Lab Test 10/05/23  0916 10/12/23  0906 10/27/23  0844 11/06/23  1053 11/20/23  1055   DOSMPA 10/4/2023   8:00 PM 10/11/2023  10:22 PM  --   --   --    MPACID 0.86* 1.50 3.41 1.52 3.07   MPAG 56.3 57.4 69.0 44.3 64.0          Again, thank you for allowing me to participate in the care of your patient.        Sincerely,        Bryant Rizvi MD

## 2024-01-30 ENCOUNTER — TELEPHONE (OUTPATIENT)
Dept: TRANSPLANT | Facility: CLINIC | Age: 44
End: 2024-01-30
Payer: COMMERCIAL

## 2024-01-30 LAB
BK VIRUS SPECIMEN TYPE: NORMAL
BK VIRUS SPECIMEN TYPE: NORMAL
BKV DNA # SPEC NAA+PROBE: NOT DETECTED IU/ML
BKV DNA # SPEC NAA+PROBE: NOT DETECTED IU/ML

## 2024-01-30 NOTE — TELEPHONE ENCOUNTER
ISSUE:   Tacrolimus IR level 12.1 on 1/29, goal 8-10, dose 2.5 mg BID.    PLAN:   Call Patientand confirm this was an accurate 12-hour trough.   Verify Tacrolimus IR dose 2.5 mg BID.   Confirm no new medications or illness.   Confirm no missed doses.   If accurate trough and accurate dose, decrease Tacrolimus IR dose to 2 mg BID     Is this more than a 50% increase or decrease in current IS dose: No  If YES, justification: NA    Repeat labs in 1 week.  *If > 50% change in immunosuppression dose, repeat labs in 1 week.     OUTCOME:

## 2024-01-30 NOTE — TELEPHONE ENCOUNTER
Call placed to patient. Patient confirms current dose. Note that she had taken her medications prior to her lab draw. She denies any recent illness, diarrhea or medication changes. Patient v\u to continue current dose and repeat level in one week.

## 2024-02-05 ENCOUNTER — LAB REQUISITION (OUTPATIENT)
Dept: LAB | Facility: CLINIC | Age: 44
End: 2024-02-05

## 2024-02-05 ENCOUNTER — TRANSFERRED RECORDS (OUTPATIENT)
Dept: HEALTH INFORMATION MANAGEMENT | Facility: CLINIC | Age: 44
End: 2024-02-05
Payer: COMMERCIAL

## 2024-02-05 PROCEDURE — 80197 ASSAY OF TACROLIMUS: CPT

## 2024-02-05 PROCEDURE — 87799 DETECT AGENT NOS DNA QUANT: CPT

## 2024-02-06 LAB
BK VIRUS SPECIMEN TYPE: NORMAL
BKV DNA # SPEC NAA+PROBE: NOT DETECTED IU/ML
DONOR IDENTIFICATION: NORMAL
DSA COMMENTS: NORMAL
DSA PRESENT: NO
DSA TEST METHOD: NORMAL
ORGAN: NORMAL
SA 1 CELL: NORMAL
SA 1 TEST METHOD: NORMAL
SA 2 CELL: NORMAL
SA 2 TEST METHOD: NORMAL
SA1 HI RISK ABY: NORMAL
SA1 MOD RISK ABY: NORMAL
SA2 HI RISK ABY: NORMAL
SA2 MOD RISK ABY: NORMAL
TACROLIMUS BLD-MCNC: 9.4 UG/L (ref 5–15)
TME LAST DOSE: NORMAL H
TME LAST DOSE: NORMAL H
UNACCEPTABLE ANTIGENS: NORMAL
UNOS CPRA: 98
ZZZSA 1  COMMENTS: NORMAL
ZZZSA 2 COMMENTS: NORMAL

## 2024-02-12 ENCOUNTER — LAB REQUISITION (OUTPATIENT)
Dept: LAB | Facility: CLINIC | Age: 44
End: 2024-02-12

## 2024-02-12 PROCEDURE — 80197 ASSAY OF TACROLIMUS: CPT

## 2024-02-13 ENCOUNTER — TELEPHONE (OUTPATIENT)
Dept: TRANSPLANT | Facility: CLINIC | Age: 44
End: 2024-02-13
Payer: COMMERCIAL

## 2024-02-13 DIAGNOSIS — Z94.0 S/P KIDNEY TRANSPLANT: Primary | ICD-10-CM

## 2024-02-13 DIAGNOSIS — Z94.0 KIDNEY TRANSPLANTED: Primary | ICD-10-CM

## 2024-02-13 LAB
TACROLIMUS BLD-MCNC: 7.4 UG/L (ref 5–15)
TME LAST DOSE: NORMAL H
TME LAST DOSE: NORMAL H

## 2024-02-13 RX ORDER — BIOTIN 5 MG
5 TABLET ORAL DAILY
Qty: 90 TABLET | Refills: 0 | Status: SHIPPED | OUTPATIENT
Start: 2024-02-13 | End: 2024-03-11

## 2024-02-13 RX ORDER — TACROLIMUS 1 MG/1
3 CAPSULE ORAL 2 TIMES DAILY
Qty: 180 CAPSULE | Refills: 11 | Status: SHIPPED | OUTPATIENT
Start: 2024-02-13 | End: 2024-03-12

## 2024-02-13 RX ORDER — TACROLIMUS 0.5 MG/1
CAPSULE ORAL
Qty: 60 CAPSULE | Refills: 11 | Status: SHIPPED | OUTPATIENT
Start: 2024-02-13 | End: 2024-03-12

## 2024-02-13 NOTE — TELEPHONE ENCOUNTER
ISSUE:  SCr 1.06 slightly above baseline.     Please encouraged increased hydration, goal 2-3 liters of fluid per day.     ISSUE:   Tacrolimus IR level 7.4 on 2/12, goal 8-10, dose 2.5 mg BID.    PLAN:   Call Patientand confirm this was an accurate 12-hour trough.   Verify Tacrolimus IR dose 2.5 mg BID.   Confirm no new medications or illness.   Confirm no missed doses.   If accurate trough and accurate dose, increase Tacrolimus IR dose to 3 mg BID     Is this more than a 50% increase or decrease in current IS dose: No  If YES, justification: N/A    Repeat labs in 1 weeks.  *If > 50% change in immunosuppression dose, repeat labs in 1 week.     Helena Wise RN, BSN  Post-Kidney/Pancreas Transplant Coordinator   [phone]    OUTCOME:   Spoke with Patient, they confirm accurate trough level and current dose 2.5 mg BID.   Patientconfirmed dose change to 3 mg BID.  Patientagreed to repeat labs in 1 weeks.   Orders sent to preferred pharmacy for dose change and lab for repeat labs.   Patientvoiced understanding of plan.

## 2024-02-13 NOTE — TELEPHONE ENCOUNTER
Patient Contacted     Appointment type: RKT  Provider: JESSICA YAN  Return date: 7/25/24  Specialty phone number: [phone]  Additional appointment(s) needed: LAB, MTM  Additonal Notes: RESCHEDULED.

## 2024-02-13 NOTE — LETTER
PHYSICIAN ORDERS      DATE & TIME ISSUED: 2024 2:40 PM  PATIENT NAME: Ariane Flores   : 1980     [facility] MR# [if applicable]: [id number]     DIAGNOSIS:  Kidney Transplant  ICD-10 CODE: Z94.0     Please repeat the following labs in 1 week:  Tacrolimus drug level  CBC  BMP    Any questions please call: [phone]    Please fax each result same day as resulted/available    Critical lab results page [phone]  Please fax lab results to [phone].      Bryant Rizvi MD

## 2024-02-13 NOTE — TELEPHONE ENCOUNTER
Needs refill on Biotin 5 MG TABS    [facility], [place] - [address] Phone: [phone]   Fax: [phone]             Patient stated medication has not been sent to her pharmacy

## 2024-02-19 ENCOUNTER — LAB REQUISITION (OUTPATIENT)
Dept: LAB | Facility: CLINIC | Age: 44
End: 2024-02-19

## 2024-02-19 PROCEDURE — 80197 ASSAY OF TACROLIMUS: CPT

## 2024-02-20 LAB
TACROLIMUS BLD-MCNC: 8.2 UG/L (ref 5–15)
TME LAST DOSE: NORMAL H
TME LAST DOSE: NORMAL H

## 2024-02-26 ENCOUNTER — LAB REQUISITION (OUTPATIENT)
Dept: LAB | Facility: CLINIC | Age: 44
End: 2024-02-26

## 2024-02-26 PROCEDURE — 80197 ASSAY OF TACROLIMUS: CPT

## 2024-02-27 ENCOUNTER — TELEPHONE (OUTPATIENT)
Dept: TRANSPLANT | Facility: CLINIC | Age: 44
End: 2024-02-27
Payer: COMMERCIAL

## 2024-02-27 LAB
TACROLIMUS BLD-MCNC: 8.3 UG/L (ref 5–15)
TME LAST DOSE: NORMAL H
TME LAST DOSE: NORMAL H

## 2024-02-27 NOTE — TELEPHONE ENCOUNTER
ISSUE: creatinine elevated from baseline. Result: 1.2    PLAN/LPN TASK:  Please call pt to ensure pt is drinking 2-3L/day, no new illness/fever/malaise/abdominal pain/edema, medication changes, or normal UOP. If the above is normal, encourage continued hydration and repeat labs next week. Please place order for BMP.

## 2024-02-27 NOTE — TELEPHONE ENCOUNTER
Spoke to patient who ensured that she is drinking 2L/day, no new illness/fever/malaise/abdominal pain/edema, medication changes, or normal UOP. encouraged continued hydration and repeat labs next week.  Patient verbalized understanding.

## 2024-03-04 ENCOUNTER — LAB REQUISITION (OUTPATIENT)
Dept: LAB | Facility: CLINIC | Age: 44
End: 2024-03-04

## 2024-03-04 PROCEDURE — 80197 ASSAY OF TACROLIMUS: CPT

## 2024-03-04 PROCEDURE — 87799 DETECT AGENT NOS DNA QUANT: CPT

## 2024-03-05 ENCOUNTER — TELEPHONE (OUTPATIENT)
Dept: TRANSPLANT | Facility: CLINIC | Age: 44
End: 2024-03-05
Payer: COMMERCIAL

## 2024-03-05 LAB
BK VIRUS SPECIMEN TYPE: NORMAL
BKV DNA # SPEC NAA+PROBE: NOT DETECTED IU/ML
TACROLIMUS BLD-MCNC: 9.8 UG/L (ref 5–15)
TME LAST DOSE: NORMAL H
TME LAST DOSE: NORMAL H

## 2024-03-05 ASSESSMENT — ENCOUNTER SYMPTOMS: NEW SYMPTOMS OF CORONARY ARTERY DISEASE: 0

## 2024-03-11 ENCOUNTER — LAB REQUISITION (OUTPATIENT)
Dept: LAB | Facility: CLINIC | Age: 44
End: 2024-03-11

## 2024-03-11 DIAGNOSIS — Z94.0 KIDNEY TRANSPLANTED: Primary | ICD-10-CM

## 2024-03-11 PROCEDURE — 80197 ASSAY OF TACROLIMUS: CPT

## 2024-03-11 PROCEDURE — 87799 DETECT AGENT NOS DNA QUANT: CPT

## 2024-03-11 RX ORDER — BIOTIN 5 MG
5 TABLET ORAL DAILY
Qty: 90 TABLET | Refills: 0 | Status: SHIPPED | OUTPATIENT
Start: 2024-03-11 | End: 2024-05-10

## 2024-03-11 NOTE — TELEPHONE ENCOUNTER
Patient Call: General  Route to LPN    Reason for call: [facility] calling to update team that Patient Biotin 5 mg Tabs is on back order, Patient was contacted and is aware, they had to Notify the team.    Call back needed? Yes    Return Call Needed  Same as documented in contacts section  When to return call?: Same day: Route High Priority

## 2024-03-12 DIAGNOSIS — Z94.0 S/P KIDNEY TRANSPLANT: Primary | ICD-10-CM

## 2024-03-12 LAB
BK VIRUS SPECIMEN TYPE: NORMAL
BKV DNA # SPEC NAA+PROBE: NOT DETECTED IU/ML
TACROLIMUS BLD-MCNC: 11.2 UG/L (ref 5–15)
TME LAST DOSE: NORMAL H
TME LAST DOSE: NORMAL H

## 2024-03-12 RX ORDER — TACROLIMUS 0.5 MG/1
0.5 CAPSULE ORAL 2 TIMES DAILY
Qty: 60 CAPSULE | Refills: 11 | Status: SHIPPED | OUTPATIENT
Start: 2024-03-12 | End: 2024-03-14

## 2024-03-12 RX ORDER — TACROLIMUS 1 MG/1
2 CAPSULE ORAL 2 TIMES DAILY
Qty: 120 CAPSULE | Refills: 11 | Status: SHIPPED | OUTPATIENT
Start: 2024-03-12 | End: 2024-03-14

## 2024-03-12 NOTE — TELEPHONE ENCOUNTER
ISSUE:   Tacrolimus IR level 11.2 on 3/11, goal 8-10, dose 3 mg BID.    PLAN:   Call Patientand confirm this was an accurate 12-hour trough.   Verify Tacrolimus IR dose 3 mg BID.   Confirm no new medications or illness.   Confirm no missed doses.   If accurate trough and accurate dose, decrease Tacrolimus IR dose to 2.5 mg BID     Is this more than a 50% increase or decrease in current IS dose: No  If YES, justification: NA    Repeat labs in 2 weeks.  *If > 50% change in immunosuppression dose, repeat labs in 1 week.     OUTCOME: Patient confirmed this was an accurate 12-hour trough.   Verified Tacrolimus IR dose 3 mg BID.   Confirmed no new medications or illness.   Confirmed no missed doses.   Patient confirms decrease Tacrolimus IR dose to 2.5 mg BID and repeat labs in 2 weeks.

## 2024-03-13 DIAGNOSIS — Z94.0 S/P KIDNEY TRANSPLANT: ICD-10-CM

## 2024-03-13 NOTE — TELEPHONE ENCOUNTER
ISSUE:   Tacrolimus IR level 11.2 on 3/12, goal 8-10, dose 2.5 mg BID.    PLAN:   Call Patientand confirm this was an accurate 12-hour trough.   Verify Tacrolimus IR dose 2.5 mg BID.   Confirm no new medications or illness.   Confirm no missed doses.   If accurate trough and accurate dose, decrease Tacrolimus IR dose to 2 mg BID     Is this more than a 50% increase or decrease in current IS dose: No  If YES, justification: NA    Repeat labs in 1 weeks.  *If > 50% change in immunosuppression dose, repeat labs in 1 week.     OUTCOME:

## 2024-03-14 RX ORDER — TACROLIMUS 1 MG/1
2 CAPSULE ORAL 2 TIMES DAILY
Qty: 120 CAPSULE | Refills: 11 | Status: SHIPPED | OUTPATIENT
Start: 2024-03-14 | End: 2024-06-11

## 2024-03-14 RX ORDER — TACROLIMUS 0.5 MG/1
CAPSULE ORAL
Status: SHIPPED
Start: 2024-03-14 | End: 2024-06-11

## 2024-03-14 NOTE — TELEPHONE ENCOUNTER
Call placed to patient. Patient confirms current dose and accurate trough level. Denies any recent illness, diarrhea or medication changes. Patient v\u to decrease tacrolimus dose to 2 mg bid and repeat level in 1 week. Rx sent

## 2024-03-18 ENCOUNTER — LAB REQUISITION (OUTPATIENT)
Dept: LAB | Facility: CLINIC | Age: 44
End: 2024-03-18

## 2024-03-18 PROCEDURE — 80197 ASSAY OF TACROLIMUS: CPT

## 2024-03-19 LAB
TACROLIMUS BLD-MCNC: 9.1 UG/L (ref 5–15)
TME LAST DOSE: NORMAL H
TME LAST DOSE: NORMAL H

## 2024-03-20 ENCOUNTER — TELEPHONE (OUTPATIENT)
Dept: TRANSPLANT | Facility: CLINIC | Age: 44
End: 2024-03-20
Payer: COMMERCIAL

## 2024-03-20 NOTE — TELEPHONE ENCOUNTER
ISSUE:   Tacrolimus IR level 9.1 on 3/18, goal 6-8, dose 2 mg BID.    PLAN:   Call Patientand confirm this was an accurate 12-hour trough.   Verify Tacrolimus IR dose 2 mg BID.   Confirm no new medications or illness.   Confirm no missed doses.   If accurate trough and accurate dose, decrease Tacrolimus IR dose to 1.5 mg BID     Is this more than a 50% increase or decrease in current IS dose: No  If YES, justification: NA    Repeat labs in 2 weeks.  *If > 50% change in immunosuppression dose, repeat labs in 1 week.     OUTCOME: Patient confirms this was an accurate 12-hour trough.   Verified Tacrolimus IR dose 2.5 mg BID.   Confirmed no new medications or illness.   Confirmed no missed doses.   Patient confirms decrease Tacrolimus IR dose to 2 mg BID and repeat labs in 2 weeks.

## 2024-04-01 ENCOUNTER — LAB REQUISITION (OUTPATIENT)
Dept: LAB | Facility: CLINIC | Age: 44
End: 2024-04-01

## 2024-04-01 PROCEDURE — 80197 ASSAY OF TACROLIMUS: CPT

## 2024-04-02 LAB
TACROLIMUS BLD-MCNC: 6.6 UG/L (ref 5–15)
TME LAST DOSE: NORMAL H
TME LAST DOSE: NORMAL H

## 2024-04-15 ENCOUNTER — OFFICE VISIT (OUTPATIENT)
Dept: TRANSPLANT | Facility: CLINIC | Age: 44
End: 2024-04-15
Attending: INTERNAL MEDICINE
Payer: COMMERCIAL

## 2024-04-15 ENCOUNTER — LAB (OUTPATIENT)
Dept: LAB | Facility: CLINIC | Age: 44
End: 2024-04-15
Attending: INTERNAL MEDICINE
Payer: COMMERCIAL

## 2024-04-15 VITALS
TEMPERATURE: 98.4 F | WEIGHT: 160 LBS | DIASTOLIC BLOOD PRESSURE: 74 MMHG | SYSTOLIC BLOOD PRESSURE: 122 MMHG | OXYGEN SATURATION: 100 % | HEART RATE: 87 BPM | BODY MASS INDEX: 31.25 KG/M2

## 2024-04-15 DIAGNOSIS — Z20.828 CONTACT WITH AND (SUSPECTED) EXPOSURE TO OTHER VIRAL COMMUNICABLE DISEASES: ICD-10-CM

## 2024-04-15 DIAGNOSIS — Z94.0 KIDNEY REPLACED BY TRANSPLANT: Primary | ICD-10-CM

## 2024-04-15 DIAGNOSIS — D84.9 IMMUNOSUPPRESSION (H): ICD-10-CM

## 2024-04-15 DIAGNOSIS — N18.6 ESRD (END STAGE RENAL DISEASE) (H): ICD-10-CM

## 2024-04-15 DIAGNOSIS — Z94.0 KIDNEY REPLACED BY TRANSPLANT: ICD-10-CM

## 2024-04-15 DIAGNOSIS — Z76.82 ORGAN TRANSPLANT CANDIDATE: ICD-10-CM

## 2024-04-15 DIAGNOSIS — Z79.899 ENCOUNTER FOR LONG-TERM CURRENT USE OF MEDICATION: ICD-10-CM

## 2024-04-15 DIAGNOSIS — Z94.0 KIDNEY TRANSPLANTED: ICD-10-CM

## 2024-04-15 DIAGNOSIS — Z48.298 AFTERCARE FOLLOWING ORGAN TRANSPLANT: ICD-10-CM

## 2024-04-15 DIAGNOSIS — D63.1 ANEMIA IN STAGE 3A CHRONIC KIDNEY DISEASE (H): ICD-10-CM

## 2024-04-15 DIAGNOSIS — Z98.890 OTHER SPECIFIED POSTPROCEDURAL STATES: ICD-10-CM

## 2024-04-15 DIAGNOSIS — E83.42 HYPOMAGNESEMIA: ICD-10-CM

## 2024-04-15 DIAGNOSIS — E87.20 METABOLIC ACIDOSIS: ICD-10-CM

## 2024-04-15 DIAGNOSIS — N25.81 HYPERPARATHYROIDISM, SECONDARY RENAL (H): ICD-10-CM

## 2024-04-15 DIAGNOSIS — E66.9 OBESITY (BMI 30-39.9): ICD-10-CM

## 2024-04-15 DIAGNOSIS — N18.31 ANEMIA IN STAGE 3A CHRONIC KIDNEY DISEASE (H): ICD-10-CM

## 2024-04-15 LAB
ANION GAP SERPL CALCULATED.3IONS-SCNC: 12 MMOL/L (ref 7–15)
BUN SERPL-MCNC: 21.8 MG/DL (ref 6–20)
CALCIUM SERPL-MCNC: 9.3 MG/DL (ref 8.6–10)
CHLORIDE SERPL-SCNC: 103 MMOL/L (ref 98–107)
CREAT SERPL-MCNC: 0.99 MG/DL (ref 0.51–0.95)
DEPRECATED HCO3 PLAS-SCNC: 24 MMOL/L (ref 22–29)
EGFRCR SERPLBLD CKD-EPI 2021: 72 ML/MIN/1.73M2
ERYTHROCYTE [DISTWIDTH] IN BLOOD BY AUTOMATED COUNT: 12.3 % (ref 10–15)
GLUCOSE SERPL-MCNC: 101 MG/DL (ref 70–99)
HCT VFR BLD AUTO: 39.5 % (ref 35–47)
HGB BLD-MCNC: 13.2 G/DL (ref 11.7–15.7)
MAGNESIUM SERPL-MCNC: 1.7 MG/DL (ref 1.7–2.3)
MCH RBC QN AUTO: 30.1 PG (ref 26.5–33)
MCHC RBC AUTO-ENTMCNC: 33.4 G/DL (ref 31.5–36.5)
MCV RBC AUTO: 90 FL (ref 78–100)
PLATELET # BLD AUTO: 285 10E3/UL (ref 150–450)
POTASSIUM SERPL-SCNC: 4.2 MMOL/L (ref 3.4–5.3)
RBC # BLD AUTO: 4.38 10E6/UL (ref 3.8–5.2)
SODIUM SERPL-SCNC: 139 MMOL/L (ref 135–145)
TACROLIMUS BLD-MCNC: 6.7 UG/L (ref 5–15)
TME LAST DOSE: NORMAL H
TME LAST DOSE: NORMAL H
WBC # BLD AUTO: 5.4 10E3/UL (ref 4–11)

## 2024-04-15 PROCEDURE — 86832 HLA CLASS I HIGH DEFIN QUAL: CPT | Performed by: SURGERY

## 2024-04-15 PROCEDURE — G0463 HOSPITAL OUTPT CLINIC VISIT: HCPCS | Performed by: INTERNAL MEDICINE

## 2024-04-15 PROCEDURE — 80197 ASSAY OF TACROLIMUS: CPT | Performed by: INTERNAL MEDICINE

## 2024-04-15 PROCEDURE — G2211 COMPLEX E/M VISIT ADD ON: HCPCS | Performed by: INTERNAL MEDICINE

## 2024-04-15 PROCEDURE — 36415 COLL VENOUS BLD VENIPUNCTURE: CPT | Performed by: PATHOLOGY

## 2024-04-15 PROCEDURE — 99214 OFFICE O/P EST MOD 30 MIN: CPT | Mod: GC | Performed by: INTERNAL MEDICINE

## 2024-04-15 PROCEDURE — 83735 ASSAY OF MAGNESIUM: CPT | Performed by: PATHOLOGY

## 2024-04-15 PROCEDURE — 87799 DETECT AGENT NOS DNA QUANT: CPT | Performed by: INTERNAL MEDICINE

## 2024-04-15 PROCEDURE — 99000 SPECIMEN HANDLING OFFICE-LAB: CPT | Performed by: PATHOLOGY

## 2024-04-15 PROCEDURE — 85027 COMPLETE CBC AUTOMATED: CPT | Performed by: PATHOLOGY

## 2024-04-15 PROCEDURE — 86833 HLA CLASS II HIGH DEFIN QUAL: CPT | Performed by: SURGERY

## 2024-04-15 PROCEDURE — 80048 BASIC METABOLIC PNL TOTAL CA: CPT | Performed by: PATHOLOGY

## 2024-04-15 ASSESSMENT — PAIN SCALES - GENERAL: PAINLEVEL: NO PAIN (0)

## 2024-04-15 NOTE — LETTER
4/15/2024         RE: Ariane Flores  [address]  [facility] [address]        Dear Colleague,    Thank you for referring your patient, Ariane Flores, to the [facility]. Please see a copy of my visit note below.    TRANSPLANT NEPHROLOGY EARLY POST TRANSPLANT VISIT    Assessment & Plan  # DDKT: Stable.    - Baseline Creatinine: ~  recently 0.8-1 , prior Cr baseline 1.1-1.3   - Proteinuria: Mild (0.5-1.0 grams)   - Date DSA Last Checked: Jan/2024      Latest DSA: No   - BK Viremia: No   - Kidney Tx Biopsy: No   - Transplant Ureteral Stent: Removed 11/7/23     # Immunosuppression: Tacrolimus extended release (goal 6-8) and Mycophenolic acid (dose 540 mg every 12 hours)   - Induction with Recent Transplant:  High Intensity Protocol   - Continue with intensive monitoring of immunosuppression for efficacy and toxicity.   - Changes: No    # Infection Prophylaxis:    - PJP: Sulfa/TMP (Bactrim)   - CMV: Valganciclovir (Valcyte), stopped after 3 months.CMV IgG Ab positive.     # Hypertension: Controlled;  Goal BP: < 130/80   - Volume status: Euvolemic     - Changes: Not at this time      # Anemia in Chronic Renal Disease: Hgb: Trend up      KAREN: No   - Iron studies: Replete    # Mineral Bone Disorder:    - Secondary renal hyperparathyroidism; PTH level: Normal (15-65 pg/ml)        On treatment: None   - Vitamin D; level: Normal        On supplement: Yes   - Calcium; level: Normal        On supplement: No   - Phosphorus; level: Normal        On supplement: No    # Electrolytes:   - Potassium; level: Normal        On supplement: No  - Magnesium; level: Low normal        On supplement: Yes, mag oxide 800mg with lunch and 400mg qPM  - Bicarbonate; level: Normal        On supplement: Yes, stopped.      # Complex Urologic history: possible reflux/obstruction and recurrent UTI as a child requiring multiple urologic procedures, last of which was right native nephrectomy at age 13.   Cleared by urology for transplant. Voiding cystourethrogram which showed no evidence of vesicoureteral reflux and she was noted to empty her bladder to completion. CT imaging was unremarkable outside of left solitary kidney.     # Wound dehiscence:   -Partial dehiscence of inferior aspect of incision noted on ED visit 10/25.    - wound healed    # Hair loss:   - nl TFT   - on biotin 5 mg po every day   - likely CNI related    # Obesity: BMI-31.25   - refer to weight management clinic   - counseled about lifestyle modifications diet and exercise    # Vaccines:   - due for covid booster   - UTD Flu    # Transplant History:  Etiology of Kidney Failure: Unknown etiology  Tx: DDKT  Transplant: 9/27/2023 (Kidney)  Donor Type: Donation after Brain Death Donor Class: Standard Criteria Donor  Crossmatch at time of Tx: negative  DSA at time of Tx: No  Significant changes in immunosuppression: None  CMV IgG Ab High Risk Discordance (D+/R-): No  EBV IgG Ab High Risk Discordance (D+/R-): No  Significant transplant-related complications: None      Assessment and plan was discussed with the patient and she voiced her understanding and agreement.    Return visit: 3 months    Mary Vásquez MD     The longitudinal plan of care for the diagnosis(es)/condition(s) as documented were addressed during this visit. Due to the added complexity in care, I will continue to support Ariane in the subsequent management and with ongoing continuity of care.    This patient has been seen and evaluated by me, Bryant Rizvi MD.  I have reviewed the note and agree with plan of care as documented by the fellow.    Bryant Rizvi MD     Chief Complaint  Ms. Flores is a 43 year old here for a follow-up post kidney transplant     History of Present Illness  Feels good overall. Energy level is good. Denies any fevers, chills, night sweats. No nausea, vomiting, abdominal pain, diarrhea. No chest pain, sob, leg swelling. No recent illness or  hospitalization.   BP well in range, SBP in 110'    Labs show: Cr- 1.01 Fk 6.6.  BKV - not detected on 3/11/2024, DSA neg 1/29/2024.      IS FK 2.5/2.5 [id number]    Problem List  Patient Active Problem List   Diagnosis     Migraine     Hyperparathyroidism, secondary renal (H24)     Hyperlipidemia     Vitamin D deficiency     Metabolic acidosis     Kidney replaced by transplant     Immunosuppressed status (H24)     Anemia in stage 3a chronic kidney disease (H)     Aftercare following organ transplant     Hypomagnesemia       Allergies  Allergies   Allergen Reactions     Cephalosporins Other (See Comments)     Pt does not know rx     Codeine Other (See Comments)     Gets dizzy     Iodinated Contrast Media Other (See Comments)     Only has 1 kidney.  Only has 1 kidney.  Only has 1 kidney.       Nsaids Other (See Comments)     Kidney Damage. Have Only has one kidney        Medications  Current Outpatient Medications   Medication Sig Dispense Refill     atorvastatin (LIPITOR) 40 MG tablet Take 40 mg by mouth daily PT is taking 10 MG a day       ferrous sulfate (FEROSUL) 325 (65 Fe) MG tablet Take 325 mg by mouth daily (with breakfast)       levothyroxine (SYNTHROID/LEVOTHROID) 50 MCG tablet Take 50 mcg by mouth daily       magnesium oxide (MAG-OX) 400 MG tablet Take 2 tablets (800 mg) by mouth daily AND 1 tablet (400 mg) every evening. 270 tablet 3     mycophenolic acid (GENERIC EQUIVALENT) 180 MG EC tablet Take 3 tablets (540 mg) by mouth 2 times daily 180 tablet 11     sodium bicarbonate 650 MG tablet Take 1 tablet (650 mg) by mouth 2 times daily 180 tablet 3     sulfamethoxazole-trimethoprim (BACTRIM) 400-80 MG tablet Take 1 tablet by mouth daily 90 tablet 3     tacrolimus (GENERIC) 0.5 MG capsule HOLD       tacrolimus (GENERIC) 1 MG capsule Take 2 capsules (2 mg) by mouth 2 times daily 120 capsule 11     Biotin 5 MG TABS Take 5 mg by mouth daily (Patient not taking: Reported on 4/15/2024) 90 tablet 0      cholecalciferol 25 MCG (1000 UT) TABS Take 1,000 Units by mouth daily (Patient not taking: Reported on 4/15/2024)       valGANciclovir (VALCYTE) 450 MG tablet Take 2 tablets (900 mg) by mouth daily for 30 days 60 tablet 0     No current facility-administered medications for this visit.     There are no discontinued medications.      Physical Exam  Vital Signs: /74 (BP Location: Right arm, Patient Position: Sitting, Cuff Size: Adult Regular)   Pulse 87   Temp 98.4  F (36.9  C) (Oral)   Wt 72.6 kg (160 lb)   SpO2 100%   BMI 31.25 kg/m      GENERAL APPEARANCE: healthy  HENT: mouth without ulcers or lesions  RESP: lungs clear to auscultation - no rales, rhonchi or wheezes  CV: regular rhythm, normal rate, no rub, no murmur  EDEMA: no LE edema bilaterally  ABDOMEN: soft, right lower quadrant wound is healing nicely, no erythema or discharge   MS: extremities normal - no gross deformities noted, no evidence of inflammation in joints, no muscle tenderness  SKIN: no rash  Kidney transplant: healed incision  Access LUE AVF +thrill/bruit  Data        Latest Ref Rng & Units 4/1/2024    10:50 AM 3/18/2024    11:00 AM 3/11/2024    11:01 AM   Renal   Na (external) 135 - 145 mmol/L 139  137  137    K (external) 3.6 - 5.2 mmol/L 4.6  4.0  3.9    Cl 98 - 108 mmol/L 104  102  102    Cl (external) 98 - 108 mmol/L 104  102  102    CO2 (external) 21 - 32 mmol/L 26  24  26    BUN (external) 7 - 18 mg/dL 21  18  11    Cr (external) 0.55 - 1.02 mg/dL 1.01  1.00  1.12    Glucose (external) 70 - 110 mg/dL 114  98  100    Ca (external) 8.5 - 10.1 mg/dL 9.1  8.9  9.1          Latest Ref Rng & Units 3/4/2024    11:02 AM 2/5/2024    10:46 AM 1/29/2024    11:56 AM   Bone Health   Phos (external) 2.5 - 4.9 mg/dL 3.0     3.9     Parathyroid Hormone Intact 15 - 65 pg/mL   69        This result is from an external source.         Latest Ref Rng & Units 4/1/2024    10:50 AM 3/18/2024    11:00 AM 3/11/2024    11:01 AM   Heme   WBC (external)  4.6 - 10.2 10^3/uL 4.96  4.84     9.45    Hgb (external) 12 - 16 g/dL 12.9  12.9     13.1    Plt (external) 150 - 450 K/uL 295  315     280    ABSOLUTE NEUTROPHILS (EXTERNAL) 1.5 - 7.7 K/uL 3.0     2.7     6.9       ABSOLUTE LYMPHOCYTES (EXTERNAL) 2.0 - 7.7 K/uL 1.2     1.3     1.4       ABSOLUTE MONOCYTES (EXTERNAL) 0.1 - 0.8 K/uL 0.6     0.7     1.0       ABSOLUTE EOSINOPHILS (EXTERNAL) 0.0 - 0.4 K/uL 0.1     0.1     0.1       ABSOLUTE BASOPHILS (EXTERNAL) 0.0 - 0.1 K/uL 0.0     0.0     0.0           This result is from an external source.         Latest Ref Rng & Units 10/6/2023     6:49 AM 10/5/2023    10:34 PM 9/28/2023     4:40 AM   Liver   AP 35 - 104 U/L 65  65     TBili <=1.2 mg/dL 0.4  0.3     ALT 0 - 50 U/L 27  32     AST 0 - 45 U/L 19  18     Tot Protein 6.4 - 8.3 g/dL 6.3  6.6     Albumin 3.5 - 5.2 g/dL 3.4  3.7  3.3          Latest Ref Rng & Units 10/5/2023    10:34 PM 9/27/2023     1:14 AM   Pancreas   A1C <5.7 %  5.5    Lipase (Roche) 13 - 60 U/L 33           Latest Ref Rng & Units 10/9/2023     9:22 AM 10/2/2023     7:12 AM   Iron studies   Iron 37 - 145 ug/dL 54  35    Iron Sat Index 15 - 46 % 21  16    Ferritin 6 - 175 ng/mL 348  270          Latest Ref Rng & Units 9/27/2023     1:14 AM 11/5/2020    11:28 AM   [facility] Virology   EBV CAPSID ANTIBODY IGG No detectable antibody. Positive  >8.0    Hep B Core NR^Nonreactive  Nonreactive        Recent Labs   Lab Test 01/22/24  1048 01/29/24  1156 02/05/24  1046 03/11/24  1101 03/18/24  1100 04/01/24  1050   DOSTAC 1/21/2024 1/28/2024  --  3/10/2024  --   --    TACROL 8.7 12.1   < > 11.2 9.1 6.6    < > = values in this interval not displayed.     Recent Labs   Lab Test 10/05/23  0916 10/12/23  0906 10/27/23  0844 11/06/23  1053 11/20/23  1055   DOSMPA 10/4/2023   8:00 PM 10/11/2023  10:22 PM  --   --   --    MPACID 0.86* 1.50 3.41 1.52 3.07   MPAG 56.3 57.4 69.0 44.3 64.0          Again, thank you for allowing me to participate in the care of your  patient.        Sincerely,        Bryant Rizvi MD

## 2024-04-15 NOTE — NURSING NOTE
Chief Complaint   Patient presents with    RECHECK     K/P POST ACUTE TXP NEPH - post txp pt       /74 (BP Location: Right arm, Patient Position: Sitting, Cuff Size: Adult Regular)   Pulse 87   Temp 98.4  F (36.9  C) (Oral)   Wt 72.6 kg (160 lb)   SpO2 100%   BMI 31.25 kg/m      Davi Posadas CMA on 4/15/2024 at 9:43 AM

## 2024-04-15 NOTE — PROGRESS NOTES
TRANSPLANT NEPHROLOGY EARLY POST TRANSPLANT VISIT    Assessment & Plan   # DDKT: Stable.    - Baseline Creatinine: ~  recently 0.8-1 , prior Cr baseline 1.1-1.3   - Proteinuria: Mild (0.5-1.0 grams)   - Date DSA Last Checked: Jan/2024      Latest DSA: No   - BK Viremia: No   - Kidney Tx Biopsy: No   - Transplant Ureteral Stent: Removed 11/7/23     # Immunosuppression: Tacrolimus extended release (goal 6-8) and Mycophenolic acid (dose 540 mg every 12 hours)   - Induction with Recent Transplant:  High Intensity Protocol   - Continue with intensive monitoring of immunosuppression for efficacy and toxicity.   - Changes: No    # Infection Prophylaxis:    - PJP: Sulfa/TMP (Bactrim)   - CMV: Valganciclovir (Valcyte), stopped after 3 months.CMV IgG Ab positive.     # Hypertension: Controlled;  Goal BP: < 130/80   - Volume status: Euvolemic     - Changes: Not at this time      # Anemia in Chronic Renal Disease: Hgb: Trend up      KAREN: No   - Iron studies: Replete    # Mineral Bone Disorder:    - Secondary renal hyperparathyroidism; PTH level: Normal (15-65 pg/ml)        On treatment: None   - Vitamin D; level: Normal        On supplement: Yes   - Calcium; level: Normal        On supplement: No   - Phosphorus; level: Normal        On supplement: No    # Electrolytes:   - Potassium; level: Normal        On supplement: No  - Magnesium; level: Low normal        On supplement: Yes, mag oxide 800mg with lunch and 400mg qPM  - Bicarbonate; level: Normal        On supplement: Yes, stopped.      # Complex Urologic history: possible reflux/obstruction and recurrent UTI as a child requiring multiple urologic procedures, last of which was right native nephrectomy at age 13.  Cleared by urology for transplant. Voiding cystourethrogram which showed no evidence of vesicoureteral reflux and she was noted to empty her bladder to completion. CT imaging was unremarkable outside of left solitary kidney.     # Wound dehiscence:   -Partial  dehiscence of inferior aspect of incision noted on ED visit 10/25.    - wound healed    # Hair loss:   - nl TFT   - on biotin 5 mg po every day   - likely CNI related    # Obesity: BMI-31.25   - refer to weight management clinic   - counseled about lifestyle modifications diet and exercise    # Vaccines:   - due for covid booster   - UTD Flu    # Transplant History:  Etiology of Kidney Failure: Unknown etiology  Tx: DDKT  Transplant: 9/27/2023 (Kidney)  Donor Type: Donation after Brain Death Donor Class: Standard Criteria Donor  Crossmatch at time of Tx: negative  DSA at time of Tx: No  Significant changes in immunosuppression: None  CMV IgG Ab High Risk Discordance (D+/R-): No  EBV IgG Ab High Risk Discordance (D+/R-): No  Significant transplant-related complications: None      Assessment and plan was discussed with the patient and she voiced her understanding and agreement.    Return visit: 3 months    Mary Vásquez MD     The longitudinal plan of care for the diagnosis(es)/condition(s) as documented were addressed during this visit. Due to the added complexity in care, I will continue to support Ariane in the subsequent management and with ongoing continuity of care.    This patient has been seen and evaluated by me, Bryant Rizvi MD.  I have reviewed the note and agree with plan of care as documented by the fellow.    Bryant Rizvi MD     Chief Complaint   Ms. Flores is a 43 year old here for a follow-up post kidney transplant     History of Present Illness   Feels good overall. Energy level is good. Denies any fevers, chills, night sweats. No nausea, vomiting, abdominal pain, diarrhea. No chest pain, sob, leg swelling. No recent illness or hospitalization.   BP well in range, SBP in 110'    Labs show: Cr- 1.01 Fk 6.6.  BKV - not detected on 3/11/2024, DSA neg 1/29/2024.      IS FK 2.5/2.5 [id number]    Problem List   Patient Active Problem List   Diagnosis    Migraine    Hyperparathyroidism, secondary  renal (H24)    Hyperlipidemia    Vitamin D deficiency    Metabolic acidosis    Kidney replaced by transplant    Immunosuppressed status (H24)    Anemia in stage 3a chronic kidney disease (H)    Aftercare following organ transplant    Hypomagnesemia       Allergies   Allergies   Allergen Reactions    Cephalosporins Other (See Comments)     Pt does not know rx    Codeine Other (See Comments)     Gets dizzy    Iodinated Contrast Media Other (See Comments)     Only has 1 kidney.  Only has 1 kidney.  Only has 1 kidney.      Nsaids Other (See Comments)     Kidney Damage. Have Only has one kidney        Medications   Current Outpatient Medications   Medication Sig Dispense Refill    atorvastatin (LIPITOR) 40 MG tablet Take 40 mg by mouth daily PT is taking 10 MG a day      ferrous sulfate (FEROSUL) 325 (65 Fe) MG tablet Take 325 mg by mouth daily (with breakfast)      levothyroxine (SYNTHROID/LEVOTHROID) 50 MCG tablet Take 50 mcg by mouth daily      magnesium oxide (MAG-OX) 400 MG tablet Take 2 tablets (800 mg) by mouth daily AND 1 tablet (400 mg) every evening. 270 tablet 3    mycophenolic acid (GENERIC EQUIVALENT) 180 MG EC tablet Take 3 tablets (540 mg) by mouth 2 times daily 180 tablet 11    sodium bicarbonate 650 MG tablet Take 1 tablet (650 mg) by mouth 2 times daily 180 tablet 3    sulfamethoxazole-trimethoprim (BACTRIM) 400-80 MG tablet Take 1 tablet by mouth daily 90 tablet 3    tacrolimus (GENERIC) 0.5 MG capsule HOLD      tacrolimus (GENERIC) 1 MG capsule Take 2 capsules (2 mg) by mouth 2 times daily 120 capsule 11    Biotin 5 MG TABS Take 5 mg by mouth daily (Patient not taking: Reported on 4/15/2024) 90 tablet 0    cholecalciferol 25 MCG (1000 UT) TABS Take 1,000 Units by mouth daily (Patient not taking: Reported on 4/15/2024)      valGANciclovir (VALCYTE) 450 MG tablet Take 2 tablets (900 mg) by mouth daily for 30 days 60 tablet 0     No current facility-administered medications for this visit.     There are  no discontinued medications.      Physical Exam   Vital Signs: /74 (BP Location: Right arm, Patient Position: Sitting, Cuff Size: Adult Regular)   Pulse 87   Temp 98.4  F (36.9  C) (Oral)   Wt 72.6 kg (160 lb)   SpO2 100%   BMI 31.25 kg/m      GENERAL APPEARANCE: healthy  HENT: mouth without ulcers or lesions  RESP: lungs clear to auscultation - no rales, rhonchi or wheezes  CV: regular rhythm, normal rate, no rub, no murmur  EDEMA: no LE edema bilaterally  ABDOMEN: soft, right lower quadrant wound is healing nicely, no erythema or discharge   MS: extremities normal - no gross deformities noted, no evidence of inflammation in joints, no muscle tenderness  SKIN: no rash  Kidney transplant: healed incision  Access LUE AVF +thrill/bruit  Data         Latest Ref Rng & Units 4/1/2024    10:50 AM 3/18/2024    11:00 AM 3/11/2024    11:01 AM   Renal   Na (external) 135 - 145 mmol/L 139  137  137    K (external) 3.6 - 5.2 mmol/L 4.6  4.0  3.9    Cl 98 - 108 mmol/L 104  102  102    Cl (external) 98 - 108 mmol/L 104  102  102    CO2 (external) 21 - 32 mmol/L 26  24  26    BUN (external) 7 - 18 mg/dL 21  18  11    Cr (external) 0.55 - 1.02 mg/dL 1.01  1.00  1.12    Glucose (external) 70 - 110 mg/dL 114  98  100    Ca (external) 8.5 - 10.1 mg/dL 9.1  8.9  9.1          Latest Ref Rng & Units 3/4/2024    11:02 AM 2/5/2024    10:46 AM 1/29/2024    11:56 AM   Bone Health   Phos (external) 2.5 - 4.9 mg/dL 3.0     3.9     Parathyroid Hormone Intact 15 - 65 pg/mL   69        This result is from an external source.         Latest Ref Rng & Units 4/1/2024    10:50 AM 3/18/2024    11:00 AM 3/11/2024    11:01 AM   Heme   WBC (external) 4.6 - 10.2 10^3/uL 4.96  4.84     9.45    Hgb (external) 12 - 16 g/dL 12.9  12.9     13.1    Plt (external) 150 - 450 K/uL 295  315     280    ABSOLUTE NEUTROPHILS (EXTERNAL) 1.5 - 7.7 K/uL 3.0     2.7     6.9       ABSOLUTE LYMPHOCYTES (EXTERNAL) 2.0 - 7.7 K/uL 1.2     1.3     1.4       ABSOLUTE  MONOCYTES (EXTERNAL) 0.1 - 0.8 K/uL 0.6     0.7     1.0       ABSOLUTE EOSINOPHILS (EXTERNAL) 0.0 - 0.4 K/uL 0.1     0.1     0.1       ABSOLUTE BASOPHILS (EXTERNAL) 0.0 - 0.1 K/uL 0.0     0.0     0.0           This result is from an external source.         Latest Ref Rng & Units 10/6/2023     6:49 AM 10/5/2023    10:34 PM 9/28/2023     4:40 AM   Liver   AP 35 - 104 U/L 65  65     TBili <=1.2 mg/dL 0.4  0.3     ALT 0 - 50 U/L 27  32     AST 0 - 45 U/L 19  18     Tot Protein 6.4 - 8.3 g/dL 6.3  6.6     Albumin 3.5 - 5.2 g/dL 3.4  3.7  3.3          Latest Ref Rng & Units 10/5/2023    10:34 PM 9/27/2023     1:14 AM   Pancreas   A1C <5.7 %  5.5    Lipase (Roche) 13 - 60 U/L 33           Latest Ref Rng & Units 10/9/2023     9:22 AM 10/2/2023     7:12 AM   Iron studies   Iron 37 - 145 ug/dL 54  35    Iron Sat Index 15 - 46 % 21  16    Ferritin 6 - 175 ng/mL 348  270          Latest Ref Rng & Units 9/27/2023     1:14 AM 11/5/2020    11:28 AM   [facility] T[facility] Virology   EBV CAPSID ANTIBODY IGG No detectable antibody. Positive  >8.0    Hep B Core NR^Nonreactive  Nonreactive        Recent Labs   Lab Test 01/22/24  1048 01/29/24  1156 02/05/24  1046 03/11/24  1101 03/18/24  1100 04/01/24  1050   DOSTAC 1/21/2024 1/28/2024  --  3/10/2024  --   --    TACROL 8.7 12.1   < > 11.2 9.1 6.6    < > = values in this interval not displayed.     Recent Labs   Lab Test 10/05/23  0916 10/12/23  0906 10/27/23  0844 11/06/23  1053 11/20/23  1055   DOSMPA 10/4/2023   8:00 PM 10/11/2023  10:22 PM  --   --   --    MPACID 0.86* 1.50 3.41 1.52 3.07   MPAG 56.3 57.4 69.0 44.3 64.0

## 2024-04-16 LAB
BK VIRUS SPECIMEN TYPE: NORMAL
BKV DNA # SPEC NAA+PROBE: NOT DETECTED IU/ML

## 2024-04-23 LAB
DONOR IDENTIFICATION: NORMAL
DSA COMMENTS: NORMAL
DSA PRESENT: NO
DSA TEST METHOD: NORMAL
ORGAN: NORMAL
SA 1 CELL: NORMAL
SA 1 TEST METHOD: NORMAL
SA 2 CELL: NORMAL
SA 2 TEST METHOD: NORMAL
SA1 HI RISK ABY: NORMAL
SA1 MOD RISK ABY: NORMAL
SA2 HI RISK ABY: NORMAL
SA2 MOD RISK ABY: NORMAL
UNACCEPTABLE ANTIGENS: NORMAL
UNOS CPRA: 98
ZZZSA 1  COMMENTS: NORMAL
ZZZSA 2 COMMENTS: NORMAL

## 2024-04-29 ENCOUNTER — LAB REQUISITION (OUTPATIENT)
Dept: LAB | Facility: CLINIC | Age: 44
End: 2024-04-29

## 2024-04-29 PROCEDURE — 87799 DETECT AGENT NOS DNA QUANT: CPT

## 2024-04-29 PROCEDURE — 80197 ASSAY OF TACROLIMUS: CPT

## 2024-04-30 DIAGNOSIS — Z94.0 KIDNEY REPLACED BY TRANSPLANT: Primary | ICD-10-CM

## 2024-04-30 LAB
BK VIRUS SPECIMEN TYPE: NORMAL
BKV DNA # SPEC NAA+PROBE: NOT DETECTED IU/ML
TACROLIMUS BLD-MCNC: 5.9 UG/L (ref 5–15)
TME LAST DOSE: NORMAL H
TME LAST DOSE: NORMAL H

## 2024-04-30 RX ORDER — MAGNESIUM OXIDE 400 MG/1
800 TABLET ORAL 2 TIMES DAILY
Qty: 360 TABLET | Refills: 3 | Status: SHIPPED | OUTPATIENT
Start: 2024-04-30 | End: 2024-07-25

## 2024-04-30 NOTE — TELEPHONE ENCOUNTER
ISSUE: Mag level 1.3    PLAN/LPN TASK:    Please call pt and inform to increase Magnesium oxide dose to 800 mg BID.

## 2024-05-10 ENCOUNTER — OFFICE VISIT (OUTPATIENT)
Dept: FAMILY MEDICINE | Facility: CLINIC | Age: 44
End: 2024-05-10
Attending: INTERNAL MEDICINE
Payer: COMMERCIAL

## 2024-05-10 VITALS
BODY MASS INDEX: 32.53 KG/M2 | HEART RATE: 69 BPM | HEIGHT: 60 IN | DIASTOLIC BLOOD PRESSURE: 78 MMHG | RESPIRATION RATE: 14 BRPM | SYSTOLIC BLOOD PRESSURE: 124 MMHG | OXYGEN SATURATION: 100 % | TEMPERATURE: 98.4 F | WEIGHT: 165.7 LBS

## 2024-05-10 DIAGNOSIS — Z13.1 SCREENING FOR DIABETES MELLITUS: ICD-10-CM

## 2024-05-10 DIAGNOSIS — Z23 NEED FOR PROPHYLACTIC VACCINATION AGAINST HEPATITIS B VIRUS: ICD-10-CM

## 2024-05-10 DIAGNOSIS — N18.31 ANEMIA IN STAGE 3A CHRONIC KIDNEY DISEASE (H): ICD-10-CM

## 2024-05-10 DIAGNOSIS — Z97.5 NEXPLANON IN PLACE: ICD-10-CM

## 2024-05-10 DIAGNOSIS — E03.9 HYPOTHYROIDISM, UNSPECIFIED TYPE: ICD-10-CM

## 2024-05-10 DIAGNOSIS — A15.9 TUBERCULOSIS: ICD-10-CM

## 2024-05-10 DIAGNOSIS — Z94.0 KIDNEY TRANSPLANT RECIPIENT: ICD-10-CM

## 2024-05-10 DIAGNOSIS — E66.811 CLASS 1 OBESITY WITH SERIOUS COMORBIDITY AND BODY MASS INDEX (BMI) OF 32.0 TO 32.9 IN ADULT, UNSPECIFIED OBESITY TYPE: Primary | ICD-10-CM

## 2024-05-10 DIAGNOSIS — D63.1 ANEMIA IN STAGE 3A CHRONIC KIDNEY DISEASE (H): ICD-10-CM

## 2024-05-10 LAB — HBA1C MFR BLD: 4.9 % (ref 0–5.6)

## 2024-05-10 PROCEDURE — 36415 COLL VENOUS BLD VENIPUNCTURE: CPT | Performed by: FAMILY MEDICINE

## 2024-05-10 PROCEDURE — 99204 OFFICE O/P NEW MOD 45 MIN: CPT | Performed by: FAMILY MEDICINE

## 2024-05-10 PROCEDURE — 83036 HEMOGLOBIN GLYCOSYLATED A1C: CPT | Performed by: FAMILY MEDICINE

## 2024-05-10 ASSESSMENT — SLEEP AND FATIGUE QUESTIONNAIRES
HOW LIKELY ARE YOU TO NOD OFF OR FALL ASLEEP WHILE SITTING AND READING: WOULD NEVER DOZE
HOW LIKELY ARE YOU TO NOD OFF OR FALL ASLEEP WHILE WATCHING TV: SLIGHT CHANCE OF DOZING
HOW LIKELY ARE YOU TO NOD OFF OR FALL ASLEEP WHILE SITTING QUIETLY AFTER LUNCH WITHOUT ALCOHOL: WOULD NEVER DOZE
HOW LIKELY ARE YOU TO NOD OFF OR FALL ASLEEP WHILE LYING DOWN TO REST IN THE AFTERNOON WHEN CIRCUMSTANCES PERMIT: WOULD NEVER DOZE
HOW LIKELY ARE YOU TO NOD OFF OR FALL ASLEEP WHILE SITTING AND TALKING TO SOMEONE: WOULD NEVER DOZE
HOW LIKELY ARE YOU TO NOD OFF OR FALL ASLEEP WHEN YOU ARE A PASSENGER IN A CAR FOR AN HOUR WITHOUT A BREAK: WOULD NEVER DOZE
HOW LIKELY ARE YOU TO NOD OFF OR FALL ASLEEP IN A CAR, WHILE STOPPED FOR A FEW MINUTES IN TRAFFIC: WOULD NEVER DOZE
HOW LIKELY ARE YOU TO NOD OFF OR FALL ASLEEP WHILE SITTING INACTIVE IN A PUBLIC PLACE: WOULD NEVER DOZE

## 2024-05-10 NOTE — ASSESSMENT & PLAN NOTE
Kidney transplant. she will check w kidney transplant doc to see if glp1 is okay. If so can have [facility] pharmacist (Mukul Cunningham [facility]) help her start glp 1 agonist.

## 2024-05-10 NOTE — PATIENT INSTRUCTIONS
Yash Thakkar,     Here is the website.     There are protein bars and shakes.  If you do want the shakes, I recommend getting the base (either regular or vegan) and then adding your own fruit (or for convenience they have flavor packs that you can do - which might be nice on the go - so you just pick the flavors that appeal to you).    Enjoy!     [email or web address]    Eileen Pham MD, Family Medicine and Diplomate of the American Board of Obesity Medicine 4/28/2020 11:27 AM

## 2024-05-10 NOTE — ASSESSMENT & PLAN NOTE
OBESITY WITH BMI 32.36 AT INTAKE AND CHRONIC COMORBID WEIGHT RELATED CONDITIONS INCULDING: hx kidney transplant, hypothyroid, hyperlipidemia, and developmental delay.     PATIENT IS BEGINNING ACTIVE MEDICALLY SUPERVISED WEIGHT LOSS STARTING AT Body mass index is 32.36 kg/m . - planning to utilize low calorie diet, physical activity and medications to facilitate this loss    Contraception -nexplanon - she understands she cannot become pregnant on weight loss medications as they have generally, not been tested in pregnancy.     Patient declined 24 week weight loss program due to cost.   Patient declined 3 pack health coaching cost.   We did not discuss food supplements.     Medications: declined she is too worried about her kidney.  If this medication is desired in the future, please plan another visit (evisit, video or in person)    Current goal(s):     Lab assessment plan: 4/29/2024 nl cbc, nl bmp, 10/9/2023 d normal 2/24/2022 A1c is 5.6    Follow up 1-3 months.    DISCUSSION _________________________________________________________________    Dx: Initial bmi is Body mass index is 32.36 kg/m .  With the following weight related comorbid medical conditions: hx kidney transplant, hypothyroid, hyperlipidemia, and developmental delay.     BECAUSE OF ABOVE PROBLEMS IT IS STRONGLY ADVISED THAT Ariane LOSE WEIGHT.  BELOW IS MY PLAN FOR her     Yanely Jonestiffanie NP is her primary care provider - who referred her here today for help with weight loss.    Start weight 165 lbs, Body mass index is 32.36 kg/m .  5/10/2024     Medication notes:     Zepbound - could consider   Wegovy - could consider.   Saxenda - could consider  Metformin - avoid due to kidney transplant  Wellbutrin/ contrave contraindicate, hx seizures when she was little   Naltrexone - could consider.   Phentermine/ diethylproprion/ qsymia - would avoid in setting of kidney transplant.   Topamax - could consider.   Orlistat - could consider.

## 2024-05-10 NOTE — PROGRESS NOTES
"    New Medical Weight Management Consult    PATIENT:  Ariane Flores  MRN:         [id number]  :         1980  MAT:         5/10/2024    Dear Dr. Pandya,    I had the pleasure of seeing your patient, Ariane Flores. Full intake/assessment was done to determine barriers to weight loss success and develop a treatment plan. Ariane Flores is a 43 year old female interested in treatment of medical problems associated with excess weight. She has a height of 5' 0\", a weight of 165 lbs 11.2 oz, and the calculated Body mass index is 32.36 kg/m .    ASSESSMENT/PLAN:  Eileen Pham MD, Family Medicine and Diplomate of the American Board of Obesity Medicine 5/10/2024     ASSESSMENT AND PLAN:   I spent 37 minutes today in direct patient contact, 100% of the time in consultation concerning medical problems as listed below.     ASSESSMENT/ PLAN    Problem List Items Addressed This Visit          Digestive    Class 1 obesity with serious comorbidity and body mass index (BMI) of 32.0 to 32.9 in adult     OBESITY WITH BMI 32.36 AT INTAKE AND CHRONIC COMORBID WEIGHT RELATED CONDITIONS INCULDING: hx kidney transplant, hypothyroid, hyperlipidemia, and developmental delay.     PATIENT IS BEGINNING ACTIVE MEDICALLY SUPERVISED WEIGHT LOSS STARTING AT Body mass index is 32.36 kg/m . - planning to utilize low calorie diet, physical activity and medications to facilitate this loss    Contraception -nexplanon - she understands she cannot become pregnant on weight loss medications as they have generally, not been tested in pregnancy.     Patient declined 24 week weight loss program due to cost.   Patient declined 3 pack health coaching cost.   We did not discuss food supplements.     Medications: declined she is too worried about her kidney.  If this medication is desired in the future, please plan another visit (evisit, video or in person)    Current goal(s):     Lab assessment plan: 2024 nl cbc, nl bmp, 10/9/2023 d " normal 2/24/2022 A1c is 5.6    Follow up 1-3 months.    DISCUSSION _________________________________________________________________    Dx: Initial bmi is Body mass index is 32.36 kg/m .  With the following weight related comorbid medical conditions: hx kidney transplant, hypothyroid, hyperlipidemia, and developmental delay.     BECAUSE OF ABOVE PROBLEMS IT IS STRONGLY ADVISED THAT Ariane LOSE WEIGHT.  BELOW IS MY PLAN FOR her     Yanely Baca NP is her primary care provider - who referred her here today for help with weight loss.    Start weight 165 lbs, Body mass index is 32.36 kg/m .  5/10/2024     Medication notes:     Zepbound - could consider   Wegovy - could consider.   Saxenda - could consider  Metformin - avoid due to kidney transplant  Wellbutrin/ contrave contraindicate, hx seizures when she was little   Naltrexone - could consider.   Phentermine/ diethylproprion/ qsymia - would avoid in setting of kidney transplant.   Topamax - could consider.   Orlistat - could consider.             Musculoskeletal and Integumentary    Nexplanon in place     Nexplanon placed 2/16/2024            Urinary    Anemia in stage 3a chronic kidney disease (H) - Primary       Infectious/Inflammatory    Tuberculosis     Resolved (this is from when she was in her teens)            Other    Kidney transplant recipient     Kidney transplant. she will check w kidney transplant doc to see if glp1 is okay. If so can have mtm pharmacist (Mukul Cunningham [facility]) help her start glp 1 agonist.           Other Visit Diagnoses       Obesity (BMI 30-39.9)        Need for prophylactic vaccination against hepatitis B virus        Hypothyroidism, unspecified type        Screening for diabetes mellitus        Relevant Orders    Hemoglobin A1c              She has the following co-morbidities:        5/10/2024    11:15 AM   --   I have the following health issues associated with obesity High Cholesterol    None of the above   I have the following  "symptoms associated with obesity None of the above            No data to display                    5/10/2024    11:15 AM   Referring Provider   Please name the provider who referred you to Medical Weight Management  If you do not know, please answer \"I Don't Know\" gayla paredes           5/10/2024    11:15 AM   Weight History   How concerned are you about your weight? Somewhat Concerned   I became overweight After Pregnancy   The following factors have contributed to my weight gain Eating Wrong Types of Food   I have tried the following methods to lose weight Exercise   My lowest weight since age 18 was 120   My highest weight since age 18 was 120   The most weight I have ever lost was (lbs) 20   How has your weight changed over the last year? Lost           5/10/2024    11:15 AM   Diet Recall Review with Patient   If you do eat lunch, what types of food do you typically eat? rice   How many glasses of juice do you drink in a typical day? 0   How many of glasses of milk do you drink in a typical day? 1   If you do drink milk, what type? 1%   How many 8oz glasses of sugar containing drinks such as Faisal-Aid/sweet tea do you drink in a day? 0   How many cans/bottles of sugar pop/soda/tea/sports drinks do you drink in a day? 0   How many cans/bottles of diet pop/soda/tea or sports drink do you drink in a day? 0   How often do you have a drink of alcohol? Never           5/10/2024    11:15 AM   Eating Habits   Generally, my meals include foods like these bread, pasta, rice, potatoes, corn, crackers, sweet dessert, pop, or juice A Few Times a Week   Eat at a buffet or sit-down restaurant Less Than Weekly   Rarely sit down for a meal but snack or graze throughout Less Than Weekly   Eat extra snacks between meals A Few Times a Week   Eat in the middle of the night or wake up at night to eat Never   Eat extra snacks to prevent or correct low blood sugar Never   Eat to prevent acid reflux or stomach pain Never   Worry about " not having enough food to eat Never   I eat when I am depressed Never   I finish all the food on my plate even if I am already full Less Than Weekly   I eat when I am preparing the meal Everyday   I eat more than usual when I see others eating Less Than Weekly   I have trouble not eating sweets, ice cream, cookies, or chips if they are around the house A Few Times a Week   I think about food all day Less Than Weekly   Please list any other foods you crave? candy           5/10/2024    11:15 AM   Amount of Food   I feel out of control when eating Never   I eat a large amount of food, like a loaf of bread, a box of cookies, a pint/quart of ice cream, all at once Never   I eat a large amount of food even when I am not hungry Monthly   I eat rapidly Never   I eat alone because I feel embarrassed and do not want others to see how much I have eaten Never   I eat until I am uncomfortably full Never   I feel bad, disgusted, or guilty after I overeat Weekly           5/10/2024    11:15 AM   Activity/Exercise History   How much of a typical 12 hour day do you spend sitting? Less Than Half the Day   How much of a typical 12 hour day do you spend lying down? Less Than Half the Day   How much of a typical day do you spend walking/standing? Half the Day   How many hours (not including work) do you spend on the TV/Video Games/Computer/Tablet/Phone? 2-3 Hours   How many times a week are you active for the purpose of exercise? 2-3 Times a Week   What keeps you from being more active? Other   How many total minutes do you spend doing some activity for the purpose of exercising when you exercise? 15-30 Minutes       PAST MEDICAL HISTORY:  Past Medical History:   Diagnosis Date    CKD (chronic kidney disease) stage 4, GFR 15-29 ml/min (H)     Hyperlipidemia     Hypertension     Metabolic acidosis     Migraines     Recurrent UTI     in childhood    Seizures (H)     Urinary tract infection, site not specified 09/28/2004    Urticaria,  unspecified 02/09/2006    Vitamin D deficiency            5/10/2024    11:15 AM   Work/Social History Reviewed With Patient   My employment status is Stay at Home Parent   What is your marital status? /In a Relationship   If you have children, are they overweight? No   Who do you live with? husbend daughter   Who does the food shopping? myself my daughter           5/10/2024    11:15 AM   Mental Health History Reviewed With Patient   Have you ever been physically or sexually abused? No   How often in the past 2 weeks have you felt little interest or pleasure in doing things? Not at all   Over the past 2 weeks how often have you felt down, depressed, or hopeless? Not at all           5/10/2024    11:15 AM   Sleep History Reviewed With Patient   How many hours do you sleep at night? 7       MEDICATIONS:   Current Outpatient Medications   Medication Sig Dispense Refill    atorvastatin (LIPITOR) 40 MG tablet Take 40 mg by mouth daily PT is taking 10 MG a day      cholecalciferol 25 MCG (1000 UT) TABS Take 1,000 Units by mouth daily      ferrous sulfate (FEROSUL) 325 (65 Fe) MG tablet Take 325 mg by mouth daily (with breakfast)      levothyroxine (SYNTHROID/LEVOTHROID) 50 MCG tablet Take 50 mcg by mouth daily      magnesium oxide (MAG-OX) 400 MG tablet Take 2 tablets (800 mg) by mouth 2 times daily 360 tablet 3    mycophenolic acid (GENERIC EQUIVALENT) 180 MG EC tablet Take 3 tablets (540 mg) by mouth 2 times daily 180 tablet 11    sulfamethoxazole-trimethoprim (BACTRIM) 400-80 MG tablet Take 1 tablet by mouth daily 90 tablet 3    tacrolimus (GENERIC) 0.5 MG capsule HOLD      tacrolimus (GENERIC) 1 MG capsule Take 2 capsules (2 mg) by mouth 2 times daily 120 capsule 11       ALLERGIES:   Allergies   Allergen Reactions    Cephalosporins Other (See Comments)     Pt does not know rx    Codeine Other (See Comments)     Gets dizzy    Iodinated Contrast Media Other (See Comments)     Only has 1 kidney.  Only has 1  kidney.  Only has 1 kidney.      Nsaids Other (See Comments)     Kidney Damage. Have Only has one kidney        PHYSICAL EXAM:  Physical Exam  Constitutional:       Appearance: Normal appearance.   HENT:      Head: Normocephalic and atraumatic.   Cardiovascular:      Rate and Rhythm: Normal rate and regular rhythm.   Pulmonary:      Effort: Pulmonary effort is normal.   Musculoskeletal:         General: Normal range of motion.      Cervical back: Normal range of motion and neck supple.   Neurological:      General: No focal deficit present.      Mental Status: She is alert and oriented to person, place, and time.            Sincerely,    Eileen Pham MD

## 2024-05-13 ENCOUNTER — LAB REQUISITION (OUTPATIENT)
Dept: LAB | Facility: CLINIC | Age: 44
End: 2024-05-13

## 2024-05-13 PROCEDURE — 80197 ASSAY OF TACROLIMUS: CPT

## 2024-05-13 PROCEDURE — 87799 DETECT AGENT NOS DNA QUANT: CPT

## 2024-05-14 LAB
BK VIRUS SPECIMEN TYPE: NORMAL
BKV DNA # SPEC NAA+PROBE: NOT DETECTED IU/ML
TACROLIMUS BLD-MCNC: 6.2 UG/L (ref 5–15)
TME LAST DOSE: NORMAL H
TME LAST DOSE: NORMAL H

## 2024-05-28 ENCOUNTER — LAB REQUISITION (OUTPATIENT)
Dept: LAB | Facility: CLINIC | Age: 44
End: 2024-05-28

## 2024-05-28 PROCEDURE — 80197 ASSAY OF TACROLIMUS: CPT

## 2024-05-28 PROCEDURE — 87799 DETECT AGENT NOS DNA QUANT: CPT

## 2024-05-29 LAB
BK VIRUS SPECIMEN TYPE: NORMAL
BKV DNA # SPEC NAA+PROBE: NOT DETECTED IU/ML
TACROLIMUS BLD-MCNC: 7.7 UG/L (ref 5–15)
TME LAST DOSE: NORMAL H
TME LAST DOSE: NORMAL H

## 2024-05-30 ENCOUNTER — TELEPHONE (OUTPATIENT)
Dept: TRANSPLANT | Facility: CLINIC | Age: 44
End: 2024-05-30
Payer: COMMERCIAL

## 2024-05-30 NOTE — LETTER
Ariane Flores  [address]  [place] [id number]                May 31, 2024          To whom it may concern,     Ariane Flores received a kidney transplant at our facility on 9/27/2023. Her kidney function is stable and she is doing great overall.     She requires monthly lab monitoring and bi-annual visits with our providers at this time. She will remain on immunosuppression medications for the life of her kidney.     Please find a list of Ms. Flores's medications attached.     If you have any questions, please contact the transplant office at [phone].         Sincerely,    YAJAIRA PowersN, RN   Post Kidney-Pancreas Transplant Coordinator   [facility]  Solid Organ Transplant Care  Office: [phone]  Fax: [phone]                               The following medications are necessary for the above to carry with at all times. If there are any concerns regarding this, please contact us at the [facility] Solid Organ Transplant Office at [phone], or toll-free at [phone].    Current Outpatient Medications   Medication Sig Dispense Refill    atorvastatin (LIPITOR) 40 MG tablet Take 40 mg by mouth daily PT is taking 10 MG a day      cholecalciferol 25 MCG (1000 UT) TABS Take 1,000 Units by mouth daily      ferrous sulfate (FEROSUL) 325 (65 Fe) MG tablet Take 325 mg by mouth daily (with breakfast)      levothyroxine (SYNTHROID/LEVOTHROID) 50 MCG tablet Take 50 mcg by mouth daily      magnesium oxide (MAG-OX) 400 MG tablet Take 2 tablets (800 mg) by mouth 2 times daily 360 tablet 3    mycophenolic acid (GENERIC EQUIVALENT) 180 MG EC tablet Take 3 tablets (540 mg) by mouth 2 times daily 180 tablet 11    sulfamethoxazole-trimethoprim (BACTRIM) 400-80 MG tablet Take 1 tablet by mouth daily 90 tablet 3    tacrolimus (GENERIC) 0.5 MG capsule HOLD      tacrolimus (GENERIC) 1 MG capsule Take 2 capsules (2 mg) by mouth 2 times daily 120 capsule 11

## 2024-05-30 NOTE — TELEPHONE ENCOUNTER
Pt needs a letter stating how her kidney is functioning and how long she will be on transplant meds  Give pt a call and she will give the details needed

## 2024-06-10 ENCOUNTER — LAB REQUISITION (OUTPATIENT)
Dept: LAB | Facility: CLINIC | Age: 44
End: 2024-06-10

## 2024-06-10 PROCEDURE — 80197 ASSAY OF TACROLIMUS: CPT

## 2024-06-10 PROCEDURE — 87799 DETECT AGENT NOS DNA QUANT: CPT

## 2024-06-11 DIAGNOSIS — Z94.0 S/P KIDNEY TRANSPLANT: Primary | ICD-10-CM

## 2024-06-11 LAB
BK VIRUS SPECIMEN TYPE: NORMAL
BKV DNA # SPEC NAA+PROBE: NOT DETECTED IU/ML
TACROLIMUS BLD-MCNC: 9.2 UG/L (ref 5–15)
TME LAST DOSE: NORMAL H
TME LAST DOSE: NORMAL H

## 2024-06-11 RX ORDER — TACROLIMUS 0.5 MG/1
0.5 CAPSULE ORAL 2 TIMES DAILY
Qty: 60 CAPSULE | Refills: 11 | Status: SHIPPED | OUTPATIENT
Start: 2024-06-11

## 2024-06-11 RX ORDER — TACROLIMUS 1 MG/1
1 CAPSULE ORAL 2 TIMES DAILY
Qty: 60 CAPSULE | Refills: 11 | Status: SHIPPED | OUTPATIENT
Start: 2024-06-11

## 2024-06-11 NOTE — TELEPHONE ENCOUNTER
ISSUE:   Tacrolimus IR level 9.2 on 6-/10, goal 6-8, dose 2 mg BID.    PLAN:   Call Patient and confirm this was an accurate 12-hour trough.   Verify Tacrolimus IR dose 2 mg BID.   Confirm no new medications or or missed doses.   Confirm no new illness / infection / diarrhea.   If accurate trough and accurate dose, decrease Tacrolimus IR dose to 1.5 mg BID     Is this more than a 50% increase or decrease in current IS dose: No  If YES, justification: NA    Repeat labs in 2 weeks.  *If > 50% change in immunosuppression dose, repeat labs in 1 week.     OUTCOME: Patient confirmed this was an accurate 12-hour trough.   Verified Tacrolimus IR dose 2 mg BID.   Confirmed no new medications or or missed doses.   Confirmed no new illness / infection / diarrhea.   Patient confirms decrease Tacrolimus IR dose to 1.5 mg BID and repeat labs in 2 weeks.

## 2024-06-13 ENCOUNTER — TELEPHONE (OUTPATIENT)
Dept: TRANSPLANT | Facility: CLINIC | Age: 44
End: 2024-06-13
Payer: COMMERCIAL

## 2024-06-13 NOTE — TELEPHONE ENCOUNTER
Patient called to touch base with the [facility] regarding a medication list lab results. Caller would like a return call.

## 2024-06-13 NOTE — TELEPHONE ENCOUNTER
Pt has not yet received letter sent via mail on 5/31    [facility] sent to pt via email.   Pt had questions regarding timing of labs and appt at [facility] in October. [facility] instructed pt to complete labs locally prior to appt.

## 2024-06-20 ENCOUNTER — TELEPHONE (OUTPATIENT)
Dept: TRANSPLANT | Facility: CLINIC | Age: 44
End: 2024-06-20
Payer: COMMERCIAL

## 2024-06-24 ENCOUNTER — LAB REQUISITION (OUTPATIENT)
Dept: LAB | Facility: CLINIC | Age: 44
End: 2024-06-24

## 2024-06-24 PROCEDURE — 80197 ASSAY OF TACROLIMUS: CPT

## 2024-06-24 PROCEDURE — 87799 DETECT AGENT NOS DNA QUANT: CPT

## 2024-06-25 LAB
BK VIRUS SPECIMEN TYPE: NORMAL
BKV DNA # SPEC NAA+PROBE: NOT DETECTED IU/ML
TACROLIMUS BLD-MCNC: 5.8 UG/L (ref 5–15)
TME LAST DOSE: NORMAL H
TME LAST DOSE: NORMAL H

## 2024-07-08 ENCOUNTER — LAB REQUISITION (OUTPATIENT)
Dept: LAB | Facility: CLINIC | Age: 44
End: 2024-07-08

## 2024-07-08 PROCEDURE — 80197 ASSAY OF TACROLIMUS: CPT

## 2024-07-08 PROCEDURE — 87799 DETECT AGENT NOS DNA QUANT: CPT

## 2024-07-09 LAB
BK VIRUS SPECIMEN TYPE: NORMAL
BKV DNA # SPEC NAA+PROBE: NOT DETECTED IU/ML
TACROLIMUS BLD-MCNC: 6 UG/L (ref 5–15)
TME LAST DOSE: NORMAL H
TME LAST DOSE: NORMAL H

## 2024-07-22 ENCOUNTER — LAB REQUISITION (OUTPATIENT)
Dept: LAB | Facility: CLINIC | Age: 44
End: 2024-07-22
Payer: COMMERCIAL

## 2024-07-22 PROCEDURE — 80197 ASSAY OF TACROLIMUS: CPT

## 2024-07-22 PROCEDURE — 87799 DETECT AGENT NOS DNA QUANT: CPT

## 2024-07-23 LAB
BK VIRUS SPECIMEN TYPE: NORMAL
BKV DNA # SPEC NAA+PROBE: NOT DETECTED IU/ML
TACROLIMUS BLD-MCNC: 6.5 UG/L (ref 5–15)
TME LAST DOSE: NORMAL H
TME LAST DOSE: NORMAL H

## 2024-07-25 ENCOUNTER — OFFICE VISIT (OUTPATIENT)
Dept: PHARMACY | Facility: CLINIC | Age: 44
End: 2024-07-25
Attending: INTERNAL MEDICINE
Payer: COMMERCIAL

## 2024-07-25 ENCOUNTER — OFFICE VISIT (OUTPATIENT)
Dept: TRANSPLANT | Facility: CLINIC | Age: 44
End: 2024-07-25
Attending: INTERNAL MEDICINE
Payer: COMMERCIAL

## 2024-07-25 ENCOUNTER — LAB (OUTPATIENT)
Dept: LAB | Facility: CLINIC | Age: 44
End: 2024-07-25
Attending: INTERNAL MEDICINE
Payer: COMMERCIAL

## 2024-07-25 VITALS
HEIGHT: 60 IN | TEMPERATURE: 98 F | HEART RATE: 72 BPM | RESPIRATION RATE: 18 BRPM | WEIGHT: 173.5 LBS | SYSTOLIC BLOOD PRESSURE: 115 MMHG | BODY MASS INDEX: 34.06 KG/M2 | DIASTOLIC BLOOD PRESSURE: 74 MMHG | OXYGEN SATURATION: 99 %

## 2024-07-25 DIAGNOSIS — Z79.899 ENCOUNTER FOR LONG-TERM CURRENT USE OF MEDICATION: ICD-10-CM

## 2024-07-25 DIAGNOSIS — E83.42 HYPOMAGNESEMIA: ICD-10-CM

## 2024-07-25 DIAGNOSIS — N25.81 HYPERPARATHYROIDISM, SECONDARY RENAL (H): ICD-10-CM

## 2024-07-25 DIAGNOSIS — Q60.2 CONGENITAL RENAL AGENESIS AND DYSGENESIS: ICD-10-CM

## 2024-07-25 DIAGNOSIS — N18.31 STAGE 3A CHRONIC KIDNEY DISEASE (H): ICD-10-CM

## 2024-07-25 DIAGNOSIS — Z94.0 KIDNEY REPLACED BY TRANSPLANT: Primary | ICD-10-CM

## 2024-07-25 DIAGNOSIS — D84.9 IMMUNOSUPPRESSION (H): ICD-10-CM

## 2024-07-25 DIAGNOSIS — Z48.298 AFTERCARE FOLLOWING ORGAN TRANSPLANT: ICD-10-CM

## 2024-07-25 DIAGNOSIS — E66.9 OBESITY (BMI 30-39.9): ICD-10-CM

## 2024-07-25 DIAGNOSIS — Z76.82 ORGAN TRANSPLANT CANDIDATE: ICD-10-CM

## 2024-07-25 DIAGNOSIS — Z94.0 KIDNEY REPLACED BY TRANSPLANT: ICD-10-CM

## 2024-07-25 DIAGNOSIS — Q60.5 CONGENITAL RENAL AGENESIS AND DYSGENESIS: ICD-10-CM

## 2024-07-25 DIAGNOSIS — Z78.9 TAKES DIETARY SUPPLEMENTS: ICD-10-CM

## 2024-07-25 DIAGNOSIS — Z98.890 OTHER SPECIFIED POSTPROCEDURAL STATES: ICD-10-CM

## 2024-07-25 DIAGNOSIS — Z94.0 STATUS POST KIDNEY TRANSPLANT: Primary | ICD-10-CM

## 2024-07-25 DIAGNOSIS — E87.20 METABOLIC ACIDOSIS: ICD-10-CM

## 2024-07-25 DIAGNOSIS — Z94.0 KIDNEY TRANSPLANTED: ICD-10-CM

## 2024-07-25 DIAGNOSIS — D84.9 IMMUNOSUPPRESSED STATUS (H): ICD-10-CM

## 2024-07-25 DIAGNOSIS — Z20.828 CONTACT WITH AND (SUSPECTED) EXPOSURE TO OTHER VIRAL COMMUNICABLE DISEASES: ICD-10-CM

## 2024-07-25 DIAGNOSIS — N18.6 ESRD (END STAGE RENAL DISEASE) (H): ICD-10-CM

## 2024-07-25 DIAGNOSIS — R19.5 LOOSE STOOLS: ICD-10-CM

## 2024-07-25 LAB
TACROLIMUS BLD-MCNC: 6 UG/L (ref 5–15)
TME LAST DOSE: NORMAL H
TME LAST DOSE: NORMAL H

## 2024-07-25 PROCEDURE — 99000 SPECIMEN HANDLING OFFICE-LAB: CPT | Performed by: PATHOLOGY

## 2024-07-25 PROCEDURE — 86833 HLA CLASS II HIGH DEFIN QUAL: CPT | Performed by: SURGERY

## 2024-07-25 PROCEDURE — 86832 HLA CLASS I HIGH DEFIN QUAL: CPT | Performed by: SURGERY

## 2024-07-25 PROCEDURE — 87799 DETECT AGENT NOS DNA QUANT: CPT | Performed by: INTERNAL MEDICINE

## 2024-07-25 PROCEDURE — G2211 COMPLEX E/M VISIT ADD ON: HCPCS | Performed by: INTERNAL MEDICINE

## 2024-07-25 PROCEDURE — 99207 PR NO CHARGE LOS: CPT | Performed by: PHARMACIST

## 2024-07-25 PROCEDURE — 99214 OFFICE O/P EST MOD 30 MIN: CPT | Mod: GC | Performed by: INTERNAL MEDICINE

## 2024-07-25 PROCEDURE — G0463 HOSPITAL OUTPT CLINIC VISIT: HCPCS | Performed by: INTERNAL MEDICINE

## 2024-07-25 PROCEDURE — 36415 COLL VENOUS BLD VENIPUNCTURE: CPT | Performed by: PATHOLOGY

## 2024-07-25 PROCEDURE — 80197 ASSAY OF TACROLIMUS: CPT | Performed by: INTERNAL MEDICINE

## 2024-07-25 RX ORDER — ACETAMINOPHEN 500 MG
500 TABLET ORAL EVERY 6 HOURS PRN
COMMUNITY

## 2024-07-25 RX ORDER — MAGNESIUM GLYCINATE 100 MG
200 CAPSULE ORAL 2 TIMES DAILY
Qty: 180 CAPSULE | Refills: 11 | Status: SHIPPED | OUTPATIENT
Start: 2024-07-25 | End: 2024-08-28

## 2024-07-25 ASSESSMENT — PAIN SCALES - GENERAL: PAINLEVEL: NO PAIN (0)

## 2024-07-25 NOTE — PATIENT INSTRUCTIONS
"Recommendations from today's MTM visit:                                                      Start Metamucil 1 teaspoonful daily, can increase to 2-3 times daily.   Switch from Magnesium oxide, and start Magnesium glycinate 2 tablets (200mg) twice daily.   Due for Shingrix (2 doses 8 weeks apart).     Follow-up: as needed.     It was great speaking with you today.  I value your experience and would be very thankful for your time in providing feedback in our clinic survey. In the next few days, you may receive an email or text message from [employer] with a link to a survey related to your  clinical pharmacist.\"     To schedule another MTM appointment, please call the clinic directly or you may call the MTM scheduling line at [phone] or toll-free at [phone].     My Clinical Pharmacist's contact information:                                                      Please feel free to contact me with any questions or concerns you have.      Mukul Cunningham, PharmD  MTM Pharmacist    Phone: [phone]     "

## 2024-07-25 NOTE — LETTER
7/25/2024      Ariane Flores  [address]  [facility] [address]      Dear Colleague,    Thank you for referring your patient, Ariane Flores, to the [facility]. Please see a copy of my visit note below.      TRANSPLANT NEPHROLOGY EARLY POST TRANSPLANT VISIT    Assessment & Plan  # DDKT: Stable.    - Baseline Creatinine: ~  0.8-1   - Proteinuria: Mild (0.5-1.0 grams)   - Date DSA Last Checked: Jan/2024      Latest DSA: No   - BK Viremia: No   - Kidney Tx Biopsy: No   - Transplant Ureteral Stent: Removed 11/7/23     # Immunosuppression: Tacrolimus extended release (goal 6-8) and Mycophenolic acid (dose 540 mg every 12 hours)   - Induction with Recent Transplant:  High Intensity Protocol   - Continue with intensive monitoring of immunosuppression for efficacy and toxicity.   - Changes: No    # Infection Prophylaxis:    - PJP: Sulfa/TMP (Bactrim)   - CMV: Valganciclovir (Valcyte), stopped after 3 months.CMV IgG Ab positive.     # Diarrhea:   - start Fibers, switch Mg to Mg glycinate  - Stool studies and CMV pcr      # Hypertension: Controlled;  Goal BP: < 130/80   - Volume status: Euvolemic     - Changes: Not at this time      # Anemia in Chronic Renal Disease: Hgb: Stable, near normal      KAREN: No   - Iron studies: Replete    # Mineral Bone Disorder:    - Secondary renal hyperparathyroidism; PTH level: Normal (15-65 pg/ml)        On treatment: None   - Vitamin D; level: Normal        On supplement: Yes   - Calcium; level: Normal        On supplement: No   - Phosphorus; level: Normal        On supplement: No    # Electrolytes:   - Potassium; level: Normal        On supplement: No  - Magnesium; level: Low normal        On supplement: Yes, switch Mg oxide to Mg glycinate given diarrhea  - Bicarbonate; level: Normal        On supplement: No      # Complex Urologic history: possible reflux/obstruction and recurrent UTI as a child requiring multiple urologic procedures, last of  which was right native nephrectomy at age 13.  Cleared by urology for transplant. Voiding cystourethrogram which showed no evidence of vesicoureteral reflux and she was noted to empty her bladder to completion. CT imaging was unremarkable outside of left solitary kidney.     # Wound dehiscence:   -Partial dehiscence of inferior aspect of incision noted on ED visit 10/25.    - wound healed    # Hair loss:   - nl TFT   - on biotin 5 mg po every day   - likely CNI related    # Obesity: BMI-31.25   - referred to weight management clinic   - counseled about lifestyle modifications diet and exercise   -   # Vaccines:   - due for covid booster   - UTD Flu    # Transplant History:  Etiology of Kidney Failure: Unknown etiology  Tx: DDKT  Transplant: 9/27/2023 (Kidney)  Donor Type: Donation after Brain Death Donor Class: Standard Criteria Donor  Crossmatch at time of Tx: negative  DSA at time of Tx: No  Significant changes in immunosuppression: None  CMV IgG Ab High Risk Discordance (D+/R-): No  EBV IgG Ab High Risk Discordance (D+/R-): No  Significant transplant-related complications: None      Assessment and plan was discussed with the patient and she voiced her understanding and agreement.    Return visit: 2 months for 1 yr post Tx visit    Bryant Rizvi MD     The longitudinal plan of care for the diagnosis(es)/condition(s) as documented were addressed during this visit. Due to the added complexity in care, I will continue to support Ariane in the subsequent management and with ongoing continuity of care.    This patient has been seen and evaluated by me, Bryant Rizvi MD.  I have reviewed the note and agree with plan of care as documented by the fellow.    Bryant Rizvi MD     Chief Complaint  Ms. Flores is a 43 year old here for a follow-up post kidney transplant     History of Present Illness  Feels ok   Reports diarrhea ~2 months, 5 x/day, watery, no nausea/vomiting or abdominal pain. Appetite is ok  Mg  switched to Mg glycinate and starting fiber powders  Working with dietitian on weight loss  Seen by weight management clinic, copays too high and can't afford it     IS FK 2.5/2.5 [id number]    Problem List  Patient Active Problem List   Diagnosis     Chronic kidney disease, stage III (moderate) (H)     Condyloma of female genitalia     Congenital renal agenesis and dysgenesis     Developmental reading disorder     Depressive disorder     Nexplanon in place     Abscess     Migraine     Hyperparathyroidism, secondary renal (H24)     Hypercholesterolemia     Hypertension     Heartburn     Excessive or frequent menstruation     Tuberculosis     Vitamin D deficiency     Metabolic acidosis     Kidney transplant recipient     Immunosuppressed status (H24)     Anemia in stage 3a chronic kidney disease (H)     Aftercare following organ transplant     Hypomagnesemia     Acquired hypothyroidism     Class 1 obesity with serious comorbidity and body mass index (BMI) of 32.0 to 32.9 in adult       Allergies  Allergies   Allergen Reactions     Cephalosporins Other (See Comments)     Pt does not know rx     Codeine Other (See Comments)     Gets dizzy     Iodinated Contrast Media Other (See Comments)     Only has 1 kidney.  Only has 1 kidney.  Only has 1 kidney.       Nsaids Other (See Comments)     Kidney Damage. Have Only has one kidney        Medications  Current Outpatient Medications   Medication Sig Dispense Refill     acetaminophen (TYLENOL) 500 MG tablet Take 500 mg by mouth every 6 hours as needed for pain       atorvastatin (LIPITOR) 10 MG tablet Take 10 mg by mouth daily PT is taking 10 MG a day       cholecalciferol 25 MCG (1000 UT) TABS Take 1,000 Units by mouth daily       ferrous sulfate (FEROSUL) 325 (65 Fe) MG tablet Take 325 mg by mouth daily (with breakfast)       levothyroxine (SYNTHROID/LEVOTHROID) 50 MCG tablet Take 50 mcg by mouth daily       magnesium glycinate 100 MG CAPS capsule Take 2 capsules (200 mg) by  mouth 2 times daily 180 capsule 11     mycophenolic acid (GENERIC EQUIVALENT) 180 MG EC tablet Take 3 tablets (540 mg) by mouth 2 times daily 180 tablet 11     psyllium (METAMUCIL/KONSYL) 58.6 % powder Take 1 teaspoonful by mouth daily       sulfamethoxazole-trimethoprim (BACTRIM) 400-80 MG tablet Take 1 tablet by mouth daily 90 tablet 3     tacrolimus (GENERIC EQUIVALENT) 0.5 MG capsule Take 1 capsule (0.5 mg) by mouth 2 times daily Total dose = 1.5 mg twice per day 60 capsule 11     tacrolimus (GENERIC EQUIVALENT) 1 MG capsule Take 1 capsule (1 mg) by mouth 2 times daily Total dose = 1.5 mg twice per day 60 capsule 11     No current facility-administered medications for this visit.     There are no discontinued medications.      Physical Exam  Vital Signs: /74 (BP Location: Right arm, Patient Position: Sitting, Cuff Size: Adult Regular)   Pulse 72   Temp 98  F (36.7  C) (Oral)   Resp 18   Ht 1.524 m (5')   Wt 78.7 kg (173 lb 8 oz)   SpO2 99%   BMI 33.88 kg/m      GENERAL APPEARANCE: healthy  HENT: mouth without ulcers or lesions  RESP: lungs clear to auscultation - no rales, rhonchi or wheezes  CV: regular rhythm, normal rate, no rub, no murmur  EDEMA: no LE edema bilaterally  ABDOMEN: soft, right lower quadrant wound is healing nicely, no erythema or discharge   MS: extremities normal - no gross deformities noted, no evidence of inflammation in joints, no muscle tenderness  SKIN: no rash  Kidney transplant: healed incision  Access LUE AVF +thrill/bruit  Data        Latest Ref Rng & Units 7/22/2024    11:00 AM 7/8/2024    11:04 AM 5/13/2024    11:15 AM   Renal   Na (external) 135 - 145 mmol/L 138     139     140    K (external) 3.6 - 5.2 mmol/L 3.7     4.1     4.2    Cl 98 - 108 mmol/L 103     104     104    Cl (external) 98 - 108 mmol/L 103     104     104    CO2 (external) 21 - 32 mmol/L 24     28     26    BUN (external) 7 - 18 mg/dL 17     15     16    Cr (external) 0.55 - 1.02 mg/dL 0.98     1.16      0.92    Glucose (external) 70 - 110 mg/dL 93     83     103    Ca (external) 8.5 - 10.1 mg/dL 9.1     9.0     9.1        This result is from an external source.         Latest Ref Rng & Units 4/29/2024    10:44 AM 3/4/2024    11:02 AM 2/5/2024    10:46 AM   Bone Health   Phos (external) 2.5 - 4.9 mg/dL 3.2     3.0     3.9        This result is from an external source.         Latest Ref Rng & Units 7/22/2024    11:00 AM 7/8/2024    11:04 AM 6/10/2024    10:58 AM   Heme   WBC (external) 4.6 - 10.2 10*3/uL 4.61     4.58  4.59       Hgb (external) 12 - 16 g/dL 14.0     13.6  13.6       Plt (external) 150 - 450 K/uL 294     273  285       ABSOLUTE NEUTROPHILS (EXTERNAL) 1.5 - 7.7 K/uL 2.9     2.7     3.0       ABSOLUTE LYMPHOCYTES (EXTERNAL) 2.0 - 7.7 K/uL 1.1     1.1     1.0       ABSOLUTE MONOCYTES (EXTERNAL) 0.1 - 0.8 K/uL 0.5     0.6     0.5       ABSOLUTE EOSINOPHILS (EXTERNAL) 0.0 - 0.4 K/uL 0.1     0.1     0.1       ABSOLUTE BASOPHILS (EXTERNAL) 0.0 - 0.1 K/uL 0.0     0.0     0.0           This result is from an external source.         Latest Ref Rng & Units 10/6/2023     6:49 AM 10/5/2023    10:34 PM 9/28/2023     4:40 AM   Liver   AP 35 - 104 U/L 65  65     TBili <=1.2 mg/dL 0.4  0.3     ALT 0 - 50 U/L 27  32     AST 0 - 45 U/L 19  18     Tot Protein 6.4 - 8.3 g/dL 6.3  6.6     Albumin 3.5 - 5.2 g/dL 3.4  3.7  3.3          Latest Ref Rng & Units 5/10/2024     1:21 PM 10/5/2023    10:34 PM 9/27/2023     1:14 AM   Pancreas   A1C 0.0 - 5.6 % 4.9   5.5    Lipase (Roche) 13 - 60 U/L  33           Latest Ref Rng & Units 10/9/2023     9:22 AM 10/2/2023     7:12 AM   Iron studies   Iron 37 - 145 ug/dL 54  35    Iron Sat Index 15 - 46 % 21  16    Ferritin 6 - 175 ng/mL 348  270          Latest Ref Rng & Units 9/27/2023     1:14 AM 11/5/2020    11:28 AM   [facility] Txp Virology   EBV CAPSID ANTIBODY IGG No detectable antibody. Positive  >8.0    Hep B Core NR^Nonreactive  Nonreactive        Recent Labs   Lab Test  06/24/24  1056 07/08/24  1104 07/22/24  1100 07/25/24  1110   DOSTAC 6/23/2024  --  7/21/2024 7/24/2024   TACROL 5.8 6.0 6.5 6.0     Recent Labs   Lab Test 10/05/23  0916 10/12/23  0906 10/27/23  0844 11/06/23  1053 11/20/23  1055   DOSMPA 10/4/2023   8:00 PM 10/11/2023  10:22 PM  --   --   --    MPACID 0.86* 1.50 3.41 1.52 3.07   MPAG 56.3 57.4 69.0 44.3 64.0          Again, thank you for allowing me to participate in the care of your patient.        Sincerely,        Bryant Rizvi MD

## 2024-07-25 NOTE — PROGRESS NOTES
Disease State Management Encounter:                          Ariane Flores is a 43 year old female coming in for a follow-up visit. Patient was accompanied by [place] daughter.     Reason for visit: 10 months post txp.    Allergies/ADRs: Reviewed in chart  Past Medical History: Reviewed in chart  Tobacco: She reports that she has never smoked. She has never used smokeless tobacco.  Alcohol: not currently using     Medication Adherence/Access: misses 1-2 times a month.     Kidney Transplant:    Tacrolimus 2.5 mg every morning and 2 mg every evening  Mycophenolic acid 540 mg twice daily.   Pt reports loose stools.   Transplant date: 9/27/23  PJP prophylaxis: Bactrim SS daily  Tx Coordinator: Candace Powers MD: Ricky Hess, Using Med Card: Yes  Immunizations: annual flu shot 23-24; Pneumovax 23:  2020; Prevnar 13: 2023; TDaP:  2016; Shingrix: unknown, HBV: immunity per last titers, COVID: Primary x 3  23-24 x1  Estimated Creatinine Clearance: 66.4 mL/min (A) (based on SCr of 0.99 mg/dL (H)).     Supplements:   Mag Oxide 800mg at 12pm and 800mg every evening  Vitamin D3 25mcg daily  Lab Results   Component Value Date    MAG 1.7 04/15/2024     Loose stools:   Not taking fiber.   Has 4-5 loose stools a day, has accidentally.     Today's Vitals: There were no vitals taken for this visit.    Assessment/Plan:    Start Metamucil 1 teaspoonful daily, can increase to 2-3 times daily.   Switch from Magnesium oxide, and start Magnesium glycinate 2 tablets (200mg) twice daily.   Due for Shingrix (2 doses 8 weeks apart).     Follow-up: as needed.    I spent 15 minutes with this patient today. All changes were made via collaborative practice agreement with Ricky Hess. A copy of the visit note was provided to the patient's provider(s).    A summary of these recommendations was sent via [employer].    Mukul Cunningham, PharmD  [facility] Pharmacist    Phone: [phone]      Medication Therapy Recommendations  Loose stools    Rationale: Untreated  condition - Needs additional medication therapy - Indication   Recommendation: Start Medication - Metamucil 48.57 % Powd   Status: Accepted per CPA         Takes dietary supplements    Current Medication: magnesium oxide (MAG-OX) 400 MG tablet (Discontinued)   Rationale: Undesirable effect - Adverse medication event - Safety   Recommendation: Change Medication Formulation  - magnesium glycinate 100 MG Caps capsule   Status: Accepted per CPA

## 2024-07-25 NOTE — NURSING NOTE
Chief Complaint   Patient presents with    RECHECK     Post kidney transplant 9/27/2023     Vital signs:  Temp: 98  F (36.7  C) Temp src: Oral BP: 115/74 Pulse: 72   Resp: 18 SpO2: 99 %     Height: 152.4 cm (5') Weight: 78.7 kg (173 lb 8 oz)  Estimated body mass index is 33.88 kg/m  as calculated from the following:    Height as of this encounter: 1.524 m (5').    Weight as of this encounter: 78.7 kg (173 lb 8 oz).      Belén Dobbs, [facility]  7/25/2024 1:12 PM

## 2024-07-25 NOTE — PROGRESS NOTES
TRANSPLANT NEPHROLOGY EARLY POST TRANSPLANT VISIT    Assessment & Plan   # DDKT: Stable.    - Baseline Creatinine: ~  0.8-1   - Proteinuria: Mild (0.5-1.0 grams)   - Date DSA Last Checked: Jan/2024      Latest DSA: No   - BK Viremia: No   - Kidney Tx Biopsy: No   - Transplant Ureteral Stent: Removed 11/7/23     # Immunosuppression: Tacrolimus extended release (goal 6-8) and Mycophenolic acid (dose 540 mg every 12 hours)   - Induction with Recent Transplant:  High Intensity Protocol   - Continue with intensive monitoring of immunosuppression for efficacy and toxicity.   - Changes: No    # Infection Prophylaxis:    - PJP: Sulfa/TMP (Bactrim)   - CMV: Valganciclovir (Valcyte), stopped after 3 months.CMV IgG Ab positive.     # Diarrhea:   - start Fibers, switch Mg to Mg glycinate  - Stool studies and CMV pcr      # Hypertension: Controlled;  Goal BP: < 130/80   - Volume status: Euvolemic     - Changes: Not at this time      # Anemia in Chronic Renal Disease: Hgb: Stable, near normal      KAREN: No   - Iron studies: Replete    # Mineral Bone Disorder:    - Secondary renal hyperparathyroidism; PTH level: Normal (15-65 pg/ml)        On treatment: None   - Vitamin D; level: Normal        On supplement: Yes   - Calcium; level: Normal        On supplement: No   - Phosphorus; level: Normal        On supplement: No    # Electrolytes:   - Potassium; level: Normal        On supplement: No  - Magnesium; level: Low normal        On supplement: Yes, switch Mg oxide to Mg glycinate given diarrhea  - Bicarbonate; level: Normal        On supplement: No      # Complex Urologic history: possible reflux/obstruction and recurrent UTI as a child requiring multiple urologic procedures, last of which was right native nephrectomy at age 13.  Cleared by urology for transplant. Voiding cystourethrogram which showed no evidence of vesicoureteral reflux and she was noted to empty her bladder to completion. CT imaging was unremarkable outside of  left solitary kidney.     # Wound dehiscence:   -Partial dehiscence of inferior aspect of incision noted on ED visit 10/25.    - wound healed    # Hair loss:   - nl TFT   - on biotin 5 mg po every day   - likely CNI related    # Obesity: BMI-31.25   - referred to weight management clinic   - counseled about lifestyle modifications diet and exercise   -   # Vaccines:   - due for covid booster   - UTD Flu    # Transplant History:  Etiology of Kidney Failure: Unknown etiology  Tx: DDKT  Transplant: 9/27/2023 (Kidney)  Donor Type: Donation after Brain Death Donor Class: Standard Criteria Donor  Crossmatch at time of Tx: negative  DSA at time of Tx: No  Significant changes in immunosuppression: None  CMV IgG Ab High Risk Discordance (D+/R-): No  EBV IgG Ab High Risk Discordance (D+/R-): No  Significant transplant-related complications: None      Assessment and plan was discussed with the patient and she voiced her understanding and agreement.    Return visit: 2 months for 1 yr post Tx visit    Bryant Rizvi MD     The longitudinal plan of care for the diagnosis(es)/condition(s) as documented were addressed during this visit. Due to the added complexity in care, I will continue to support Ariane in the subsequent management and with ongoing continuity of care.    This patient has been seen and evaluated by me, Bryant Rizvi MD.  I have reviewed the note and agree with plan of care as documented by the fellow.    Bryant Rizvi MD     Chief Complaint   Ms. Flores is a 43 year old here for a follow-up post kidney transplant     History of Present Illness   Feels ok   Reports diarrhea ~2 months, 5 x/day, watery, no nausea/vomiting or abdominal pain. Appetite is ok  Mg switched to Mg glycinate and starting fiber powders  Working with dietitian on weight loss  Seen by weight management clinic, copays too high and can't afford it     IS FK 2.5/2.5 [id number]    Problem List   Patient Active Problem List   Diagnosis     Chronic kidney disease, stage III (moderate) (H)    Condyloma of female genitalia    Congenital renal agenesis and dysgenesis    Developmental reading disorder    Depressive disorder    Nexplanon in place    Abscess    Migraine    Hyperparathyroidism, secondary renal (H24)    Hypercholesterolemia    Hypertension    Heartburn    Excessive or frequent menstruation    Tuberculosis    Vitamin D deficiency    Metabolic acidosis    Kidney transplant recipient    Immunosuppressed status (H24)    Anemia in stage 3a chronic kidney disease (H)    Aftercare following organ transplant    Hypomagnesemia    Acquired hypothyroidism    Class 1 obesity with serious comorbidity and body mass index (BMI) of 32.0 to 32.9 in adult       Allergies   Allergies   Allergen Reactions    Cephalosporins Other (See Comments)     Pt does not know rx    Codeine Other (See Comments)     Gets dizzy    Iodinated Contrast Media Other (See Comments)     Only has 1 kidney.  Only has 1 kidney.  Only has 1 kidney.      Nsaids Other (See Comments)     Kidney Damage. Have Only has one kidney        Medications   Current Outpatient Medications   Medication Sig Dispense Refill    acetaminophen (TYLENOL) 500 MG tablet Take 500 mg by mouth every 6 hours as needed for pain      atorvastatin (LIPITOR) 10 MG tablet Take 10 mg by mouth daily PT is taking 10 MG a day      cholecalciferol 25 MCG (1000 UT) TABS Take 1,000 Units by mouth daily      ferrous sulfate (FEROSUL) 325 (65 Fe) MG tablet Take 325 mg by mouth daily (with breakfast)      levothyroxine (SYNTHROID/LEVOTHROID) 50 MCG tablet Take 50 mcg by mouth daily      magnesium glycinate 100 MG CAPS capsule Take 2 capsules (200 mg) by mouth 2 times daily 180 capsule 11    mycophenolic acid (GENERIC EQUIVALENT) 180 MG EC tablet Take 3 tablets (540 mg) by mouth 2 times daily 180 tablet 11    psyllium (METAMUCIL/KONSYL) 58.6 % powder Take 1 teaspoonful by mouth daily      sulfamethoxazole-trimethoprim (BACTRIM)  400-80 MG tablet Take 1 tablet by mouth daily 90 tablet 3    tacrolimus (GENERIC EQUIVALENT) 0.5 MG capsule Take 1 capsule (0.5 mg) by mouth 2 times daily Total dose = 1.5 mg twice per day 60 capsule 11    tacrolimus (GENERIC EQUIVALENT) 1 MG capsule Take 1 capsule (1 mg) by mouth 2 times daily Total dose = 1.5 mg twice per day 60 capsule 11     No current facility-administered medications for this visit.     There are no discontinued medications.      Physical Exam   Vital Signs: /74 (BP Location: Right arm, Patient Position: Sitting, Cuff Size: Adult Regular)   Pulse 72   Temp 98  F (36.7  C) (Oral)   Resp 18   Ht 1.524 m (5')   Wt 78.7 kg (173 lb 8 oz)   SpO2 99%   BMI 33.88 kg/m      GENERAL APPEARANCE: healthy  HENT: mouth without ulcers or lesions  RESP: lungs clear to auscultation - no rales, rhonchi or wheezes  CV: regular rhythm, normal rate, no rub, no murmur  EDEMA: no LE edema bilaterally  ABDOMEN: soft, right lower quadrant wound is healing nicely, no erythema or discharge   MS: extremities normal - no gross deformities noted, no evidence of inflammation in joints, no muscle tenderness  SKIN: no rash  Kidney transplant: healed incision  Access LUE AVF +thrill/bruit  Data         Latest Ref Rng & Units 7/22/2024    11:00 AM 7/8/2024    11:04 AM 5/13/2024    11:15 AM   Renal   Na (external) 135 - 145 mmol/L 138     139     140    K (external) 3.6 - 5.2 mmol/L 3.7     4.1     4.2    Cl 98 - 108 mmol/L 103     104     104    Cl (external) 98 - 108 mmol/L 103     104     104    CO2 (external) 21 - 32 mmol/L 24     28     26    BUN (external) 7 - 18 mg/dL 17     15     16    Cr (external) 0.55 - 1.02 mg/dL 0.98     1.16     0.92    Glucose (external) 70 - 110 mg/dL 93     83     103    Ca (external) 8.5 - 10.1 mg/dL 9.1     9.0     9.1        This result is from an external source.         Latest Ref Rng & Units 4/29/2024    10:44 AM 3/4/2024    11:02 AM 2/5/2024    10:46 AM   Bone Health   Phos  (external) 2.5 - 4.9 mg/dL 3.2     3.0     3.9        This result is from an external source.         Latest Ref Rng & Units 7/22/2024    11:00 AM 7/8/2024    11:04 AM 6/10/2024    10:58 AM   Heme   WBC (external) 4.6 - 10.2 10*3/uL 4.61     4.58  4.59       Hgb (external) 12 - 16 g/dL 14.0     13.6  13.6       Plt (external) 150 - 450 K/uL 294     273  285       ABSOLUTE NEUTROPHILS (EXTERNAL) 1.5 - 7.7 K/uL 2.9     2.7     3.0       ABSOLUTE LYMPHOCYTES (EXTERNAL) 2.0 - 7.7 K/uL 1.1     1.1     1.0       ABSOLUTE MONOCYTES (EXTERNAL) 0.1 - 0.8 K/uL 0.5     0.6     0.5       ABSOLUTE EOSINOPHILS (EXTERNAL) 0.0 - 0.4 K/uL 0.1     0.1     0.1       ABSOLUTE BASOPHILS (EXTERNAL) 0.0 - 0.1 K/uL 0.0     0.0     0.0           This result is from an external source.         Latest Ref Rng & Units 10/6/2023     6:49 AM 10/5/2023    10:34 PM 9/28/2023     4:40 AM   Liver   AP 35 - 104 U/L 65  65     TBili <=1.2 mg/dL 0.4  0.3     ALT 0 - 50 U/L 27  32     AST 0 - 45 U/L 19  18     Tot Protein 6.4 - 8.3 g/dL 6.3  6.6     Albumin 3.5 - 5.2 g/dL 3.4  3.7  3.3          Latest Ref Rng & Units 5/10/2024     1:21 PM 10/5/2023    10:34 PM 9/27/2023     1:14 AM   Pancreas   A1C 0.0 - 5.6 % 4.9   5.5    Lipase (Roche) 13 - 60 U/L  33           Latest Ref Rng & Units 10/9/2023     9:22 AM 10/2/2023     7:12 AM   Iron studies   Iron 37 - 145 ug/dL 54  35    Iron Sat Index 15 - 46 % 21  16    Ferritin 6 - 175 ng/mL 348  270          Latest Ref Rng & Units 9/27/2023     1:14 AM 11/5/2020    11:28 AM   [facility] Txp Virology   EBV CAPSID ANTIBODY IGG No detectable antibody. Positive  >8.0    Hep B Core NR^Nonreactive  Nonreactive        Recent Labs   Lab Test 06/24/24  1056 07/08/24  1104 07/22/24  1100 07/25/24  1110   DOSTAC 6/23/2024  --  7/21/2024 7/24/2024   TACROL 5.8 6.0 6.5 6.0     Recent Labs   Lab Test 10/05/23  0916 10/12/23  0906 10/27/23  0844 11/06/23  1053 11/20/23  1055   DOSMPA 10/4/2023   8:00 PM 10/11/2023  10:22 PM  --    --   --    MPACID 0.86* 1.50 3.41 1.52 3.07   MPAG 56.3 57.4 69.0 44.3 64.0

## 2024-07-26 LAB
BK VIRUS SPECIMEN TYPE: NORMAL
BKV DNA # SPEC NAA+PROBE: NOT DETECTED IU/ML

## 2024-07-27 LAB
DONOR IDENTIFICATION: NORMAL
DSA COMMENTS: NORMAL
DSA PRESENT: NO
DSA TEST METHOD: NORMAL
ORGAN: NORMAL
SA 1  COMMENTS: NORMAL
SA 1 CELL: NORMAL
SA 1 TEST METHOD: NORMAL
SA 2 CELL: NORMAL
SA 2 COMMENTS: NORMAL
SA 2 TEST METHOD: NORMAL
SA1 HI RISK ABY: NORMAL
SA1 MOD RISK ABY: NORMAL
SA2 HI RISK ABY: NORMAL
SA2 MOD RISK ABY: NORMAL
UNACCEPTABLE ANTIGENS: NORMAL
UNOS CPRA: 98

## 2024-08-05 ENCOUNTER — LAB REQUISITION (OUTPATIENT)
Dept: LAB | Facility: CLINIC | Age: 44
End: 2024-08-05
Payer: COMMERCIAL

## 2024-08-05 PROCEDURE — 87799 DETECT AGENT NOS DNA QUANT: CPT

## 2024-08-05 PROCEDURE — 80197 ASSAY OF TACROLIMUS: CPT

## 2024-08-06 LAB
BK VIRUS SPECIMEN TYPE: NORMAL
BKV DNA # SPEC NAA+PROBE: NOT DETECTED IU/ML
TACROLIMUS BLD-MCNC: 5.6 UG/L (ref 5–15)
TME LAST DOSE: NORMAL H
TME LAST DOSE: NORMAL H

## 2024-08-19 ENCOUNTER — LAB REQUISITION (OUTPATIENT)
Dept: LAB | Facility: CLINIC | Age: 44
End: 2024-08-19
Payer: COMMERCIAL

## 2024-08-19 PROCEDURE — 80197 ASSAY OF TACROLIMUS: CPT

## 2024-08-19 PROCEDURE — 87799 DETECT AGENT NOS DNA QUANT: CPT

## 2024-08-20 LAB
BK VIRUS SPECIMEN TYPE: NORMAL
BKV DNA # SPEC NAA+PROBE: NOT DETECTED IU/ML
TACROLIMUS BLD-MCNC: 5.7 UG/L (ref 5–15)
TME LAST DOSE: NORMAL H
TME LAST DOSE: NORMAL H

## 2024-08-27 ENCOUNTER — TELEPHONE (OUTPATIENT)
Dept: TRANSPLANT | Facility: CLINIC | Age: 44
End: 2024-08-27
Payer: COMMERCIAL

## 2024-08-27 DIAGNOSIS — R19.5 LOOSE STOOLS: ICD-10-CM

## 2024-08-27 DIAGNOSIS — Z94.0 STATUS POST KIDNEY TRANSPLANT: ICD-10-CM

## 2024-08-28 RX ORDER — MAGNESIUM GLYCINATE 100 MG
200 CAPSULE ORAL 2 TIMES DAILY
Qty: 360 CAPSULE | Refills: 3 | Status: SHIPPED | OUTPATIENT
Start: 2024-08-28

## 2024-08-28 ASSESSMENT — ENCOUNTER SYMPTOMS: NEW SYMPTOMS OF CORONARY ARTERY DISEASE: 0

## 2024-09-04 ENCOUNTER — LAB REQUISITION (OUTPATIENT)
Dept: LAB | Facility: CLINIC | Age: 44
End: 2024-09-04

## 2024-09-04 PROCEDURE — 87799 DETECT AGENT NOS DNA QUANT: CPT

## 2024-09-04 PROCEDURE — 80197 ASSAY OF TACROLIMUS: CPT

## 2024-09-05 LAB
BK VIRUS SPECIMEN TYPE: NORMAL
BKV DNA # SPEC NAA+PROBE: NOT DETECTED IU/ML
TACROLIMUS BLD-MCNC: 6.7 UG/L (ref 5–15)
TME LAST DOSE: NORMAL H
TME LAST DOSE: NORMAL H

## 2024-09-12 ENCOUNTER — TELEPHONE (OUTPATIENT)
Dept: TRANSPLANT | Facility: CLINIC | Age: 44
End: 2024-09-12
Payer: COMMERCIAL

## 2024-09-12 NOTE — TELEPHONE ENCOUNTER
Pt has questions to review with Lynn, would not give details to writer    [No Acute Distress] : no acute distress [Normal Oropharynx] : normal oropharynx [Normal Appearance] : normal appearance [No Neck Mass] : no neck mass [Normal Rate/Rhythm] : normal rate/rhythm [Normal S1, S2] : normal s1, s2 [No Murmurs] : no murmurs [No Resp Distress] : no resp distress [Clear to Auscultation Bilaterally] : clear to auscultation bilaterally [No Abnormalities] : no abnormalities [Benign] : benign [Normal Gait] : normal gait [No Clubbing] : no clubbing [No Cyanosis] : no cyanosis [No Edema] : no edema [FROM] : FROM [Normal Color/ Pigmentation] : normal color/ pigmentation [No Focal Deficits] : no focal deficits [Oriented x3] : oriented x3 [Normal Affect] : normal affect

## 2024-09-12 NOTE — TELEPHONE ENCOUNTER
RNCC spoke with pt who has questions regarding shingles vaccine. RNCC explained that we do recommend the shingles vaccine, per the CDC recommendations for immunocompromised adults, but that pt's insurance may not cover it d/t her being <50 yrs.     Pt voiced understanding and will bring up at her PCP visit. When she spoke to local pharmacy about it, they reported to her that it would cost $300 per dose out of pocket d/t insurance denial.

## 2024-09-16 ENCOUNTER — LAB REQUISITION (OUTPATIENT)
Dept: LAB | Facility: CLINIC | Age: 44
End: 2024-09-16

## 2024-09-16 ENCOUNTER — EXTERNAL ORDER RESULTS (OUTPATIENT)
Dept: LAB | Facility: CLINIC | Age: 44
End: 2024-09-16
Payer: COMMERCIAL

## 2024-09-16 PROCEDURE — 87799 DETECT AGENT NOS DNA QUANT: CPT

## 2024-09-16 PROCEDURE — 80197 ASSAY OF TACROLIMUS: CPT

## 2024-09-17 LAB
BK VIRUS SPECIMEN TYPE: NORMAL
BKV DNA # SPEC NAA+PROBE: NOT DETECTED IU/ML
TACROLIMUS BLD-MCNC: 8.8 UG/L (ref 5–15)
TME LAST DOSE: NORMAL H
TME LAST DOSE: NORMAL H

## 2024-09-30 ENCOUNTER — TELEPHONE (OUTPATIENT)
Dept: TRANSPLANT | Facility: CLINIC | Age: 44
End: 2024-09-30
Payer: COMMERCIAL

## 2024-09-30 ENCOUNTER — LAB REQUISITION (OUTPATIENT)
Dept: LAB | Facility: CLINIC | Age: 44
End: 2024-09-30

## 2024-09-30 DIAGNOSIS — Z20.828 CONTACT WITH AND (SUSPECTED) EXPOSURE TO OTHER VIRAL COMMUNICABLE DISEASES: ICD-10-CM

## 2024-09-30 DIAGNOSIS — Z98.890 OTHER SPECIFIED POSTPROCEDURAL STATES: ICD-10-CM

## 2024-09-30 DIAGNOSIS — Z94.0 KIDNEY REPLACED BY TRANSPLANT: Primary | ICD-10-CM

## 2024-09-30 DIAGNOSIS — Z48.298 AFTERCARE FOLLOWING ORGAN TRANSPLANT: ICD-10-CM

## 2024-09-30 DIAGNOSIS — Z79.899 ENCOUNTER FOR LONG-TERM CURRENT USE OF MEDICATION: ICD-10-CM

## 2024-09-30 LAB — VIT D+METAB SERPL-MCNC: 31 NG/ML (ref 20–50)

## 2024-09-30 PROCEDURE — 83970 ASSAY OF PARATHORMONE: CPT

## 2024-09-30 PROCEDURE — 82306 VITAMIN D 25 HYDROXY: CPT

## 2024-09-30 NOTE — LETTER
OUTPATIENT LABORATORY TEST ORDER     Patient Name: Ariane Flores   YOB: 1980     formerly [facility] MR# [if applicable]: [id number]   Date & Time: October 1, 2024  8:58 AM  Expiration Date: 1 year after date issued      Diagnoses: Kidney Transplant (ICD-10 Z94.0)   Long term use of medications (ICD-10 Z79.899)    Other specified postprocedural states (Z98.890)     We ask your assistance in obtaining the following laboratory tests, which are part of our routine surveillance program for Solid Organ Transplant patients.     Please fax each result to [phone], same day as resulted/available    Critical lab results page [phone]    Year 1-2 post-transplant (9/27/24-9/27/25)  Monthly  CBC with platelets  Basic Metabolic Panel (Sodium, Potassium, Chloride, Creatinine, CO2, Urea Nitrogen, glucose, Calcium)  Tacrolimus drug level - 12-hour trough, please document time of last dose    BK (Polyoma Virus) PCR Quantitative/Plasma    At month 13 (10/27/24)   PRA/DSA (patient to provide )    At month 15 (12/27/24)   T Cell Subset (CD4)    At month 18 (3/27/25)   PRA / DSA (patient to provide )  If on PJP Prophylaxis other than Bactrim: T Cell Subset (CD4)    Years 2-5 post-transplant:   Every other month   CBC with platelets  Basic Metabolic Panel (Sodium, Potassium, Chloride, CO2, Creatinine, Urea Nitrogen, glucose, Calcium)  Tacrolimus drug level - 12-hour trough, please document time of last dose        Every 6 Months   Urine for protein/creatinine      At 13 months post-transplant   PRA/DSA level (mailers provided by the patient)    At 2 years post-transplant (DATE range)  PRA/DSA level (mailers provided by the patient)  AlloSure (kits will be mailed to lab by [facility])  T cell subset (CD4)     Yearly  Urine for protein/creatinine       If you have any questions, please call [facility] [phone] or [phone], Fax [phone].      Bryant Rizvi MD

## 2024-09-30 NOTE — TELEPHONE ENCOUNTER
Patient Call: General  Route to LPN    Reason for call: Ariane would like to follow up with Coordinator, to see if she's still needing Labs drawn Every other Monday, and if so patient is requesting new standing orders be faxed to [facility]  Phone: [phone]  Fax: [phone]      Call back needed? Yes    Return Call Needed  Same as documented in contacts section  When to return call?: Same day: Route High Priority

## 2024-10-01 LAB — PTH-INTACT SERPL-MCNC: 26 PG/ML (ref 15–65)

## 2024-10-02 ENCOUNTER — TELEPHONE (OUTPATIENT)
Dept: TRANSPLANT | Facility: CLINIC | Age: 44
End: 2024-10-02
Payer: COMMERCIAL

## 2024-10-02 DIAGNOSIS — Z94.0 KIDNEY TRANSPLANTED: ICD-10-CM

## 2024-10-02 NOTE — TELEPHONE ENCOUNTER
Provider Call: General  Route to LPN    Reason for call: Pharmacy called to request refill    mycophenolic acid (GENERIC EQUIVALENT) 180 MG EC tablet     [place] [address] [place] [address] Phone: [phone]   Fax: [phone]          Call back needed? No

## 2024-10-03 RX ORDER — MYCOPHENOLIC ACID 180 MG/1
540 TABLET, DELAYED RELEASE ORAL 2 TIMES DAILY
Qty: 180 TABLET | Refills: 11 | Status: SHIPPED | OUTPATIENT
Start: 2024-10-03

## 2024-10-03 NOTE — TELEPHONE ENCOUNTER
RNCC left VM clarifying labs needed for pt. Lab orders in place and need for monthly lab frequency at this time.

## 2024-10-07 ENCOUNTER — LAB REQUISITION (OUTPATIENT)
Dept: LAB | Facility: CLINIC | Age: 44
End: 2024-10-07

## 2024-10-07 ENCOUNTER — RESULTS ONLY (OUTPATIENT)
Dept: LAB | Facility: CLINIC | Age: 44
End: 2024-10-07
Payer: COMMERCIAL

## 2024-10-07 LAB
% BASOPHILS (EXTERNAL): 0.3 % (ref 0–2)
% EOSINOPHILS (EXTERNAL): 2.4 % (ref 0–4)
% LYMPHOCYTES (EXTERNAL): 20.4 % (ref 20–40)
% MONOCYTES (EXTERNAL): 10.4 % (ref 2–10)
% NEUTROPHILS (EXTERNAL): 66.3 % (ref 50–70)
ABSOLUTE BASOPHILS (EXTERNAL): 0 K/UL (ref 0–0.1)
ABSOLUTE EOSINOPHILS (EXTERNAL): 0.1 K/UL (ref 0–0.4)
ABSOLUTE IMMATURE GRANULOCYTES (EXTERNAL): 0.01 K/UL (ref 0–0.5)
ABSOLUTE LYMPHOCYTES (EXTERNAL): 1.2 K/UL (ref 2–7.7)
ABSOLUTE MONOCYTES (EXTERNAL): 0.6 K/UL (ref 0.1–0.8)
ABSOLUTE NEUTROPHILS (EXTERNAL): 3.8 K/UL (ref 1.5–7.7)

## 2024-10-07 PROCEDURE — 80197 ASSAY OF TACROLIMUS: CPT

## 2024-10-07 PROCEDURE — 87799 DETECT AGENT NOS DNA QUANT: CPT

## 2024-10-21 ENCOUNTER — TELEPHONE (OUTPATIENT)
Dept: TRANSPLANT | Facility: CLINIC | Age: 44
End: 2024-10-21
Payer: COMMERCIAL

## 2024-10-21 NOTE — TELEPHONE ENCOUNTER
Provider Call: General  Route to LPN    Reason for call: Pharmacy called to discuss mycophenolate    Medication now has a $54 copay; when copay changes, pharmacy cannot ship until pt approves copay. Pharmacy has been unable to contact pt, needs to speak to coordinator since pt does not have transplant medication    Call back needed? Yes    Return Call Needed  Same as documented in contacts section  When to return call?: Same day: Route High Priority

## 2024-10-22 NOTE — TELEPHONE ENCOUNTER
RNCC spoke with [facility] who states that the medication needs a billing adjustment on the pharmacy side. No further action required.

## 2024-11-04 ENCOUNTER — LAB (OUTPATIENT)
Dept: LAB | Facility: CLINIC | Age: 44
End: 2024-11-04
Payer: COMMERCIAL

## 2024-11-04 ENCOUNTER — LAB REQUISITION (OUTPATIENT)
Dept: LAB | Facility: CLINIC | Age: 44
End: 2024-11-04

## 2024-11-04 DIAGNOSIS — Z48.298 AFTERCARE FOLLOWING ORGAN TRANSPLANT: ICD-10-CM

## 2024-11-04 PROCEDURE — 87799 DETECT AGENT NOS DNA QUANT: CPT

## 2024-11-04 PROCEDURE — 80197 ASSAY OF TACROLIMUS: CPT

## 2024-11-06 NOTE — PROGRESS NOTES
TRANSPLANT NEPHROLOGY EARLY POST TRANSPLANT VISIT    Assessment & Plan   # DDKT: Stable.    - Baseline Creatinine: ~  0.8-1   - Proteinuria: Mild (0.5-1.0 grams)   - Date DSA Last Checked: Jan/2024      Latest DSA: No   - BK Viremia: No   - Kidney Tx Biopsy: No   - Transplant Ureteral Stent: Removed 11/7/23     # Immunosuppression: Tacrolimus extended release (goal 6-8) and Mycophenolic acid (dose 540 mg every 12 hours)   - Induction with Recent Transplant:  High Intensity Protocol   - Continue with intensive monitoring of immunosuppression for efficacy and toxicity.   - Changes: No    # Infection Prophylaxis:    - PJP: Sulfa/TMP (Bactrim)   - CMV: Valganciclovir (Valcyte), stopped after 3 months.CMV IgG Ab positive.     # Diarrhea:   - start Fibers, switch Mg to Mg glycinate  - Stool studies and CMV pcr      # Hypertension: Controlled;  Goal BP: < 130/80   - Volume status: Euvolemic     - Changes: Not at this time      # Anemia in Chronic Renal Disease: Hgb: Stable, near normal      KAREN: No   - Iron studies: Replete    # Mineral Bone Disorder:    - Secondary renal hyperparathyroidism; PTH level: Normal (15-65 pg/ml)        On treatment: None   - Vitamin D; level: Normal        On supplement: Yes   - Calcium; level: Normal        On supplement: No   - Phosphorus; level: Normal        On supplement: No    # Electrolytes:   - Potassium; level: Normal        On supplement: No  - Magnesium; level: Normal        On supplement: Yes,  - Bicarbonate; level: Normal        On supplement: No      # Complex Urologic history: possible reflux/obstruction and recurrent UTI as a child requiring multiple urologic procedures, last of which was right native nephrectomy at age 13.  Cleared by urology for transplant. Voiding cystourethrogram which showed no evidence of vesicoureteral reflux and she was noted to empty her bladder to completion. CT imaging was unremarkable outside of left solitary kidney.     # Wound  dehiscence:   -Partial dehiscence of inferior aspect of incision noted on ED visit 10/25.    - wound healed    # Hair loss:   - nl TFT   - on biotin 5 mg po every day   - likely CNI related    # Obesity: BMI-34   - referred to weight management clinic   - counseled about lifestyle modifications diet and exercise   -   # Vaccines:   - due for covid booster   - UTD Flu    # Transplant History:  Etiology of Kidney Failure: Unknown etiology  Tx: DDKT  Transplant: 9/27/2023 (Kidney)  Donor Type: Donation after Brain Death Donor Class: Standard Criteria Donor  Crossmatch at time of Tx: negative  DSA at time of Tx: No  Significant changes in immunosuppression: None  CMV IgG Ab High Risk Discordance (D+/R-): No  EBV IgG Ab High Risk Discordance (D+/R-): No  Significant transplant-related complications: None      Assessment and plan was discussed with the patient and she voiced her understanding and agreement.    Return visit: 2 months for 1 yr post Tx visit    VIRGILIO Ford CNP       Chief Complaint   Ms. Flores is a 44 year old here for a follow-up post kidney transplant     History of Present Illness   Since she was last seen by Dr. Ricky Hess in July 2024 she has been doing well. She denies any hospitalizations, ED visit or illnesses.  No n/v/d. Appetite is very good. Has gained 10 lbs since last visit. Was referred to weight loss management, copays too high and can't afford it. Prefers to meet with dietician now with appt scheduled soon. No f/s/c. No chest pains or shortness of breath. Urinating OK, no dysuria or hematuria.       IS FK 2.5/2.5 [id number]    Problem List   Patient Active Problem List   Diagnosis    Chronic kidney disease, stage III (moderate) (H)    Condyloma of female genitalia    Congenital renal agenesis and dysgenesis    Developmental reading disorder    Depressive disorder    Nexplanon in place    Abscess    Migraine    Hyperparathyroidism, secondary renal (H)    Hypercholesterolemia     Hypertension    Heartburn    Excessive or frequent menstruation    Tuberculosis    Vitamin D deficiency    Metabolic acidosis    Kidney transplant recipient    Immunosuppressed status (H24)    Anemia in stage 3a chronic kidney disease (H)    Aftercare following organ transplant    Hypomagnesemia    Acquired hypothyroidism    Class 1 obesity with serious comorbidity and body mass index (BMI) of 32.0 to 32.9 in adult       Allergies   Allergies   Allergen Reactions    Cephalosporins Other (See Comments)     Pt does not know rx    Codeine Other (See Comments)     Gets dizzy    Iodinated Contrast Media Other (See Comments)     Only has 1 kidney.  Only has 1 kidney.  Only has 1 kidney.      Nsaids Other (See Comments)     Kidney Damage. Have Only has one kidney        Medications   Current Outpatient Medications   Medication Sig Dispense Refill    acetaminophen (TYLENOL) 500 MG tablet Take 500 mg by mouth every 6 hours as needed for pain      atorvastatin (LIPITOR) 10 MG tablet Take 10 mg by mouth daily PT is taking 10 MG a day      cholecalciferol 25 MCG (1000 UT) TABS Take 1,000 Units by mouth daily      ferrous sulfate (FEROSUL) 325 (65 Fe) MG tablet Take 325 mg by mouth daily (with breakfast)      levothyroxine (SYNTHROID/LEVOTHROID) 50 MCG tablet Take 50 mcg by mouth daily      magnesium glycinate 100 MG CAPS capsule Take 2 capsules (200 mg) by mouth 2 times daily. 360 capsule 3    mycophenolic acid (GENERIC EQUIVALENT) 180 MG EC tablet Take 3 tablets (540 mg) by mouth 2 times daily. 180 tablet 11    psyllium (METAMUCIL/KONSYL) 58.6 % powder Take 1 teaspoonful by mouth daily      sulfamethoxazole-trimethoprim (BACTRIM) 400-80 MG tablet Take 1 tablet by mouth daily 90 tablet 3    tacrolimus (GENERIC EQUIVALENT) 0.5 MG capsule Take 1 capsule (0.5 mg) by mouth 2 times daily Total dose = 1.5 mg twice per day 60 capsule 11    tacrolimus (GENERIC EQUIVALENT) 1 MG capsule Take 1 capsule (1 mg) by mouth 2 times daily Total  dose = 1.5 mg twice per day 60 capsule 11     No current facility-administered medications for this visit.     There are no discontinued medications.      Physical Exam   Vital Signs: There were no vitals taken for this visit.    GENERAL APPEARANCE: healthy  HENT: mouth without ulcers or lesions  RESP: lungs clear to auscultation - no rales, rhonchi or wheezes  CV: regular rhythm, normal rate, no rub, no murmur  EDEMA: no LE edema bilaterally  ABDOMEN: soft, right lower quadrant wound is healing nicely, no erythema or discharge   MS: extremities normal - no gross deformities noted, no evidence of inflammation in joints, no muscle tenderness  SKIN: no rash  Kidney transplant: healed incision  Access LUE AVF +thrill/bruit  Data         Latest Ref Rng & Units 11/4/2024    10:29 AM 10/7/2024    10:59 AM 9/4/2024    11:00 AM   Renal   Na (external) 135 - 145 mmol/L 143  140  140       K (external) 3.6 - 5.2 mmol/L 4.1  4.0  4.2       Cl 98 - 108 mmol/L 106  104  104       Cl (external) 98 - 108 mmol/L 106  104  104       CO2 (external) 21 - 32 mmol/L 27  27  25       BUN (external) 7 - 18 mg/dL 12  15  13       Cr (external) 0.55 - 1.02 mg/dL 0.88  1.01  0.90       Glucose (external) 70 - 110 mg/dL 92  92  89       Ca (external) 8.5 - 10.1 mg/dL 9.1  8.7  8.9           This result is from an external source.         Latest Ref Rng & Units 9/30/2024    10:38 AM 4/29/2024    10:44 AM 3/4/2024    11:02 AM   Bone Health   Phos (external) 2.5 - 4.9 mg/dL  3.2     3.0       Parathyroid Hormone Intact 15 - 65 pg/mL 26      PTHi (external) 15 - 65 pg/mL 26      Vit D Def 20 - 50 ng/mL 31          This result is from an external source.         Latest Ref Rng & Units 11/4/2024    10:29 AM 10/7/2024    10:59 AM 9/4/2024    11:00 AM   Heme   WBC (external) 4.6 - 10.2 10*3/uL 4.85  5.78  4.88       Hgb (external) 12 - 16 g/dL 13.7  13.3  13.6       Plt (external) 150 - 450 K/uL 292  294  294       ABSOLUTE NEUTROPHILS (EXTERNAL) 1.5  - 7.7 K/uL 3.0     3.8  2.9       ABSOLUTE LYMPHOCYTES (EXTERNAL) 2.0 - 7.7 K/uL 1.2     1.2  1.3       ABSOLUTE MONOCYTES (EXTERNAL) 0.1 - 0.8 K/uL 0.5     0.6  0.6       ABSOLUTE EOSINOPHILS (EXTERNAL) 0.0 - 0.4 K/uL 0.1     0.1  0.1       ABSOLUTE BASOPHILS (EXTERNAL) 0.0 - 0.1 K/uL 0.0     0.0  0.0           This result is from an external source.         Latest Ref Rng & Units 9/30/2024    10:38 AM 10/6/2023     6:49 AM 10/5/2023    10:34 PM   Liver   AP 35 - 104 U/L  65  65    AP (external) 46 - 116 U/L 83      TBili <=1.2 mg/dL  0.4  0.3    TBili (external) 0.0 - 1.0 mg/dL 0.6      DBili (external) 0.0 - 0.3 mg/dL 0.1      ALT 0 - 50 U/L  27  32    ALT (external) 16 - 63 U/L 29      AST 0 - 45 U/L  19  18    AST (external) 15 - 37 U/L 16      Tot Protein 6.4 - 8.3 g/dL  6.3  6.6    Tot Protein (external) 6.4 - 8.2 g/dL 7.3      Albumin 3.5 - 5.2 g/dL  3.4  3.7    Albumin (external) 3.4 - 5.0 g/dL 3.9            Latest Ref Rng & Units 9/30/2024    10:38 AM 5/10/2024     1:21 PM 10/5/2023    10:34 PM   Pancreas   A1C 0.0 - 5.6 %  4.9     A1C (external) 3.8 - 5.6 % 5.1      Lipase (Roche) 13 - 60 U/L   33          Latest Ref Rng & Units 10/9/2023     9:22 AM 10/2/2023     7:12 AM   Iron studies   Iron 37 - 145 ug/dL 54  35    Iron Sat Index 15 - 46 % 21  16    Ferritin 6 - 175 ng/mL 348  270          Latest Ref Rng & Units 9/27/2023     1:14 AM 11/5/2020    11:28 AM   [facility] T[facility] Virology   EBV CAPSID ANTIBODY IGG No detectable antibody. Positive  >8.0    Hep B Core NR^Nonreactive  Nonreactive        Recent Labs   Lab Test 08/05/24  1055 08/19/24  1105 09/16/24  1058 10/07/24  1059 11/04/24  1029   DOSTAC 8/4/2024  --  9/15/2024  --  11/3/2024   TACROL 5.6   < > 8.8 6.2 6.0    < > = values in this interval not displayed.     Recent Labs   Lab Test 10/05/23  0916 10/12/23  0906 10/27/23  0844 11/06/23  1053 11/20/23  1055   DOSMPA 10/4/2023   8:00 PM 10/11/2023  10:22 PM  --   --   --    MPACID 0.86* 1.50 3.41 1.52  3.07   MPAG 56.3 57.4 69.0 44.3 64.0

## 2024-11-07 ENCOUNTER — OFFICE VISIT (OUTPATIENT)
Dept: TRANSPLANT | Facility: CLINIC | Age: 44
End: 2024-11-07
Attending: NURSE PRACTITIONER
Payer: COMMERCIAL

## 2024-11-07 VITALS
WEIGHT: 176.5 LBS | HEART RATE: 85 BPM | DIASTOLIC BLOOD PRESSURE: 73 MMHG | OXYGEN SATURATION: 98 % | SYSTOLIC BLOOD PRESSURE: 126 MMHG | BODY MASS INDEX: 34.47 KG/M2

## 2024-11-07 DIAGNOSIS — Z94.0 KIDNEY TRANSPLANTED: ICD-10-CM

## 2024-11-07 PROCEDURE — 99214 OFFICE O/P EST MOD 30 MIN: CPT | Performed by: NURSE PRACTITIONER

## 2024-11-07 PROCEDURE — G0463 HOSPITAL OUTPT CLINIC VISIT: HCPCS | Performed by: NURSE PRACTITIONER

## 2024-11-07 NOTE — LETTER
11/7/2024      Ariane Flores  [address]  [facility] [address]      Dear Colleague,    Thank you for referring your patient, Ariane Flores, to the [facility]. Please see a copy of my visit note below.      TRANSPLANT NEPHROLOGY EARLY POST TRANSPLANT VISIT    Assessment & Plan  # DDKT: Stable.    - Baseline Creatinine: ~  0.8-1   - Proteinuria: Mild (0.5-1.0 grams)   - Date DSA Last Checked: Jan/2024      Latest DSA: No   - BK Viremia: No   - Kidney Tx Biopsy: No   - Transplant Ureteral Stent: Removed 11/7/23     # Immunosuppression: Tacrolimus extended release (goal 6-8) and Mycophenolic acid (dose 540 mg every 12 hours)   - Induction with Recent Transplant:  High Intensity Protocol   - Continue with intensive monitoring of immunosuppression for efficacy and toxicity.   - Changes: No    # Infection Prophylaxis:    - PJP: Sulfa/TMP (Bactrim)   - CMV: Valganciclovir (Valcyte), stopped after 3 months.CMV IgG Ab positive.     # Diarrhea:   - start Fibers, switch Mg to Mg glycinate  - Stool studies and CMV pcr      # Hypertension: Controlled;  Goal BP: < 130/80   - Volume status: Euvolemic     - Changes: Not at this time      # Anemia in Chronic Renal Disease: Hgb: Stable, near normal      KAREN: No   - Iron studies: Replete    # Mineral Bone Disorder:    - Secondary renal hyperparathyroidism; PTH level: Normal (15-65 pg/ml)        On treatment: None   - Vitamin D; level: Normal        On supplement: Yes   - Calcium; level: Normal        On supplement: No   - Phosphorus; level: Normal        On supplement: No    # Electrolytes:   - Potassium; level: Normal        On supplement: No  - Magnesium; level: Normal        On supplement: Yes,  - Bicarbonate; level: Normal        On supplement: No      # Complex Urologic history: possible reflux/obstruction and recurrent UTI as a child requiring multiple urologic procedures, last of which was right native nephrectomy at age 13.   Cleared by urology for transplant. Voiding cystourethrogram which showed no evidence of vesicoureteral reflux and she was noted to empty her bladder to completion. CT imaging was unremarkable outside of left solitary kidney.     # Wound dehiscence:   -Partial dehiscence of inferior aspect of incision noted on ED visit 10/25.    - wound healed    # Hair loss:   - nl TFT   - on biotin 5 mg po every day   - likely CNI related    # Obesity: BMI-34   - referred to weight management clinic   - counseled about lifestyle modifications diet and exercise   -   # Vaccines:   - due for covid booster   - UTD Flu    # Transplant History:  Etiology of Kidney Failure: Unknown etiology  Tx: DDKT  Transplant: 9/27/2023 (Kidney)  Donor Type: Donation after Brain Death Donor Class: Standard Criteria Donor  Crossmatch at time of Tx: negative  DSA at time of Tx: No  Significant changes in immunosuppression: None  CMV IgG Ab High Risk Discordance (D+/R-): No  EBV IgG Ab High Risk Discordance (D+/R-): No  Significant transplant-related complications: None      Assessment and plan was discussed with the patient and she voiced her understanding and agreement.    Return visit: 2 months for 1 yr post Tx visit    VIRGILIO Ford CNP       Chief Complaint  Ms. Flores is a 44 year old here for a follow-up post kidney transplant     History of Present Illness  Since she was last seen by Dr. Ricky Hess in July 2024 she has been doing well. She denies any hospitalizations, ED visit or illnesses.  No n/v/d. Appetite is very good. Has gained 10 lbs since last visit. Was referred to weight loss management, copays too high and can't afford it. Prefers to meet with dietician now with appt scheduled soon. No f/s/c. No chest pains or shortness of breath. Urinating OK, no dysuria or hematuria.       IS FK 2.5/2.5 [id number]    Problem List  Patient Active Problem List   Diagnosis     Chronic kidney disease, stage III (moderate) (H)     Condyloma of  female genitalia     Congenital renal agenesis and dysgenesis     Developmental reading disorder     Depressive disorder     Nexplanon in place     Abscess     Migraine     Hyperparathyroidism, secondary renal (H)     Hypercholesterolemia     Hypertension     Heartburn     Excessive or frequent menstruation     Tuberculosis     Vitamin D deficiency     Metabolic acidosis     Kidney transplant recipient     Immunosuppressed status (H24)     Anemia in stage 3a chronic kidney disease (H)     Aftercare following organ transplant     Hypomagnesemia     Acquired hypothyroidism     Class 1 obesity with serious comorbidity and body mass index (BMI) of 32.0 to 32.9 in adult       Allergies  Allergies   Allergen Reactions     Cephalosporins Other (See Comments)     Pt does not know rx     Codeine Other (See Comments)     Gets dizzy     Iodinated Contrast Media Other (See Comments)     Only has 1 kidney.  Only has 1 kidney.  Only has 1 kidney.       Nsaids Other (See Comments)     Kidney Damage. Have Only has one kidney        Medications  Current Outpatient Medications   Medication Sig Dispense Refill     acetaminophen (TYLENOL) 500 MG tablet Take 500 mg by mouth every 6 hours as needed for pain       atorvastatin (LIPITOR) 10 MG tablet Take 10 mg by mouth daily PT is taking 10 MG a day       cholecalciferol 25 MCG (1000 UT) TABS Take 1,000 Units by mouth daily       ferrous sulfate (FEROSUL) 325 (65 Fe) MG tablet Take 325 mg by mouth daily (with breakfast)       levothyroxine (SYNTHROID/LEVOTHROID) 50 MCG tablet Take 50 mcg by mouth daily       magnesium glycinate 100 MG CAPS capsule Take 2 capsules (200 mg) by mouth 2 times daily. 360 capsule 3     mycophenolic acid (GENERIC EQUIVALENT) 180 MG EC tablet Take 3 tablets (540 mg) by mouth 2 times daily. 180 tablet 11     psyllium (METAMUCIL/KONSYL) 58.6 % powder Take 1 teaspoonful by mouth daily       sulfamethoxazole-trimethoprim (BACTRIM) 400-80 MG tablet Take 1 tablet by  mouth daily 90 tablet 3     tacrolimus (GENERIC EQUIVALENT) 0.5 MG capsule Take 1 capsule (0.5 mg) by mouth 2 times daily Total dose = 1.5 mg twice per day 60 capsule 11     tacrolimus (GENERIC EQUIVALENT) 1 MG capsule Take 1 capsule (1 mg) by mouth 2 times daily Total dose = 1.5 mg twice per day 60 capsule 11     No current facility-administered medications for this visit.     There are no discontinued medications.      Physical Exam  Vital Signs: There were no vitals taken for this visit.    GENERAL APPEARANCE: healthy  HENT: mouth without ulcers or lesions  RESP: lungs clear to auscultation - no rales, rhonchi or wheezes  CV: regular rhythm, normal rate, no rub, no murmur  EDEMA: no LE edema bilaterally  ABDOMEN: soft, right lower quadrant wound is healing nicely, no erythema or discharge   MS: extremities normal - no gross deformities noted, no evidence of inflammation in joints, no muscle tenderness  SKIN: no rash  Kidney transplant: healed incision  Access LUE AVF +thrill/bruit  Data        Latest Ref Rng & Units 11/4/2024    10:29 AM 10/7/2024    10:59 AM 9/4/2024    11:00 AM   Renal   Na (external) 135 - 145 mmol/L 143  140  140       K (external) 3.6 - 5.2 mmol/L 4.1  4.0  4.2       Cl 98 - 108 mmol/L 106  104  104       Cl (external) 98 - 108 mmol/L 106  104  104       CO2 (external) 21 - 32 mmol/L 27  27  25       BUN (external) 7 - 18 mg/dL 12  15  13       Cr (external) 0.55 - 1.02 mg/dL 0.88  1.01  0.90       Glucose (external) 70 - 110 mg/dL 92  92  89       Ca (external) 8.5 - 10.1 mg/dL 9.1  8.7  8.9           This result is from an external source.         Latest Ref Rng & Units 9/30/2024    10:38 AM 4/29/2024    10:44 AM 3/4/2024    11:02 AM   Bone Health   Phos (external) 2.5 - 4.9 mg/dL  3.2     3.0       Parathyroid Hormone Intact 15 - 65 pg/mL 26      PTHi (external) 15 - 65 pg/mL 26      Vit D Def 20 - 50 ng/mL 31          This result is from an external source.         Latest Ref Rng & Units  11/4/2024    10:29 AM 10/7/2024    10:59 AM 9/4/2024    11:00 AM   Heme   WBC (external) 4.6 - 10.2 10*3/uL 4.85  5.78  4.88       Hgb (external) 12 - 16 g/dL 13.7  13.3  13.6       Plt (external) 150 - 450 K/uL 292  294  294       ABSOLUTE NEUTROPHILS (EXTERNAL) 1.5 - 7.7 K/uL 3.0     3.8  2.9       ABSOLUTE LYMPHOCYTES (EXTERNAL) 2.0 - 7.7 K/uL 1.2     1.2  1.3       ABSOLUTE MONOCYTES (EXTERNAL) 0.1 - 0.8 K/uL 0.5     0.6  0.6       ABSOLUTE EOSINOPHILS (EXTERNAL) 0.0 - 0.4 K/uL 0.1     0.1  0.1       ABSOLUTE BASOPHILS (EXTERNAL) 0.0 - 0.1 K/uL 0.0     0.0  0.0           This result is from an external source.         Latest Ref Rng & Units 9/30/2024    10:38 AM 10/6/2023     6:49 AM 10/5/2023    10:34 PM   Liver   AP 35 - 104 U/L  65  65    AP (external) 46 - 116 U/L 83      TBili <=1.2 mg/dL  0.4  0.3    TBili (external) 0.0 - 1.0 mg/dL 0.6      DBili (external) 0.0 - 0.3 mg/dL 0.1      ALT 0 - 50 U/L  27  32    ALT (external) 16 - 63 U/L 29      AST 0 - 45 U/L  19  18    AST (external) 15 - 37 U/L 16      Tot Protein 6.4 - 8.3 g/dL  6.3  6.6    Tot Protein (external) 6.4 - 8.2 g/dL 7.3      Albumin 3.5 - 5.2 g/dL  3.4  3.7    Albumin (external) 3.4 - 5.0 g/dL 3.9            Latest Ref Rng & Units 9/30/2024    10:38 AM 5/10/2024     1:21 PM 10/5/2023    10:34 PM   Pancreas   A1C 0.0 - 5.6 %  4.9     A1C (external) 3.8 - 5.6 % 5.1      Lipase (Roche) 13 - 60 U/L   33          Latest Ref Rng & Units 10/9/2023     9:22 AM 10/2/2023     7:12 AM   Iron studies   Iron 37 - 145 ug/dL 54  35    Iron Sat Index 15 - 46 % 21  16    Ferritin 6 - 175 ng/mL 348  270          Latest Ref Rng & Units 9/27/2023     1:14 AM 11/5/2020    11:28 AM   [facility] T[facility] Virology   EBV CAPSID ANTIBODY IGG No detectable antibody. Positive  >8.0    Hep B Core NR^Nonreactive  Nonreactive        Recent Labs   Lab Test 08/05/24  1055 08/19/24  1105 09/16/24  1058 10/07/24  1059 11/04/24  1029   DOSTAC 8/4/2024  --  9/15/2024  --  11/3/2024   TACROL  5.6   < > 8.8 6.2 6.0    < > = values in this interval not displayed.     Recent Labs   Lab Test 10/05/23  0916 10/12/23  0906 10/27/23  0844 11/06/23  1053 11/20/23  1055   DOSMPA 10/4/2023   8:00 PM 10/11/2023  10:22 PM  --   --   --    MPACID 0.86* 1.50 3.41 1.52 3.07   MPAG 56.3 57.4 69.0 44.3 64.0          Again, thank you for allowing me to participate in the care of your patient.        Sincerely,        VIRGILIO Ford CNP

## 2024-11-08 DIAGNOSIS — Z48.298 AFTERCARE FOLLOWING ORGAN TRANSPLANT: Primary | ICD-10-CM

## 2024-11-15 ENCOUNTER — LAB REQUISITION (OUTPATIENT)
Dept: LAB | Facility: CLINIC | Age: 44
End: 2024-11-15

## 2024-11-15 PROCEDURE — 88304 TISSUE EXAM BY PATHOLOGIST: CPT | Performed by: PATHOLOGY

## 2024-11-18 ENCOUNTER — TELEPHONE (OUTPATIENT)
Dept: TRANSPLANT | Facility: CLINIC | Age: 44
End: 2024-11-18
Payer: COMMERCIAL

## 2024-11-18 NOTE — TELEPHONE ENCOUNTER
General  Route to LPN    Reason for call: Pt said she would like a report sent to her to let her know how her kidney is doing     Call back needed? Yes    Return Call Needed  Same as documented in contacts section  When to return call?: Greater than one day: Route standard priority

## 2024-11-19 LAB
PATH REPORT.COMMENTS IMP SPEC: NORMAL
PATH REPORT.COMMENTS IMP SPEC: NORMAL
PATH REPORT.FINAL DX SPEC: NORMAL
PATH REPORT.GROSS SPEC: NORMAL
PATH REPORT.MICROSCOPIC SPEC OTHER STN: NORMAL
PATH REPORT.RELEVANT HX SPEC: NORMAL
PHOTO IMAGE: NORMAL

## 2024-12-04 ENCOUNTER — LAB REQUISITION (OUTPATIENT)
Dept: LAB | Facility: CLINIC | Age: 44
End: 2024-12-04

## 2024-12-04 PROCEDURE — 87799 DETECT AGENT NOS DNA QUANT: CPT

## 2024-12-04 PROCEDURE — 80197 ASSAY OF TACROLIMUS: CPT

## 2024-12-16 ENCOUNTER — TELEPHONE (OUTPATIENT)
Dept: TRANSPLANT | Facility: CLINIC | Age: 44
End: 2024-12-16
Payer: COMMERCIAL

## 2024-12-16 NOTE — LETTER
11/7/2024      Ariane Flores  [address]  [facility] [address]      Dear Colleague,    Thank you for referring your patient, Ariane Flores, to the [facility]. Please see a copy of my visit note below.    Pt had left VM for pre-coordinator requesting CB.     RNCC returned call and left VM.     Pt called back stating she needs a 'paper on her kidney function.'    Pt called back      Again, thank you for allowing me to participate in the care of your patient.        Sincerely,        VIRGILIO Ford CNP

## 2024-12-19 ENCOUNTER — TELEPHONE (OUTPATIENT)
Dept: TRANSPLANT | Facility: CLINIC | Age: 44
End: 2024-12-19
Payer: COMMERCIAL

## 2024-12-19 NOTE — TELEPHONE ENCOUNTER
Left message for patient advising her the best way to view her office notes is to sign up for MyChart. A link has been sent to her and our contact information given to call back if questions.

## 2025-01-06 ENCOUNTER — LAB REQUISITION (OUTPATIENT)
Dept: LAB | Facility: CLINIC | Age: 45
End: 2025-01-06

## 2025-01-06 PROCEDURE — 86360 T CELL ABSOLUTE COUNT/RATIO: CPT

## 2025-01-06 PROCEDURE — 86359 T CELLS TOTAL COUNT: CPT

## 2025-01-07 LAB
CD3 CELLS # BLD: 517 CELLS/UL (ref 603–2990)
CD3 CELLS NFR BLD: 61 % (ref 49–84)
CD3+CD4+ CELLS # BLD: 194 CELLS/UL (ref 441–2156)
CD3+CD4+ CELLS NFR BLD: 23 % (ref 28–63)
CD3+CD4+ CELLS/CD3+CD8+ CLL BLD: 0.76 % (ref 1.4–2.6)
CD3+CD8+ CELLS # BLD: 254 CELLS/UL (ref 125–1312)
CD3+CD8+ CELLS NFR BLD: 30 % (ref 10–40)
T CELL COMMENT: ABNORMAL

## 2025-01-12 ENCOUNTER — HEALTH MAINTENANCE LETTER (OUTPATIENT)
Age: 45
End: 2025-01-12

## 2025-02-05 ENCOUNTER — LAB REQUISITION (OUTPATIENT)
Dept: LAB | Facility: CLINIC | Age: 45
End: 2025-02-05

## 2025-02-05 PROCEDURE — 80197 ASSAY OF TACROLIMUS: CPT

## 2025-02-05 PROCEDURE — 87799 DETECT AGENT NOS DNA QUANT: CPT

## 2025-02-13 ENCOUNTER — TELEPHONE (OUTPATIENT)
Dept: TRANSPLANT | Facility: CLINIC | Age: 45
End: 2025-02-13
Payer: COMMERCIAL

## 2025-02-13 DIAGNOSIS — Z94.0 KIDNEY REPLACED BY TRANSPLANT: ICD-10-CM

## 2025-02-13 RX ORDER — SULFAMETHOXAZOLE AND TRIMETHOPRIM 400; 80 MG/1; MG/1
1 TABLET ORAL DAILY
Qty: 90 TABLET | Refills: 3 | Status: SHIPPED | OUTPATIENT
Start: 2025-02-13

## 2025-02-13 NOTE — TELEPHONE ENCOUNTER
General  Route to LPN    Reason for call: Pt said she has no more refills left     Call back needed? Yes    Return Call Needed  Same as documented in contacts section  When to return call?: Greater than one day: Route standard priority

## 2025-03-07 ENCOUNTER — LAB REQUISITION (OUTPATIENT)
Dept: LAB | Facility: CLINIC | Age: 45
End: 2025-03-07

## 2025-03-07 PROCEDURE — 87799 DETECT AGENT NOS DNA QUANT: CPT

## 2025-03-07 PROCEDURE — 80197 ASSAY OF TACROLIMUS: CPT

## 2025-03-08 LAB
TACROLIMUS BLD-MCNC: 6.3 UG/L (ref 5–15)
TME LAST DOSE: NORMAL H
TME LAST DOSE: NORMAL H

## 2025-03-09 LAB
BK VIRUS SPECIMEN TYPE: NORMAL
BKV DNA # SPEC NAA+PROBE: NOT DETECTED IU/ML

## 2025-04-01 ENCOUNTER — LAB REQUISITION (OUTPATIENT)
Dept: LAB | Facility: CLINIC | Age: 45
End: 2025-04-01

## 2025-04-01 ENCOUNTER — LAB (OUTPATIENT)
Dept: LAB | Facility: CLINIC | Age: 45
End: 2025-04-01
Payer: COMMERCIAL

## 2025-04-01 DIAGNOSIS — Z48.298 AFTERCARE FOLLOWING ORGAN TRANSPLANT: ICD-10-CM

## 2025-04-01 PROCEDURE — 86360 T CELL ABSOLUTE COUNT/RATIO: CPT

## 2025-04-01 PROCEDURE — 87799 DETECT AGENT NOS DNA QUANT: CPT

## 2025-04-01 PROCEDURE — 86359 T CELLS TOTAL COUNT: CPT

## 2025-04-01 PROCEDURE — 80197 ASSAY OF TACROLIMUS: CPT

## 2025-04-02 LAB
BK VIRUS SPECIMEN TYPE: NORMAL
BKV DNA # SPEC NAA+PROBE: NOT DETECTED IU/ML
CD3 CELLS # BLD: 812 CELLS/UL (ref 603–2990)
CD3 CELLS NFR BLD: 60 % (ref 49–84)
CD3+CD4+ CELLS # BLD: 355 CELLS/UL (ref 441–2156)
CD3+CD4+ CELLS NFR BLD: 26 % (ref 28–63)
CD3+CD4+ CELLS/CD3+CD8+ CLL BLD: 0.93 % (ref 1.4–2.6)
CD3+CD8+ CELLS # BLD: 382 CELLS/UL (ref 125–1312)
CD3+CD8+ CELLS NFR BLD: 28 % (ref 10–40)
T CELL COMMENT: ABNORMAL
TACROLIMUS BLD-MCNC: 5.5 UG/L (ref 5–15)
TME LAST DOSE: NORMAL H
TME LAST DOSE: NORMAL H

## 2025-05-06 ENCOUNTER — LAB REQUISITION (OUTPATIENT)
Dept: LAB | Facility: CLINIC | Age: 45
End: 2025-05-06

## 2025-05-06 PROCEDURE — 80197 ASSAY OF TACROLIMUS: CPT

## 2025-05-06 PROCEDURE — 87799 DETECT AGENT NOS DNA QUANT: CPT

## 2025-05-07 ENCOUNTER — RESULTS FOLLOW-UP (OUTPATIENT)
Dept: TRANSPLANT | Facility: CLINIC | Age: 45
End: 2025-05-07

## 2025-05-07 LAB
BK VIRUS SPECIMEN TYPE: NORMAL
BKV DNA # SPEC NAA+PROBE: NOT DETECTED IU/ML
TACROLIMUS BLD-MCNC: 6.3 UG/L (ref 5–15)
TME LAST DOSE: NORMAL H
TME LAST DOSE: NORMAL H

## 2025-05-12 DIAGNOSIS — Z94.0 S/P KIDNEY TRANSPLANT: ICD-10-CM

## 2025-05-12 RX ORDER — TACROLIMUS 1 MG/1
1 CAPSULE ORAL 2 TIMES DAILY
Qty: 60 CAPSULE | Refills: 11 | Status: SHIPPED | OUTPATIENT
Start: 2025-05-12

## 2025-06-11 DIAGNOSIS — Z94.0 S/P KIDNEY TRANSPLANT: Primary | ICD-10-CM

## 2025-06-11 RX ORDER — TACROLIMUS 0.5 MG/1
0.5 CAPSULE ORAL 2 TIMES DAILY
Qty: 60 CAPSULE | Refills: 11 | Status: SHIPPED | OUTPATIENT
Start: 2025-06-11

## 2025-06-20 ENCOUNTER — RESULTS FOLLOW-UP (OUTPATIENT)
Dept: TRANSPLANT | Facility: CLINIC | Age: 45
End: 2025-06-20

## 2025-06-26 ENCOUNTER — RESULTS FOLLOW-UP (OUTPATIENT)
Dept: TRANSPLANT | Facility: CLINIC | Age: 45
End: 2025-06-26

## 2025-07-11 ENCOUNTER — LAB REQUISITION (OUTPATIENT)
Dept: LAB | Facility: CLINIC | Age: 45
End: 2025-07-11

## 2025-07-11 PROCEDURE — 80197 ASSAY OF TACROLIMUS: CPT

## 2025-07-11 PROCEDURE — 87799 DETECT AGENT NOS DNA QUANT: CPT

## 2025-07-14 ENCOUNTER — TELEPHONE (OUTPATIENT)
Dept: TRANSPLANT | Facility: CLINIC | Age: 45
End: 2025-07-14
Payer: COMMERCIAL

## 2025-07-14 NOTE — TELEPHONE ENCOUNTER
Patient stated she has some concerns of a couple of abnormal lab results she received from lab draw on 7/11/2025 patient isn't sure of lab name but would like for coordinator to call today if possible, also wanting to know how often is she needing to come in to see the transplant team in clinic.

## 2025-07-19 ENCOUNTER — HEALTH MAINTENANCE LETTER (OUTPATIENT)
Age: 45
End: 2025-07-19

## 2025-07-21 ENCOUNTER — TELEPHONE (OUTPATIENT)
Dept: TRANSPLANT | Facility: CLINIC | Age: 45
End: 2025-07-21
Payer: COMMERCIAL

## 2025-07-21 DIAGNOSIS — Z48.298 AFTERCARE FOLLOWING ORGAN TRANSPLANT: Primary | ICD-10-CM

## 2025-07-21 NOTE — TELEPHONE ENCOUNTER
Patient called to speak with coordinator and would like a call back as soon as possible-No other details provided.

## 2025-07-21 NOTE — TELEPHONE ENCOUNTER
RNCC spoke with Ariane. Pt is concerned about weight gain after transplant and curious if weIght loss meds are safe.     RNCC sent myC message with list of approved meds from a transplant standpoint for pt to bring forward to PCP.     Txp neph follow up also ordered as pt is due

## 2025-08-05 ENCOUNTER — OFFICE VISIT (OUTPATIENT)
Dept: TRANSPLANT | Facility: CLINIC | Age: 45
End: 2025-08-05
Attending: INTERNAL MEDICINE
Payer: COMMERCIAL

## 2025-08-05 VITALS
DIASTOLIC BLOOD PRESSURE: 80 MMHG | SYSTOLIC BLOOD PRESSURE: 128 MMHG | BODY MASS INDEX: 35.06 KG/M2 | TEMPERATURE: 98.6 F | HEART RATE: 65 BPM | WEIGHT: 179.5 LBS | OXYGEN SATURATION: 98 %

## 2025-08-05 DIAGNOSIS — Z48.298 AFTERCARE FOLLOWING ORGAN TRANSPLANT: ICD-10-CM

## 2025-08-05 DIAGNOSIS — Z94.0 KIDNEY TRANSPLANT RECIPIENT: ICD-10-CM

## 2025-08-05 DIAGNOSIS — E66.1 CLASS 2 DRUG-INDUCED OBESITY WITH BODY MASS INDEX (BMI) OF 35.0 TO 35.9 IN ADULT, UNSPECIFIED WHETHER SERIOUS COMORBIDITY PRESENT: Primary | ICD-10-CM

## 2025-08-05 DIAGNOSIS — Q60.2 CONGENITAL RENAL AGENESIS AND DYSGENESIS: ICD-10-CM

## 2025-08-05 DIAGNOSIS — E66.812 CLASS 2 DRUG-INDUCED OBESITY WITH BODY MASS INDEX (BMI) OF 35.0 TO 35.9 IN ADULT, UNSPECIFIED WHETHER SERIOUS COMORBIDITY PRESENT: Primary | ICD-10-CM

## 2025-08-05 DIAGNOSIS — Q60.5 CONGENITAL RENAL AGENESIS AND DYSGENESIS: ICD-10-CM

## 2025-08-05 PROCEDURE — G0463 HOSPITAL OUTPT CLINIC VISIT: HCPCS | Performed by: NURSE PRACTITIONER

## 2025-08-05 RX ORDER — SEMAGLUTIDE 0.25 MG/.5ML
INJECTION, SOLUTION SUBCUTANEOUS
COMMUNITY
Start: 2025-07-30

## 2025-08-05 ASSESSMENT — PAIN SCALES - GENERAL: PAINLEVEL_OUTOF10: NO PAIN (0)

## 2025-08-26 ENCOUNTER — EXTERNAL ORDER RESULTS (OUTPATIENT)
Dept: LAB | Facility: CLINIC | Age: 45
End: 2025-08-26
Payer: COMMERCIAL

## 2025-08-26 ENCOUNTER — LAB REQUISITION (OUTPATIENT)
Dept: LAB | Facility: CLINIC | Age: 45
End: 2025-08-26

## 2025-08-26 PROCEDURE — 80197 ASSAY OF TACROLIMUS: CPT

## 2025-08-26 PROCEDURE — 87799 DETECT AGENT NOS DNA QUANT: CPT

## 2025-08-27 LAB
% BASOPHILS (EXTERNAL): 0.5 % (ref 0–2)
% EOSINOPHILS (EXTERNAL): 2.5 % (ref 0–4)
% IMMATURE GRANULOCYTES (EXTERNAL): 0.3 % (ref 0–5)
% LYMPHOCYTES (EXTERNAL): 23.5 % (ref 20–40)
% MONOCYTES (EXTERNAL): 12.5 % (ref 2–10)
% NEUTROPHILS (EXTERNAL): 60.7 % (ref 50–70)
ABSOLUTE BASOPHILS (EXTERNAL): 0 K/UL (ref 0–0.1)
ABSOLUTE EOSINOPHILS (EXTERNAL): 0.2 K/UL (ref 0–0.4)
ABSOLUTE IMMATURE GRANULOCYTES (EXTERNAL): 0.02 K/UL (ref 0–0.5)
ABSOLUTE LYMPHOCYTES (EXTERNAL): 1.4 K/UL (ref 2–7.7)
ABSOLUTE MONOCYTES (EXTERNAL): 0.8 K/UL (ref 0.1–0.8)
ABSOLUTE NEUTROPHILS (EXTERNAL): 3.7 K/UL (ref 1.5–7.7)
BK VIRUS SPECIMEN TYPE: NORMAL
BKV DNA # SPEC NAA+PROBE: NOT DETECTED IU/ML
METHOD (EXTERNAL): ABNORMAL
TACROLIMUS BLD-MCNC: 7.9 UG/L (ref 5–15)
TME LAST DOSE: NORMAL H
TME LAST DOSE: NORMAL H

## (undated) DEVICE — SU PDS II 5-0 RB-1 27" Z303H

## (undated) DEVICE — ESU PENCIL SMOKE EVAC W/ROCKER SWITCH 0703-047-000

## (undated) DEVICE — SYR 10ML LL W/O NDL 302995

## (undated) DEVICE — PREP CHLORAPREP 26ML TINTED HI-LITE ORANGE 930815

## (undated) DEVICE — WIPES FOLEY CARE SURESTEP PROVON DFC100

## (undated) DEVICE — DRAPE SHEET REV FOLD 3/4 9349

## (undated) DEVICE — SU VICRYL 3-0 SH 27" UND J416H

## (undated) DEVICE — DRAPE FLUID WARMING 52 X 60" ORS-321

## (undated) DEVICE — SU SILK 3-0 SH CR 8X30" C017D

## (undated) DEVICE — SOL NACL 0.9% IRRIG 1000ML BOTTLE 2F7124

## (undated) DEVICE — NDL BLUNT 18GA 1" W/O FILTER 305181

## (undated) DEVICE — SU PROLENE 6-0 RB-2DA 30" 8711H

## (undated) DEVICE — Device

## (undated) DEVICE — DRSG MEDIPORE 3 1/2X13 3/4" 3573

## (undated) DEVICE — CATH FOLEY 3WAY 18FR 30ML LATEX 0167SI18

## (undated) DEVICE — DRAPE IOBAN INCISE 23X17" 6650EZ

## (undated) DEVICE — SOL WATER IRRIG 1000ML BOTTLE 2F7114

## (undated) DEVICE — STPL LINEAR 30X2.5MM VASC TX30V

## (undated) DEVICE — TUBING IRRIG CYSTO/BLADDER SET 81" LF 2C4040

## (undated) DEVICE — DEVICE CATH STABILIZATION STATLOCK FOLEY 3-WAY FOL0105

## (undated) DEVICE — SOL NACL 0.9% INJ 1000ML BAG 2B1324X

## (undated) DEVICE — NDL COUNTER 20CT 31142493

## (undated) DEVICE — SU PDS II 4-0 RB-1 27" Z304H

## (undated) DEVICE — INSERT FOGARTY 33MM TRACTION HYDRAJAW HYDRA33

## (undated) DEVICE — SUCTION TIP YANKAUER STR K87

## (undated) DEVICE — LINEN TOWEL PACK X6 WHITE 5487

## (undated) DEVICE — LINEN TOWEL PACK X30 5481

## (undated) DEVICE — BLADE CLIPPER SGL USE 9680

## (undated) DEVICE — CATH PLUG W/CAP 000076

## (undated) RX ORDER — VERAPAMIL HYDROCHLORIDE 2.5 MG/ML
INJECTION, SOLUTION INTRAVENOUS
Status: DISPENSED
Start: 2023-09-27

## (undated) RX ORDER — PAPAVERINE HYDROCHLORIDE 30 MG/ML
INJECTION INTRAMUSCULAR; INTRAVENOUS
Status: DISPENSED
Start: 2023-09-27

## (undated) RX ORDER — DEXTROSE, SODIUM CHLORIDE, SODIUM LACTATE, POTASSIUM CHLORIDE, AND CALCIUM CHLORIDE 5; .6; .31; .03; .02 G/100ML; G/100ML; G/100ML; G/100ML; G/100ML
INJECTION, SOLUTION INTRAVENOUS
Status: DISPENSED
Start: 2023-09-27

## (undated) RX ORDER — SODIUM CHLORIDE 9 MG/ML
INJECTION, SOLUTION INTRAVENOUS
Status: DISPENSED
Start: 2023-09-27

## (undated) RX ORDER — HEPARIN SODIUM 1000 [USP'U]/ML
INJECTION, SOLUTION INTRAVENOUS; SUBCUTANEOUS
Status: DISPENSED
Start: 2023-09-27

## (undated) RX ORDER — CARDIOPLEG/ORGAN PRESERV NO.1 9-198-2-1
BOTTLE PERFUSION
Status: DISPENSED
Start: 2023-09-27

## (undated) RX ORDER — FENTANYL CITRATE 50 UG/ML
INJECTION, SOLUTION INTRAMUSCULAR; INTRAVENOUS
Status: DISPENSED
Start: 2023-09-27

## (undated) RX ORDER — SODIUM CHLORIDE 450 MG/100ML
INJECTION, SOLUTION INTRAVENOUS
Status: DISPENSED
Start: 2023-09-27